# Patient Record
Sex: FEMALE | Race: WHITE | Employment: FULL TIME | URBAN - METROPOLITAN AREA
[De-identification: names, ages, dates, MRNs, and addresses within clinical notes are randomized per-mention and may not be internally consistent; named-entity substitution may affect disease eponyms.]

---

## 2017-02-07 ENCOUNTER — OFFICE VISIT (OUTPATIENT)
Dept: INTERNAL MEDICINE CLINIC | Age: 62
End: 2017-02-07

## 2017-02-07 VITALS
RESPIRATION RATE: 18 BRPM | DIASTOLIC BLOOD PRESSURE: 83 MMHG | HEART RATE: 78 BPM | HEIGHT: 67 IN | TEMPERATURE: 97.8 F | SYSTOLIC BLOOD PRESSURE: 146 MMHG | WEIGHT: 265.4 LBS | OXYGEN SATURATION: 97 % | BODY MASS INDEX: 41.65 KG/M2

## 2017-02-07 DIAGNOSIS — E66.01 OBESITY, MORBID, BMI 40.0-49.9 (HCC): ICD-10-CM

## 2017-02-07 DIAGNOSIS — E78.2 MIXED HYPERLIPIDEMIA: ICD-10-CM

## 2017-02-07 DIAGNOSIS — Z23 ENCOUNTER FOR IMMUNIZATION: ICD-10-CM

## 2017-02-07 DIAGNOSIS — I10 ESSENTIAL HYPERTENSION: Primary | ICD-10-CM

## 2017-02-07 PROBLEM — E78.5 HYPERLIPIDEMIA: Status: ACTIVE | Noted: 2017-02-07

## 2017-02-07 PROBLEM — M17.9 OSTEOARTHRITIS OF KNEE: Status: ACTIVE | Noted: 2017-02-07

## 2017-02-07 RX ORDER — LISINOPRIL 10 MG/1
10 TABLET ORAL DAILY
Qty: 90 TAB | Refills: 3 | Status: SHIPPED | OUTPATIENT
Start: 2017-02-07 | End: 2017-07-20 | Stop reason: ALTCHOICE

## 2017-02-07 RX ORDER — FUROSEMIDE 20 MG/1
20 TABLET ORAL DAILY
Qty: 90 TAB | Refills: 3 | Status: SHIPPED | OUTPATIENT
Start: 2017-02-07 | End: 2017-07-20 | Stop reason: SDUPTHER

## 2017-02-07 NOTE — MR AVS SNAPSHOT
Visit Information Date & Time Provider Department Dept. Phone Encounter #  
 2/7/2017  8:00 AM Anibal Vega MD Internist of [address] [place] [address] Place [id number] Your Appointments 8/8/2017  8:00 AM  
Office Visit with Anibal Vega MD  
Internist of [facility]) Appt Note: 6 months [address], [address] [place] [address] [address] [place]  
  
   
 [address], [address] Upcoming Health Maintenance Date Due DTaP/Tdap/Td series (1 - Tdap) 5/5/1977 ZOSTER VACCINE AGE 60> 5/5/2016 BREAST CANCER SCRN MAMMOGRAM 6/11/2017 COLONOSCOPY 5/28/2018 PAP AKA CERVICAL CYTOLOGY 7/28/2019 Allergies as of 2/7/2017  Review Complete On: 2/7/2017 By: Nicole Speaker No Known Allergies Current Immunizations  Reviewed on 10/10/2016 Name Date Influenza Vaccine (Quad) PF 10/10/2016 Not reviewed this visit You Were Diagnosed With   
  
 Codes Comments Essential hypertension    -  Primary ICD-10-CM: I10 
ICD-9-CM: 401.9 Vitals BP Pulse Temp Resp Height(growth percentile) Weight(growth percentile) 146/83 (BP 1 Location: Left arm, BP Patient Position: Sitting) 78 97.8 °F (36.6 °C) (Oral) 18 5' 6.5\" (1.689 m) 265 lb 6.4 oz (120.4 kg) SpO2 BMI OB Status Smoking Status 97% 42.19 kg/m2 Hysterectomy Never Smoker BMI and BSA Data Body Mass Index Body Surface Area  
 42.19 kg/m 2 2.38 m 2 Preferred Pharmacy Pharmacy Name Phone [facility] [address] [facility] [phone] Your Updated Medication List  
  
   
This list is accurate as of: 2/7/17  9:10 AM.  Always use your most recent med list.  
  
  
  
  
 acetaminophen 650 mg CR tablet Commonly known as:  TYLENOL Take 1 Tab by mouth every eight (8) hours as needed for Pain. calcium-cholecalciferol (d3) 600-125 mg-unit Tab Take 600 mg by mouth daily. cholecalciferol (vitamin D3) 2,000 unit Tab Take 1 Tab by mouth daily. furosemide 20 mg tablet Commonly known as:  LASIX Take 1 Tab by mouth daily. lisinopril 10 mg tablet Commonly known as:  Robertha Roup Take 1 Tab by mouth daily. meclizine 25 mg tablet Commonly known as:  ANTIVERT Take 1 Tab by mouth three (3) times daily as needed. Indications: VERTIGO  
  
 meloxicam 15 mg tablet Commonly known as:  MOBIC Take 1 Tab by mouth daily. omega-3 fatty acids-vitamin e 1,000 mg Cap Take 1 Cap by mouth. simvastatin 20 mg tablet Commonly known as:  ZOCOR Take 1 Tab by mouth nightly. Prescriptions Sent to Pharmacy Refills  
 furosemide (LASIX) 20 mg tablet 3 Sig: Take 1 Tab by mouth daily. Class: Normal  
 Pharmacy: [address] Ph #: [phone] Route: Oral  
 lisinopril (PRINIVIL, ZESTRIL) 10 mg tablet 3 Sig: Take 1 Tab by mouth daily. Class: Normal  
 Pharmacy: [address] Ph #: [phone] Route: Oral  
  
Patient Instructions PLAN: 
Key points we discussed today: 1. Call our office if symptoms worsen or if you have any questions 2. \"The Whole 30\" cook-book vs Paleo diet vs Mediterranean diet discussed today 3. Medications refilled. 4. Your goal is to keep your \"top\" blood pressure number below 140 and your \"bottom\" blood pressure number below 90. If you can, check your blood pressure 3 times per week. Notify us if your blood pressure is greater than 140/90. Dr. Marychuy Mcclelland [facility] [address] Phone: [phone] Fax: [phone] Thank you for choosing [facility] for all of your health care needs. High Cholesterol: Care Instructions Your Care Instructions Cholesterol is a type of fat in your blood. It is needed for many body functions, such as making new cells. Cholesterol is made by your body. It also comes from food you eat. High cholesterol means that you have too much of the fat in your blood. This raises your risk of a heart attack and stroke. LDL and HDL are part of your total cholesterol. LDL is the \"bad\" cholesterol. High LDL can raise your risk for heart disease, heart attack, and stroke. HDL is the \"good\" cholesterol. It helps clear bad cholesterol from the body. High HDL is linked with a lower risk of heart disease, heart attack, and stroke. Your cholesterol levels help your doctor find out your risk for having a heart attack or stroke. You and your doctor can talk about whether you need to lower your risk and what treatment is best for you. A heart-healthy lifestyle along with medicines can help lower your cholesterol and your risk. The way you choose to lower your risk will depend on how high your risk is for heart attack and stroke. It will also depend on how you feel about taking medicines. Follow-up care is a key part of your treatment and safety. Be sure to make and go to all appointments, and call your doctor if you are having problems. It's also a good idea to know your test results and keep a list of the medicines you take. How can you care for yourself at home? · Eat a variety of foods every day. Good choices include fruits, vegetables, whole grains (like oatmeal), dried beans and peas, nuts and seeds, soy products (like tofu), and fat-free or low-fat dairy products. · Replace butter, margarine, and hydrogenated or partially hydrogenated oils with olive and canola oils. (Canola oil margarine without trans fat is fine.) · Replace red meat with fish, poultry, and soy protein (like tofu). · Limit processed and packaged foods like chips, crackers, and cookies. · Bake, broil, or steam foods. Don't coleman them. · Be physically active. Get at least 30 minutes of exercise on most days of the week. Walking is a good choice. You also may want to do other activities, such as running, swimming, cycling, or playing tennis or team sports. · Stay at a healthy weight or lose weight by making the changes in eating and physical activity listed above. Losing just a small amount of weight, even 5 to 10 pounds, can reduce your risk for having a heart attack or stroke. · Do not smoke. When should you call for help? Watch closely for changes in your health, and be sure to contact your doctor if: 
· You need help making lifestyle changes. · You have questions about your medicine. Where can you learn more? Go to http://stantonTurbinegonsalo.info/. Enter Y741 in the search box to learn more about \"High Cholesterol: Care Instructions. \" Current as of: January 27, 2016 Content Version: 11.1 © 7647-2763 [employer]. Care instructions adapted under license by [employer] (which disclaims liability or warranty for this information). If you have questions about a medical condition or this instruction, always ask your healthcare professional. [address] any warranty or liability for your use of this information. Health Maintenance Due Topic Date Due  
 DTaP/Tdap/Td series (1 - Tdap) 05/05/1977  ZOSTER VACCINE AGE 60>  05/05/2016 [facility]! Dear Ananya Tirado: 
Thank you for requesting a [employer] account. Our records indicate that you already have an active [employer] account. You can access your account anytime at [email or web address] Did you know that you can access your hospital and ER discharge instructions at any time in [employer]? You can also review all of your test results from your hospital stay or ER visit. Additional Information If you have questions, please visit the Frequently Asked Questions section of the [email or web address] website at [email or web address] Remember, [employer] is NOT to be used for urgent needs. For medical emergencies, dial 911. Now available from your iPhone and Android! Please provide this summary of care documentation to your next provider. Your primary care clinician is listed as Lilli Brandt. If you have any questions after today's visit, please call [phone].

## 2017-02-07 NOTE — PROGRESS NOTES
INTERNISTS OF [facility]:  2/12/2017, MRN: [id number]       Zelda Floyd is a 62 y.o. female and presents to clinic for Hypertension; Cholesterol Problem; Labs (done 10-10-16 to discuss); and Immunization/Injection (t dap)    Subjective:   Pt is a 66yo female with h/o HLD, HTN, and DJD. 1. HTN:   - Chronic, present >6 months   - On lasix and lisinopril   - No side effects. She needs refills    2. HLD:   - Chronic, present >6 months   - On simvastatin, no adverse side effects.   - Has a 7lb weight gain   - No regular exercise   - Has problem with late evening snacks and a heavy dinner   - Has tried Weight Watchers but has to stop at times due to cost   - Not interested in bariatric surgery      Patient Active Problem List    Diagnosis Date Noted    Hyperlipidemia 02/07/2017    Essential hypertension 02/07/2017    Osteoarthritis of knee 02/07/2017       Current Outpatient Prescriptions   Medication Sig Dispense Refill    furosemide (LASIX) 20 mg tablet Take 1 Tab by mouth daily. 90 Tab 3    lisinopril (PRINIVIL, ZESTRIL) 10 mg tablet Take 1 Tab by mouth daily. 90 Tab 3    meloxicam (MOBIC) 15 mg tablet Take 1 Tab by mouth daily. 90 Tab 1    omega-3 fatty acids-vitamin e 1,000 mg cap Take 1 Cap by mouth.  simvastatin (ZOCOR) 20 mg tablet Take 1 Tab by mouth nightly. 90 Tab 3    cholecalciferol, vitamin D3, 2,000 unit tab Take 1 Tab by mouth daily. 90 Tab 3    calcium-cholecalciferol, d3, 600-125 mg-unit tab Take 600 mg by mouth daily.  acetaminophen (TYLENOL) 650 mg CR tablet Take 1 Tab by mouth every eight (8) hours as needed for Pain. 120 Tab 1    meclizine (ANTIVERT) 25 mg tablet Take 1 Tab by mouth three (3) times daily as needed.  Indications: VERTIGO 30 Tab 1       No Known Allergies    Past Medical History   Diagnosis Date    Arthritis      Bilateral Knee, and Bilateral Hand/Thumb    Carpal tunnel syndrome     Headache     History of ankle fusion 1997     left    Hx of migraine headaches  Hypercholesterolemia     Hypertension     Morbid obesity ([facility] [address]     Vertigo        Past Surgical History   Procedure Laterality Date    Hx hysterectomy       age 30years old   [facility] Hx tonsillectomy       at age 29years old   [facility] Hx appendectomy       age 25 years   [facility] Hx colonoscopy       Due in 2018: May 2015       Family History   Problem Relation Age of Onset    Heart Disease Mother     Hypertension Mother     Heart Surgery Mother     Cancer Father      colon    Hypertension Brother     No Known Problems Maternal Grandmother     Heart Disease Maternal Grandfather     MS Paternal Grandmother     Stroke Paternal Grandfather     Heart Disease Paternal Grandfather     No Known Problems Son        Social History   Substance Use Topics    Smoking status: Never Smoker    Smokeless tobacco: Never Used    Alcohol use 0.0 oz/week     0 Standard drinks or equivalent per week      Comment: rare once a year       ROS   Review of Systems   Constitutional: Negative for chills, fever and weight loss. HENT: Negative for ear pain and sore throat. Eyes: Negative for blurred vision and pain. Respiratory: Negative for cough and shortness of breath. Cardiovascular: Negative for chest pain. Gastrointestinal: Negative for abdominal pain. Genitourinary: Negative for dysuria. Musculoskeletal: Negative for joint pain and myalgias. Skin: Negative for rash. Neurological: Negative for tingling, focal weakness and headaches. Endo/Heme/Allergies: Negative for environmental allergies. Does not bruise/bleed easily. Psychiatric/Behavioral: Negative for substance abuse.        Objective     Vitals:    02/07/17 0817   BP: 146/83   Pulse: 78   Resp: 18   Temp: 97.8 °F (36.6 °C)   TempSrc: Oral   SpO2: 97%   Weight: 265 lb 6.4 oz (120.4 kg)   Height: 5' 6.5\" (1.689 m)   PainSc:   0 - No pain       Physical Exam   Constitutional: She is oriented to person, place, and time and well-developed, well-nourished, and in no distress. HENT:   Head: Normocephalic and atraumatic. Right Ear: External ear normal.   Left Ear: External ear normal.   Nose: Nose normal.   Mouth/Throat: Oropharynx is clear and moist. No oropharyngeal exudate. Clear TMs   Eyes: Conjunctivae and EOM are normal. Pupils are equal, round, and reactive to light. Right eye exhibits no discharge. Left eye exhibits no discharge. No scleral icterus. Neck: Neck supple. Cardiovascular: Normal rate, regular rhythm, normal heart sounds and intact distal pulses. Exam reveals no gallop and no friction rub. No murmur heard. Pulmonary/Chest: Effort normal and breath sounds normal. No respiratory distress. She has no wheezes. She has no rales. Abdominal: Soft. Bowel sounds are normal. She exhibits no distension. There is no tenderness. There is no rebound and no guarding. No hepatomegaly   Musculoskeletal: She exhibits no edema or tenderness (BUE are NTTP). Lymphadenopathy:     She has no cervical adenopathy. Neurological: She is alert and oriented to person, place, and time. She exhibits normal muscle tone. Gait normal.   Skin: Skin is warm and dry. No erythema. Psychiatric: Affect normal.   Nursing note and vitals reviewed.       LABS   Data Review:   Lab Results   Component Value Date/Time    WBC 6.2 10/10/2016 07:49 AM    HGB 14.6 10/10/2016 07:49 AM    HCT 45.1 10/10/2016 07:49 AM    PLATELET 409 78/65/1122 07:49 AM    MCV 89.7 10/10/2016 07:49 AM       Lab Results   Component Value Date/Time    Sodium 142 10/10/2016 07:49 AM    Potassium 4.2 10/10/2016 07:49 AM    Chloride 106 10/10/2016 07:49 AM    CO2 30 10/10/2016 07:49 AM    Anion gap 6 10/10/2016 07:49 AM    Glucose 97 10/10/2016 07:49 AM    BUN 15 10/10/2016 07:49 AM    Creatinine 0.58 10/10/2016 07:49 AM    BUN/Creatinine ratio 26 10/10/2016 07:49 AM    GFR est AA >60 10/10/2016 07:49 AM    GFR est non-AA >60 10/10/2016 07:49 AM    Calcium 9.3 10/10/2016 07:49 AM       Lab Results Component Value Date/Time    Cholesterol, total 164 10/10/2016 07:49 AM    HDL Cholesterol 56 10/10/2016 07:49 AM    LDL, calculated 69 10/10/2016 07:49 AM    VLDL, calculated 39 10/10/2016 07:49 AM    Triglyceride 195 10/10/2016 07:49 AM    CHOL/HDL Ratio 2.9 10/10/2016 07:49 AM       Lab Results   Component Value Date/Time    Hemoglobin A1c 5.4 05/16/2016 02:21 PM       Assessment/Plan:   1. Essential hypertension:  - Refilled her lasix and lisinopril    ORDERS:  - furosemide (LASIX) 20 mg tablet; Take 1 Tab by mouth daily. Dispense: 90 Tab; Refill: 3  - lisinopril (PRINIVIL, ZESTRIL) 10 mg tablet; Take 1 Tab by mouth daily. Dispense: 90 Tab; Refill: 3    2. Encounter for immunization  - Tdap given today    ORDERS:  - Tetanus, diphtheria toxoids and acellular pertussis (TDAP) vaccine, in individuals >=7 years, IM  - DC IMMUNIZ ADMIN,1 SINGLE/COMB VAC/TOXOID    3. HLD/Obesity:   - Pt counseled on the Paleo diet, the Whole 30, and the Mediterranean diet as ways to improve her diet, reduce her cholesterol, and lose weight. -C/w statin rx. Health Maintenance Due   Topic Date Due    ZOSTER VACCINE AGE 60>  05/05/2016    BREAST CANCER SCRN MAMMOGRAM  06/11/2017       Lab review: labs are reviewed in EHR    I have discussed the diagnosis with the patient and the intended plan as seen in the above orders. The patient has received an after-visit summary and questions were answered concerning future plans. I have discussed medication side effects and warnings with the patient as well. I have reviewed the plan of care with the patient, accepted their input and they are in agreement with the treatment goals. All questions were answered. The patient understands the plan of care. Handouts provided today with above information. Pt instructed if symptoms worsen to call the office or report to the ED for continued care. Greater than 50% of the visit time was spent in counseling and/or coordination of care. Follow-up Disposition:  Return in about 6 months (around 8/7/2017), or if symptoms worsen or fail to improve, for BP check.     Donell Cruz MD

## 2017-02-07 NOTE — PROGRESS NOTES
Chief Complaint   Patient presents with    Hypertension    Cholesterol Problem    Labs     done 10-10-16 to discuss     1. Have you been to the ER, urgent care clinic since your last visit? Hospitalized since your last visit? No    2. Have you seen or consulted any other health care providers outside of the [address] [place] [address] since your last visit? Include any pap smears or colon screening.  No

## 2017-02-07 NOTE — PROGRESS NOTES
Kathie Scherer is a 62 y.o. female who presents for routine immunizations. She denies any symptoms , reactions or allergies that would exclude them from being immunized today. Risks and adverse reactions were discussed and the VIS was given to them. All questions were addressed. She was observed for 10 min post injection. There were no reactions observed.     Chau Alcala LPN

## 2017-02-07 NOTE — PATIENT INSTRUCTIONS
PLAN:  Key points we discussed today:  1. Call our office if symptoms worsen or if you have any questions    2. \"The Whole 30\" cook-book vs Paleo diet vs Mediterranean diet discussed today    3. Medications refilled. 4. Your goal is to keep your \"top\" blood pressure number below 140 and your \"bottom\" blood pressure number below 90. If you can, check your blood pressure 3 times per week. Notify us if your blood pressure is greater than 140/90. Dr. Elijah Beavers  [facility] [place] [address]  Phone: [phone]  Fax: [phone]    Thank you for choosing [facility] for all of your health care needs. High Cholesterol: Care Instructions  Your Care Instructions  Cholesterol is a type of fat in your blood. It is needed for many body functions, such as making new cells. Cholesterol is made by your body. It also comes from food you eat. High cholesterol means that you have too much of the fat in your blood. This raises your risk of a heart attack and stroke. LDL and HDL are part of your total cholesterol. LDL is the \"bad\" cholesterol. High LDL can raise your risk for heart disease, heart attack, and stroke. HDL is the \"good\" cholesterol. It helps clear bad cholesterol from the body. High HDL is linked with a lower risk of heart disease, heart attack, and stroke. Your cholesterol levels help your doctor find out your risk for having a heart attack or stroke. You and your doctor can talk about whether you need to lower your risk and what treatment is best for you. A heart-healthy lifestyle along with medicines can help lower your cholesterol and your risk. The way you choose to lower your risk will depend on how high your risk is for heart attack and stroke. It will also depend on how you feel about taking medicines. Follow-up care is a key part of your treatment and safety.  Be sure to make and go to all appointments, and call your doctor if you are having problems. It's also a good idea to know your test results and keep a list of the medicines you take. How can you care for yourself at home? · Eat a variety of foods every day. Good choices include fruits, vegetables, whole grains (like oatmeal), dried beans and peas, nuts and seeds, soy products (like tofu), and fat-free or low-fat dairy products. · Replace butter, margarine, and hydrogenated or partially hydrogenated oils with olive and canola oils. (Canola oil margarine without trans fat is fine.)  · Replace red meat with fish, poultry, and soy protein (like tofu). · Limit processed and packaged foods like chips, crackers, and cookies. · Bake, broil, or steam foods. Don't coleman them. · Be physically active. Get at least 30 minutes of exercise on most days of the week. Walking is a good choice. You also may want to do other activities, such as running, swimming, cycling, or playing tennis or team sports. · Stay at a healthy weight or lose weight by making the changes in eating and physical activity listed above. Losing just a small amount of weight, even 5 to 10 pounds, can reduce your risk for having a heart attack or stroke. · Do not smoke. When should you call for help? Watch closely for changes in your health, and be sure to contact your doctor if:  · You need help making lifestyle changes. · You have questions about your medicine. Where can you learn more? Go to http://stanton-gonsalo.info/. Enter W755 in the search box to learn more about \"High Cholesterol: Care Instructions. \"  Current as of: January 27, 2016  Content Version: 11.1  © 1070-5865 [employer]. Care instructions adapted under license by [employer] (which disclaims liability or warranty for this information).  If you have questions about a medical condition or this instruction, always ask your healthcare professional. [employer] disclaims any warranty or liability for your use of this information.   Health Maintenance Due   Topic Date Due    DTaP/Tdap/Td series (1 - Tdap) 05/05/1977    ZOSTER VACCINE AGE 60>  05/05/2016

## 2017-02-12 PROBLEM — E66.01 OBESITY, MORBID, BMI 40.0-49.9 (HCC): Status: ACTIVE | Noted: 2017-02-12

## 2017-03-01 RX ORDER — MELOXICAM 15 MG/1
15 TABLET ORAL DAILY
Qty: 90 TAB | Refills: 1 | Status: SHIPPED | OUTPATIENT
Start: 2017-03-01 | End: 2017-08-30 | Stop reason: SDUPTHER

## 2017-03-15 ENCOUNTER — TELEPHONE (OUTPATIENT)
Dept: ORTHOPEDIC SURGERY | Facility: CLINIC | Age: 62
End: 2017-03-15

## 2017-06-26 DIAGNOSIS — L98.9 SKIN LESION: Primary | ICD-10-CM

## 2017-06-26 DIAGNOSIS — Z12.39 SCREENING FOR BREAST CANCER: ICD-10-CM

## 2017-07-14 ENCOUNTER — HOSPITAL ENCOUNTER (OUTPATIENT)
Dept: MAMMOGRAPHY | Age: 62
Discharge: HOME | End: 2017-07-14
Attending: INTERNAL MEDICINE
Payer: COMMERCIAL

## 2017-07-14 DIAGNOSIS — Z12.39 SCREENING FOR BREAST CANCER: ICD-10-CM

## 2017-07-14 PROCEDURE — 77067 SCR MAMMO BI INCL CAD: CPT

## 2017-07-18 RX ORDER — SIMVASTATIN 20 MG/1
20 TABLET, FILM COATED ORAL
Qty: 90 TAB | Refills: 3 | Status: SHIPPED | OUTPATIENT
Start: 2017-07-18 | End: 2018-07-08 | Stop reason: SDUPTHER

## 2017-07-20 ENCOUNTER — TELEPHONE (OUTPATIENT)
Dept: INTERNAL MEDICINE CLINIC | Age: 62
End: 2017-07-20

## 2017-07-20 ENCOUNTER — HOSPITAL ENCOUNTER (OUTPATIENT)
Dept: LAB | Age: 62
Discharge: HOME | End: 2017-07-20
Payer: COMMERCIAL

## 2017-07-20 ENCOUNTER — OFFICE VISIT (OUTPATIENT)
Dept: INTERNAL MEDICINE CLINIC | Age: 62
End: 2017-07-20

## 2017-07-20 ENCOUNTER — HOSPITAL ENCOUNTER (OUTPATIENT)
Dept: GENERAL RADIOLOGY | Age: 62
Discharge: HOME | End: 2017-07-20
Payer: COMMERCIAL

## 2017-07-20 VITALS
HEIGHT: 67 IN | SYSTOLIC BLOOD PRESSURE: 130 MMHG | BODY MASS INDEX: 44.95 KG/M2 | RESPIRATION RATE: 18 BRPM | HEART RATE: 57 BPM | TEMPERATURE: 96 F | OXYGEN SATURATION: 96 % | DIASTOLIC BLOOD PRESSURE: 90 MMHG | WEIGHT: 286.4 LBS

## 2017-07-20 DIAGNOSIS — R05.9 COUGH: ICD-10-CM

## 2017-07-20 DIAGNOSIS — R05.9 COUGH: Primary | ICD-10-CM

## 2017-07-20 DIAGNOSIS — R53.83 FATIGUE, UNSPECIFIED TYPE: ICD-10-CM

## 2017-07-20 DIAGNOSIS — I10 ESSENTIAL HYPERTENSION: ICD-10-CM

## 2017-07-20 LAB
BASOPHILS # BLD AUTO: 0 K/UL (ref 0–0.1)
BASOPHILS # BLD: 0 % (ref 0–2)
DIFFERENTIAL METHOD BLD: NORMAL
EOSINOPHIL # BLD: 0.1 K/UL (ref 0–0.4)
EOSINOPHIL NFR BLD: 2 % (ref 0–5)
ERYTHROCYTE [DISTWIDTH] IN BLOOD BY AUTOMATED COUNT: 12.2 % (ref 11.6–14.5)
HCT VFR BLD AUTO: 43.3 % (ref 35–45)
HGB BLD-MCNC: 14.3 G/DL (ref 12–16)
LYMPHOCYTES # BLD AUTO: 34 % (ref 21–52)
LYMPHOCYTES # BLD: 2.3 K/UL (ref 0.9–3.6)
MCH RBC QN AUTO: 29.2 PG (ref 24–34)
MCHC RBC AUTO-ENTMCNC: 33 G/DL (ref 31–37)
MCV RBC AUTO: 88.4 FL (ref 74–97)
MONOCYTES # BLD: 0.5 K/UL (ref 0.05–1.2)
MONOCYTES NFR BLD AUTO: 7 % (ref 3–10)
NEUTS SEG # BLD: 3.8 K/UL (ref 1.8–8)
NEUTS SEG NFR BLD AUTO: 57 % (ref 40–73)
PLATELET # BLD AUTO: 272 K/UL (ref 135–420)
PMV BLD AUTO: 11.3 FL (ref 9.2–11.8)
RBC # BLD AUTO: 4.9 M/UL (ref 4.2–5.3)
T4 FREE SERPL-MCNC: 1.1 NG/DL (ref 0.7–1.5)
TSH SERPL DL<=0.05 MIU/L-ACNC: 2.01 UIU/ML (ref 0.36–3.74)
WBC # BLD AUTO: 6.7 K/UL (ref 4.6–13.2)

## 2017-07-20 PROCEDURE — 71020 XR CHEST AP LAT: CPT

## 2017-07-20 PROCEDURE — 36415 COLL VENOUS BLD VENIPUNCTURE: CPT | Performed by: INTERNAL MEDICINE

## 2017-07-20 PROCEDURE — 85025 COMPLETE CBC W/AUTO DIFF WBC: CPT | Performed by: INTERNAL MEDICINE

## 2017-07-20 PROCEDURE — 84439 ASSAY OF FREE THYROXINE: CPT | Performed by: INTERNAL MEDICINE

## 2017-07-20 RX ORDER — AZITHROMYCIN 250 MG/1
TABLET, FILM COATED ORAL
Qty: 6 TAB | Refills: 0 | Status: CANCELLED | OUTPATIENT
Start: 2017-07-20

## 2017-07-20 RX ORDER — AMLODIPINE BESYLATE 5 MG/1
5 TABLET ORAL DAILY
Qty: 30 TAB | Refills: 6 | Status: CANCELLED | OUTPATIENT
Start: 2017-07-20

## 2017-07-20 RX ORDER — FUROSEMIDE 40 MG/1
40 TABLET ORAL DAILY
Qty: 90 TAB | Refills: 3 | Status: SHIPPED | OUTPATIENT
Start: 2017-07-20 | End: 2018-01-30

## 2017-07-20 RX ORDER — BENZONATATE 100 MG/1
100 CAPSULE ORAL
Qty: 14 CAP | Refills: 3 | Status: CANCELLED | OUTPATIENT
Start: 2017-07-20

## 2017-07-20 NOTE — PATIENT INSTRUCTIONS
DASH Diet: Care Instructions  Your Care Instructions  The DASH diet is an eating plan that can help lower your blood pressure. DASH stands for Dietary Approaches to Stop Hypertension. Hypertension is high blood pressure. The DASH diet focuses on eating foods that are high in calcium, potassium, and magnesium. These nutrients can lower blood pressure. The foods that are highest in these nutrients are fruits, vegetables, low-fat dairy products, nuts, seeds, and legumes. But taking calcium, potassium, and magnesium supplements instead of eating foods that are high in those nutrients does not have the same effect. The DASH diet also includes whole grains, fish, and poultry. The DASH diet is one of several lifestyle changes your doctor may recommend to lower your high blood pressure. Your doctor may also want you to decrease the amount of sodium in your diet. Lowering sodium while following the DASH diet can lower blood pressure even further than just the DASH diet alone. Follow-up care is a key part of your treatment and safety. Be sure to make and go to all appointments, and call your doctor if you are having problems. It's also a good idea to know your test results and keep a list of the medicines you take. How can you care for yourself at home? Following the DASH diet  · Eat 4 to 5 servings of fruit each day. A serving is 1 medium-sized piece of fruit, ½ cup chopped or canned fruit, 1/4 cup dried fruit, or 4 ounces (½ cup) of fruit juice. Choose fruit more often than fruit juice. · Eat 4 to 5 servings of vegetables each day. A serving is 1 cup of lettuce or raw leafy vegetables, ½ cup of chopped or cooked vegetables, or 4 ounces (½ cup) of vegetable juice. Choose vegetables more often than vegetable juice. · Get 2 to 3 servings of low-fat and fat-free dairy each day. A serving is 8 ounces of milk, 1 cup of yogurt, or 1 ½ ounces of cheese. · Eat 6 to 8 servings of grains each day.  A serving is 1 slice of bread, 1 ounce of dry cereal, or ½ cup of cooked rice, pasta, or cooked cereal. Try to choose whole-grain products as much as possible. · Limit lean meat, poultry, and fish to 2 servings each day. A serving is 3 ounces, about the size of a deck of cards. · Eat 4 to 5 servings of nuts, seeds, and legumes (cooked dried beans, lentils, and split peas) each week. A serving is 1/3 cup of nuts, 2 tablespoons of seeds, or ½ cup of cooked beans or peas. · Limit fats and oils to 2 to 3 servings each day. A serving is 1 teaspoon of vegetable oil or 2 tablespoons of salad dressing. · Limit sweets and added sugars to 5 servings or less a week. A serving is 1 tablespoon jelly or jam, ½ cup sorbet, or 1 cup of lemonade. · Eat less than 2,300 milligrams (mg) of sodium a day. If you limit your sodium to 1,500 mg a day, you can lower your blood pressure even more. Tips for success  · Start small. Do not try to make dramatic changes to your diet all at once. You might feel that you are missing out on your favorite foods and then be more likely to not follow the plan. Make small changes, and stick with them. Once those changes become habit, add a few more changes. · Try some of the following:  ¨ Make it a goal to eat a fruit or vegetable at every meal and at snacks. This will make it easy to get the recommended amount of fruits and vegetables each day. ¨ Try yogurt topped with fruit and nuts for a snack or healthy dessert. ¨ Add lettuce, tomato, cucumber, and onion to sandwiches. ¨ Combine a ready-made pizza crust with low-fat mozzarella cheese and lots of vegetable toppings. Try using tomatoes, squash, spinach, broccoli, carrots, cauliflower, and onions. ¨ Have a variety of cut-up vegetables with a low-fat dip as an appetizer instead of chips and dip. ¨ Sprinkle sunflower seeds or chopped almonds over salads. Or try adding chopped walnuts or almonds to cooked vegetables. ¨ Try some vegetarian meals using beans and peas. Add garbanzo or kidney beans to salads. Make burritos and tacos with mashed petit beans or black beans. Where can you learn more? Go to http://stanton-gonsalo.info/. Enter N856 in the search box to learn more about \"DASH Diet: Care Instructions. \"  Current as of: April 3, 2017  Content Version: 11.3  © 7265-1209 [employer]. Care instructions adapted under license by [employer] (which disclaims liability or warranty for this information). If you have questions about a medical condition or this instruction, always ask your healthcare professional. [place] [address] any warranty or liability for your use of this information. DASH Diet: Care Instructions  Your Care Instructions  The DASH diet is an eating plan that can help lower your blood pressure. DASH stands for Dietary Approaches to Stop Hypertension. Hypertension is high blood pressure. The DASH diet focuses on eating foods that are high in calcium, potassium, and magnesium. These nutrients can lower blood pressure. The foods that are highest in these nutrients are fruits, vegetables, low-fat dairy products, nuts, seeds, and legumes. But taking calcium, potassium, and magnesium supplements instead of eating foods that are high in those nutrients does not have the same effect. The DASH diet also includes whole grains, fish, and poultry. The DASH diet is one of several lifestyle changes your doctor may recommend to lower your high blood pressure. Your doctor may also want you to decrease the amount of sodium in your diet. Lowering sodium while following the DASH diet can lower blood pressure even further than just the DASH diet alone. Follow-up care is a key part of your treatment and safety. Be sure to make and go to all appointments, and call your doctor if you are having problems. It's also a good idea to know your test results and keep a list of the medicines you take.   How can you care for yourself at home? Following the DASH diet  · Eat 4 to 5 servings of fruit each day. A serving is 1 medium-sized piece of fruit, ½ cup chopped or canned fruit, 1/4 cup dried fruit, or 4 ounces (½ cup) of fruit juice. Choose fruit more often than fruit juice. · Eat 4 to 5 servings of vegetables each day. A serving is 1 cup of lettuce or raw leafy vegetables, ½ cup of chopped or cooked vegetables, or 4 ounces (½ cup) of vegetable juice. Choose vegetables more often than vegetable juice. · Get 2 to 3 servings of low-fat and fat-free dairy each day. A serving is 8 ounces of milk, 1 cup of yogurt, or 1 ½ ounces of cheese. · Eat 6 to 8 servings of grains each day. A serving is 1 slice of bread, 1 ounce of dry cereal, or ½ cup of cooked rice, pasta, or cooked cereal. Try to choose whole-grain products as much as possible. · Limit lean meat, poultry, and fish to 2 servings each day. A serving is 3 ounces, about the size of a deck of cards. · Eat 4 to 5 servings of nuts, seeds, and legumes (cooked dried beans, lentils, and split peas) each week. A serving is 1/3 cup of nuts, 2 tablespoons of seeds, or ½ cup of cooked beans or peas. · Limit fats and oils to 2 to 3 servings each day. A serving is 1 teaspoon of vegetable oil or 2 tablespoons of salad dressing. · Limit sweets and added sugars to 5 servings or less a week. A serving is 1 tablespoon jelly or jam, ½ cup sorbet, or 1 cup of lemonade. · Eat less than 2,300 milligrams (mg) of sodium a day. If you limit your sodium to 1,500 mg a day, you can lower your blood pressure even more. Tips for success  · Start small. Do not try to make dramatic changes to your diet all at once. You might feel that you are missing out on your favorite foods and then be more likely to not follow the plan. Make small changes, and stick with them. Once those changes become habit, add a few more changes.   · Try some of the following:  ¨ Make it a goal to eat a fruit or vegetable at every meal and at snacks. This will make it easy to get the recommended amount of fruits and vegetables each day. ¨ Try yogurt topped with fruit and nuts for a snack or healthy dessert. ¨ Add lettuce, tomato, cucumber, and onion to sandwiches. ¨ Combine a ready-made pizza crust with low-fat mozzarella cheese and lots of vegetable toppings. Try using tomatoes, squash, spinach, broccoli, carrots, cauliflower, and onions. ¨ Have a variety of cut-up vegetables with a low-fat dip as an appetizer instead of chips and dip. ¨ Sprinkle sunflower seeds or chopped almonds over salads. Or try adding chopped walnuts or almonds to cooked vegetables. ¨ Try some vegetarian meals using beans and peas. Add garbanzo or kidney beans to salads. Make burritos and tacos with mashed petit beans or black beans. Where can you learn more? Go to http://stantonFusionOnegonsalo.info/. Enter A232 in the search box to learn more about \"DASH Diet: Care Instructions. \"  Current as of: April 3, 2017  Content Version: 11.3  © 8885-8128 [employer]. Care instructions adapted under license by [employer] (which disclaims liability or warranty for this information). If you have questions about a medical condition or this instruction, always ask your healthcare professional. [place] [address] any warranty or liability for your use of this information. Cough: Care Instructions  Your Care Instructions  A cough is your body's response to something that bothers your throat or airways. Many things can cause a cough. You might cough because of a cold or the flu, bronchitis, or asthma. Smoking, postnasal drip, allergies, and stomach acid that backs up into your throat also can cause coughs. A cough is a symptom, not a disease. Most coughs stop when the cause, such as a cold, goes away. You can take a few steps at home to cough less and feel better.   Follow-up care is a key part of your treatment and safety. Be sure to make and go to all appointments, and call your doctor if you are having problems. It's also a good idea to know your test results and keep a list of the medicines you take. How can you care for yourself at home? · Drink lots of water and other fluids. This helps thin the mucus and soothes a dry or sore throat. Honey or lemon juice in hot water or tea may ease a dry cough. · Take cough medicine as directed by your doctor. · Prop up your head on pillows to help you breathe and ease a dry cough. · Try cough drops to soothe a dry or sore throat. Cough drops don't stop a cough. Medicine-flavored cough drops are no better than candy-flavored drops or hard candy. · Do not smoke. Avoid secondhand smoke. If you need help quitting, talk to your doctor about stop-smoking programs and medicines. These can increase your chances of quitting for good. When should you call for help? Call 911 anytime you think you may need emergency care. For example, call if:  · You have severe trouble breathing. Call your doctor now or seek immediate medical care if:  · You cough up blood. · You have new or worse trouble breathing. · You have a new or higher fever. · You have a new rash. Watch closely for changes in your health, and be sure to contact your doctor if:  · You cough more deeply or more often, especially if you notice more mucus or a change in the color of your mucus. · You have new symptoms, such as a sore throat, an earache, or sinus pain. · You do not get better as expected. Where can you learn more? Go to http://stanton-gonsalo.info/. Enter D279 in the search box to learn more about \"Cough: Care Instructions. \"  Current as of: March 25, 2017  Content Version: 11.3  © 4881-5442 [employer]. Care instructions adapted under license by [employer] (which disclaims liability or warranty for this information).  If you have questions about a medical condition or this instruction, always ask your healthcare professional. [place] [address] any warranty or liability for your use of this information.

## 2017-07-20 NOTE — TELEPHONE ENCOUNTER
I answered all of her questions. She is to stop lisinopril and take a higher dose of lasix as we discussed at her apt.     Dr. Michael Flores  [facility] [address].  Phone: [phone]  Fax: [phone]

## 2017-07-20 NOTE — PROGRESS NOTES
Chief Complaint   Patient presents with    Cough    Fatigue     Patient is here today for cough, non-productive and Fatigue with headache x 2 mths. Patient sts she feels weak during day and has been taking napd. Patient is due for Zooster vaccine. Patient sts she is having left side pain x 2 mth feels like is bruised. 1. Have you been to the ER, urgent care clinic since your last visit? Hospitalized since your last visit? No    2. Have you seen or consulted any other health care providers outside of the [address] [place] [address] since your last visit? Include any pap smears or colon screening.  No

## 2017-07-20 NOTE — MR AVS SNAPSHOT
Visit Information Date & Time Provider Department Dept. Phone Encounter #  
 7/20/2017  9:00 AM Matthew Phillips MD Internist of [facility] [address] [id number] Follow-up Instructions Return in about 3 weeks (around 8/10/2017). Your Appointments 8/3/2017 10:35 AM  
LAB with [facility] NURSE VISIT Internist of [facility] ([facility]) Appt Note: fasting labs [address], [address] [facility] [address] [place]  
  
   
 [address], [facility]  
  
    
 8/8/2017  8:00 AM  
Office Visit with Matthew Phillips MD  
Internist of [address] [place] [address] Appt Note: 6 months [address], [address] [facility] [address] [place]  
  
   
 [address], [facility]  
  
    
 8/10/2017  8:30 AM  
Office Visit with Matthew Phillips MD  
Internist of [address] [place] [address] Appt Note: 3 week follow up  
 [address], [address] [place] [address]  
[phone] Upcoming Health Maintenance Date Due ZOSTER VACCINE AGE 60> 5/5/2016 INFLUENZA AGE 9 TO ADULT 8/1/2017 COLONOSCOPY 5/28/2018 BREAST CANCER SCRN MAMMOGRAM 7/14/2018 PAP AKA CERVICAL CYTOLOGY 7/28/2019 DTaP/Tdap/Td series (2 - Td) 2/7/2027 Allergies as of 7/20/2017  Review Complete On: 7/20/2017 By: Tonio Guy LPN No Known Allergies Current Immunizations  Reviewed on 2/7/2017 Name Date Influenza Vaccine (Quad) PF 10/10/2016 Tdap 2/7/2017 Not reviewed this visit You Were Diagnosed With   
  
 Codes Comments Cough    -  Primary ICD-10-CM: L28 ICD-9-CM: 786.2 Fatigue, unspecified type     ICD-10-CM: R53.83 ICD-9-CM: 780.79 Essential hypertension     ICD-10-CM: I10 
ICD-9-CM: 401.9 Vitals BP Pulse Temp Resp Height(growth percentile) Weight(growth percentile) 130/90 (BP 1 Location: Right arm, BP Patient Position: Sitting) (!) 57 96 °F (35.6 °C) (Oral) 18 5' 6.5\" (1.689 m) 286 lb 6.4 oz (129.9 kg) SpO2 BMI OB Status Smoking Status 96% 45.53 kg/m2 Hysterectomy Never Smoker Vitals History BMI and BSA Data Body Mass Index Body Surface Area 45.53 kg/m 2 2.47 m 2 Preferred Pharmacy Pharmacy Name Phone [facility] [address] [place] [address] [phone] Your Updated Medication List  
  
   
This list is accurate as of: 7/20/17  9:49 AM.  Always use your most recent med list.  
  
  
  
  
 calcium-cholecalciferol (d3) 600-125 mg-unit Tab Take 600 mg by mouth daily. cholecalciferol (vitamin D3) 2,000 unit Tab Take 1 Tab by mouth daily. furosemide 40 mg tablet Commonly known as:  LASIX Take 1 Tab by mouth daily. meloxicam 15 mg tablet Commonly known as:  MOBIC Take 1 Tab by mouth daily. omega-3 fatty acids-vitamin e 1,000 mg Cap Take 1 Cap by mouth. simvastatin 20 mg tablet Commonly known as:  ZOCOR Take 1 Tab by mouth nightly. Prescriptions Sent to Pharmacy Refills  
 furosemide (LASIX) 40 mg tablet 3 Sig: Take 1 Tab by mouth daily. Class: Normal  
 Pharmacy: [facility] [address] [place] [address] Ph #: [phone] Route: Oral  
  
Follow-up Instructions Return in about 3 weeks (around 8/10/2017). To-Do List   
 07/20/2017 Lab:  CBC WITH AUTOMATED DIFF Around 07/20/2017 Lab:  TSH AND FREE T4 Around 07/20/2017 Imaging:  XR CHEST AP LAT Patient Instructions DASH Diet: Care Instructions Your Care Instructions The DASH diet is an eating plan that can help lower your blood pressure. DASH stands for Dietary Approaches to Stop Hypertension. Hypertension is high blood pressure. The DASH diet focuses on eating foods that are high in calcium, potassium, and magnesium. These nutrients can lower blood pressure. The foods that are highest in these nutrients are fruits, vegetables, low-fat dairy products, nuts, seeds, and legumes. But taking calcium, potassium, and magnesium supplements instead of eating foods that are high in those nutrients does not have the same effect. The DASH diet also includes whole grains, fish, and poultry. The DASH diet is one of several lifestyle changes your doctor may recommend to lower your high blood pressure. Your doctor may also want you to decrease the amount of sodium in your diet. Lowering sodium while following the DASH diet can lower blood pressure even further than just the DASH diet alone. Follow-up care is a key part of your treatment and safety. Be sure to make and go to all appointments, and call your doctor if you are having problems. It's also a good idea to know your test results and keep a list of the medicines you take. How can you care for yourself at home? Following the DASH diet · Eat 4 to 5 servings of fruit each day. A serving is 1 medium-sized piece of fruit, ½ cup chopped or canned fruit, 1/4 cup dried fruit, or 4 ounces (½ cup) of fruit juice. Choose fruit more often than fruit juice. · Eat 4 to 5 servings of vegetables each day. A serving is 1 cup of lettuce or raw leafy vegetables, ½ cup of chopped or cooked vegetables, or 4 ounces (½ cup) of vegetable juice. Choose vegetables more often than vegetable juice. · Get 2 to 3 servings of low-fat and fat-free dairy each day. A serving is 8 ounces of milk, 1 cup of yogurt, or 1 ½ ounces of cheese. · Eat 6 to 8 servings of grains each day. A serving is 1 slice of bread, 1 ounce of dry cereal, or ½ cup of cooked rice, pasta, or cooked cereal. Try to choose whole-grain products as much as possible. · Limit lean meat, poultry, and fish to 2 servings each day.  A serving is 3 ounces, about the size of a deck of cards. · Eat 4 to 5 servings of nuts, seeds, and legumes (cooked dried beans, lentils, and split peas) each week. A serving is 1/3 cup of nuts, 2 tablespoons of seeds, or ½ cup of cooked beans or peas. · Limit fats and oils to 2 to 3 servings each day. A serving is 1 teaspoon of vegetable oil or 2 tablespoons of salad dressing. · Limit sweets and added sugars to 5 servings or less a week. A serving is 1 tablespoon jelly or jam, ½ cup sorbet, or 1 cup of lemonade. · Eat less than 2,300 milligrams (mg) of sodium a day. If you limit your sodium to 1,500 mg a day, you can lower your blood pressure even more. Tips for success · Start small. Do not try to make dramatic changes to your diet all at once. You might feel that you are missing out on your favorite foods and then be more likely to not follow the plan. Make small changes, and stick with them. Once those changes become habit, add a few more changes. · Try some of the following: ¨ Make it a goal to eat a fruit or vegetable at every meal and at snacks. This will make it easy to get the recommended amount of fruits and vegetables each day. ¨ Try yogurt topped with fruit and nuts for a snack or healthy dessert. ¨ Add lettuce, tomato, cucumber, and onion to sandwiches. ¨ Combine a ready-made pizza crust with low-fat mozzarella cheese and lots of vegetable toppings. Try using tomatoes, squash, spinach, broccoli, carrots, cauliflower, and onions. ¨ Have a variety of cut-up vegetables with a low-fat dip as an appetizer instead of chips and dip. ¨ Sprinkle sunflower seeds or chopped almonds over salads. Or try adding chopped walnuts or almonds to cooked vegetables. ¨ Try some vegetarian meals using beans and peas. Add garbanzo or kidney beans to salads. Make burritos and tacos with mashed petit beans or black beans. Where can you learn more? Go to http://eid-gonsalo.info/. Enter E900 in the search box to learn more about \"DASH Diet: Care Instructions. \" Current as of: April 3, 2017 Content Version: 11.3 © 2419-4533 Echo it. Care instructions adapted under license by [employer] (which disclaims liability or warranty for this information). If you have questions about a medical condition or this instruction, always ask your healthcare professional. [address] any warranty or liability for your use of this information. DASH Diet: Care Instructions Your Care Instructions The DASH diet is an eating plan that can help lower your blood pressure. DASH stands for Dietary Approaches to Stop Hypertension. Hypertension is high blood pressure. The DASH diet focuses on eating foods that are high in calcium, potassium, and magnesium. These nutrients can lower blood pressure. The foods that are highest in these nutrients are fruits, vegetables, low-fat dairy products, nuts, seeds, and legumes. But taking calcium, potassium, and magnesium supplements instead of eating foods that are high in those nutrients does not have the same effect. The DASH diet also includes whole grains, fish, and poultry. The DASH diet is one of several lifestyle changes your doctor may recommend to lower your high blood pressure. Your doctor may also want you to decrease the amount of sodium in your diet. Lowering sodium while following the DASH diet can lower blood pressure even further than just the DASH diet alone. Follow-up care is a key part of your treatment and safety. Be sure to make and go to all appointments, and call your doctor if you are having problems. It's also a good idea to know your test results and keep a list of the medicines you take. How can you care for yourself at home? Following the DASH diet · Eat 4 to 5 servings of fruit each day.  A serving is 1 medium-sized piece of fruit, ½ cup chopped or canned fruit, 1/4 cup dried fruit, or 4 ounces (½ cup) of fruit juice. Choose fruit more often than fruit juice. · Eat 4 to 5 servings of vegetables each day. A serving is 1 cup of lettuce or raw leafy vegetables, ½ cup of chopped or cooked vegetables, or 4 ounces (½ cup) of vegetable juice. Choose vegetables more often than vegetable juice. · Get 2 to 3 servings of low-fat and fat-free dairy each day. A serving is 8 ounces of milk, 1 cup of yogurt, or 1 ½ ounces of cheese. · Eat 6 to 8 servings of grains each day. A serving is 1 slice of bread, 1 ounce of dry cereal, or ½ cup of cooked rice, pasta, or cooked cereal. Try to choose whole-grain products as much as possible. · Limit lean meat, poultry, and fish to 2 servings each day. A serving is 3 ounces, about the size of a deck of cards. · Eat 4 to 5 servings of nuts, seeds, and legumes (cooked dried beans, lentils, and split peas) each week. A serving is 1/3 cup of nuts, 2 tablespoons of seeds, or ½ cup of cooked beans or peas. · Limit fats and oils to 2 to 3 servings each day. A serving is 1 teaspoon of vegetable oil or 2 tablespoons of salad dressing. · Limit sweets and added sugars to 5 servings or less a week. A serving is 1 tablespoon jelly or jam, ½ cup sorbet, or 1 cup of lemonade. · Eat less than 2,300 milligrams (mg) of sodium a day. If you limit your sodium to 1,500 mg a day, you can lower your blood pressure even more. Tips for success · Start small. Do not try to make dramatic changes to your diet all at once. You might feel that you are missing out on your favorite foods and then be more likely to not follow the plan. Make small changes, and stick with them. Once those changes become habit, add a few more changes. · Try some of the following: ¨ Make it a goal to eat a fruit or vegetable at every meal and at snacks. This will make it easy to get the recommended amount of fruits and vegetables each day. ¨ Try yogurt topped with fruit and nuts for a snack or healthy dessert. ¨ Add lettuce, tomato, cucumber, and onion to sandwiches. ¨ Combine a ready-made pizza crust with low-fat mozzarella cheese and lots of vegetable toppings. Try using tomatoes, squash, spinach, broccoli, carrots, cauliflower, and onions. ¨ Have a variety of cut-up vegetables with a low-fat dip as an appetizer instead of chips and dip. ¨ Sprinkle sunflower seeds or chopped almonds over salads. Or try adding chopped walnuts or almonds to cooked vegetables. ¨ Try some vegetarian meals using beans and peas. Add garbanzo or kidney beans to salads. Make burritos and tacos with mashed petit beans or black beans. Where can you learn more? Go to http://stanton[employer] Incgonsalo.info/. Enter Z027 in the search box to learn more about \"DASH Diet: Care Instructions. \" Current as of: April 3, 2017 Content Version: 11.3 © 6337-7479 [email or web address]. Care instructions adapted under license by [employer] (which disclaims liability or warranty for this information). If you have questions about a medical condition or this instruction, always ask your healthcare professional. [address] any warranty or liability for your use of this information. Cough: Care Instructions Your Care Instructions A cough is your body's response to something that bothers your throat or airways. Many things can cause a cough. You might cough because of a cold or the flu, bronchitis, or asthma. Smoking, postnasal drip, allergies, and stomach acid that backs up into your throat also can cause coughs. A cough is a symptom, not a disease. Most coughs stop when the cause, such as a cold, goes away. You can take a few steps at home to cough less and feel better. Follow-up care is a key part of your treatment and safety.  Be sure to make and go to all appointments, and call your doctor if you are having problems. It's also a good idea to know your test results and keep a list of the medicines you take. How can you care for yourself at home? · Drink lots of water and other fluids. This helps thin the mucus and soothes a dry or sore throat. Honey or lemon juice in hot water or tea may ease a dry cough. · Take cough medicine as directed by your doctor. · Prop up your head on pillows to help you breathe and ease a dry cough. · Try cough drops to soothe a dry or sore throat. Cough drops don't stop a cough. Medicine-flavored cough drops are no better than candy-flavored drops or hard candy. · Do not smoke. Avoid secondhand smoke. If you need help quitting, talk to your doctor about stop-smoking programs and medicines. These can increase your chances of quitting for good. When should you call for help? Call 911 anytime you think you may need emergency care. For example, call if: 
· You have severe trouble breathing. Call your doctor now or seek immediate medical care if: 
· You cough up blood. · You have new or worse trouble breathing. · You have a new or higher fever. · You have a new rash. Watch closely for changes in your health, and be sure to contact your doctor if: 
· You cough more deeply or more often, especially if you notice more mucus or a change in the color of your mucus. · You have new symptoms, such as a sore throat, an earache, or sinus pain. · You do not get better as expected. Where can you learn more? Go to http://stanton-gonsalo.info/. Enter D279 in the search box to learn more about \"Cough: Care Instructions. \" Current as of: March 25, 2017 Content Version: 11.3 © 3944-9564 XL Video. Care instructions adapted under license by [employer] (which disclaims liability or warranty for this information).  If you have questions about a medical condition or this instruction, always ask your healthcare professional. [employer] disclaims any warranty or liability for your use of this information. Introducing [facility]! Dear Anthony Tijerina: 
Thank you for requesting a Docphin account. Our records indicate that you already have an active Docphin account. You can access your account anytime at [email or web address] Did you know that you can access your hospital and ER discharge instructions at any time in Docphin? You can also review all of your test results from your hospital stay or ER visit. Additional Information If you have questions, please visit the Frequently Asked Questions section of the Docphin website at [email or web address] Remember, Docphin is NOT to be used for urgent needs. For medical emergencies, dial 911. Now available from your iPhone and Android! Please provide this summary of care documentation to your next provider. Your primary care clinician is listed as Beka Pineda. If you have any questions after today's visit, please call [phone].

## 2017-07-20 NOTE — PROGRESS NOTES
INTERNISTS OF [facility]:  7/20/2017, MRN: [id number]      Cristo Matias is a 65 y.o. female and presents to clinic for Cough and Fatigue    Subjective:   Pt is a 64yo female with h/o HLD, HTN, and DJD. 1. Cough and Fatigue: The patient has cough and fatigue for 2 months. Her cough is non-productive. She often feels tired wanting to take naps during the day. She tried OTC rx with little relief of sx. She has some associated left sided pain along her rib cage that she has with her position. No alleviating factors are known. No hematochezia. No vaginal bleeding. No hematuria. No fever or chills. No sick contacts. No shortness of breath. 2. Hypertension: This is a chronic condition, present over 3 months. The patient was taking Lasix 20 mg daily and lisinopril 10 mg daily. Her blood pressure is 130/90. She has never had any problems taking her medications or had any adverse side effects. Patient Active Problem List    Diagnosis Date Noted    Obesity, morbid, BMI 40.0-49.9 ([facility] [address] 02/12/2017    Hyperlipidemia 02/07/2017    Essential hypertension 02/07/2017    Osteoarthritis of knee 02/07/2017       Current Outpatient Prescriptions   Medication Sig Dispense Refill    furosemide (LASIX) 40 mg tablet Take 1 Tab by mouth daily. 90 Tab 3    simvastatin (ZOCOR) 20 mg tablet Take 1 Tab by mouth nightly. 90 Tab 3    meloxicam (MOBIC) 15 mg tablet Take 1 Tab by mouth daily. 90 Tab 1    omega-3 fatty acids-vitamin e 1,000 mg cap Take 1 Cap by mouth.  cholecalciferol, vitamin D3, 2,000 unit tab Take 1 Tab by mouth daily. 90 Tab 3    calcium-cholecalciferol, d3, 600-125 mg-unit tab Take 600 mg by mouth daily.          No Known Allergies    Past Medical History:   Diagnosis Date    Arthritis     Bilateral Knee, and Bilateral Hand/Thumb    Carpal tunnel syndrome     Headache     History of ankle fusion 1997    left    Hx of migraine headaches     Hypercholesterolemia     Hypertension     Morbid obesity ([address]     Vertigo        Past Surgical History:   Procedure Laterality Date    HX APPENDECTOMY      age 25 years    HX COLONOSCOPY      Due in 2018: May 2015    HX HYSTERECTOMY      age 30years old   [place] HX TONSILLECTOMY      at age 29years old       Family History   Problem Relation Age of Onset    Heart Disease Mother     Hypertension Mother     Heart Surgery Mother     Cancer Father      colon    Hypertension Brother     No Known Problems Maternal Grandmother     Heart Disease Maternal Grandfather     MS Paternal Grandmother     Stroke Paternal Grandfather     Heart Disease Paternal Grandfather     No Known Problems Son        Social History   Substance Use Topics    Smoking status: Never Smoker    Smokeless tobacco: Never Used    Alcohol use 0.0 oz/week     0 Standard drinks or equivalent per week      Comment: rare once a year       ROS   Review of Systems   Constitutional: Positive for malaise/fatigue. Negative for chills and fever. HENT: Negative for ear pain and sore throat. Eyes: Negative for blurred vision and pain. Respiratory: Positive for cough. Negative for sputum production and shortness of breath. Cardiovascular: Negative for chest pain. Gastrointestinal: Negative for abdominal pain, blood in stool and melena. Genitourinary: Negative for dysuria and hematuria. Musculoskeletal: Positive for myalgias. Negative for joint pain. Skin: Negative for rash. Neurological: Negative for tingling, focal weakness and headaches. Endo/Heme/Allergies: Does not bruise/bleed easily. Psychiatric/Behavioral: Negative for substance abuse.        Objective     Vitals:    07/20/17 0908   BP: 130/90   Pulse: (!) 57   Resp: 18   Temp: 96 °F (35.6 °C)   TempSrc: Oral   SpO2: 96%   Weight: 286 lb 6.4 oz (129.9 kg)   Height: 5' 6.5\" (1.689 m)   PainSc:   6   PainLoc: Abdomen       Physical Exam   Constitutional: She is oriented to person, place, and time and well-developed, well-nourished, and in no distress. HENT:   Head: Normocephalic and atraumatic. Right Ear: External ear normal.   Left Ear: External ear normal.   Nose: Nose normal.   Mouth/Throat: Oropharynx is clear and moist. No oropharyngeal exudate. Clear TMs   Eyes: Conjunctivae and EOM are normal. Pupils are equal, round, and reactive to light. Right eye exhibits no discharge. Left eye exhibits no discharge. No scleral icterus. Neck: Neck supple. Cardiovascular: Normal rate, regular rhythm, normal heart sounds and intact distal pulses. Exam reveals no gallop and no friction rub. No murmur heard. Pulmonary/Chest: Effort normal and breath sounds normal. No respiratory distress. She has no wheezes. She has no rales. Abdominal: Soft. Bowel sounds are normal. She exhibits no distension. There is no tenderness. There is no rebound and no guarding. No hepatosplenomegaly   Musculoskeletal: She exhibits no edema or tenderness (BUE are NTTP). Lymphadenopathy:     She has no cervical adenopathy. Neurological: She is alert and oriented to person, place, and time. She exhibits normal muscle tone. Gait normal.   Skin: Skin is warm and dry. No erythema. Psychiatric: Affect normal.   Nursing note and vitals reviewed.       LABS   Data Review:   Lab Results   Component Value Date/Time    WBC 6.2 10/10/2016 07:49 AM    HGB 14.6 10/10/2016 07:49 AM    HCT 45.1 10/10/2016 07:49 AM    PLATELET 807 32/77/9340 07:49 AM    MCV 89.7 10/10/2016 07:49 AM       Lab Results   Component Value Date/Time    Sodium 142 10/10/2016 07:49 AM    Potassium 4.2 10/10/2016 07:49 AM    Chloride 106 10/10/2016 07:49 AM    CO2 30 10/10/2016 07:49 AM    Anion gap 6 10/10/2016 07:49 AM    Glucose 97 10/10/2016 07:49 AM    BUN 15 10/10/2016 07:49 AM    Creatinine 0.58 10/10/2016 07:49 AM    BUN/Creatinine ratio 26 10/10/2016 07:49 AM    GFR est AA >60 10/10/2016 07:49 AM    GFR est non-AA >60 10/10/2016 07:49 AM    Calcium 9.3 10/10/2016 07:49 AM Lab Results   Component Value Date/Time    Cholesterol, total 164 10/10/2016 07:49 AM    HDL Cholesterol 56 10/10/2016 07:49 AM    LDL, calculated 69 10/10/2016 07:49 AM    VLDL, calculated 39 10/10/2016 07:49 AM    Triglyceride 195 10/10/2016 07:49 AM    CHOL/HDL Ratio 2.9 10/10/2016 07:49 AM       Lab Results   Component Value Date/Time    Hemoglobin A1c 5.4 05/16/2016 02:21 PM       Assessment/Plan:   Cough, Fatigue, and HTN: On lisinopril and lasix. +H/o edema in BLE (stable per PE findings today). PE is reassuring today.  -We will check a chest x-ray given duration of her symptoms.  -We will check a CBC and TFTs given fatigue symptoms as well. -The patient was instructed to stop her lisinopril. We will replace this with a higher dose of her Lasix. Her Lasix was increased from 20 mg daily to 40 mg daily.  -We will have her return to clinic in 2 weeks for a BMP. She will follow with me in 3 weeks to assess her response to the higher dose of Lasix and the trial off of lisinopril. If she is still having cough symptoms, I will consider adding a PPI and PFTs for presumed GERD and to rule out cough variant asthma respectively. ORDERS:  - XR CHEST AP LAT; Future  - CBC WITH AUTOMATED DIFF; Future  - TSH AND FREE T4; Future  - furosemide (LASIX) 40 mg tablet; Take 1 Tab by mouth daily. Dispense: 90 Tab; Refill: 3        Health Maintenance Due   Topic Date Due    ZOSTER VACCINE AGE 60>  05/05/2016     Lab review: labs are reviewed and ordered as mentioned above    I have discussed the diagnosis with the patient and the intended plan as seen in the above orders. The patient has received an after-visit summary and questions were answered concerning future plans. I have discussed medication side effects and warnings with the patient as well. I have reviewed the plan of care with the patient, accepted their input and they are in agreement with the treatment goals. All questions were answered.  The patient understands the plan of care. Handouts provided today with above information. Pt instructed if symptoms worsen to call the office or report to the ED for continued care. Greater than 50% of the visit time was spent in counseling and/or coordination of care. Follow-up Disposition:  Return in about 3 weeks (around 8/10/2017).     Tiffanie France MD

## 2017-08-03 DIAGNOSIS — I10 ESSENTIAL HYPERTENSION: Primary | ICD-10-CM

## 2017-08-04 ENCOUNTER — TELEPHONE (OUTPATIENT)
Dept: INTERNAL MEDICINE CLINIC | Age: 62
End: 2017-08-04

## 2017-08-04 NOTE — TELEPHONE ENCOUNTER
Princess with [employer] is calling. Patient contacted them to see if they would [place] [facility] [place]. Emma Stanford stated they will not cover visits with a NP. She wants to know it they will be filed under a supervising provider.     [phone] ext [phone]

## 2017-08-05 ENCOUNTER — HOSPITAL ENCOUNTER (OUTPATIENT)
Dept: LAB | Age: 62
Discharge: HOME | End: 2017-08-05
Payer: COMMERCIAL

## 2017-08-05 DIAGNOSIS — I10 ESSENTIAL HYPERTENSION: ICD-10-CM

## 2017-08-05 LAB
ANION GAP BLD CALC-SCNC: 10 MMOL/L (ref 3–18)
BUN SERPL-MCNC: 15 MG/DL (ref 7–18)
BUN/CREAT SERPL: 25 (ref 12–20)
CALCIUM SERPL-MCNC: 8.8 MG/DL (ref 8.5–10.1)
CHLORIDE SERPL-SCNC: 106 MMOL/L (ref 100–108)
CO2 SERPL-SCNC: 27 MMOL/L (ref 21–32)
CREAT SERPL-MCNC: 0.6 MG/DL (ref 0.6–1.3)
GLUCOSE SERPL-MCNC: 114 MG/DL (ref 74–99)
POTASSIUM SERPL-SCNC: 4.4 MMOL/L (ref 3.5–5.5)
SODIUM SERPL-SCNC: 143 MMOL/L (ref 136–145)

## 2017-08-05 PROCEDURE — 36415 COLL VENOUS BLD VENIPUNCTURE: CPT | Performed by: INTERNAL MEDICINE

## 2017-08-05 PROCEDURE — 80048 BASIC METABOLIC PNL TOTAL CA: CPT | Performed by: INTERNAL MEDICINE

## 2017-08-10 ENCOUNTER — HOSPITAL ENCOUNTER (OUTPATIENT)
Dept: LAB | Age: 62
Discharge: HOME | End: 2017-08-10
Payer: COMMERCIAL

## 2017-08-10 ENCOUNTER — OFFICE VISIT (OUTPATIENT)
Dept: INTERNAL MEDICINE CLINIC | Age: 62
End: 2017-08-10

## 2017-08-10 VITALS
OXYGEN SATURATION: 97 % | TEMPERATURE: 97.4 F | HEIGHT: 67 IN | SYSTOLIC BLOOD PRESSURE: 143 MMHG | WEIGHT: 273.4 LBS | DIASTOLIC BLOOD PRESSURE: 83 MMHG | BODY MASS INDEX: 42.91 KG/M2 | HEART RATE: 76 BPM | RESPIRATION RATE: 18 BRPM

## 2017-08-10 DIAGNOSIS — R60.0 BILATERAL EDEMA OF LOWER EXTREMITY: ICD-10-CM

## 2017-08-10 DIAGNOSIS — R60.1 GENERALIZED EDEMA: Primary | ICD-10-CM

## 2017-08-10 DIAGNOSIS — R05.9 COUGH: ICD-10-CM

## 2017-08-10 DIAGNOSIS — R60.1 GENERALIZED EDEMA: ICD-10-CM

## 2017-08-10 DIAGNOSIS — I10 ESSENTIAL HYPERTENSION: ICD-10-CM

## 2017-08-10 LAB
ALBUMIN SERPL BCP-MCNC: 3.9 G/DL (ref 3.4–5)
ALBUMIN/GLOB SERPL: 1.2 {RATIO} (ref 0.8–1.7)
ALP SERPL-CCNC: 77 U/L (ref 45–117)
ALT SERPL-CCNC: 82 U/L (ref 13–56)
AST SERPL W P-5'-P-CCNC: 31 U/L (ref 15–37)
BILIRUB DIRECT SERPL-MCNC: 0.1 MG/DL (ref 0–0.2)
BILIRUB SERPL-MCNC: 0.5 MG/DL (ref 0.2–1)
BNP SERPL-MCNC: 43 PG/ML (ref 0–900)
GLOBULIN SER CALC-MCNC: 3.2 G/DL (ref 2–4)
PROT SERPL-MCNC: 7.1 G/DL (ref 6.4–8.2)

## 2017-08-10 PROCEDURE — 83880 ASSAY OF NATRIURETIC PEPTIDE: CPT | Performed by: INTERNAL MEDICINE

## 2017-08-10 PROCEDURE — 80076 HEPATIC FUNCTION PANEL: CPT | Performed by: INTERNAL MEDICINE

## 2017-08-10 NOTE — PROGRESS NOTES
Chief Complaint   Patient presents with    Cough    Fatigue     Patient is here today for 3wk F/U. Patient sts still having swelling and she can feel her body swelling. Patient sts she isn't urinating very much during. 1. Have you been to the ER, urgent care clinic since your last visit? Hospitalized since your last visit? No    2. Have you seen or consulted any other health care providers outside of the [address] [place] [address] since your last visit? Include any pap smears or colon screening.  No

## 2017-08-10 NOTE — PATIENT INSTRUCTIONS

## 2017-08-10 NOTE — PROGRESS NOTES
INTERNISTS OF [facility]:  8/10/2017, MRN: [id number]      Gein Pugh is a 65 y.o. female and presents to clinic for Cough and Fatigue    Subjective:   Pt is a 64yo female with h/o HLD, HTN, and DJD. Cough, Fatigue, and HTN: The patient has a history of cough and fatigue for 2-3 months. Her cough seems to be improving since stopping her lisinopril. Her cough is nonproductive. To compensate for d/c her lisinopril, I increased her Lasix. She reports no adverse side effects from the higher Lasix dose. In fact, she states she has more edema in all extremities over the past week. She denies shortness of breath. Edema resolves with rest.  Over the past 2 days, she notes that her legs have been more edematous. No history of travel recently. Her lifestyle is not sedentary. No fever or chills. She has a long-standing history of hypertension, present for over 6 months. +Lasix. Patient Active Problem List    Diagnosis Date Noted    Obesity, morbid, BMI 40.0-49.9 ([address] 02/12/2017    Hyperlipidemia 02/07/2017    Essential hypertension 02/07/2017    Osteoarthritis of knee 02/07/2017       Current Outpatient Prescriptions   Medication Sig Dispense Refill    furosemide (LASIX) 40 mg tablet Take 1 Tab by mouth daily. 90 Tab 3    simvastatin (ZOCOR) 20 mg tablet Take 1 Tab by mouth nightly. 90 Tab 3    meloxicam (MOBIC) 15 mg tablet Take 1 Tab by mouth daily. 90 Tab 1    omega-3 fatty acids-vitamin e 1,000 mg cap Take 1 Cap by mouth.  cholecalciferol, vitamin D3, 2,000 unit tab Take 1 Tab by mouth daily. 90 Tab 3    calcium-cholecalciferol, d3, 600-125 mg-unit tab Take 600 mg by mouth daily.          No Known Allergies    Past Medical History:   Diagnosis Date    Arthritis     Bilateral Knee, and Bilateral Hand/Thumb    Carpal tunnel syndrome     Headache     History of ankle fusion 1997    left    Hx of migraine headaches     Hypercholesterolemia     Hypertension     Morbid obesity ([address]     Vertigo        Past Surgical History:   Procedure Laterality Date    HX APPENDECTOMY      age 25 years    HX COLONOSCOPY      Due in 2018: May 2015    HX HYSTERECTOMY      age 30years old   Cindi Alas HX TONSILLECTOMY      at age 29years old       Family History   Problem Relation Age of Onset    Heart Disease Mother     Hypertension Mother     Heart Surgery Mother     Cancer Father      colon    Hypertension Brother     No Known Problems Maternal Grandmother     Heart Disease Maternal Grandfather     MS Paternal Grandmother     Stroke Paternal Grandfather     Heart Disease Paternal Grandfather     No Known Problems Son        Social History   Substance Use Topics    Smoking status: Never Smoker    Smokeless tobacco: Never Used    Alcohol use 0.0 oz/week     0 Standard drinks or equivalent per week      Comment: rare once a year       ROS   Review of Systems   Constitutional: Positive for malaise/fatigue. Negative for chills and fever. HENT: Negative for ear pain and sore throat. Eyes: Negative for blurred vision and pain. Respiratory: Positive for cough. Negative for sputum production and shortness of breath. Cardiovascular: Negative for chest pain. Gastrointestinal: Negative for abdominal pain, blood in stool and melena. Genitourinary: Negative for dysuria and hematuria. Musculoskeletal: Negative for joint pain and myalgias. Skin: Negative for rash. Neurological: Negative for tingling, focal weakness and headaches. Endo/Heme/Allergies: Does not bruise/bleed easily. Psychiatric/Behavioral: Negative for substance abuse. Objective     Vitals:    08/10/17 0810   BP: 143/83   Pulse: 76   Resp: 18   Temp: 97.4 °F (36.3 °C)   TempSrc: Oral   SpO2: 97%   Weight: 273 lb 6.4 oz (124 kg)   Height: 5' 6.5\" (1.689 m)   PainSc:   5   PainLoc: Leg       Physical Exam   Constitutional: She is oriented to person, place, and time and well-developed, well-nourished, and in no distress.    HENT: Head: Normocephalic and atraumatic. Right Ear: External ear normal.   Left Ear: External ear normal.   Nose: Nose normal.   Mouth/Throat: Oropharynx is clear and moist. No oropharyngeal exudate. Clear TMs   Eyes: Conjunctivae and EOM are normal. Pupils are equal, round, and reactive to light. Right eye exhibits no discharge. Left eye exhibits no discharge. No scleral icterus. Neck: Neck supple. Cardiovascular: Normal rate, regular rhythm, normal heart sounds and intact distal pulses. Exam reveals no gallop and no friction rub. No murmur heard. Pulmonary/Chest: Effort normal and breath sounds normal. No respiratory distress. She has no wheezes. She has no rales. Abdominal: Soft. Bowel sounds are normal. She exhibits no distension. There is no tenderness. There is no rebound and no guarding. Musculoskeletal: She exhibits edema (barely +1 edema in BLE - symmetric). She exhibits no tenderness (BUE are NTTP). No jt effusions. BUE are NTTP. BLE are mildly TTP   Lymphadenopathy:     She has no cervical adenopathy. Neurological: She is alert and oriented to person, place, and time. She exhibits normal muscle tone. Gait normal.   Skin: Skin is warm and dry. No erythema. Psychiatric: Affect normal.   Nursing note and vitals reviewed.       LABS   Data Review:   Lab Results   Component Value Date/Time    WBC 6.7 07/20/2017 10:23 AM    HGB 14.3 07/20/2017 10:23 AM    HCT 43.3 07/20/2017 10:23 AM    PLATELET 263 56/22/1584 10:23 AM    MCV 88.4 07/20/2017 10:23 AM       Lab Results   Component Value Date/Time    Sodium 143 08/05/2017 07:10 AM    Potassium 4.4 08/05/2017 07:10 AM    Chloride 106 08/05/2017 07:10 AM    CO2 27 08/05/2017 07:10 AM    Anion gap 10 08/05/2017 07:10 AM    Glucose 114 08/05/2017 07:10 AM    BUN 15 08/05/2017 07:10 AM    Creatinine 0.60 08/05/2017 07:10 AM    BUN/Creatinine ratio 25 08/05/2017 07:10 AM    GFR est AA >60 08/05/2017 07:10 AM    GFR est non-AA >60 08/05/2017 07:10 AM Calcium 8.8 08/05/2017 07:10 AM       Lab Results   Component Value Date/Time    Cholesterol, total 164 10/10/2016 07:49 AM    HDL Cholesterol 56 10/10/2016 07:49 AM    LDL, calculated 69 10/10/2016 07:49 AM    VLDL, calculated 39 10/10/2016 07:49 AM    Triglyceride 195 10/10/2016 07:49 AM    CHOL/HDL Ratio 2.9 10/10/2016 07:49 AM       Lab Results   Component Value Date/Time    Hemoglobin A1c 5.4 05/16/2016 02:21 PM       Assessment/Plan:   1. Generalized edema: Etiology is unknown.   -Bilateral lower extremity PVL study was ordered to rule out DVT. -A hepatic panel and BMP were ordered as well given generalized edema. -If she continues to have symptoms or if they worsen, I will refer her to vascular surgery for evaluation. ORDERS:  - DUPLEX LOWER EXT VENOUS BILAT; Future  - HEPATIC FUNCTION PANEL; Future  - NT-PRO BNP; Future    2. Cough: Improving since stopping her lisinopril.  -Checking a BNP.  -Observation. She continues to have symptoms or her symptoms worsen, I will refer her to pulmonology for PFTs. I will also consider a trial on a PPI. ORDERS:  - NT-PRO BNP; Future    3. Hypertension: Stable. -Continue with Lasix as prescribed. Health Maintenance Due   Topic Date Due    ZOSTER VACCINE AGE 60>  03/05/2016    INFLUENZA AGE 9 TO ADULT  08/01/2017     Lab review: labs are reviewed in the EHR    I have discussed the diagnosis with the patient and the intended plan as seen in the above orders. The patient has received an after-visit summary and questions were answered concerning future plans. I have discussed medication side effects and warnings with the patient as well. I have reviewed the plan of care with the patient, accepted their input and they are in agreement with the treatment goals. All questions were answered. The patient understands the plan of care. Handouts provided today with above information.  Pt instructed if symptoms worsen to call the office or report to the ED for continued care. Greater than 50% of the visit time was spent in counseling and/or coordination of care. Follow-up Disposition:  Return if symptoms worsen or fail to improve.     Jackie Eldridge MD

## 2017-08-10 NOTE — MR AVS SNAPSHOT
Visit Information Date & Time Provider Department Dept. Phone Encounter #  
 8/10/2017  8:30 AM Marisa Newman MD Internist of [address] [id number] Your Appointments 9/21/2017  9:00 AM  
Nurse Visit with [address] Metabolic Program ([facility]) Appt Note: orientation HR Metabolic Program ([facility]) [phone]  
  
    
 2/7/2018  7:45 AM  
LAB with [facility] NURSE VISIT Internist of [facility] ([facility]) Appt Note: 6 mo lab  
 [address], Suite [facility] [id number] [address] [place]  
  
   
 [address], [address] [facility] [phone]. [phone]  
  
    
 2/14/2018  8:00 AM  
Office Visit with Marisa Newman MD  
Internist of [address] [place] [address] Appt Note: 6 mo fu  
 [address], [address] [place]  
  
   
 [address], [address] Upcoming Health Maintenance Date Due ZOSTER VACCINE AGE 60> 3/5/2016 INFLUENZA AGE 9 TO ADULT 8/1/2017 COLONOSCOPY 5/28/2018 BREAST CANCER SCRN MAMMOGRAM 7/14/2018 PAP AKA CERVICAL CYTOLOGY 7/28/2019 DTaP/Tdap/Td series (2 - Td) 2/7/2027 Allergies as of 8/10/2017  Review Complete On: 8/10/2017 By: Dora Christopher LPN No Known Allergies Current Immunizations  Reviewed on 2/7/2017 Name Date Influenza Vaccine (Quad) PF 10/10/2016 Tdap 2/7/2017 Not reviewed this visit You Were Diagnosed With   
  
 Codes Comments Generalized edema    -  Primary ICD-10-CM: R60.1 ICD-9-CM: 848. 3 Bilateral edema of lower extremity     ICD-10-CM: R60.0 ICD-9-CM: 356. 3 Vitals BP Pulse Temp Resp Height(growth percentile) Weight(growth percentile) 143/83 (BP 1 Location: Right arm, BP Patient Position: Sitting) 76 97.4 °F (36.3 °C) (Oral) 18 5' 6.5\" (1.689 m) 273 lb 6.4 oz (124 kg) SpO2 BMI OB Status Smoking Status 97% 43.47 kg/m2 Hysterectomy Never Smoker Vitals History BMI and BSA Data Body Mass Index Body Surface Area  
 43.47 kg/m 2 2.41 m 2 Preferred Pharmacy Pharmacy Name Phone [facility] Carla Hawkins [address] [place] [address] [phone] Your Updated Medication List  
  
   
This list is accurate as of: 8/10/17  9:04 AM.  Always use your most recent med list.  
  
  
  
  
 calcium-cholecalciferol (d3) 600-125 mg-unit Tab Take 600 mg by mouth daily. cholecalciferol (vitamin D3) 2,000 unit Tab Take 1 Tab by mouth daily. furosemide 40 mg tablet Commonly known as:  LASIX Take 1 Tab by mouth daily. meloxicam 15 mg tablet Commonly known as:  MOBIC Take 1 Tab by mouth daily. omega-3 fatty acids-vitamin e 1,000 mg Cap Take 1 Cap by mouth. simvastatin 20 mg tablet Commonly known as:  ZOCOR Take 1 Tab by mouth nightly. To-Do List   
 08/10/2017 Imaging:  DUPLEX LOWER EXT VENOUS BILAT   
  
 08/10/2017 Lab:  HEPATIC FUNCTION PANEL   
  
 08/10/2017 Lab:  NT-PRO BNP Patient Instructions DASH Diet: Care Instructions Your Care Instructions The DASH diet is an eating plan that can help lower your blood pressure. DASH stands for Dietary Approaches to Stop Hypertension. Hypertension is high blood pressure. The DASH diet focuses on eating foods that are high in calcium, potassium, and magnesium. These nutrients can lower blood pressure. The foods that are highest in these nutrients are fruits, vegetables, low-fat dairy products, nuts, seeds, and legumes. But taking calcium, potassium, and magnesium supplements instead of eating foods that are high in those nutrients does not have the same effect. The DASH diet also includes whole grains, fish, and poultry.  
The DASH diet is one of several lifestyle changes your doctor may recommend to lower your high blood pressure. Your doctor may also want you to decrease the amount of sodium in your diet. Lowering sodium while following the DASH diet can lower blood pressure even further than just the DASH diet alone. Follow-up care is a key part of your treatment and safety. Be sure to make and go to all appointments, and call your doctor if you are having problems. It's also a good idea to know your test results and keep a list of the medicines you take. How can you care for yourself at home? Following the DASH diet · Eat 4 to 5 servings of fruit each day. A serving is 1 medium-sized piece of fruit, ½ cup chopped or canned fruit, 1/4 cup dried fruit, or 4 ounces (½ cup) of fruit juice. Choose fruit more often than fruit juice. · Eat 4 to 5 servings of vegetables each day. A serving is 1 cup of lettuce or raw leafy vegetables, ½ cup of chopped or cooked vegetables, or 4 ounces (½ cup) of vegetable juice. Choose vegetables more often than vegetable juice. · Get 2 to 3 servings of low-fat and fat-free dairy each day. A serving is 8 ounces of milk, 1 cup of yogurt, or 1 ½ ounces of cheese. · Eat 6 to 8 servings of grains each day. A serving is 1 slice of bread, 1 ounce of dry cereal, or ½ cup of cooked rice, pasta, or cooked cereal. Try to choose whole-grain products as much as possible. · Limit lean meat, poultry, and fish to 2 servings each day. A serving is 3 ounces, about the size of a deck of cards. · Eat 4 to 5 servings of nuts, seeds, and legumes (cooked dried beans, lentils, and split peas) each week. A serving is 1/3 cup of nuts, 2 tablespoons of seeds, or ½ cup of cooked beans or peas. · Limit fats and oils to 2 to 3 servings each day. A serving is 1 teaspoon of vegetable oil or 2 tablespoons of salad dressing. · Limit sweets and added sugars to 5 servings or less a week. A serving is 1 tablespoon jelly or jam, ½ cup sorbet, or 1 cup of lemonade. · Eat less than 2,300 milligrams (mg) of sodium a day. If you limit your sodium to 1,500 mg a day, you can lower your blood pressure even more. Tips for success · Start small. Do not try to make dramatic changes to your diet all at once. You might feel that you are missing out on your favorite foods and then be more likely to not follow the plan. Make small changes, and stick with them. Once those changes become habit, add a few more changes. · Try some of the following: ¨ Make it a goal to eat a fruit or vegetable at every meal and at snacks. This will make it easy to get the recommended amount of fruits and vegetables each day. ¨ Try yogurt topped with fruit and nuts for a snack or healthy dessert. ¨ Add lettuce, tomato, cucumber, and onion to sandwiches. ¨ Combine a ready-made pizza crust with low-fat mozzarella cheese and lots of vegetable toppings. Try using tomatoes, squash, spinach, broccoli, carrots, cauliflower, and onions. ¨ Have a variety of cut-up vegetables with a low-fat dip as an appetizer instead of chips and dip. ¨ Sprinkle sunflower seeds or chopped almonds over salads. Or try adding chopped walnuts or almonds to cooked vegetables. ¨ Try some vegetarian meals using beans and peas. Add garbanzo or kidney beans to salads. Make burritos and tacos with mashed petit beans or black beans. Where can you learn more? Go to http://stanton-gonsalo.info/. Enter V962 in the search box to learn more about \"DASH Diet: Care Instructions. \" Current as of: April 3, 2017 Content Version: 11.3 © 6418-6692 [employer]. Care instructions adapted under license by [employer] (which disclaims liability or warranty for this information). If you have questions about a medical condition or this instruction, always ask your healthcare professional. [address] any warranty or liability for your use of this information. Introducing [facility]! Dear Jeremias Colvin: 
Thank you for requesting a [employer] account. Our records indicate that you already have an active [employer] account. You can access your account anytime at [email or web address] Did you know that you can access your hospital and ER discharge instructions at any time in [employer]? You can also review all of your test results from your hospital stay or ER visit. Additional Information If you have questions, please visit the Frequently Asked Questions section of the [employer] website at [email or web address] Remember, [employer] is NOT to be used for urgent needs. For medical emergencies, dial 911. Now available from your iPhone and Android! Please provide this summary of care documentation to your next provider. Your primary care clinician is listed as Catina Collins. If you have any questions after today's visit, please call [phone].

## 2017-08-11 PROBLEM — K75.81 NASH (NONALCOHOLIC STEATOHEPATITIS): Status: ACTIVE | Noted: 2017-08-11

## 2017-08-17 ENCOUNTER — HOSPITAL ENCOUNTER (OUTPATIENT)
Dept: VASCULAR SURGERY | Age: 62
Discharge: HOME | End: 2017-08-17
Attending: INTERNAL MEDICINE
Payer: COMMERCIAL

## 2017-08-17 DIAGNOSIS — R60.1 GENERALIZED EDEMA: ICD-10-CM

## 2017-08-17 PROCEDURE — 93970 EXTREMITY STUDY: CPT

## 2017-08-17 NOTE — PROCEDURES
[facility]  *** [facility] ***    Name: Lillian Maradiaga  MRN: [id number]    Outpatient  : 05 May 1955  HIS Order #: [id number]  [address] [facility] Visit #: [id number]  Date: 17 Aug 2017    TYPE OF TEST: Peripheral Venous Testing    REASON FOR TEST  Pain in limb, Limb swelling    Right Leg:-  Deep venous thrombosis:           No  Superficial venous thrombosis:    No  Deep venous insufficiency:        Not examined  Superficial venous insufficiency: No    Left Leg:-  Deep venous thrombosis:           No  Superficial venous thrombosis:    No  Deep venous insufficiency:        Not examined  Superficial venous insufficiency: No      INTERPRETATION/FINDINGS  Duplex images were obtained using 2-D gray scale, color flow, and  spectral Doppler analysis. Right leg :  1. Deep veins visualized include the common femoral, femoral,  popliteal, posterior tibial and peroneal veins. 2. No evidence of deep venous thrombosis detected in the veins  visualized. 3. Superficial veins visualized include the great saphenous vein. 4. No evidence of superficial thrombosis detected. 5. No evidence of reflux detected in the superficial veins visualized. 6. Normal multiphasic flow in the posterior tibial artery. Left leg :  1. Deep veins visualized include the common femoral, femoral,  popliteal, posterior tibial and peroneal veins. 2. No evidence of deep venous thrombosis detected in the veins  visualized. 3. Superficial veins visualized include the great saphenous vein. 4. No evidence of superficial thrombosis detected. 5. No evidence of reflux detected in the superficial veins visualized. 6. Normal multiphasic flow in the posterior tibial artery. ADDITIONAL COMMENTS    I have personally reviewed the data relevant to the interpretation of  this  study.     TECHNOLOGIST: Mikle Goltz, [address] [place] [address]  Signed: 2017 06:29 PM    PHYSICIAN: Concha Hidalgo MD  Signed: 2017 08:19 AM

## 2017-09-04 NOTE — PROGRESS NOTES
Patient notified of results via my chart
Please let the pt know that the pt's CXR does not show evidence of pneumonia. She has what appears to be scarring vs mucous plugging along her right lower lung field. Her symptoms should improve. If they do not improve by her follow up apt with me, I will refer her to Pulmonology for further evaluation.     Dr. Zehra Branch  Internists of [address]  Phone: [phone]  Fax: [phone]
No

## 2017-09-21 ENCOUNTER — CLINICAL SUPPORT (OUTPATIENT)
Dept: FAMILY MEDICINE CLINIC | Age: 62
End: 2017-09-21

## 2017-09-21 DIAGNOSIS — E66.9 OBESITY, UNSPECIFIED OBESITY SEVERITY, UNSPECIFIED OBESITY TYPE: Primary | ICD-10-CM

## 2017-09-21 NOTE — PROGRESS NOTES
Patient attended a Medically Supervised Weight Loss New Patient Orientation today where we discussed:  - New Direction Very Low Calorie Diet details  - Medical Supervision  - Nutrition education  - Cost  - Policies and compliance required for program enrollment. - Lab slip was given to patient with instructions for having them drawn. Patients initial consultation with physician is tentatively scheduled for:  Future Appointments  Date Time Provider Katharine Cerna   10/4/2017 10:00 AM Tonya Lucero NP [address]   2/7/2018 7:45 AM IO NURSE VISIT [facility]   2/14/2018 8:00 AM Cory Olivas MD [address] starting weight was documented as: There were no vitals taken for this visit. The following patient answers were pulled from Weight Loss Questionnaire regarding weight related illness: (refer to media section for full weight loss history)  Hypertension (high blood pressure)  yes   High cholesterol yes   Hypothyroidism no   Obstructive sleep apnea no   Gout no   Arthritis yes ; Heart Attack in the last 3 months: no     Type I Diabetes no   Type II Diabetes   no           [facility]  Metabolic   [address] [facility]  Medically Supervised [facility] Loss Program  [address] [facility] [address]. [address]  [phone]  office  [phone]  Fax  [email or web address]  [email or web address]. [employer]

## 2017-09-29 RX ORDER — SULFAMETHOXAZOLE AND TRIMETHOPRIM 800; 160 MG/1; MG/1
1 TABLET ORAL 2 TIMES DAILY
Qty: 6 TAB | Refills: 0 | Status: SHIPPED | OUTPATIENT
Start: 2017-09-29 | End: 2017-10-02

## 2017-09-29 NOTE — PROGRESS NOTES
Left a voice message for the patient to check her pharmacy today for a new prescription for  and to call with any questions and or concerns.

## 2017-10-04 ENCOUNTER — OFFICE VISIT (OUTPATIENT)
Dept: INTERNAL MEDICINE CLINIC | Age: 62
End: 2017-10-04

## 2017-10-04 VITALS
HEIGHT: 66 IN | SYSTOLIC BLOOD PRESSURE: 135 MMHG | RESPIRATION RATE: 20 BRPM | OXYGEN SATURATION: 96 % | HEART RATE: 69 BPM | TEMPERATURE: 98 F | WEIGHT: 270.1 LBS | BODY MASS INDEX: 43.41 KG/M2 | DIASTOLIC BLOOD PRESSURE: 87 MMHG

## 2017-10-04 DIAGNOSIS — I10 ESSENTIAL HYPERTENSION: ICD-10-CM

## 2017-10-04 DIAGNOSIS — E78.2 MIXED HYPERLIPIDEMIA: ICD-10-CM

## 2017-10-04 DIAGNOSIS — K75.81 NASH (NONALCOHOLIC STEATOHEPATITIS): ICD-10-CM

## 2017-10-04 DIAGNOSIS — E66.01 MORBID OBESITY WITH BMI OF 40.0-44.9, ADULT (HCC): Primary | ICD-10-CM

## 2017-10-04 RX ORDER — CHOLECALCIFEROL (VITAMIN D3) 125 MCG
2000 CAPSULE ORAL DAILY
COMMUNITY
End: 2018-05-23

## 2017-10-04 RX ORDER — DEXTROMETHORPHAN HYDROBROMIDE, GUAIFENESIN 5; 100 MG/5ML; MG/5ML
650 LIQUID ORAL
COMMUNITY
End: 2019-05-15

## 2017-10-04 NOTE — PROGRESS NOTES
Chief Complaint   Patient presents with    Weight Management     Consult     1. Have you been to the ER, urgent care clinic since your last visit? Hospitalized since your last visit? No    2. Have you seen or consulted any other health care providers outside of the [address] [place] [address] since your last visit? Include any pap smears or colon screening.  No

## 2017-10-04 NOTE — PROGRESS NOTES
NSR, non specific T abnormality, unchanged from previous- except previous had multiple PVCs which are not present currently, QTc 413

## 2017-10-04 NOTE — MR AVS SNAPSHOT
Visit Information Date & Time Provider Department Dept. Phone Encounter #  
 10/4/2017 10:00 AM Cathy Hyde NP Internist of [address] [id number] Follow-up Instructions Return in about 4 weeks (around 11/1/2017). Your Appointments 2/7/2018  7:45 AM  
LAB with [facility] NURSE VISIT Internist of [facility] ([address]) Appt Note: 6 mo lab  
 [address], [address] [id number] [address] [place]  
  
   
 [address], [place] [phone] [phone]  
  
    
 2/14/2018  8:00 AM  
Office Visit with Malorie Melchor MD  
Internist of [address] [place] [address] [address] [facility]) Appt Note: 6 mo fu  
 [address], [address] [address] [address] [place]  
  
   
 [address], [address] Upcoming Health Maintenance Date Due ZOSTER VACCINE AGE 60> 3/5/2016 INFLUENZA AGE 9 TO ADULT 8/1/2017 COLONOSCOPY 5/28/2018 BREAST CANCER SCRN MAMMOGRAM 7/14/2018 PAP AKA CERVICAL CYTOLOGY 7/28/2019 DTaP/Tdap/Td series (2 - Td) 2/7/2027 Allergies as of 10/4/2017  Review Complete On: 10/4/2017 By: Cathy Hyde NP No Known Allergies Current Immunizations  Reviewed on 2/7/2017 Name Date Influenza Vaccine (Quad) PF 10/10/2016 Tdap 2/7/2017 Not reviewed this visit You Were Diagnosed With   
  
 Codes Comments Morbid obesity with BMI of 40.0-44.9, adult ([facility] [email or web address]]ca 75.)    -  Primary ICD-10-CM: E66.01, Z68.41 
ICD-9-CM: 278.01, V85.41 Mixed hyperlipidemia     ICD-10-CM: E78.2 ICD-9-CM: 272.2 HERZOG (nonalcoholic steatohepatitis)     ICD-10-CM: L97.33 ICD-9-CM: 571.8 Essential hypertension     ICD-10-CM: I10 
ICD-9-CM: 401.9 Vitals BP Pulse Temp Resp Height(growth percentile) Weight(growth percentile) 135/87 (BP 1 Location: Right arm, BP Patient Position: Sitting) 69 98 °F (36.7 °C) (Oral) 20 5' 6\" (1.676 m) 270 lb 1.6 oz (122.5 kg) SpO2 BMI OB Status Smoking Status 96% 43.6 kg/m2 Hysterectomy Never Smoker BMI and BSA Data Body Mass Index Body Surface Area  
 43.6 kg/m 2 2.39 m 2 Preferred Pharmacy Pharmacy Name Phone [facility] Carla Hawkins [address] [place] [address] [phone] Your Updated Medication List  
  
   
This list is accurate as of: 10/4/17 10:55 AM.  Always use your most recent med list.  
  
  
  
  
 calcium-cholecalciferol (d3) 600-125 mg-unit Tab Take 1,200 mg by mouth daily. * VITAMIN D3 2,000 unit Tab Generic drug:  cholecalciferol (vitamin D3) Take 2,000 Units by mouth daily. * cholecalciferol (vitamin D3) 2,000 unit Tab Take 1 Tab by mouth daily. furosemide 40 mg tablet Commonly known as:  LASIX Take 1 Tab by mouth daily. meloxicam 15 mg tablet Commonly known as:  MOBIC  
TAKE 1 TABLET BY MOUTH DAILY  
  
 omega-3 fatty acids-vitamin e 1,000 mg Cap Take 1 Cap by mouth two (2) times a day. potassium 99 mg tablet Take 99 mg by mouth daily as needed. simvastatin 20 mg tablet Commonly known as:  ZOCOR Take 1 Tab by mouth nightly. TYLENOL ARTHRITIS PAIN 650 mg CR tablet Generic drug:  acetaminophen Take 650 mg by mouth every six (6) hours as needed for Pain. * Notice: This list has 2 medication(s) that are the same as other medications prescribed for you. Read the directions carefully, and ask your doctor or other care provider to review them with you. We Performed the Following AMB POC EKG ROUTINE W/ 12 LEADS, INTER & REP [04264 CPT(R)] Follow-up Instructions Return in about 4 weeks (around 11/1/2017). To-Do List   
 10/04/2017 Lab:  METABOLIC PANEL, COMPREHENSIVE   
  
 10/04/2017 Lab:  URIC ACID Patient Instructions Monthly goal: 
 
10-15 lbs weight loss 4 meal replacements a day. No other food. First one within about 1 hour of waking up to get metabolism started. Don't go more than 6 hours between meals. No more than 1 soup a day- too much salt No more than 1 bar a day- not enough protein.  
  
10 carbs extra a day. Compliance 
  
We do not expect perfection. However, we do ask for your persistence and your willingness to do the work of growing yourself.  
  
You will hit plateaus in your weight loss. You will run into situations that test your will power. Janki Gates will encounter times when you feel frustrated. It's OK if you slip up from time to time. However, how your respond to your slip ups and, the adjustments you make to prevent future slip ups will determine you long-term success. 
  
If you find yourself temporarily slipping in the program, it's OK. We will gently nudge you forward and encourage you to do the work of identifying and moving beyond your stuck areas. However, if you find yourself wavering in the program for a prolonged time (more than 3 or 4 months) we will ask that you take a break from the program. At that time you will 3 options:  
  
1. Work with our weight loss specialist MICHAEL Francis to explore additional weight loss options.  
  
2. Work with a counselor to do some focused work in order to identify and break the patterns that are holding you back.  
  
3. The combination of both these. 
  
Once you, your counselor and/or Lorna feel that you have moved past those patterns and want to give the program another chance, we will gladly work with you to determine if you're ready to start back in the program. 
  
When returning after a break, if it's been less than 3 months, you do not need to repeat an orientation, labs or an EKG. If it's over been 3 months you will required to repeat an orientation and, if greater than 6 months, you will be required to repeat an orientation, labs and an EKG.  
  
  
  
Additional Tips 
  
 - Consume your 4 daily meal replacements equally spaced over the day No more than 6 hours between each meal 
Breakfast is especially important 
  
- Get the support of family and friends 
  
- Snack-proof your house 
  
- Have strategies for social situations, meetings that run over or vacation 
  
  
- When you fall off the plan it's no big deal; just start right back. Turn those days into examples of what to change or avoid next time.  
  
  
 
Homework for [employer] 
 
Exercise Exercise daily  
- Start slow, gradually increase your time by around 10 to 20 % a week - To prevent injury, take a recovery week every 4 weeks (reduce your exercise time and intensity by 1/2 during this time) - Your goal is to work up to 150 min a week; hard enough that you can't whistle or sing. It may take 6 months to work up to this. - Call the Health  at [facility] labs for exercise ideas ([phone]) Diet Common Side Effects 
 
-Constipation 
-Fatigue 
-Hunger 
-Low sodium 
-Hair Loss 1. Constipation  
     -around 1 out of 5 chance it happens at all 
     -around 1 out of 10 chance you'll need to take something (see below) It can be prevented by Drinking at least 8 glasses of water a day If you're prone to constipation: - Take a Stool Softener every day:  (eg - Dulcolax Stool Softener 100 mg or Miralax) - Eat Plenty of Fiber Get the New Direction products with fiber added Keep your fiber intake to at least 20 grams a day Use SUGAR-FREE products: Fiber One products, Benefiber or Metamucil If you get constipated: 1. Start with a Stool Softener (produces a bowel movement in 72 hr): 
 Examples: 
  Miralax 1 capful twice a day (It's OK to go up to 3 caps for a few days) Dulcolax Stool Softener 100 mg 
 
2.   If you still have constipation after 2 or 3 days, add a Stimulant Laxative to the Stool Softener (this will produce a bowel movement in 6 - 12 hr): 
 Examples: 
  Exlax (1 small square) for up to 4 days Dulcolax stimulant laxative Magnesium citrate pill 500 mg start with once a day and go up to 3 times a day if needed 3. Or you can go straight to a combination Stool Softner / Stimulant at night. If you need, you can add a morning dose. Examples: 
  Pericolace 50 mg / 8.25 mg Sennakot-S  50 mg / 8.25 mg 
 
4. If You're Really locked up, get Liquid Magnesium Citrate (take according to the      directions) 2. Fatigue 
     -Everyone goes through this stage More common in the first 2 weeks It's your body adjusting to the Ketogenic diet and it's normal  
 
 
3. Hunger More common in the first few days After your body adjusts to the Ketogenic diet it will go away 4. Low blood levels of sodium 
     -Not very common, especially if you're not taking a fluid pil If you develop a headache, feel fatigued, light-headed or dizzy Add 1/2 cup of bouillon broth every day 5. Hair loss 
     -This is fairly uncommon; you may see more than a usual amount of hair in your brush or the shower drain This can happen with physical stress (surgery, trauma or calorie restriction) or psychological stress. Although is it very concerning, it is often temporary and will resolve within 3 months. Some people notice a benefit from taking a daily dose of Biotin 2,500 mcg and increasing their Fish Oil intake. Other Tips and Helpful Information - Get the following books (all found on [employer]) Change Anything by Isabelle Amezquita, Gregory Husbands, and Tyler Edwards Mindless Eating by Grzegorz Remy Perfectly Yourself by Shawn Hensley Me, Myself and I - 28 Days to Self-Love by Miguel Cabrera - Consume your 4 daily meal replacements equally spaced over the    day No more than 6 hours between each meal 
 Breakfast is especially important - Get the support of family and friends 
 
- Snack-proof your house - Have strategies for social situations, meetings that run over or vacation - When you fall off the plan it's no big deal; just start right back; turn those days into examples of what to avoid next time. Long-term Healthy Weight / Body Fat Different ways to determining your ideal weight: 
1. Keep your waist to less than 1/2 of your height 2. Keep your % body fat to under 30% for female and under 20% if male 3. Keep your BMI around 25 Supplements 1. Vitacost Synergy Basic Multi-Vitamin (Their In-House Brand) Take 1 capsule twice a day Found ONLY at [facility], NOT at [facility], [facility], the Vitamin Shoppe, etc 
    SKU #: [id number]  
    $16.49   / 1 month supply 
    www. [employer]  [phone]  
 
 
2. N-Acetyl Cysteine (NAC) -- 600 mg 
     1 pill once a day Found at many stores but [address] has a good price: 
     Item #: NSI [id number]  $9.99 / 120 pills (4 month supply) 3. Turmeric with Black Pepper Extract (or Bioperine) 500 mg 
    1 pill 1-2 times a day (more is joint pain or increased inflammation) Found at many stores but [address] has a good price: 
    SKU #: [id number]  $13.64 / 60 pills 4. Fish Oil Take 1 capsule a day [facility] Brand: \"Omega 3 Fish Oil (per pill:  )\" OR 
 
Take 1 Tbsp 3 days a week of any of the following: 
 
[employer] Brand: Lemon or Orange flavored Fish Oil Nutra Sea + D:  Apple flavored Nutra Sea:  [place] flavored (These are found at most Vitamin Stores or on [employer]) FYI:  
Typical fish oil per pill =  mg and  mg 
Krill oil per pill = EPA 50 - 70 mg and DHA 27 - 250 mg 
 
 
^^^^^^^^^^^^^^^^^^^^^^^^^^^^^^^^^^^^^^^^^^^^^^^^^^^^^^^^^^^^^^^^^^^^^^^^^^^^^^^^^^^^^^^^^^^^^^^ Carbohydrates If you do not metabolize carbohydrates well, you will lose weight and keep the weigh off by keeping your carbohydrate intake low. How do you know if you do not metabolize carbs well? If your Triglycerides, sdLCL-C and Insulin Resistance are elevated, then you will do well to follow a low carb diet. Fun Facts About Carbs - The Typical American Diet = 450 grams a day - The Reducing Phase of the VLCD = 40 grams a day - Target for weight loss maintenance: 
 - Eat no more than 30 grams per meal 
 - Eat no more than 10 grams per snack (mid-morning and mid-afternoon snack) Resources for finding out where carbs hide in our foods: 
1. Our Registered Dietitians 2. Www. Atkins. com/tools 3. Read food labels 4. Books - The Calorie Titus Magdy - The New Atkins Diet for a New You 
     - Li Sorto's NEW Carb and Calorie 4th Edition (my favorite) 5. Smart phone and Internet-based apps - [employer]  
     - Carb Manager If you want to get a better picture of your cardiac risk, consider getting a coronary calcium score:  Call [phone] to schedule one. Introducing [facility]! Dear Ananya Tirado: 
Thank you for requesting a [employer] account. Our records indicate that you already have an active [employer] account. You can access your account anytime at [email or web address] Did you know that you can access your hospital and ER discharge instructions at any time in [employer]? You can also review all of your test results from your hospital stay or ER visit. Additional Information If you have questions, please visit the Frequently Asked Questions section of the [employer] website at [email or web address] Remember, [employer] is NOT to be used for urgent needs. For medical emergencies, dial 911. Now available from your iPhone and Android! Please provide this summary of care documentation to your next provider. Your primary care clinician is listed as Arpita Sanford. If you have any questions after today's visit, please call [phone].

## 2017-10-04 NOTE — PROGRESS NOTES
VLCD Progress Note: Initial Physician Visit    Brittany Watkins is a 65 y.o. female who is here for medical screening for the VLCD Program.      Weight History  Current weight 270.1 lb Body mass index is 43.6 kg/(m^2). Goal weight 165 lb  Highest weight 277 lb, at 47years old    Weight loss History  How many weight loss attempts have you had? 4  Which program were you most successful at? Weight Watchers    Significant Medical History  MI w/ in the last 3 months: no  Type I Diabetes: no  Type II Diabetes: no  Liver or Kidney disease requiring protein restriction: no  Pregnant or planning on becoming pregnant w/in 6 months: no  Recent treatment for Cancer: no  History of Uric-acid Kidney Stone or elevated Uric Acid: no  Peptic ulcer disease not well controlled: no  Recent onset of Inflammatory Bowel Disease: no    If female:  No LMP recorded. Patient has had a hysterectomy.     Significant Psychosocial History  Major lifestyle changes: no   Other commitments: no   Any potential unsupportive: no     Current psychotherapy: no  Ever hospitalized for psychiatric reasons: no  History of depression: no  History of suicidal ideation: no  Dramatic mood changes during dieting: no  History of binge eating: no  If yes, is there a history of purging: no    Social History  Social History   Substance Use Topics    Smoking status: Never Smoker    Smokeless tobacco: Never Used    Alcohol use 0.0 oz/week     0 Standard drinks or equivalent per week      Comment: rare once a year       Has Jazlyn Staff ever been told by a physician not to exercise: no    Does Jazlyn Staff know of any reason they shouldn't exercise: no  If yes, why?       Does Jazlyn Staff have any food allergies or sensitivities: no    Allergies include: No Known Allergies    Objective    Visit Vitals    /87 (BP 1 Location: Right arm, BP Patient Position: Sitting)    Pulse 69    Temp 98 °F (36.7 °C) (Oral)    Resp 20    Ht 5' 6\" (1.676 m)    Wt 270 lb 1.6 oz (122.5 kg)  SpO2 96%    BMI 43.6 kg/m2     Appearance: Obese, A&O x 3, NAD  HEENT:  NC/AT, PERRL  Neck:  No nodes, no JVD  Heart:  RRR without M/R/G  Lungs:  CTAB, no rhonchi, rales, or wheezes with good air exchange   Abdomen:  Non-tender, pos bowel sounds, no hepatosplenomegally  Ext:  No C/C/E      Labs    [facility] labs reviewed extensively with patient, will follow up with PCP concerning currently well controlled. Will continue with monthly CMP, uric acid checks    CBC:   Lab Results   Component Value Date/Time    WBC 6.7 07/20/2017 10:23 AM    RBC 4.90 07/20/2017 10:23 AM    HGB 14.3 07/20/2017 10:23 AM    HCT 43.3 07/20/2017 10:23 AM    PLATELET 391 79/75/7285 10:23 AM     BMP:   Lab Results   Component Value Date/Time    Glucose 114 08/05/2017 07:10 AM    Sodium 143 08/05/2017 07:10 AM    Potassium 4.4 08/05/2017 07:10 AM    Chloride 106 08/05/2017 07:10 AM    CO2 27 08/05/2017 07:10 AM    BUN 15 08/05/2017 07:10 AM    Creatinine 0.60 08/05/2017 07:10 AM    Calcium 8.8 08/05/2017 07:10 AM      Lab Results   Component Value Date/Time    Hemoglobin A1c 5.4 05/16/2016 02:21 PM    Glucose 114 08/05/2017 07:10 AM    Microalbumin/Creat ratio (mg/g creat)  05/16/2016 02:20 PM     Cannot calculate ratio due to micro albumin result outside reportable range. Microalbumin,urine random <0.50 05/16/2016 02:20 PM    LDL, calculated 69 10/10/2016 07:49 AM    Creatinine 0.60 08/05/2017 07:10 AM      Lab Results   Component Value Date/Time    Cholesterol, total 164 10/10/2016 07:49 AM    HDL Cholesterol 56 10/10/2016 07:49 AM    LDL, calculated 69 10/10/2016 07:49 AM    Triglyceride 195 10/10/2016 07:49 AM    CHOL/HDL Ratio 2.9 10/10/2016 07:49 AM             Assessment/Plan    ICD-10-CM ICD-9-CM    1. Morbid obesity with BMI of 40.0-44.9, adult (HCC) E66.01 278.01 AMB POC EKG ROUTINE W/ 12 LEADS, INTER & REP    Z68.41 V85.41    2. Mixed hyperlipidemia E78.2 272.2    3.  HERZOG (nonalcoholic steatohepatitis) suggested by ultrasound report, 2016 K75.81 571.8    4.  Essential hypertension I10 401.9        Diet regimen   # of meal replacements prescribed: 4   If modified LCD-nutritional guidelines:    Monthly Goal   10-15 lbs weight loss    Medical monitoring schedule:   Weekly BP/Weight checks   Monthly provider appointments

## 2017-10-04 NOTE — PATIENT INSTRUCTIONS
Monthly goal:    10-15 lbs weight loss    4 meal replacements a day. No other food. First one within about 1 hour of waking up to get metabolism started. Don't go more than 6 hours between meals. No more than 1 soup a day- too much salt  No more than 1 bar a day- not enough protein.      10 carbs extra a day. Compliance     We do not expect perfection. However, we do ask for your persistence and your willingness to do the work of growing yourself.      You will hit plateaus in your weight loss. You will run into situations that test your will power. Margaret Caldera will encounter times when you feel frustrated. It's OK if you slip up from time to time. However, how your respond to your slip ups and, the adjustments you make to prevent future slip ups will determine you long-term success.     If you find yourself temporarily slipping in the program, it's OK. We will gently nudge you forward and encourage you to do the work of identifying and moving beyond your stuck areas. However, if you find yourself wavering in the program for a prolonged time (more than 3 or 4 months) we will ask that you take a break from the program. At that time you will 3 options:      1. Work with our weight loss specialist MICHAEL Francis to explore additional weight loss options.      2. Work with a counselor to do some focused work in order to identify and break the patterns that are holding you back.      3. The combination of both these.     Once you, your counselor and/or Lorna feel that you have moved past those patterns and want to give the program another chance, we will gladly work with you to determine if you're ready to start back in the program.     When returning after a break, if it's been less than 3 months, you do not need to repeat an orientation, labs or an EKG.  If it's over been 3 months you will required to repeat an orientation and, if greater than 6 months, you will be required to repeat an orientation, labs and an EKG.           Additional Tips     - Consume your 4 daily meal replacements equally spaced over the day  No more than 6 hours between each meal  Breakfast is especially important     - Get the support of family and friends     - Snack-proof your house     - Have strategies for social situations, meetings that run over or vacation        - When you fall off the plan it's no big deal; just start right back. Turn those days into examples of what to change or avoid next time.           Homework for [employer]    Exercise    Exercise daily   - Start slow, gradually increase your time by around 10 to 20 % a week    - To prevent injury, take a recovery week every 4 weeks (reduce your exercise time and intensity by 1/2 during this time)    - Your goal is to work up to 150 min a week; hard enough that you can't whistle or sing. It may take 6 months to work up to this. - Call the Health  at [facility] labs for exercise ideas ([phone])          Diet          Common Side Effects    -Constipation  -Fatigue  -Hunger  -Low sodium  -Hair Loss      1. Constipation        -around 1 out of 5 chance it happens at all       -around 1 out of 10 chance you'll need to take something (see below)    It can be prevented by Drinking at least 8 glasses of water a day    If you're prone to constipation:  - Take a Stool Softener every day:  (eg - Dulcolax Stool Softener 100 mg or Miralax)  - Eat Plenty of Fiber   Get the New Direction products with fiber added   Keep your fiber intake to at least 20 grams a day   Use SUGAR-FREE products: Fiber One products, Benefiber or Metamucil      If you get constipated:  1. Start with a Stool Softener (produces a bowel movement in 72 hr):   Examples:    Miralax 1 capful twice a day (It's OK to go up to 3 caps for a few days)    Dulcolax Stool Softener 100 mg    2.   If you still have constipation after 2 or 3 days, add a Stimulant Laxative to the Stool Softener (this will produce a bowel movement in 6 - 12 hr):   Examples:    Exlax (1 small square) for up to 4 days    Dulcolax stimulant laxative    Magnesium citrate pill 500 mg start with once a day and go up to 3 times a day if needed    3. Or you can go straight to a combination Stool Softner / Stimulant at night. If you need, you can add a morning dose. Examples:    Pericolace 50 mg / 8.25 mg    Sennakot-S  50 mg / 8.25 mg    4. If You're Really locked up, get Liquid Magnesium Citrate (take according to the      directions)      2. Fatigue       -Everyone goes through this stage  More common in the first 2 weeks  It's your body adjusting to the Ketogenic diet and it's normal       3. Hunger  More common in the first few days  After your body adjusts to the Ketogenic diet it will go away       4. Low blood levels of sodium       -Not very common, especially if you're not taking a fluid pil  If you develop a headache, feel fatigued, light-headed or dizzy  Add 1/2 cup of bouillon broth every day      5. Hair loss       -This is fairly uncommon; you may see more than a usual amount of hair in your brush or the shower drain  This can happen with physical stress (surgery, trauma or calorie restriction) or psychological stress. Although is it very concerning, it is often temporary and will resolve within 3 months. Some people notice a benefit from taking a daily dose of Biotin 2,500 mcg and increasing their Fish Oil intake.       Other Tips and Helpful Information    - Get the following books (all found on [address])    Change Anything by Bart Fan, Shanel Carmichael, and Yudi Melendez    Mindless Eating by Lakshmi Martinez    Perfectly Yourself by Gaudencio Both    Me, Myself and I - 28 Days to Self-Love by Javier Sandhoff your 4 daily meal replacements equally spaced over the    day   No more than 6 hours between each meal   Breakfast is especially important    - Get the support of family and friends    - Snack-proof your house    - Have strategies for social situations, meetings that run over or vacation      - When you fall off the plan it's no big deal; just start right back; turn those days into examples of what to avoid next time. Long-term Healthy Weight / Body Fat  Different ways to determining your ideal weight:  1. Keep your waist to less than 1/2 of your height   2. Keep your % body fat to under 30% for female and under 20% if male  3. Keep your BMI around 25        Supplements      1. Vitacost Synergy Basic Multi-Vitamin (Their In-House Brand)      Take 1 capsule twice a day        Found ONLY at [facility], NOT at [facility], [place], [place], the [facility] Shoppe, etc      SKU #: [id number]       $16.49   / 1 month supply      www. [employer]  [phone]       2. N-Acetyl Cysteine (NAC) -- 600 mg       1 pill once a day       Found at many stores but [employer] has a good price:       Item #: [id number]  $9.99 / 120 pills (4 month supply)      3. Turmeric with Black Pepper Extract (or Bioperine) 500 mg      1 pill 1-2 times a day (more is joint pain or increased inflammation)      Found at many stores but [employer] has a good price:      SKU #: [id number]  $13.64 / 60 pills        4.  Fish Oil      Take 1 capsule a day      [employer] Brand: \"Omega 3 Fish Oil (per pill:  )\"                                                               OR    Take 1 Tbsp 3 days a week of any of the following:    [employer] Brand: Lemon or Orange flavored Fish Oil   Nutra Sea + D:  Apple flavored   Nutra Sea:  Luis flavored   (These are found at most Vitamin Stores or on [employer])    FYI:   Typical fish oil per pill =  mg and  mg  Krill oil per pill = EPA 50 - 70 mg and DHA 27 - 250 mg      ^^^^^^^^^^^^^^^^^^^^^^^^^^^^^^^^^^^^^^^^^^^^^^^^^^^^^^^^^^^^^^^^^^^^^^^^^^^^^^^^^^^^^^^^^^^^^^^      Carbohydrates     If you do not metabolize carbohydrates well, you will lose weight and keep the weigh off by keeping your carbohydrate intake low. How do you know if you do not metabolize carbs well? If your Triglycerides, sdLCL-C and Insulin Resistance are elevated, then you will do well to follow a low carb diet. Fun Facts About Carbs    - The Typical American Diet = 450 grams a day    - The Reducing Phase of the VLCD = 40 grams a day    - Target for weight loss maintenance:   - Eat no more than 30 grams per meal   - Eat no more than 10 grams per snack (mid-morning and mid-afternoon snack)        Resources for finding out where carbs hide in our foods:  1. Our Registered Dietitians    2. Www. Atkins. com/tools    3. Read food labels    4. Books       - The Calorie Rehana Cristian       - The [place] System Diet for a New You       - Li Sorto's NEW Carb and Calorie 4th Edition (my favorite)    5. Smart phone and Internet-based apps       - HealthPlan Data SolutionsPal        - Carb Manager      If you want to get a better picture of your cardiac risk, consider getting a coronary calcium score:  Call [phone] to schedule one.

## 2017-10-11 ENCOUNTER — CLINICAL SUPPORT (OUTPATIENT)
Dept: FAMILY MEDICINE CLINIC | Age: 62
End: 2017-10-11

## 2017-10-11 VITALS
HEART RATE: 76 BPM | BODY MASS INDEX: 42.56 KG/M2 | HEIGHT: 66 IN | DIASTOLIC BLOOD PRESSURE: 88 MMHG | WEIGHT: 264.8 LBS | SYSTOLIC BLOOD PRESSURE: 142 MMHG

## 2017-10-11 DIAGNOSIS — E66.01 OBESITY, MORBID, BMI 40.0-49.9 (HCC): Primary | ICD-10-CM

## 2017-10-11 NOTE — PROGRESS NOTES
Progress Note: Weekly Education Class in the [facility] Weight Loss Program   Is there anything that you or the patient needs to let the [facility] Physician know about? no  Over the past week, have you experienced any side-effects? no    Mouna Fernandez is a 65 y.o. female who is enrolled in [facility] Weight Loss Program    Visit Vitals    /88 (BP 1 Location: Right arm, BP Patient Position: Sitting)    Pulse 76    Ht 5' 6\" (1.676 m)    Wt 264 lb 12.8 oz (120.1 kg)    BMI 42.74 kg/m2     Weight Metrics 10/11/2017 10/4/2017 10/4/2017 8/10/2017 7/20/2017 2/7/2017 12/29/2016   Weight 264 lb 12.8 oz - 270 lb 1.6 oz 273 lb 6.4 oz 286 lb 6.4 oz 265 lb 6.4 oz 258 lb   Waist Measure Inches 48 48 - - - - -   Exercise Mins/week - 0 - - - - -   Body Fat % - 47.9 - - - - -   BMI 42.74 kg/m2 - 43.6 kg/m2 43.47 kg/m2 45.53 kg/m2 42.19 kg/m2 41.02 kg/m2         Have you received any other medical care this week? no  If yes, where and for what? Have you had any change in your medications since your last visit? no  If yes what? Did you have any problems adhering to the program last week? no  If yes, please explain:       Eating Habits Over Last Week:  Did you take in 64 oz of non-caloric fluids?  yes     Did you consume your 4 meal replacements each day? no       Physical Activity Over the Past Week:    Aerobic exercise: 0 min  Resistance exercise: 0 workouts / week

## 2017-10-18 ENCOUNTER — CLINICAL SUPPORT (OUTPATIENT)
Dept: FAMILY MEDICINE CLINIC | Age: 62
End: 2017-10-18

## 2017-10-18 VITALS
SYSTOLIC BLOOD PRESSURE: 133 MMHG | DIASTOLIC BLOOD PRESSURE: 85 MMHG | BODY MASS INDEX: 42.04 KG/M2 | HEART RATE: 74 BPM | WEIGHT: 261.6 LBS | HEIGHT: 66 IN

## 2017-10-18 DIAGNOSIS — E66.01 OBESITY, MORBID, BMI 40.0-49.9 (HCC): Primary | ICD-10-CM

## 2017-10-18 NOTE — PROGRESS NOTES
Progress Note: Weekly Education Class in the [facility] Weight Loss Program   Is there anything that you or the patient needs to let the [facility] Physician know about? no  Over the past week, have you experienced any side-effects? no    Marilu Verdugo is a 65 y.o. female who is enrolled in [facility] Weight Loss Program    Visit Vitals    /85 (BP 1 Location: Right arm, BP Patient Position: Sitting)    Pulse 74    Ht 5' 6\" (1.676 m)    Wt 261 lb 9.6 oz (118.7 kg)    BMI 42.22 kg/m2     Weight Metrics 10/18/2017 10/11/2017 10/4/2017 10/4/2017 8/10/2017 7/20/2017 2/7/2017   Weight 261 lb 9.6 oz 264 lb 12.8 oz - 270 lb 1.6 oz 273 lb 6.4 oz 286 lb 6.4 oz 265 lb 6.4 oz   Waist Measure Inches 47 48 48 - - - -   Exercise Mins/week - - 0 - - - -   Body Fat % - - 47.9 - - - -   BMI 42.22 kg/m2 42.74 kg/m2 - 43.6 kg/m2 43.47 kg/m2 45.53 kg/m2 42.19 kg/m2         Have you received any other medical care this week? no  If yes, where and for what? Have you had any change in your medications since your last visit? no  If yes what? Did you have any problems adhering to the program last week? no  If yes, please explain:       Eating Habits Over Last Week:  Did you take in 64 oz of non-caloric fluids? yes     Did you consume your 4 meal replacements each day?  yes       Physical Activity Over the Past Week:    Aerobic exercise: 0 min  Resistance exercise: 0 workouts / week

## 2017-10-25 ENCOUNTER — CLINICAL SUPPORT (OUTPATIENT)
Dept: FAMILY MEDICINE CLINIC | Age: 62
End: 2017-10-25

## 2017-10-25 DIAGNOSIS — E66.01 OBESITY, MORBID, BMI 40.0-49.9 (HCC): Primary | ICD-10-CM

## 2017-10-26 ENCOUNTER — HOSPITAL ENCOUNTER (OUTPATIENT)
Dept: LAB | Age: 62
Discharge: HOME | End: 2017-10-26
Payer: COMMERCIAL

## 2017-10-26 VITALS
DIASTOLIC BLOOD PRESSURE: 88 MMHG | HEIGHT: 66 IN | WEIGHT: 258.6 LBS | BODY MASS INDEX: 41.56 KG/M2 | HEART RATE: 79 BPM | SYSTOLIC BLOOD PRESSURE: 153 MMHG

## 2017-10-26 DIAGNOSIS — E66.01 MORBID OBESITY WITH BMI OF 40.0-44.9, ADULT (HCC): ICD-10-CM

## 2017-10-26 LAB
ALBUMIN SERPL-MCNC: 4.5 G/DL (ref 3.4–5)
ALBUMIN/GLOB SERPL: 1.4 {RATIO} (ref 0.8–1.7)
ALP SERPL-CCNC: 71 U/L (ref 45–117)
ALT SERPL-CCNC: 42 U/L (ref 13–56)
ANION GAP SERPL CALC-SCNC: 9 MMOL/L (ref 3–18)
AST SERPL-CCNC: 23 U/L (ref 15–37)
BILIRUB SERPL-MCNC: 0.4 MG/DL (ref 0.2–1)
BUN SERPL-MCNC: 18 MG/DL (ref 7–18)
BUN/CREAT SERPL: 31 (ref 12–20)
CALCIUM SERPL-MCNC: 9.9 MG/DL (ref 8.5–10.1)
CHLORIDE SERPL-SCNC: 103 MMOL/L (ref 100–108)
CO2 SERPL-SCNC: 28 MMOL/L (ref 21–32)
CREAT SERPL-MCNC: 0.59 MG/DL (ref 0.6–1.3)
GLOBULIN SER CALC-MCNC: 3.3 G/DL (ref 2–4)
GLUCOSE SERPL-MCNC: 92 MG/DL (ref 74–99)
POTASSIUM SERPL-SCNC: 4.2 MMOL/L (ref 3.5–5.5)
PROT SERPL-MCNC: 7.8 G/DL (ref 6.4–8.2)
SODIUM SERPL-SCNC: 140 MMOL/L (ref 136–145)
URATE SERPL-MCNC: 5.3 MG/DL (ref 2.6–7.2)

## 2017-10-26 PROCEDURE — 84550 ASSAY OF BLOOD/URIC ACID: CPT | Performed by: NURSE PRACTITIONER

## 2017-10-26 PROCEDURE — 80053 COMPREHEN METABOLIC PANEL: CPT | Performed by: NURSE PRACTITIONER

## 2017-10-26 PROCEDURE — 36415 COLL VENOUS BLD VENIPUNCTURE: CPT | Performed by: NURSE PRACTITIONER

## 2017-10-26 NOTE — PROGRESS NOTES
Progress Note: Weekly Education Class in the [facility] Weight Loss Program   Is there anything that you or the patient needs to let the [facility] Physician know about? no  Over the past week, have you experienced any side-effects? no    Edwige Landa is a 65 y.o. female who is enrolled in [facility] Weight Loss Program    Visit Vitals    /88 (BP 1 Location: Right arm, BP Patient Position: Sitting)    Pulse 79    Ht 5' 6\" (1.676 m)    Wt 258 lb 9.6 oz (117.3 kg)    BMI 41.74 kg/m2     Weight Metrics 10/26/2017 10/25/2017 10/18/2017 10/11/2017 10/4/2017 10/4/2017 8/10/2017   Weight - 258 lb 9.6 oz 261 lb 9.6 oz 264 lb 12.8 oz - 270 lb 1.6 oz 273 lb 6.4 oz   Waist Measure Inches 47 - 47 48 48 - -   Exercise Mins/week - - - - 0 - -   Body Fat % - - - - 47.9 - -   BMI - 41.74 kg/m2 42.22 kg/m2 42.74 kg/m2 - 43.6 kg/m2 43.47 kg/m2         Have you received any other medical care this week? no  If yes, where and for what? Have you had any change in your medications since your last visit? no  If yes what? Did you have any problems adhering to the program last week? no  If yes, please explain:       Eating Habits Over Last Week:  Did you take in 64 oz of non-caloric fluids? yes     Did you consume your 4 meal replacements each day?  yes       Physical Activity Over the Past Week:    Aerobic exercise: 0 min  Resistance exercise: 0 workouts / week

## 2017-11-01 ENCOUNTER — OFFICE VISIT (OUTPATIENT)
Dept: INTERNAL MEDICINE CLINIC | Age: 62
End: 2017-11-01

## 2017-11-01 VITALS
SYSTOLIC BLOOD PRESSURE: 139 MMHG | OXYGEN SATURATION: 95 % | WEIGHT: 253.7 LBS | RESPIRATION RATE: 18 BRPM | DIASTOLIC BLOOD PRESSURE: 74 MMHG | TEMPERATURE: 98.8 F | BODY MASS INDEX: 39.82 KG/M2 | HEART RATE: 64 BPM | HEIGHT: 67 IN

## 2017-11-01 DIAGNOSIS — K75.81 NASH (NONALCOHOLIC STEATOHEPATITIS): ICD-10-CM

## 2017-11-01 DIAGNOSIS — E78.2 MIXED HYPERLIPIDEMIA: ICD-10-CM

## 2017-11-01 DIAGNOSIS — E66.01 OBESITY, MORBID, BMI 40.0-49.9 (HCC): Primary | ICD-10-CM

## 2017-11-01 DIAGNOSIS — I10 ESSENTIAL HYPERTENSION: ICD-10-CM

## 2017-11-01 NOTE — PATIENT INSTRUCTIONS
Health Maintenance Due   Topic Date Due    ZOSTER VACCINE AGE 60>  03/05/2016     Monthly goal:    10-15 lbs weight loss    Biking 3 days per week 15 min/day with increases of only time or days each week. Body Mass Index: Care Instructions  Your Care Instructions    Body mass index (BMI) can help you see if your weight is raising your risk for health problems. It uses a formula to compare how much you weigh with how tall you are. · A BMI lower than 18.5 is considered underweight. · A BMI between 18.5 and 24.9 is considered healthy. · A BMI between 25 and 29.9 is considered overweight. A BMI of 30 or higher is considered obese. If your BMI is in the normal range, it means that you have a lower risk for weight-related health problems. If your BMI is in the overweight or obese range, you may be at increased risk for weight-related health problems, such as high blood pressure, heart disease, stroke, arthritis or joint pain, and diabetes. If your BMI is in the underweight range, you may be at increased risk for health problems such as fatigue, lower protection (immunity) against illness, muscle loss, bone loss, hair loss, and hormone problems. BMI is just one measure of your risk for weight-related health problems. You may be at higher risk for health problems if you are not active, you eat an unhealthy diet, or you drink too much alcohol or use tobacco products. Follow-up care is a key part of your treatment and safety. Be sure to make and go to all appointments, and call your doctor if you are having problems. It's also a good idea to know your test results and keep a list of the medicines you take. How can you care for yourself at home? · Practice healthy eating habits. This includes eating plenty of fruits, vegetables, whole grains, lean protein, and low-fat dairy. · If your doctor recommends it, get more exercise. Walking is a good choice. Bit by bit, increase the amount you walk every day.  Try for at least 30 minutes on most days of the week. · Do not smoke. Smoking can increase your risk for health problems. If you need help quitting, talk to your doctor about stop-smoking programs and medicines. These can increase your chances of quitting for good. · Limit alcohol to 2 drinks a day for men and 1 drink a day for women. Too much alcohol can cause health problems. If you have a BMI higher than 25  · Your doctor may do other tests to check your risk for weight-related health problems. This may include measuring the distance around your waist. A waist measurement of more than 40 inches in men or 35 inches in women can increase the risk of weight-related health problems. · Talk with your doctor about steps you can take to stay healthy or improve your health. You may need to make lifestyle changes to lose weight and stay healthy, such as changing your diet and getting regular exercise. If you have a BMI lower than 18.5  · Your doctor may do other tests to check your risk for health problems. · Talk with your doctor about steps you can take to stay healthy or improve your health. You may need to make lifestyle changes to gain or maintain weight and stay healthy, such as getting more healthy foods in your diet and doing exercises to build muscle. Where can you learn more? Go to http://stanton-gonsalo.info/. Enter S176 in the search box to learn more about \"Body Mass Index: Care Instructions. \"  Current as of: October 13, 2016  Content Version: 11.4  © 0729-4156 Healthwise, [employer]orporated. Care instructions adapted under license by [employer] (which disclaims liability or warranty for this information). If you have questions about a medical condition or this instruction, always ask your healthcare professional. [address] any warranty or liability for your use of this information.

## 2017-11-01 NOTE — PROGRESS NOTES
[facility] Progress Note:   F/up Physician Visit    CC: Obesity      Jignesh Toledo is a 65 y.o. female who is here for her  f/up physician visit for the VLCD Program. Jj Ku has completed 4 weeks of the program to date. 17 lbs weight loss, doing great, feeling well. Mild constipation, relieved with intermittent stool softener use. Weight History  Starting  weight 270.1 lb Body mass index is 43.6 kg/(m^2). Current weight 253  Goal weight 165 lb  Highest weight 277 lb, at 47years old    Weight Metrics 11/1/2017 11/1/2017 10/26/2017 10/25/2017 10/18/2017 10/11/2017 10/4/2017   Weight - 253 lb 11.2 oz - 258 lb 9.6 oz 261 lb 9.6 oz 264 lb 12.8 oz -   Waist Measure Inches 47 - 47 - 47 48 48   Exercise Mins/week 0 - - - - - 0   Body Fat % 42.7 - - - - - 47.9   BMI - 40.33 kg/m2 - 41.74 kg/m2 42.22 kg/m2 42.74 kg/m2 -         Current Outpatient Prescriptions   Medication Sig Dispense Refill    cholecalciferol, vitamin D3, (VITAMIN D3) 2,000 unit tab Take 2,000 Units by mouth daily.  potassium 99 mg tablet Take 99 mg by mouth daily as needed.  acetaminophen (TYLENOL ARTHRITIS PAIN) 650 mg CR tablet Take 650 mg by mouth every six (6) hours as needed for Pain.  meloxicam (MOBIC) 15 mg tablet TAKE 1 TABLET BY MOUTH DAILY 90 Tab 3    furosemide (LASIX) 40 mg tablet Take 1 Tab by mouth daily. 90 Tab 3    simvastatin (ZOCOR) 20 mg tablet Take 1 Tab by mouth nightly. 90 Tab 3    omega-3 fatty acids-vitamin e 1,000 mg cap Take 1 Cap by mouth two (2) times a day.  calcium-cholecalciferol, d3, 600-125 mg-unit tab Take 1,200 mg by mouth daily. Participation   Did you attend clinic and class last week? yes    Review of Systems  Since your last visit, have you experienced any complications? no  If yes, please list:     No CP, SOB, palpitations, lightheadedness, dizziness, constipation. Positives highlight in BOLD.       Are you taking an appetite suppressant? no  If so, is there any Chest Pain, Palpitations or Dizziness? BP Readings from Last 10 Encounters:   11/01/17 139/74   10/26/17 153/88   10/18/17 133/85   10/11/17 142/88   10/04/17 135/87   08/10/17 143/83   07/20/17 130/90   02/07/17 146/83   12/29/16 137/71   12/19/16 144/82         Have you received any other medical care this week? no  If yes, where and for what? Have you discontinued or changed any medicine or dose of your medicine since your last visit? no  If yes, where and for what? Diet  How many ounces of calorie-free liquids did you consume each day?  84 oz plus  How many meal replacements did you take each day? 4    Did you have any problems adhering to the program?  yes If yes, please explain: due to constipation and the patient has a Stool Softener she takes when needed      Exercise  Aerobic exercise: 0 min  Resistance exercise: 0 workouts / week  Any discomfort?  no     If yes, where? Review of Systems  Complete ROS negative except where noted above    Objective  Visit Vitals    /74 (BP 1 Location: Left arm, BP Patient Position: Sitting)    Pulse 64    Temp 98.8 °F (37.1 °C) (Oral)    Resp 18    Ht 5' 6.5\" (1.689 m)    Wt 253 lb 11.2 oz (115.1 kg)    SpO2 95%    BMI 40.33 kg/m2     No LMP recorded. Patient has had a hysterectomy. Waist Circumference: I personally reviewed patient's Weight Management Doc Flowsheet  Neck Circumference: I personally reviewed patient's Weight Management Doc Flowsheet  Percent Body Fat: I personally reviewed patient's Weight Management Doc Flowsheet    Physical Exam  Appearance: well appearing, obese, A&O, NAD  HEENT:  NC/AT, PERRL, No scleral icterus  Heart:  RRR without M/R/G  Lungs:  CTAB, no rhonchi, rales, or wheezes with good air exchange   Ext:  No LE Edema    Assessment / Plan    Encounter Diagnoses   Name Primary?     Obesity, morbid, BMI 40.0-49.9 ([facility] [address] Yes    Mixed hyperlipidemia     HERZOG (nonalcoholic steatohepatitis) suggested by ultrasound report, 2016     Essential hypertension        1. Weight management improved, reasonably well controlled   Progress was reviewed with patient    2. Labs    Latest results reviewed with patient   Lab slip given to pt for f/up HDL labs    3. Diet regimen   # of meal replacements prescribed: 4   If modified LCD-nutritional guidelines:    Monthly Goal   As below    Medical monitoring schedule:   Weekly BP/Weight checks   Monthly provider appointments          Patient Instructions     Health Maintenance Due   Topic Date Due    ZOSTER VACCINE AGE 60>  03/05/2016     Monthly goal:    10-15 lbs weight loss    Biking 3 days per week 15 min/day with increases of only time or days each week. Body Mass Index: Care Instructions  Your Care Instructions    Body mass index (BMI) can help you see if your weight is raising your risk for health problems. It uses a formula to compare how much you weigh with how tall you are. · A BMI lower than 18.5 is considered underweight. · A BMI between 18.5 and 24.9 is considered healthy. · A BMI between 25 and 29.9 is considered overweight. A BMI of 30 or higher is considered obese. If your BMI is in the normal range, it means that you have a lower risk for weight-related health problems. If your BMI is in the overweight or obese range, you may be at increased risk for weight-related health problems, such as high blood pressure, heart disease, stroke, arthritis or joint pain, and diabetes. If your BMI is in the underweight range, you may be at increased risk for health problems such as fatigue, lower protection (immunity) against illness, muscle loss, bone loss, hair loss, and hormone problems. BMI is just one measure of your risk for weight-related health problems. You may be at higher risk for health problems if you are not active, you eat an unhealthy diet, or you drink too much alcohol or use tobacco products.   Follow-up care is a key part of your treatment and safety. Be sure to make and go to all appointments, and call your doctor if you are having problems. It's also a good idea to know your test results and keep a list of the medicines you take. How can you care for yourself at home? · Practice healthy eating habits. This includes eating plenty of fruits, vegetables, whole grains, lean protein, and low-fat dairy. · If your doctor recommends it, get more exercise. Walking is a good choice. Bit by bit, increase the amount you walk every day. Try for at least 30 minutes on most days of the week. · Do not smoke. Smoking can increase your risk for health problems. If you need help quitting, talk to your doctor about stop-smoking programs and medicines. These can increase your chances of quitting for good. · Limit alcohol to 2 drinks a day for men and 1 drink a day for women. Too much alcohol can cause health problems. If you have a BMI higher than 25  · Your doctor may do other tests to check your risk for weight-related health problems. This may include measuring the distance around your waist. A waist measurement of more than 40 inches in men or 35 inches in women can increase the risk of weight-related health problems. · Talk with your doctor about steps you can take to stay healthy or improve your health. You may need to make lifestyle changes to lose weight and stay healthy, such as changing your diet and getting regular exercise. If you have a BMI lower than 18.5  · Your doctor may do other tests to check your risk for health problems. · Talk with your doctor about steps you can take to stay healthy or improve your health. You may need to make lifestyle changes to gain or maintain weight and stay healthy, such as getting more healthy foods in your diet and doing exercises to build muscle. Where can you learn more? Go to http://stanton-gonsalo.info/.   Enter S176 in the search box to learn more about \"Body Mass Index: Care Instructions. \"  Current as of: October 13, 2016  Content Version: 11.4  © 8544-1814 Healthwise, [employer]. Care instructions adapted under license by [employer] (which disclaims liability or warranty for this information). If you have questions about a medical condition or this instruction, always ask your healthcare professional. [address]  any warranty or liability for your use of this information. Follow-up Disposition:  Return in about 4 weeks (around 11/29/2017). 10 minutes of the 15 minutes face to face time with Sakina Viveros consisted of counseling & coordinating and/or discussing treatment plans in reference to her The primary encounter diagnosis was Obesity, morbid, BMI 40.0-49.9 ([facility] [address] Diagnoses of Mixed hyperlipidemia, HERZOG (nonalcoholic steatohepatitis) suggested by ultrasound report, 2016, and Essential hypertension were also pertinent to this visit. The patient is to follow up as scheduled and will report to the ED or the office if symptoms change or increase. The patient has voiced understanding and will comply.

## 2017-11-01 NOTE — PROGRESS NOTES
Chief Complaint   Patient presents with    Weight Management     1. Have you been to the ER, urgent care clinic since your last visit? Hospitalized since your last visit? No    2. Have you seen or consulted any other health care providers outside of the [address] [place] [address] since your last visit? Include any pap smears or colon screening.  No

## 2017-11-01 NOTE — MR AVS SNAPSHOT
Visit Information Date & Time Provider Department Dept. Phone Encounter #  
 11/1/2017  8:15 AM Emil Mejia NP [facility] [id number] Follow-up Instructions Return in about 4 weeks (around 11/29/2017). Follow-up and Disposition History Your Appointments 11/28/2017  9:55 AM  
LAB with [facility] NURSE VISIT [facility] ([address] [place] [address]) Appt Note: lab  
 [address], [address] [address] [address]  
  
   
 [address], [address]  
  
    
 12/6/2017  9:34 AM  
METABOLIC PROGRAM 15 with Emil Mejia NP [facility] ([address] [place] [address]) Appt Note: mwl  
 [address], [address] [address] [address]  
  
   
 [address], [address]  
  
    
 2/7/2018  7:45 AM  
LAB with [facility] NURSE VISIT [facility] ([address] [place] [address]) Appt Note: 6 mo lab  
 [address], [address] [address] [address] [address]  
[phone]  
  
    
 2/14/2018  8:00 AM  
Office Visit with Sujey Finnegan MD  
[facility] [address]) Appt Note: 6 mo fu  
 [address], [address] [address] [address]  
  
   
 [address], [address] Upcoming Health Maintenance Date Due ZOSTER VACCINE AGE 60> 3/5/2016 COLONOSCOPY 5/28/2018 BREAST CANCER SCRN MAMMOGRAM 7/14/2018 PAP AKA CERVICAL CYTOLOGY 7/28/2019 DTaP/Tdap/Td series (2 - Td) 2/7/2027 Allergies as of 11/1/2017  Review Complete On: 11/1/2017 By: Emil Mejia NP No Known Allergies Current Immunizations  Reviewed on 2/7/2017 Name Date Influenza Vaccine 10/12/2017 Influenza Vaccine (Quad) PF 10/10/2016 Tdap 2/7/2017 Not reviewed this visit You Were Diagnosed With   
  
 Codes Comments Obesity, morbid, BMI 40.0-49.9 ([facility] [address]    -  Primary ICD-10-CM: E66.01 
ICD-9-CM: 278.01 Mixed hyperlipidemia     ICD-10-CM: E78.2 ICD-9-CM: 272.2 HERZOG (nonalcoholic steatohepatitis)     ICD-10-CM: Y29.86 ICD-9-CM: 571.8 Essential hypertension     ICD-10-CM: I10 
ICD-9-CM: 401.9 Vitals BP Pulse Temp Resp Height(growth percentile) Weight(growth percentile) 139/74 (BP 1 Location: Left arm, BP Patient Position: Sitting) 64 98.8 °F (37.1 °C) (Oral) 18 5' 6.5\" (1.689 m) 253 lb 11.2 oz (115.1 kg) SpO2 BMI OB Status Smoking Status 95% 40.33 kg/m2 Hysterectomy Never Smoker BMI and BSA Data Body Mass Index Body Surface Area  
 40.33 kg/m 2 2.32 m 2 Preferred Pharmacy Pharmacy Name Phone [facility] [address] [place] [address] Your Updated Medication List  
  
   
This list is accurate as of: 11/1/17  8:46 AM.  Always use your most recent med list.  
  
  
  
  
 calcium-cholecalciferol (d3) 600-125 mg-unit Tab Take 1,200 mg by mouth daily. furosemide 40 mg tablet Commonly known as:  LASIX Take 1 Tab by mouth daily. meloxicam 15 mg tablet Commonly known as:  MOBIC  
TAKE 1 TABLET BY MOUTH DAILY  
  
 omega-3 fatty acids-vitamin e 1,000 mg Cap Take 1 Cap by mouth two (2) times a day. potassium 99 mg tablet Take 99 mg by mouth daily as needed. simvastatin 20 mg tablet Commonly known as:  ZOCOR Take 1 Tab by mouth nightly. TYLENOL ARTHRITIS PAIN 650 mg CR tablet Generic drug:  acetaminophen Take 650 mg by mouth every six (6) hours as needed for Pain. VITAMIN D3 2,000 unit Tab Generic drug:  cholecalciferol (vitamin D3) Take 2,000 Units by mouth daily. Follow-up Instructions Return in about 4 weeks (around 11/29/2017). To-Do List   
 11/01/2017 Lab:  METABOLIC PANEL, COMPREHENSIVE   
  
 11/01/2017 Lab:  URIC ACID Patient Instructions Health Maintenance Due Topic Date Due  
 ZOSTER VACCINE AGE 60>  03/05/2016 Monthly goal: 
 
10-15 lbs weight loss Biking 3 days per week 15 min/day with increases of only time or days each week. Body Mass Index: Care Instructions Your Care Instructions Body mass index (BMI) can help you see if your weight is raising your risk for health problems. It uses a formula to compare how much you weigh with how tall you are. · A BMI lower than 18.5 is considered underweight. · A BMI between 18.5 and 24.9 is considered healthy. · A BMI between 25 and 29.9 is considered overweight. A BMI of 30 or higher is considered obese. If your BMI is in the normal range, it means that you have a lower risk for weight-related health problems. If your BMI is in the overweight or obese range, you may be at increased risk for weight-related health problems, such as high blood pressure, heart disease, stroke, arthritis or joint pain, and diabetes. If your BMI is in the underweight range, you may be at increased risk for health problems such as fatigue, lower protection (immunity) against illness, muscle loss, bone loss, hair loss, and hormone problems. BMI is just one measure of your risk for weight-related health problems. You may be at higher risk for health problems if you are not active, you eat an unhealthy diet, or you drink too much alcohol or use tobacco products. Follow-up care is a key part of your treatment and safety. Be sure to make and go to all appointments, and call your doctor if you are having problems. It's also a good idea to know your test results and keep a list of the medicines you take. How can you care for yourself at home? · Practice healthy eating habits. This includes eating plenty of fruits, vegetables, whole grains, lean protein, and low-fat dairy. · If your doctor recommends it, get more exercise.  Walking is a good choice. Bit by bit, increase the amount you walk every day. Try for at least 30 minutes on most days of the week. · Do not smoke. Smoking can increase your risk for health problems. If you need help quitting, talk to your doctor about stop-smoking programs and medicines. These can increase your chances of quitting for good. · Limit alcohol to 2 drinks a day for men and 1 drink a day for women. Too much alcohol can cause health problems. If you have a BMI higher than 25 · Your doctor may do other tests to check your risk for weight-related health problems. This may include measuring the distance around your waist. A waist measurement of more than 40 inches in men or 35 inches in women can increase the risk of weight-related health problems. · Talk with your doctor about steps you can take to stay healthy or improve your health. You may need to make lifestyle changes to lose weight and stay healthy, such as changing your diet and getting regular exercise. If you have a BMI lower than 18.5 · Your doctor may do other tests to check your risk for health problems. · Talk with your doctor about steps you can take to stay healthy or improve your health. You may need to make lifestyle changes to gain or maintain weight and stay healthy, such as getting more healthy foods in your diet and doing exercises to build muscle. Where can you learn more? Go to http://stanton-gonsalo.info/. Enter S176 in the search box to learn more about \"Body Mass Index: Care Instructions. \" Current as of: October 13, 2016 Content Version: 11.4 © 4868-3876 Healthwise, [employer]. Care instructions adapted under license by [employer] (which disclaims liability or warranty for this information). If you have questions about a medical condition or this instruction, always ask your healthcare professional. [place] [address] any warranty or liability for your use of this information. Patient Instructions History Introducing [facility]! Dear Kristie Royal: 
Thank you for requesting a BindHQ account. Our records indicate that you already have an active BindHQ account. You can access your account anytime at [email or web address]. [employer] Did you know that you can access your hospital and ER discharge instructions at any time in BindHQ? You can also review all of your test results from your hospital stay or ER visit. Additional Information If you have questions, please visit the Frequently Asked Questions section of the BindHQ website at [email or web address] Remember, BindHQ is NOT to be used for urgent needs. For medical emergencies, dial 911. Now available from your iPhone and Android! Please provide this summary of care documentation to your next provider. Your primary care clinician is listed as Chiquis Deng. If you have any questions after today's visit, please call [phone].

## 2017-11-04 NOTE — PROGRESS NOTES
Nurse note from patient's weekly VLCD / LCD / Maintenance class was reviewed. Pertinent medical concerns were:  None noted.      Continue current regimen  Follow bp

## 2017-11-08 ENCOUNTER — CLINICAL SUPPORT (OUTPATIENT)
Dept: FAMILY MEDICINE CLINIC | Age: 62
End: 2017-11-08

## 2017-11-08 VITALS
BODY MASS INDEX: 39.68 KG/M2 | HEART RATE: 76 BPM | DIASTOLIC BLOOD PRESSURE: 85 MMHG | SYSTOLIC BLOOD PRESSURE: 140 MMHG | WEIGHT: 252.8 LBS | HEIGHT: 67 IN

## 2017-11-08 DIAGNOSIS — E66.01 OBESITY, MORBID, BMI 40.0-49.9 (HCC): Primary | ICD-10-CM

## 2017-11-15 ENCOUNTER — CLINICAL SUPPORT (OUTPATIENT)
Dept: FAMILY MEDICINE CLINIC | Age: 62
End: 2017-11-15

## 2017-11-15 VITALS
BODY MASS INDEX: 39.33 KG/M2 | SYSTOLIC BLOOD PRESSURE: 149 MMHG | DIASTOLIC BLOOD PRESSURE: 89 MMHG | HEIGHT: 67 IN | HEART RATE: 80 BPM | WEIGHT: 250.6 LBS

## 2017-11-15 DIAGNOSIS — E66.01 OBESITY, MORBID, BMI 40.0-49.9 (HCC): Primary | ICD-10-CM

## 2017-11-15 NOTE — PROGRESS NOTES
Progress Note: Weekly Education Class in the [facility] Program   Is there anything that you or the patient needs to let the [facility] Physician know about? no  Over the past week, have you experienced any side-effects? no    Faina Saldaña is a 65 y.o. female who is enrolled in [facility]    Visit Vitals    /89 (BP 1 Location: Right arm, BP Patient Position: Sitting)    Pulse 80    Ht 5' 6.5\" (1.689 m)    Wt 250 lb 9.6 oz (113.7 kg)    BMI 39.84 kg/m2     Weight Metrics 11/15/2017 11/8/2017 11/8/2017 11/1/2017 11/1/2017 10/26/2017 10/25/2017   Weight 250 lb 9.6 oz - 252 lb 12.8 oz - 253 lb 11.2 oz - 258 lb 9.6 oz   Waist Measure Inches 46 46 - 47 - 47 -   Exercise Mins/week - - - 0 - - -   Body Fat % - - - 42.7 - - -   BMI 39.84 kg/m2 - 40.19 kg/m2 - 40.33 kg/m2 - 41.74 kg/m2         Have you received any other medical care this week? no  If yes, where and for what? Have you had any change in your medications since your last visit? no  If yes what? Did you have any problems adhering to the program last week? no  If yes, please explain:       Eating Habits Over Last Week:  Did you take in 64 oz of non-caloric fluids? yes     Did you consume your 4 meal replacements each day?  yes       Physical Activity Over the Past Week:    Aerobic exercise: 40 min  Resistance exercise: 0 workouts / week

## 2017-11-22 ENCOUNTER — CLINICAL SUPPORT (OUTPATIENT)
Dept: FAMILY MEDICINE CLINIC | Age: 62
End: 2017-11-22

## 2017-11-22 VITALS
DIASTOLIC BLOOD PRESSURE: 88 MMHG | HEIGHT: 67 IN | SYSTOLIC BLOOD PRESSURE: 141 MMHG | HEART RATE: 81 BPM | BODY MASS INDEX: 38.67 KG/M2 | WEIGHT: 246.4 LBS

## 2017-11-22 DIAGNOSIS — E66.01 OBESITY, MORBID, BMI 40.0-49.9 (HCC): Primary | ICD-10-CM

## 2017-11-22 NOTE — PROGRESS NOTES
Progress Note: Weekly Education Class in the [facility] Weight Loss Program   Is there anything that you or the patient needs to let the [facility] Physician know about? no  Over the past week, have you experienced any side-effects? no    Brendon Bautista is a 65 y.o. female who is enrolled in [facility]    Visit Vitals    /88 (BP 1 Location: Left arm, BP Patient Position: Sitting)    Pulse 81    Ht 5' 6.5\" (1.689 m)    Wt 246 lb 6.4 oz (111.8 kg)    BMI 39.17 kg/m2     Weight Metrics 11/22/2017 11/15/2017 11/8/2017 11/8/2017 11/1/2017 11/1/2017 10/26/2017   Weight 246 lb 6.4 oz 250 lb 9.6 oz - 252 lb 12.8 oz - 253 lb 11.2 oz -   Waist Measure Inches 45 46 46 - 47 - 47   Exercise Mins/week - - - - 0 - -   Body Fat % - - - - 42.7 - -   BMI 39.17 kg/m2 39.84 kg/m2 - 40.19 kg/m2 - 40.33 kg/m2 -         Have you received any other medical care this week? no  If yes, where and for what? Have you had any change in your medications since your last visit? no  If yes what? Did you have any problems adhering to the program last week? no  If yes, please explain:       Eating Habits Over Last Week:  Did you take in 64 oz of non-caloric fluids? yes     Did you consume your 4 meal replacements each day?  yes       Physical Activity Over the Past Week:    Aerobic exercise: 60 min  Resistance exercise: 0 workouts / week

## 2017-11-28 ENCOUNTER — HOSPITAL ENCOUNTER (OUTPATIENT)
Dept: LAB | Age: 62
Discharge: HOME | End: 2017-11-28
Payer: COMMERCIAL

## 2017-11-28 DIAGNOSIS — E66.01 OBESITY, MORBID, BMI 40.0-49.9 (HCC): ICD-10-CM

## 2017-11-28 LAB
ALBUMIN SERPL-MCNC: 4.6 G/DL (ref 3.4–5)
ALBUMIN/GLOB SERPL: 1.5 {RATIO} (ref 0.8–1.7)
ALP SERPL-CCNC: 77 U/L (ref 45–117)
ALT SERPL-CCNC: 73 U/L (ref 13–56)
ANION GAP SERPL CALC-SCNC: 9 MMOL/L (ref 3–18)
AST SERPL-CCNC: 33 U/L (ref 15–37)
BILIRUB SERPL-MCNC: 0.5 MG/DL (ref 0.2–1)
BUN SERPL-MCNC: 16 MG/DL (ref 7–18)
BUN/CREAT SERPL: 28 (ref 12–20)
CALCIUM SERPL-MCNC: 10.2 MG/DL (ref 8.5–10.1)
CHLORIDE SERPL-SCNC: 102 MMOL/L (ref 100–108)
CO2 SERPL-SCNC: 29 MMOL/L (ref 21–32)
CREAT SERPL-MCNC: 0.57 MG/DL (ref 0.6–1.3)
GLOBULIN SER CALC-MCNC: 3 G/DL (ref 2–4)
GLUCOSE SERPL-MCNC: 94 MG/DL (ref 74–99)
POTASSIUM SERPL-SCNC: 4.2 MMOL/L (ref 3.5–5.5)
PROT SERPL-MCNC: 7.6 G/DL (ref 6.4–8.2)
SODIUM SERPL-SCNC: 140 MMOL/L (ref 136–145)
URATE SERPL-MCNC: 4.6 MG/DL (ref 2.6–7.2)

## 2017-11-28 PROCEDURE — 80053 COMPREHEN METABOLIC PANEL: CPT | Performed by: NURSE PRACTITIONER

## 2017-11-28 PROCEDURE — 36415 COLL VENOUS BLD VENIPUNCTURE: CPT | Performed by: NURSE PRACTITIONER

## 2017-11-28 PROCEDURE — 84550 ASSAY OF BLOOD/URIC ACID: CPT | Performed by: NURSE PRACTITIONER

## 2017-11-29 ENCOUNTER — CLINICAL SUPPORT (OUTPATIENT)
Dept: FAMILY MEDICINE CLINIC | Age: 62
End: 2017-11-29

## 2017-11-29 VITALS
BODY MASS INDEX: 38.3 KG/M2 | WEIGHT: 244 LBS | DIASTOLIC BLOOD PRESSURE: 92 MMHG | HEART RATE: 76 BPM | SYSTOLIC BLOOD PRESSURE: 148 MMHG | HEIGHT: 67 IN

## 2017-11-29 DIAGNOSIS — E66.01 OBESITY, MORBID, BMI 40.0-49.9 (HCC): Primary | ICD-10-CM

## 2017-11-29 NOTE — PROGRESS NOTES
Progress Note: Weekly Education Class in the [facility] Weight Loss Program   Is there anything that you or the patient needs to let the [facility] Physician know about? no  Over the past week, have you experienced any side-effects? no    Nato Young is a 65 y.o. female who is enrolled in [facility]    Visit Vitals    BP (!) 148/92 (BP 1 Location: Right arm, BP Patient Position: Sitting)    Pulse 76    Ht 5' 6.5\" (1.689 m)    Wt 244 lb (110.7 kg)    BMI 38.79 kg/m2     Weight Metrics 11/29/2017 11/22/2017 11/15/2017 11/8/2017 11/8/2017 11/1/2017 11/1/2017   Weight 244 lb 246 lb 6.4 oz 250 lb 9.6 oz - 252 lb 12.8 oz - 253 lb 11.2 oz   Waist Measure Inches 45.5 45 46 46 - 47 -   Exercise Mins/week - - - - - 0 -   Body Fat % - - - - - 42.7 -   BMI 38.79 kg/m2 39.17 kg/m2 39.84 kg/m2 - 40.19 kg/m2 - 40.33 kg/m2         Have you received any other medical care this week? no  If yes, where and for what? Have you had any change in your medications since your last visit? no  If yes what? Did you have any problems adhering to the program last week? no  If yes, please explain:       Eating Habits Over Last Week:  Did you take in 64 oz of non-caloric fluids? yes     Did you consume your 4 meal replacements each day?  yes       Physical Activity Over the Past Week:    Aerobic exercise: 80 min  Resistance exercise: 0 workouts / week

## 2017-12-06 ENCOUNTER — OFFICE VISIT (OUTPATIENT)
Dept: INTERNAL MEDICINE CLINIC | Age: 62
End: 2017-12-06

## 2017-12-06 VITALS
HEART RATE: 70 BPM | RESPIRATION RATE: 12 BRPM | TEMPERATURE: 98.9 F | OXYGEN SATURATION: 99 % | HEIGHT: 66 IN | DIASTOLIC BLOOD PRESSURE: 78 MMHG | BODY MASS INDEX: 38.59 KG/M2 | SYSTOLIC BLOOD PRESSURE: 120 MMHG | WEIGHT: 240.1 LBS

## 2017-12-06 DIAGNOSIS — E66.01 OBESITY, MORBID, BMI 40.0-49.9 (HCC): Primary | ICD-10-CM

## 2017-12-06 DIAGNOSIS — E78.2 MIXED HYPERLIPIDEMIA: ICD-10-CM

## 2017-12-06 DIAGNOSIS — I10 ESSENTIAL HYPERTENSION: ICD-10-CM

## 2017-12-06 DIAGNOSIS — K75.81 NASH (NONALCOHOLIC STEATOHEPATITIS): ICD-10-CM

## 2017-12-06 NOTE — PROGRESS NOTES
[facility] Progress Note:   F/up Physician Visit    CC: Obesity      Phuong Ruano is a 65 y.o. female who is here for her  f/up physician visit for the VLCD Program. Sky Thomas has completed 9 weeks of the program to date. 13 lb weight loss since last visit. Weight History  Starting  weight 270.1 lb Body mass index is 43.6 kg/(m^2). Current weight 240  Goal weight 165 lb  Highest weight 277 lb, at 47years old    Weight Metrics 12/6/2017 12/6/2017 11/29/2017 11/22/2017 11/15/2017 11/8/2017 11/8/2017   Weight - 240 lb 1.6 oz 244 lb 246 lb 6.4 oz 250 lb 9.6 oz - 252 lb 12.8 oz   Waist Measure Inches 45 - 45.5 45 46 46 -   Exercise Mins/week 90 - - - - - -   Body Fat % 45.5 - - - - - -   BMI - 39.05 kg/m2 38.79 kg/m2 39.17 kg/m2 39.84 kg/m2 - 40.19 kg/m2         Current Outpatient Prescriptions   Medication Sig Dispense Refill    cholecalciferol, vitamin D3, (VITAMIN D3) 2,000 unit tab Take 2,000 Units by mouth daily.  potassium 99 mg tablet Take 99 mg by mouth daily as needed.  acetaminophen (TYLENOL ARTHRITIS PAIN) 650 mg CR tablet Take 650 mg by mouth every six (6) hours as needed for Pain.  meloxicam (MOBIC) 15 mg tablet TAKE 1 TABLET BY MOUTH DAILY 90 Tab 3    furosemide (LASIX) 40 mg tablet Take 1 Tab by mouth daily. 90 Tab 3    simvastatin (ZOCOR) 20 mg tablet Take 1 Tab by mouth nightly. 90 Tab 3    omega-3 fatty acids-vitamin e 1,000 mg cap Take 1 Cap by mouth two (2) times a day.  calcium-cholecalciferol, d3, 600-125 mg-unit tab Take 1,200 mg by mouth daily. Participation   Did you attend clinic and class last week? yes    Review of Systems  Since your last visit, have you experienced any complications? no  If yes, please list:     No CP, SOB, palpitations, lightheadedness, dizziness, constipation. Positives highlight in BOLD. Are you taking an appetite suppressant? no  If so, is there any Chest Pain, Palpitations or Dizziness?   BP Readings from Last 10 Encounters:   12/06/17 120/78   11/29/17 (!) 148/92   11/22/17 141/88   11/15/17 149/89   11/08/17 140/85   11/01/17 139/74   10/26/17 153/88   10/18/17 133/85   10/11/17 142/88   10/04/17 135/87         Have you received any other medical care this week? no  If yes, where and for what? Have you discontinued or changed any medicine or dose of your medicine since your last visit? no  If yes, where and for what? Diet  How many ounces of calorie-free liquids did you consume each day? 64 oz    How many meal replacements did you take each day? 4    Did you have any problems adhering to the program?  no If yes, please explain:       Exercise  Aerobic exercise: 90 min  Resistance exercise: 0 workouts / week  Any discomfort?  no     If yes, where? Review of Systems  Complete ROS negative except where noted above    Objective  Visit Vitals    /78 (BP 1 Location: Left arm, BP Patient Position: Sitting)    Pulse 70    Temp 98.9 °F (37.2 °C) (Oral)    Resp 12    Ht 5' 5.75\" (1.67 m)    Wt 240 lb 1.6 oz (108.9 kg)    SpO2 99%    BMI 39.05 kg/m2     No LMP recorded. Patient has had a hysterectomy. Waist Circumference: I personally reviewed patient's Weight Management Doc Flowsheet  Neck Circumference: I personally reviewed patient's Weight Management Doc Flowsheet  Percent Body Fat: I personally reviewed patient's Weight Management Doc Flowsheet    Physical Exam  Appearance: well appearing, obese, A&O, NAD  HEENT:  NC/AT, PERRL, No scleral icterus  Heart:  RRR without M/R/G  Lungs:  CTAB, no rhonchi, rales, or wheezes with good air exchange   Ext:  No LE Edema    Assessment / Plan    Encounter Diagnoses   Name Primary?  Obesity, morbid, BMI 40.0-49.9 ([address] Yes    Essential hypertension     HERZOG (nonalcoholic steatohepatitis) suggested by ultrasound report, 2016     Mixed hyperlipidemia        1. Weight management well controlled   Progress was reviewed with patient    2.   Labs Latest results reviewed with patient   Lab slip given to pt for f/up HDL labs    3. Diet regimen   # of meal replacements prescribed: 4   If modified LCD-nutritional guidelines:    Monthly Goal   As below    Medical monitoring schedule:   Weekly BP/Weight checks   Monthly provider appointments          Patient Instructions   Monthly goal:    10-15 lbs weight loss    Continue biking/walking. Body Mass Index: Care Instructions  Your Care Instructions    Body mass index (BMI) can help you see if your weight is raising your risk for health problems. It uses a formula to compare how much you weigh with how tall you are. · A BMI lower than 18.5 is considered underweight. · A BMI between 18.5 and 24.9 is considered healthy. · A BMI between 25 and 29.9 is considered overweight. A BMI of 30 or higher is considered obese. If your BMI is in the normal range, it means that you have a lower risk for weight-related health problems. If your BMI is in the overweight or obese range, you may be at increased risk for weight-related health problems, such as high blood pressure, heart disease, stroke, arthritis or joint pain, and diabetes. If your BMI is in the underweight range, you may be at increased risk for health problems such as fatigue, lower protection (immunity) against illness, muscle loss, bone loss, hair loss, and hormone problems. BMI is just one measure of your risk for weight-related health problems. You may be at higher risk for health problems if you are not active, you eat an unhealthy diet, or you drink too much alcohol or use tobacco products. Follow-up care is a key part of your treatment and safety. Be sure to make and go to all appointments, and call your doctor if you are having problems. It's also a good idea to know your test results and keep a list of the medicines you take. How can you care for yourself at home? · Practice healthy eating habits.  This includes eating plenty of fruits, vegetables, whole grains, lean protein, and low-fat dairy. · If your doctor recommends it, get more exercise. Walking is a good choice. Bit by bit, increase the amount you walk every day. Try for at least 30 minutes on most days of the week. · Do not smoke. Smoking can increase your risk for health problems. If you need help quitting, talk to your doctor about stop-smoking programs and medicines. These can increase your chances of quitting for good. · Limit alcohol to 2 drinks a day for men and 1 drink a day for women. Too much alcohol can cause health problems. If you have a BMI higher than 25  · Your doctor may do other tests to check your risk for weight-related health problems. This may include measuring the distance around your waist. A waist measurement of more than 40 inches in men or 35 inches in women can increase the risk of weight-related health problems. · Talk with your doctor about steps you can take to stay healthy or improve your health. You may need to make lifestyle changes to lose weight and stay healthy, such as changing your diet and getting regular exercise. If you have a BMI lower than 18.5  · Your doctor may do other tests to check your risk for health problems. · Talk with your doctor about steps you can take to stay healthy or improve your health. You may need to make lifestyle changes to gain or maintain weight and stay healthy, such as getting more healthy foods in your diet and doing exercises to build muscle. Where can you learn more? Go to http://stanton-gonsalo.info/. Enter S176 in the search box to learn more about \"Body Mass Index: Care Instructions. \"  Current as of: October 13, 2016  Content Version: 11.4  © 9464-9684 [employer]. Care instructions adapted under license by [employer] (which disclaims liability or warranty for this information).  If you have questions about a medical condition or this instruction, always ask your healthcare professional. [place] [address] any warranty or liability for your use of this information. Follow-up Disposition:  Return in about 4 weeks (around 1/3/2018). 10 minutes of the 15 minutes face to face time with Dipak Aviles consisted of counseling & coordinating and/or discussing treatment plans in reference to her The primary encounter diagnosis was Obesity, morbid, BMI 40.0-49.9 ([address] Diagnoses of Essential hypertension, HERZOG (nonalcoholic steatohepatitis) suggested by ultrasound report, 2016, and Mixed hyperlipidemia were also pertinent to this visit. The patient is to follow up as scheduled and will report to the ED or the office if symptoms change or increase. The patient has voiced understanding and will comply.

## 2017-12-06 NOTE — PROGRESS NOTES
Chief Complaint   Patient presents with    Weight Management     1. Have you been to the ER, urgent care clinic since your last visit? Hospitalized since your last visit? No    2. Have you seen or consulted any other health care providers outside of the [address] [place] [address] since your last visit? Include any pap smears or colon screening.  No

## 2017-12-06 NOTE — PATIENT INSTRUCTIONS
Monthly goal:    10-15 lbs weight loss    Continue biking/walking. Body Mass Index: Care Instructions  Your Care Instructions    Body mass index (BMI) can help you see if your weight is raising your risk for health problems. It uses a formula to compare how much you weigh with how tall you are. · A BMI lower than 18.5 is considered underweight. · A BMI between 18.5 and 24.9 is considered healthy. · A BMI between 25 and 29.9 is considered overweight. A BMI of 30 or higher is considered obese. If your BMI is in the normal range, it means that you have a lower risk for weight-related health problems. If your BMI is in the overweight or obese range, you may be at increased risk for weight-related health problems, such as high blood pressure, heart disease, stroke, arthritis or joint pain, and diabetes. If your BMI is in the underweight range, you may be at increased risk for health problems such as fatigue, lower protection (immunity) against illness, muscle loss, bone loss, hair loss, and hormone problems. BMI is just one measure of your risk for weight-related health problems. You may be at higher risk for health problems if you are not active, you eat an unhealthy diet, or you drink too much alcohol or use tobacco products. Follow-up care is a key part of your treatment and safety. Be sure to make and go to all appointments, and call your doctor if you are having problems. It's also a good idea to know your test results and keep a list of the medicines you take. How can you care for yourself at home? · Practice healthy eating habits. This includes eating plenty of fruits, vegetables, whole grains, lean protein, and low-fat dairy. · If your doctor recommends it, get more exercise. Walking is a good choice. Bit by bit, increase the amount you walk every day. Try for at least 30 minutes on most days of the week. · Do not smoke. Smoking can increase your risk for health problems.  If you need help quitting, talk to your doctor about stop-smoking programs and medicines. These can increase your chances of quitting for good. · Limit alcohol to 2 drinks a day for men and 1 drink a day for women. Too much alcohol can cause health problems. If you have a BMI higher than 25  · Your doctor may do other tests to check your risk for weight-related health problems. This may include measuring the distance around your waist. A waist measurement of more than 40 inches in men or 35 inches in women can increase the risk of weight-related health problems. · Talk with your doctor about steps you can take to stay healthy or improve your health. You may need to make lifestyle changes to lose weight and stay healthy, such as changing your diet and getting regular exercise. If you have a BMI lower than 18.5  · Your doctor may do other tests to check your risk for health problems. · Talk with your doctor about steps you can take to stay healthy or improve your health. You may need to make lifestyle changes to gain or maintain weight and stay healthy, such as getting more healthy foods in your diet and doing exercises to build muscle. Where can you learn more? Go to http://stanton-gonsalo.info/. Enter S176 in the search box to learn more about \"Body Mass Index: Care Instructions. \"  Current as of: October 13, 2016  Content Version: 11.4  © 4074-0474 [employer]. Care instructions adapted under license by [employer] (which disclaims liability or warranty for this information). If you have questions about a medical condition or this instruction, always ask your healthcare professional. [place] [address] any warranty or liability for your use of this information.

## 2017-12-06 NOTE — MR AVS SNAPSHOT
Visit Information Date & Time Provider Department Dept. Phone Encounter #  
 12/6/2017  8:00 AM Radha Atkinson NP [facility] [phone] [id number] Follow-up Instructions Return in about 4 weeks (around 1/3/2018). Your Appointments 1/3/2018  4:88 AM  
METABOLIC PROGRAM 15 with Radha Atkinson NP [facility] ([facility]) Appt Note: mwl  
 [address], [address] [address] [address] [place]  
  
   
 [address], [address]  
  
    
 2/7/2018  7:45 AM  
LAB with [facility] NURSE VISIT [facility] ([facility]) Appt Note: 6 mo lab  
 [address], [address] [address] [address] [place]  
  
   
 [address], [address]  
  
    
 2/14/2018  8:00 AM  
Office Visit with Juan Otto MD  
[facility]) Appt Note: 6 mo fu  
 [address], [address] [address] [address] [place]  
  
   
 [address], [address] Upcoming Health Maintenance Date Due ZOSTER VACCINE AGE 60> 3/5/2016 COLONOSCOPY 5/28/2018 BREAST CANCER SCRN MAMMOGRAM 7/14/2018 PAP AKA CERVICAL CYTOLOGY 7/28/2019 DTaP/Tdap/Td series (2 - Td) 2/7/2027 Allergies as of 12/6/2017  Review Complete On: 12/6/2017 By: Radha Atkinson NP No Known Allergies Current Immunizations  Reviewed on 2/7/2017 Name Date Influenza Vaccine 10/12/2017 Influenza Vaccine (Quad) PF 10/10/2016 Tdap 2/7/2017 Not reviewed this visit You Were Diagnosed With   
  
 Codes Comments Obesity, morbid, BMI 40.0-49.9 ([facility] [address]    -  Primary ICD-10-CM: E66.01 
ICD-9-CM: 278.01 Essential hypertension     ICD-10-CM: I10 
ICD-9-CM: 401.9 HERZOG (nonalcoholic steatohepatitis)     ICD-10-CM: Q72.30 ICD-9-CM: 571.8 Mixed hyperlipidemia     ICD-10-CM: E78.2 ICD-9-CM: 272.2 Vitals BP Pulse Temp Resp Height(growth percentile) Weight(growth percentile) 120/78 (BP 1 Location: Left arm, BP Patient Position: Sitting) 70 98.9 °F (37.2 °C) (Oral) 12 5' 5.75\" (1.67 m) 240 lb 1.6 oz (108.9 kg) SpO2 BMI OB Status Smoking Status 99% 39.05 kg/m2 Hysterectomy Never Smoker Vitals History BMI and BSA Data Body Mass Index Body Surface Area 39.05 kg/m 2 2.25 m 2 Preferred Pharmacy Pharmacy Name Phone [facility] Carla Hawkins [address] [place] [address] [phone] Your Updated Medication List  
  
   
This list is accurate as of: 12/6/17  8:28 AM.  Always use your most recent med list.  
  
  
  
  
 calcium-cholecalciferol (d3) 600-125 mg-unit Tab Take 1,200 mg by mouth daily. furosemide 40 mg tablet Commonly known as:  LASIX Take 1 Tab by mouth daily. meloxicam 15 mg tablet Commonly known as:  MOBIC  
TAKE 1 TABLET BY MOUTH DAILY  
  
 omega-3 fatty acids-vitamin e 1,000 mg Cap Take 1 Cap by mouth two (2) times a day. potassium 99 mg tablet Take 99 mg by mouth daily as needed. simvastatin 20 mg tablet Commonly known as:  ZOCOR Take 1 Tab by mouth nightly. TYLENOL ARTHRITIS PAIN 650 mg [facility] Generic drug:  acetaminophen Take 650 mg by mouth every six (6) hours as needed for Pain. VITAMIN D3 2,000 unit Tab Generic drug:  cholecalciferol (vitamin D3) Take 2,000 Units by mouth daily. Follow-up Instructions Return in about 4 weeks (around 1/3/2018). To-Do List   
 12/06/2017 Lab:  METABOLIC PANEL, COMPREHENSIVE   
  
 12/06/2017 Lab:  URIC ACID Patient Instructions Monthly goal: 
 
10-15 lbs weight loss Continue biking/walking. Body Mass Index: Care Instructions Your Care Instructions Body mass index (BMI) can help you see if your weight is raising your risk for health problems. It uses a formula to compare how much you weigh with how tall you are. · A BMI lower than 18.5 is considered underweight. · A BMI between 18.5 and 24.9 is considered healthy. · A BMI between 25 and 29.9 is considered overweight. A BMI of 30 or higher is considered obese. If your BMI is in the normal range, it means that you have a lower risk for weight-related health problems. If your BMI is in the overweight or obese range, you may be at increased risk for weight-related health problems, such as high blood pressure, heart disease, stroke, arthritis or joint pain, and diabetes. If your BMI is in the underweight range, you may be at increased risk for health problems such as fatigue, lower protection (immunity) against illness, muscle loss, bone loss, hair loss, and hormone problems. BMI is just one measure of your risk for weight-related health problems. You may be at higher risk for health problems if you are not active, you eat an unhealthy diet, or you drink too much alcohol or use tobacco products. Follow-up care is a key part of your treatment and safety. Be sure to make and go to all appointments, and call your doctor if you are having problems. It's also a good idea to know your test results and keep a list of the medicines you take. How can you care for yourself at home? · Practice healthy eating habits. This includes eating plenty of fruits, vegetables, whole grains, lean protein, and low-fat dairy. · If your doctor recommends it, get more exercise. Walking is a good choice. Bit by bit, increase the amount you walk every day. Try for at least 30 minutes on most days of the week. · Do not smoke. Smoking can increase your risk for health problems. If you need help quitting, talk to your doctor about stop-smoking programs and medicines. These can increase your chances of quitting for good. · Limit alcohol to 2 drinks a day for men and 1 drink a day for women.  Too much alcohol can cause health problems. If you have a BMI higher than 25 · Your doctor may do other tests to check your risk for weight-related health problems. This may include measuring the distance around your waist. A waist measurement of more than 40 inches in men or 35 inches in women can increase the risk of weight-related health problems. · Talk with your doctor about steps you can take to stay healthy or improve your health. You may need to make lifestyle changes to lose weight and stay healthy, such as changing your diet and getting regular exercise. If you have a BMI lower than 18.5 · Your doctor may do other tests to check your risk for health problems. · Talk with your doctor about steps you can take to stay healthy or improve your health. You may need to make lifestyle changes to gain or maintain weight and stay healthy, such as getting more healthy foods in your diet and doing exercises to build muscle. Where can you learn more? Go to http://stanton-gonsalo.info/. Enter S176 in the search box to learn more about \"Body Mass Index: Care Instructions. \" Current as of: October 13, 2016 Content Version: 11.4 © 3743-8442 [employer] Care instructions adapted under license by [employer] (which disclaims liability or warranty for this information). If you have questions about a medical condition or this instruction, always ask your healthcare professional. [address] any warranty or liability for your use of this information. Introducing [facility]! Dear Cristo Barney: 
Thank you for requesting a [employer] account. Our records indicate that you already have an active [employer] account. You can access your account anytime at [email or web address] Did you know that you can access your hospital and ER discharge instructions at any time in [employer]an[employer]?   You can also review all of your test results from your hospital stay or ER visit. Additional Information If you have questions, please visit the Frequently Asked Questions section of the [employer] website at [email or web address] Remember, [employer] is NOT to be used for urgent needs. For medical emergencies, dial 911. Now available from your iPhone and Android! Please provide this summary of care documentation to your next provider. Your primary care clinician is listed as Jose C Bearden. If you have any questions after today's visit, please call [phone].

## 2017-12-13 ENCOUNTER — CLINICAL SUPPORT (OUTPATIENT)
Dept: FAMILY MEDICINE CLINIC | Age: 62
End: 2017-12-13

## 2017-12-13 VITALS
HEART RATE: 82 BPM | BODY MASS INDEX: 38.25 KG/M2 | DIASTOLIC BLOOD PRESSURE: 85 MMHG | SYSTOLIC BLOOD PRESSURE: 133 MMHG | WEIGHT: 238 LBS | HEIGHT: 66 IN

## 2017-12-13 DIAGNOSIS — E66.01 OBESITY, MORBID, BMI 40.0-49.9 (HCC): Primary | ICD-10-CM

## 2017-12-13 NOTE — PROGRESS NOTES
Progress Note: Weekly Education Class in the [facility]   Is there anything that you or the patient needs to let the [address] [facility] Physician know about? no  Over the past week, have you experienced any side-effects? no    Zelda Floyd is a 65 y.o. female who is enrolled in [facility]    Visit Vitals    /85 (BP 1 Location: Left arm, BP Patient Position: Sitting)    Pulse 82    Ht 5' 5.75\" (1.67 m)    Wt 238 lb (108 kg)    BMI 38.71 kg/m2     Weight Metrics 12/13/2017 12/6/2017 12/6/2017 11/29/2017 11/22/2017 11/15/2017 11/8/2017   Weight 238 lb - 240 lb 1.6 oz 244 lb 246 lb 6.4 oz 250 lb 9.6 oz -   Waist Measure Inches 44 45 - 45.5 45 46 46   Exercise Mins/week - 90 - - - - -   Body Fat % - 45.5 - - - - -   BMI 38.71 kg/m2 - 39.05 kg/m2 38.79 kg/m2 39.17 kg/m2 39.84 kg/m2 -         Have you received any other medical care this week? no  If yes, where and for what? Have you had any change in your medications since your last visit? no  If yes what? Did you have any problems adhering to the program last week? no  If yes, please explain:       Eating Habits Over Last Week:  Did you take in 64 oz of non-caloric fluids? yes     Did you consume your 4 meal replacements each day?  yes       Physical Activity Over the Past Week:    Aerobic exercise: 0 min  Resistance exercise: 0 workouts / week

## 2017-12-20 ENCOUNTER — CLINICAL SUPPORT (OUTPATIENT)
Dept: FAMILY MEDICINE CLINIC | Age: 62
End: 2017-12-20

## 2017-12-20 VITALS
SYSTOLIC BLOOD PRESSURE: 144 MMHG | WEIGHT: 235.2 LBS | HEIGHT: 66 IN | BODY MASS INDEX: 37.8 KG/M2 | HEART RATE: 68 BPM | DIASTOLIC BLOOD PRESSURE: 88 MMHG

## 2017-12-20 DIAGNOSIS — E66.01 OBESITY, MORBID, BMI 40.0-49.9 (HCC): Primary | ICD-10-CM

## 2017-12-20 NOTE — PROGRESS NOTES
Progress Note: Weekly Education Class in the [facility]   Is there anything that you or the patient needs to let the [facility] Physician know about? no  Over the past week, have you experienced any side-effects? no    Candance Power is a 65 y.o. female who is enrolled in [facility]    Visit Vitals    /88 (BP 1 Location: Right arm, BP Patient Position: Sitting)    Pulse 68    Ht 5' 5.75\" (1.67 m)    Wt 235 lb 3.2 oz (106.7 kg)    BMI 38.25 kg/m2     Weight Metrics 12/20/2017 12/13/2017 12/6/2017 12/6/2017 11/29/2017 11/22/2017 11/15/2017   Weight 235 lb 3.2 oz 238 lb - 240 lb 1.6 oz 244 lb 246 lb 6.4 oz 250 lb 9.6 oz   Waist Measure Inches 44 44 45 - 45.5 45 46   Exercise Mins/week - - 90 - - - -   Body Fat % - - 45.5 - - - -   BMI 38.25 kg/m2 38.71 kg/m2 - 39.05 kg/m2 38.79 kg/m2 39.17 kg/m2 39.84 kg/m2         Have you received any other medical care this week? no  If yes, where and for what? Have you had any change in your medications since your last visit? no  If yes what? Did you have any problems adhering to the program last week? no  If yes, please explain:       Eating Habits Over Last Week:  Did you take in 64 oz of non-caloric fluids? yes     Did you consume your 4 meal replacements each day?  yes       Physical Activity Over the Past Week:    Aerobic exercise: 0 min  Resistance exercise: 0 workouts / week

## 2017-12-27 ENCOUNTER — HOSPITAL ENCOUNTER (OUTPATIENT)
Dept: LAB | Age: 62
Discharge: HOME | End: 2017-12-27
Payer: COMMERCIAL

## 2017-12-27 ENCOUNTER — CLINICAL SUPPORT (OUTPATIENT)
Dept: FAMILY MEDICINE CLINIC | Age: 62
End: 2017-12-27

## 2017-12-27 VITALS
DIASTOLIC BLOOD PRESSURE: 84 MMHG | WEIGHT: 232.4 LBS | HEIGHT: 66 IN | HEART RATE: 75 BPM | BODY MASS INDEX: 37.35 KG/M2 | SYSTOLIC BLOOD PRESSURE: 133 MMHG

## 2017-12-27 DIAGNOSIS — E66.01 OBESITY, MORBID, BMI 40.0-49.9 (HCC): Primary | ICD-10-CM

## 2017-12-27 DIAGNOSIS — E66.01 OBESITY, MORBID, BMI 40.0-49.9 (HCC): ICD-10-CM

## 2017-12-27 LAB
ALBUMIN SERPL-MCNC: 4.2 G/DL (ref 3.4–5)
ALBUMIN/GLOB SERPL: 1.2 {RATIO} (ref 0.8–1.7)
ALP SERPL-CCNC: 71 U/L (ref 45–117)
ALT SERPL-CCNC: 46 U/L (ref 13–56)
ANION GAP SERPL CALC-SCNC: 9 MMOL/L (ref 3–18)
AST SERPL-CCNC: 18 U/L (ref 15–37)
BILIRUB SERPL-MCNC: 0.5 MG/DL (ref 0.2–1)
BUN SERPL-MCNC: 14 MG/DL (ref 7–18)
BUN/CREAT SERPL: 24 (ref 12–20)
CALCIUM SERPL-MCNC: 9.5 MG/DL (ref 8.5–10.1)
CHLORIDE SERPL-SCNC: 107 MMOL/L (ref 100–108)
CO2 SERPL-SCNC: 28 MMOL/L (ref 21–32)
CREAT SERPL-MCNC: 0.58 MG/DL (ref 0.6–1.3)
GLOBULIN SER CALC-MCNC: 3.4 G/DL (ref 2–4)
GLUCOSE SERPL-MCNC: 94 MG/DL (ref 74–99)
POTASSIUM SERPL-SCNC: 4.5 MMOL/L (ref 3.5–5.5)
PROT SERPL-MCNC: 7.6 G/DL (ref 6.4–8.2)
SODIUM SERPL-SCNC: 144 MMOL/L (ref 136–145)
URATE SERPL-MCNC: 4.3 MG/DL (ref 2.6–7.2)

## 2017-12-27 PROCEDURE — 80053 COMPREHEN METABOLIC PANEL: CPT | Performed by: NURSE PRACTITIONER

## 2017-12-27 PROCEDURE — 84550 ASSAY OF BLOOD/URIC ACID: CPT | Performed by: NURSE PRACTITIONER

## 2017-12-27 PROCEDURE — 36415 COLL VENOUS BLD VENIPUNCTURE: CPT | Performed by: NURSE PRACTITIONER

## 2017-12-27 NOTE — PROGRESS NOTES
Progress Note: Weekly Education Class in the [facility]   Is there anything that you or the patient needs to let the [facility] Physician know about? no  Over the past week, have you experienced any side-effects? no    Moriah Barrett is a 65 y.o. female who is enrolled in [facility]    Visit Vitals    /84 (BP 1 Location: Left arm, BP Patient Position: Sitting)    Pulse 75    Ht 5' 5.75\" (1.67 m)    Wt 232 lb 6.4 oz (105.4 kg)    BMI 37.8 kg/m2     Weight Metrics 12/27/2017 12/20/2017 12/13/2017 12/6/2017 12/6/2017 11/29/2017 11/22/2017   Weight 232 lb 6.4 oz 235 lb 3.2 oz 238 lb - 240 lb 1.6 oz 244 lb 246 lb 6.4 oz   Waist Measure Inches 45 44 44 45 - 45.5 45   Exercise Mins/week - - - 90 - - -   Body Fat % - - - 45.5 - - -   BMI 37.8 kg/m2 38.25 kg/m2 38.71 kg/m2 - 39.05 kg/m2 38.79 kg/m2 39.17 kg/m2         Have you received any other medical care this week? no  If yes, where and for what? Have you had any change in your medications since your last visit? no  If yes what? Did you have any problems adhering to the program last week? no  If yes, please explain:       Eating Habits Over Last Week:  Did you take in 64 oz of non-caloric fluids? yes     Did you consume your 4 meal replacements each day?  yes       Physical Activity Over the Past Week:    Aerobic exercise: 0 min  Resistance exercise: 0 workouts / week

## 2018-01-03 ENCOUNTER — OFFICE VISIT (OUTPATIENT)
Dept: INTERNAL MEDICINE CLINIC | Age: 63
End: 2018-01-03

## 2018-01-03 VITALS
SYSTOLIC BLOOD PRESSURE: 121 MMHG | WEIGHT: 224.4 LBS | BODY MASS INDEX: 37.39 KG/M2 | HEART RATE: 68 BPM | TEMPERATURE: 99.2 F | RESPIRATION RATE: 12 BRPM | HEIGHT: 65 IN | DIASTOLIC BLOOD PRESSURE: 73 MMHG | OXYGEN SATURATION: 96 %

## 2018-01-03 DIAGNOSIS — E66.9 OBESITY (BMI 30-39.9): Primary | ICD-10-CM

## 2018-01-03 NOTE — PATIENT INSTRUCTIONS
Health Maintenance Due   Topic Date Due    ZOSTER VACCINE AGE 60>  03/05/2016    COLONOSCOPY  05/28/2018     Monthly goal:    10-15 lbs    Restart exercise- make sure you can talk in full sentences. Great with incorporating strength training. Body Mass Index: Care Instructions  Your Care Instructions    Body mass index (BMI) can help you see if your weight is raising your risk for health problems. It uses a formula to compare how much you weigh with how tall you are. · A BMI lower than 18.5 is considered underweight. · A BMI between 18.5 and 24.9 is considered healthy. · A BMI between 25 and 29.9 is considered overweight. A BMI of 30 or higher is considered obese. If your BMI is in the normal range, it means that you have a lower risk for weight-related health problems. If your BMI is in the overweight or obese range, you may be at increased risk for weight-related health problems, such as high blood pressure, heart disease, stroke, arthritis or joint pain, and diabetes. If your BMI is in the underweight range, you may be at increased risk for health problems such as fatigue, lower protection (immunity) against illness, muscle loss, bone loss, hair loss, and hormone problems. BMI is just one measure of your risk for weight-related health problems. You may be at higher risk for health problems if you are not active, you eat an unhealthy diet, or you drink too much alcohol or use tobacco products. Follow-up care is a key part of your treatment and safety. Be sure to make and go to all appointments, and call your doctor if you are having problems. It's also a good idea to know your test results and keep a list of the medicines you take. How can you care for yourself at home? · Practice healthy eating habits. This includes eating plenty of fruits, vegetables, whole grains, lean protein, and low-fat dairy. · If your doctor recommends it, get more exercise. Walking is a good choice.  Bit by bit, increase the amount you walk every day. Try for at least 30 minutes on most days of the week. · Do not smoke. Smoking can increase your risk for health problems. If you need help quitting, talk to your doctor about stop-smoking programs and medicines. These can increase your chances of quitting for good. · Limit alcohol to 2 drinks a day for men and 1 drink a day for women. Too much alcohol can cause health problems. If you have a BMI higher than 25  · Your doctor may do other tests to check your risk for weight-related health problems. This may include measuring the distance around your waist. A waist measurement of more than 40 inches in men or 35 inches in women can increase the risk of weight-related health problems. · Talk with your doctor about steps you can take to stay healthy or improve your health. You may need to make lifestyle changes to lose weight and stay healthy, such as changing your diet and getting regular exercise. If you have a BMI lower than 18.5  · Your doctor may do other tests to check your risk for health problems. · Talk with your doctor about steps you can take to stay healthy or improve your health. You may need to make lifestyle changes to gain or maintain weight and stay healthy, such as getting more healthy foods in your diet and doing exercises to build muscle. Where can you learn more? Go to http://stanton-gonsalo.info/. Enter S176 in the search box to learn more about \"Body Mass Index: Care Instructions. \"  Current as of: October 13, 2016  Content Version: 11.4  © 1317-2564 [employer]. Care instructions adapted under license by [employer] (which disclaims liability or warranty for this information). If you have questions about a medical condition or this instruction, always ask your healthcare professional. [place] [address] any warranty or liability for your use of this information.

## 2018-01-03 NOTE — PROGRESS NOTES
[facility] Progress Note:   F/up Physician Visit    CC: Obesity      Garth Mcgovern is a 65 y.o. female who is here for her  f/up physician visit for the [facility]CD Program. Kenna Menchaca has completed 12 weeks of the program to date. Lost 16 lbs since last visit! Weight History  Starting  weight 270.1 lb Body mass index is 43.6 kg/(m^2). Current weight 224 lb  Goal weight 165 lb  Highest weight 277 lb, at 47years old    Weight Metrics 1/3/2018 1/3/2018 12/27/2017 12/27/2017 12/20/2017 12/13/2017 12/6/2017   Weight - 224 lb 6.4 oz - 232 lb 6.4 oz 235 lb 3.2 oz 238 lb -   Waist Measure Inches 43 - 45 - 44 44 45   Exercise Mins/week 0 - - - - - 90   Body Fat % 44 - - - - - 45.5   BMI - 37.34 kg/m2 - 37.8 kg/m2 38.25 kg/m2 38.71 kg/m2 -         Current Outpatient Prescriptions   Medication Sig Dispense Refill    cholecalciferol, vitamin D3, (VITAMIN D3) 2,000 unit tab Take 2,000 Units by mouth daily.  potassium 99 mg tablet Take 99 mg by mouth daily as needed.  acetaminophen (TYLENOL ARTHRITIS PAIN) 650 mg CR tablet Take 650 mg by mouth every six (6) hours as needed for Pain.  meloxicam (MOBIC) 15 mg tablet TAKE 1 TABLET BY MOUTH DAILY 90 Tab 3    furosemide (LASIX) 40 mg tablet Take 1 Tab by mouth daily. 90 Tab 3    simvastatin (ZOCOR) 20 mg tablet Take 1 Tab by mouth nightly. 90 Tab 3    omega-3 fatty acids-vitamin e 1,000 mg cap Take 1 Cap by mouth two (2) times a day.  calcium-cholecalciferol, d3, 600-125 mg-unit tab Take 1,200 mg by mouth daily. Participation   Did you attend clinic and class last week? yes    Review of Systems  Since your last visit, have you experienced any complications? no  If yes, please list:     No CP, SOB, palpitations, lightheadedness, dizziness, constipation. Positives highlight in BOLD. Are you taking an appetite suppressant? no  If so, is there any Chest Pain, Palpitations or Dizziness?   BP Readings from Last 10 Encounters: 01/03/18 121/73   12/27/17 133/84   12/20/17 144/88   12/13/17 133/85   12/06/17 120/78   11/29/17 (!) 148/92   11/22/17 141/88   11/15/17 149/89   11/08/17 140/85   11/01/17 139/74         Have you received any other medical care this week? no  If yes, where and for what? Have you discontinued or changed any medicine or dose of your medicine since your last visit? no  If yes, where and for what? Diet  How many ounces of calorie-free liquids did you consume each day?  80 oz    How many meal replacements did you take each day? 4    Did you have any problems adhering to the program?  no If yes, please explain:       Exercise  Aerobic exercise: 0 min  Resistance exercise: 0 workouts / week  Any discomfort?  no     If yes, where? Review of Systems  Complete ROS negative except where noted above    Objective  Visit Vitals    /73 (BP 1 Location: Left arm, BP Patient Position: Sitting)    Pulse 68    Temp 99.2 °F (37.3 °C) (Oral)    Resp 12    Ht 5' 5\" (1.651 m)    Wt 224 lb 6.4 oz (101.8 kg)    SpO2 96%    BMI 37.34 kg/m2     No LMP recorded. Patient has had a hysterectomy. Waist Circumference: I personally reviewed patient's Weight Management Doc Flowsheet  Neck Circumference: I personally reviewed patient's Weight Management Doc Flowsheet  Percent Body Fat: I personally reviewed patient's Weight Management Doc Flowsheet    Physical Exam  Appearance: well appearing, obese, A&O, NAD  HEENT:  NC/AT, PERRL, No scleral icterus  Heart:  RRR without M/R/G  Lungs:  CTAB, no rhonchi, rales, or wheezes with good air exchange   Ext:  No LE Edema    Assessment / Plan    Encounter Diagnosis   Name Primary?  Obesity (BMI 30-39. 9) Yes       1. Weight management well controlled   Progress was reviewed with patient    2. Labs    Latest results reviewed with patient   Lab slip given to pt for f/up HDL labs    3.  Diet regimen   # of meal replacements prescribed: 4   If modified LCD-nutritional guidelines:    Monthly Goal   As below    Medical monitoring schedule:   Weekly BP/Weight checks   Monthly provider appointments          Patient Instructions     Health Maintenance Due   Topic Date Due    ZOSTER VACCINE AGE 60>  03/05/2016    COLONOSCOPY  05/28/2018     Monthly goal:    10-15 lbs    Restart exercise- make sure you can talk in full sentences. Great with incorporating strength training. Body Mass Index: Care Instructions  Your Care Instructions    Body mass index (BMI) can help you see if your weight is raising your risk for health problems. It uses a formula to compare how much you weigh with how tall you are. · A BMI lower than 18.5 is considered underweight. · A BMI between 18.5 and 24.9 is considered healthy. · A BMI between 25 and 29.9 is considered overweight. A BMI of 30 or higher is considered obese. If your BMI is in the normal range, it means that you have a lower risk for weight-related health problems. If your BMI is in the overweight or obese range, you may be at increased risk for weight-related health problems, such as high blood pressure, heart disease, stroke, arthritis or joint pain, and diabetes. If your BMI is in the underweight range, you may be at increased risk for health problems such as fatigue, lower protection (immunity) against illness, muscle loss, bone loss, hair loss, and hormone problems. BMI is just one measure of your risk for weight-related health problems. You may be at higher risk for health problems if you are not active, you eat an unhealthy diet, or you drink too much alcohol or use tobacco products. Follow-up care is a key part of your treatment and safety. Be sure to make and go to all appointments, and call your doctor if you are having problems. It's also a good idea to know your test results and keep a list of the medicines you take. How can you care for yourself at home? · Practice healthy eating habits.  This includes eating plenty of fruits, vegetables, whole grains, lean protein, and low-fat dairy. · If your doctor recommends it, get more exercise. Walking is a good choice. Bit by bit, increase the amount you walk every day. Try for at least 30 minutes on most days of the week. · Do not smoke. Smoking can increase your risk for health problems. If you need help quitting, talk to your doctor about stop-smoking programs and medicines. These can increase your chances of quitting for good. · Limit alcohol to 2 drinks a day for men and 1 drink a day for women. Too much alcohol can cause health problems. If you have a BMI higher than 25  · Your doctor may do other tests to check your risk for weight-related health problems. This may include measuring the distance around your waist. A waist measurement of more than 40 inches in men or 35 inches in women can increase the risk of weight-related health problems. · Talk with your doctor about steps you can take to stay healthy or improve your health. You may need to make lifestyle changes to lose weight and stay healthy, such as changing your diet and getting regular exercise. If you have a BMI lower than 18.5  · Your doctor may do other tests to check your risk for health problems. · Talk with your doctor about steps you can take to stay healthy or improve your health. You may need to make lifestyle changes to gain or maintain weight and stay healthy, such as getting more healthy foods in your diet and doing exercises to build muscle. Where can you learn more? Go to http://stanton-gonsalo.info/. Enter S176 in the search box to learn more about \"Body Mass Index: Care Instructions. \"  Current as of: October 13, 2016  Content Version: 11.4  © 7699-1853 [employer]. Care instructions adapted under license by [employer] (which disclaims liability or warranty for this information).  If you have questions about a medical condition or this instruction, always ask your healthcare professional. [place] [address] any warranty or liability for your use of this information. Follow-up Disposition:  Return in about 4 weeks (around 1/31/2018). 10 minutes of the 15 minutes face to face time with Ivonne Michael consisted of counseling & coordinating and/or discussing treatment plans in reference to her The encounter diagnosis was Obesity (BMI 30-39.9). The patient is to follow up as scheduled and will report to the ED or the office if symptoms change or increase. The patient has voiced understanding and will comply. Discussed the patient's BMI with her. The BMI follow up plan is as follows:     dietary management education, guidance, and counseling  encourage exercise  monitor weight  prescribed dietary intake    An After Visit Summary was printed and given to the patient. The patient's abnormal BMI was reviewed and deemed target BMI for the patient. An After Visit Summary was provided to the patient and/or caregiver.

## 2018-01-03 NOTE — MR AVS SNAPSHOT
Visit Information Date & Time Provider Department Dept. Phone Encounter #  
 1/3/2018  8:15 AM Osvaldo Galloway NP [facility] [place] [phone] Follow-up Instructions Return in about 4 weeks (around 1/31/2018). Your Appointments 1/31/2018  0:29 AM  
METABOLIC PROGRAM 15 with Osvaldo Galloway NP [facility] ([address] [place] [address]) Appt Note: metabolic program salma [address], [address]  
  
   
 [address], [address]  
  
    
 2/7/2018  7:45 AM  
LAB with [facility] NURSE VISIT [facility] ([address] [place] [address]) Appt Note: 6 mo lab  
 [address], [address] [place] [address] [address]  
  
   
 [address], [address]  
  
    
 2/14/2018  8:00 AM  
Office Visit with Marquis Tom MD  
[facility] [address]) Appt Note: 6 mo fu  
 [address], [address] [place] [id number] [place] [address] [address]  
  
   
 [address], [address] Upcoming Health Maintenance Date Due ZOSTER VACCINE AGE 60> 3/5/2016 COLONOSCOPY 5/28/2018 BREAST CANCER SCRN MAMMOGRAM 7/14/2018 PAP AKA CERVICAL CYTOLOGY 7/28/2019 DTaP/Tdap/Td series (2 - Td) 2/7/2027 Allergies as of 1/3/2018  Review Complete On: 1/3/2018 By: Osvaldo Galloway NP No Known Allergies Current Immunizations  Reviewed on 2/7/2017 Name Date Influenza Vaccine 10/12/2017 Influenza Vaccine (Quad) PF 10/10/2016 Tdap 2/7/2017 Not reviewed this visit You Were Diagnosed With   
  
 Codes Comments Obesity (BMI 30-39.9)    -  Primary ICD-10-CM: V20.1 ICD-9-CM: 278.00 Vitals BP Pulse Temp Resp Height(growth percentile) Weight(growth percentile)  121/73 (BP 1 Location: Left arm, BP Patient Position: Sitting) 68 99.2 °F (37.3 °C) (Oral) 12 5' 5\" (1.651 m) 224 lb 6.4 oz (101.8 kg) SpO2 BMI OB Status Smoking Status 96% 37.34 kg/m2 Hysterectomy Never Smoker BMI and BSA Data Body Mass Index Body Surface Area  
 37.34 kg/m 2 2.16 m 2 Preferred Pharmacy Pharmacy Name Phone [facility] Carla Hawkins [address] [place] [address] [phone] Your Updated Medication List  
  
   
This list is accurate as of: 1/3/18  8:35 AM.  Always use your most recent med list.  
  
  
  
  
 calcium-cholecalciferol (d3) 600-125 mg-unit Tab Take 1,200 mg by mouth daily. furosemide 40 mg tablet Commonly known as:  LASIX Take 1 Tab by mouth daily. meloxicam 15 mg tablet Commonly known as:  MOBIC  
TAKE 1 TABLET BY MOUTH DAILY  
  
 omega-3 fatty acids-vitamin e 1,000 mg Cap Take 1 Cap by mouth two (2) times a day. potassium 99 mg tablet Take 99 mg by mouth daily as needed. simvastatin 20 mg tablet Commonly known as:  ZOCOR Take 1 Tab by mouth nightly. TYLENOL ARTHRITIS PAIN 650 mg [employer] Generic drug:  acetaminophen Take 650 mg by mouth every six (6) hours as needed for Pain. VITAMIN D3 2,000 unit Tab Generic drug:  cholecalciferol (vitamin D3) Take 2,000 Units by mouth daily. We Performed the Following HI CALC BMI ABV UP DAVE F/U [ [facility]] Follow-up Instructions Return in about 4 weeks (around 1/31/2018). To-Do List   
 01/03/2018 Lab:  METABOLIC PANEL, COMPREHENSIVE   
  
 01/03/2018 Lab:  URIC ACID Patient Instructions Health Maintenance Due Topic Date Due  
 ZOSTER VACCINE AGE 60>  03/05/2016  COLONOSCOPY  05/28/2018 Monthly goal: 
 
10-15 lbs Restart exercise- make sure you can talk in full sentences. Great with incorporating strength training. Body Mass Index: Care Instructions Your Care Instructions Body mass index (BMI) can help you see if your weight is raising your risk for health problems. It uses a formula to compare how much you weigh with how tall you are. · A BMI lower than 18.5 is considered underweight. · A BMI between 18.5 and 24.9 is considered healthy. · A BMI between 25 and 29.9 is considered overweight. A BMI of 30 or higher is considered obese. If your BMI is in the normal range, it means that you have a lower risk for weight-related health problems. If your BMI is in the overweight or obese range, you may be at increased risk for weight-related health problems, such as high blood pressure, heart disease, stroke, arthritis or joint pain, and diabetes. If your BMI is in the underweight range, you may be at increased risk for health problems such as fatigue, lower protection (immunity) against illness, muscle loss, bone loss, hair loss, and hormone problems. BMI is just one measure of your risk for weight-related health problems. You may be at higher risk for health problems if you are not active, you eat an unhealthy diet, or you drink too much alcohol or use tobacco products. Follow-up care is a key part of your treatment and safety. Be sure to make and go to all appointments, and call your doctor if you are having problems. It's also a good idea to know your test results and keep a list of the medicines you take. How can you care for yourself at home? · Practice healthy eating habits. This includes eating plenty of fruits, vegetables, whole grains, lean protein, and low-fat dairy. · If your doctor recommends it, get more exercise. Walking is a good choice. Bit by bit, increase the amount you walk every day. Try for at least 30 minutes on most days of the week. · Do not smoke. Smoking can increase your risk for health problems. If you need help quitting, talk to your doctor about stop-smoking programs and medicines. These can increase your chances of quitting for good. · Limit alcohol to 2 drinks a day for men and 1 drink a day for women. Too much alcohol can cause health problems. If you have a BMI higher than 25 · Your doctor may do other tests to check your risk for weight-related health problems. This may include measuring the distance around your waist. A waist measurement of more than 40 inches in men or 35 inches in women can increase the risk of weight-related health problems. · Talk with your doctor about steps you can take to stay healthy or improve your health. You may need to make lifestyle changes to lose weight and stay healthy, such as changing your diet and getting regular exercise. If you have a BMI lower than 18.5 · Your doctor may do other tests to check your risk for health problems. · Talk with your doctor about steps you can take to stay healthy or improve your health. You may need to make lifestyle changes to gain or maintain weight and stay healthy, such as getting more healthy foods in your diet and doing exercises to build muscle. Where can you learn more? Go to http://stanton-gonsalo.info/. Enter S176 in the search box to learn more about \"Body Mass Index: Care Instructions. \" Current as of: October 13, 2016 Content Version: 11.4 © 5961-0815 [employer]. Care instructions adapted under license by [employer] (which disclaims liability or warranty for this information). If you have questions about a medical condition or this instruction, always ask your healthcare professional. [address] any warranty or liability for your use of this information. Introducing [facility]! Dear Nithin Bo: 
Thank you for requesting a Asthmatx account. Our records indicate that you already have an active Asthmatx account. You can access your account anytime at [email or web address] Did you know that you can access your hospital and ER discharge instructions at any time in Clean Membranes? You can also review all of your test results from your hospital stay or ER visit. Additional Information If you have questions, please visit the Frequently Asked Questions section of the [employer] website at [email or web address] Remember, [employer] Membranes is NOT to be used for urgent needs. For medical emergencies, dial 911. Now available from your iPhone and Android! Please provide this summary of care documentation to your next provider. Your primary care clinician is listed as Seth Draper. If you have any questions after today's visit, please call [phone].

## 2018-01-03 NOTE — PROGRESS NOTES
Chief Complaint   Patient presents with    Weight Management     1. Have you been to the ER, urgent care clinic since your last visit? Hospitalized since your last visit? No    2. Have you seen or consulted any other health care providers outside of the [address] [place] [address] since your last visit? Include any pap smears or colon screening.  No

## 2018-01-10 ENCOUNTER — CLINICAL SUPPORT (OUTPATIENT)
Dept: FAMILY MEDICINE CLINIC | Age: 63
End: 2018-01-10

## 2018-01-10 DIAGNOSIS — E66.9 OBESITY (BMI 30-39.9): Primary | ICD-10-CM

## 2018-01-11 VITALS
BODY MASS INDEX: 37.99 KG/M2 | SYSTOLIC BLOOD PRESSURE: 140 MMHG | HEIGHT: 65 IN | DIASTOLIC BLOOD PRESSURE: 80 MMHG | WEIGHT: 228 LBS | HEART RATE: 79 BPM

## 2018-01-11 NOTE — PROGRESS NOTES
Progress Note: Weekly Education Class in the [facility] Weight Loss Program   Is there anything that you or the patient needs to let the [facility] [address] [facility] Physician know about? no  Over the past week, have you experienced any side-effects? no    Niharika Hoskins is a 65 y.o. female who is enrolled in [facility]    Visit Vitals    /80 (BP 1 Location: Left arm, BP Patient Position: Sitting)    Pulse 79    Ht 5' 5\" (1.651 m)    Wt 228 lb (103.4 kg)    BMI 37.94 kg/m2     Weight Metrics 1/11/2018 1/10/2018 1/3/2018 1/3/2018 12/27/2017 12/27/2017 12/20/2017   Weight - 228 lb - 224 lb 6.4 oz - 232 lb 6.4 oz 235 lb 3.2 oz   Waist Measure Inches 43 - 43 - 45 - 44   Exercise Mins/week - - 0 - - - -   Body Fat % - - 44 - - - -   BMI - 37.94 kg/m2 - 37.34 kg/m2 - 37.8 kg/m2 38.25 kg/m2         Have you received any other medical care this week? no  If yes, where and for what? Have you had any change in your medications since your last visit? no  If yes what? Did you have any problems adhering to the program last week? no  If yes, please explain:       Eating Habits Over Last Week:  Did you take in 64 oz of non-caloric fluids? yes     Did you consume your 4 meal replacements each day?  yes       Physical Activity Over the Past Week:    Aerobic exercise: 0 min  Resistance exercise: 0 workouts / week

## 2018-01-12 ENCOUNTER — OFFICE VISIT (OUTPATIENT)
Dept: ORTHOPEDIC SURGERY | Facility: CLINIC | Age: 63
End: 2018-01-12

## 2018-01-12 VITALS
BODY MASS INDEX: 37.92 KG/M2 | DIASTOLIC BLOOD PRESSURE: 83 MMHG | TEMPERATURE: 97.9 F | HEART RATE: 65 BPM | HEIGHT: 65 IN | RESPIRATION RATE: 18 BRPM | OXYGEN SATURATION: 98 % | WEIGHT: 227.6 LBS | SYSTOLIC BLOOD PRESSURE: 132 MMHG

## 2018-01-12 DIAGNOSIS — M25.061 HEMARTHROSIS INVOLVING KNEE JOINT, RIGHT: ICD-10-CM

## 2018-01-12 DIAGNOSIS — M17.0 PRIMARY OSTEOARTHRITIS OF BOTH KNEES: ICD-10-CM

## 2018-01-12 DIAGNOSIS — G89.29 CHRONIC PAIN OF BOTH KNEES: Primary | ICD-10-CM

## 2018-01-12 DIAGNOSIS — M25.461 EFFUSION OF RIGHT KNEE JOINT: ICD-10-CM

## 2018-01-12 DIAGNOSIS — M25.561 CHRONIC PAIN OF BOTH KNEES: Primary | ICD-10-CM

## 2018-01-12 DIAGNOSIS — M25.562 CHRONIC PAIN OF BOTH KNEES: Primary | ICD-10-CM

## 2018-01-12 RX ORDER — TRIAMCINOLONE ACETONIDE 40 MG/ML
80 INJECTION, SUSPENSION INTRA-ARTICULAR; INTRAMUSCULAR ONCE
Qty: 1 ML | Refills: 0
Start: 2018-01-12 | End: 2018-01-12

## 2018-01-12 RX ORDER — HYDROCODONE BITARTRATE AND ACETAMINOPHEN 7.5; 325 MG/1; MG/1
1-2 TABLET ORAL
Qty: 14 TAB | Refills: 0 | Status: SHIPPED | OUTPATIENT
Start: 2018-01-12 | End: 2018-02-01 | Stop reason: ALTCHOICE

## 2018-01-12 NOTE — MR AVS SNAPSHOT
Visit Information Date & Time Provider Department Dept. Phone Encounter #  
 1/12/2018  1:15 PM Quincy Blas, [address] [facility] [phone] Your Appointments 1/24/2018  8:45 AM  
LAB with [facility] NURSE VISIT [facility] of [address] [place] [address] ([address]) Appt Note: labs [address], [address] [place] [address] [place]  
  
   
 [address], Critical access hospital  
  
    
 1/31/2018  3:49 AM  
METABOLIC PROGRAM 15 with Jaz Lewis NP [facility] of [address] [place] [address] ([address]) Appt Note: metabolic program salma [address], [address] [place] [address] [place]  
  
   
 [address], Critical access hospital  
  
    
 2/7/2018  7:45 AM  
LAB with [facility] NURSE VISIT [facility] of [address] [place] [address] ([address] [facility]) Appt Note: 6 mo lab  
 [address] [place] [address]  
[phone]  
  
    
 2/14/2018  8:00 AM  
Office Visit with Ruth Kimball MD  
[facility]ists of [address] [place] [address] [address] [place] [address]) Appt Note: 6 mo fu  
 [address], [address] [place] [address] [place]  
  
   
 [address], Critical access hospital Upcoming Health Maintenance Date Due ZOSTER VACCINE AGE 60> 3/5/2016 COLONOSCOPY 5/28/2018 BREAST CANCER SCRN MAMMOGRAM 7/14/2018 PAP AKA CERVICAL CYTOLOGY 7/28/2019 DTaP/Tdap/Td series (2 - Td) 2/7/2027 Allergies as of 1/12/2018  Review Complete On: 1/12/2018 By: Quincy Blas PA-C No Known Allergies Current Immunizations  Reviewed on 2/7/2017 Name Date Influenza Vaccine 10/12/2017 Influenza Vaccine (Quad) PF 10/10/2016 Tdap 2/7/2017 Not reviewed this visit You Were Diagnosed With   
  
 Codes Comments Chronic pain of both knees    -  Primary ICD-10-CM: M25.561, M25.562, G89.29 ICD-9-CM: 719.46, 338.29   
 Effusion into joint     ICD-10-CM: M25.40 ICD-9-CM: 719.00 Primary osteoarthritis of both knees     ICD-10-CM: M17.0 ICD-9-CM: 715.16 Hemarthrosis involving knee joint, right     ICD-10-CM: M25.061 
ICD-9-CM: 719.16 Vitals BP Pulse Temp Resp Height(growth percentile) Weight(growth percentile) 132/83 65 97.9 °F (36.6 °C) (Oral) 18 5' 5\" (1.651 m) 227 lb 9.6 oz (103.2 kg) SpO2 BMI OB Status Smoking Status 98% 37.87 kg/m2 Hysterectomy Never Smoker BMI and BSA Data Body Mass Index Body Surface Area  
 37.87 kg/m 2 2.18 m 2 Preferred Pharmacy Pharmacy Name Phone [facility] [address] [phone] Your Updated Medication List  
  
   
This list is accurate as of: 1/12/18  2:16 PM.  Always use your most recent med list.  
  
  
  
  
 calcium-cholecalciferol (d3) 600-125 mg-unit Tab Take 1,200 mg by mouth daily. furosemide 40 mg tablet Commonly known as:  LASIX Take 1 Tab by mouth daily. meloxicam 15 mg tablet Commonly known as:  MOBIC  
TAKE 1 TABLET BY MOUTH DAILY  
  
 omega-3 fatty acids-vitamin e 1,000 mg Cap Take 1 Cap by mouth two (2) times a day. potassium 99 mg tablet Take 99 mg by mouth daily as needed. simvastatin 20 mg tablet Commonly known as:  ZOCOR Take 1 Tab by mouth nightly. TYLENOL ARTHRITIS PAIN 650 mg Devorah Cos Generic drug:  acetaminophen Take 650 mg by mouth every six (6) hours as needed for Pain. VITAMIN D3 2,000 unit Tab Generic drug:  cholecalciferol (vitamin D3) Take 2,000 Units by mouth daily. We Performed the Following AMB POC X-RAY KNEE 3 VIEW [26553 CPT(R)] AMB POC X-RAY KNEE 3 VIEW [68735 CPT(R)] Introducing [facility]! Dear Cali Hendricks: 
Thank you for requesting a YABUYt account.   Our records indicate that you already have an active Oculus360 account. You can access your account anytime at [email or web address] Did you know that you can access your hospital and ER discharge instructions at any time in [employer]? You can also review all of your test results from your hospital stay or ER visit. Additional Information If you have questions, please visit the Frequently Asked Questions section of the [employer] website at [email or web address] Remember, Oculus360 is NOT to be used for urgent needs. For medical emergencies, dial 911. Now available from your iPhone and Android! Please provide this summary of care documentation to your next provider. Your primary care clinician is listed as Lacey Subramanian. If you have any questions after today's visit, please call [phone].

## 2018-01-12 NOTE — PROGRESS NOTES
HISTORY OF PRESENT ILLNESS:  Basil Mireles presents to the office following an injury to her right knee. She has a history of osteoarthritis to the right knee and has pain in both knees. She was treated about one year ago with Euflexxa viscosupplement injections. She did well for several months following the injections. Regarding the injury, she was standing, with the past 24-36 hours, on a two-step stepladder when she stepped the full breadth of the second step to the floor causing a loading force to the right knee. She did not buckle of the knee. She did not fall to the ground. Since the occurrence, she has had severe pain to the right knee including the medial and the back of the knee. She had some difficulty sleeping last evening secondary to pain. REVIEW OF SYSTEMS:  No chest pain or shortness of breath. No fever, chills, or night sweats. No rash, no itching. No nausea and no vomiting. She is not a diabetic. She is not allergic to Betadine. PHYSICAL EXAM:  She is a healthy-appearing, well-developed, well-nourished, pleasant, obese, 79-year-old,  female, atraumatic, normocephalic, alert, and oriented times three, sitting on the table comfortably. She lies supine with no difficulties. She is in shorts. Nano Reardon, is present as my chaperone. Examination to the right knee reveals a 1+ effusion. On varus and valgus stressing, she has no instability. Her knee straightens with a varus deformity. Her patella tracks midline with passive ranging with a 90-10° range discovered. There is no calf tenderness or evidence of DVT. Distal sensation is intact fully to the right lower extremity. There is a positive Ganga's sign. IMPRESSION:      1. Right knee pain. 2. Right knee decreased range of motion secondary to above. 3. Right knee effusion/hematoma. 4. Positive Ganga's sign right knee.     PROCEDURE:  Using sterile technique, after verbal and written consent were obtained and appropriate time out performed, 3 cc of 1% Lidocaine was used to anesthetize the right knee using the superolateral, intraarticular approach. There were no complications. To follow, 21 cc of thin, bloody aspirate was removed from the same portal.  To follow, 2 cc of Kenalog at 6 mg per mL mixed with 10 mL of 0.25% Marcaine, was reinjected. There were no complications. The patient tolerated the procedure well. PLAN:   I am currently recommending an aspiration and injection of the right knee today. I am going to order an MRI to rule out any internal derangement. This blood finding coupled with the Ganga's sign leads me to believe there may be acute meniscal injury present. The patient was offered pain medication for bedtime use only. She will follow once the MRI is available.

## 2018-01-16 ENCOUNTER — HOSPITAL ENCOUNTER (OUTPATIENT)
Age: 63
Discharge: HOME | End: 2018-01-16
Attending: PHYSICIAN ASSISTANT
Payer: COMMERCIAL

## 2018-01-16 DIAGNOSIS — M25.461 EFFUSION OF RIGHT KNEE JOINT: ICD-10-CM

## 2018-01-16 DIAGNOSIS — M17.0 PRIMARY OSTEOARTHRITIS OF BOTH KNEES: ICD-10-CM

## 2018-01-16 DIAGNOSIS — M25.061 HEMARTHROSIS INVOLVING KNEE JOINT, RIGHT: ICD-10-CM

## 2018-01-16 PROCEDURE — 73721 MRI JNT OF LWR EXTRE W/O DYE: CPT

## 2018-01-17 ENCOUNTER — CLINICAL SUPPORT (OUTPATIENT)
Dept: FAMILY MEDICINE CLINIC | Age: 63
End: 2018-01-17

## 2018-01-17 VITALS
SYSTOLIC BLOOD PRESSURE: 133 MMHG | DIASTOLIC BLOOD PRESSURE: 82 MMHG | WEIGHT: 222.6 LBS | BODY MASS INDEX: 37.09 KG/M2 | HEIGHT: 65 IN | HEART RATE: 58 BPM

## 2018-01-17 DIAGNOSIS — E66.9 OBESITY (BMI 30-39.9): Primary | ICD-10-CM

## 2018-01-17 NOTE — PROGRESS NOTES
Progress Note: Weekly Education Class in the [facility] Weight Loss Program   Is there anything that you or the patient needs to let the [address] [facility] Physician know about? no  Over the past week, have you experienced any side-effects?no    Ludwig Zelaya is a 65 y.o. female who is enrolled in [facility] Loss Program    Visit Vitals    /82 (BP 1 Location: Left arm, BP Patient Position: Sitting)    Pulse (!) 58    Ht 5' 5\" (1.651 m)    Wt 222 lb 9.6 oz (101 kg)    BMI 37.04 kg/m2     Weight Metrics 1/17/2018 1/12/2018 1/11/2018 1/10/2018 1/3/2018 1/3/2018 12/27/2017   Weight 222 lb 9.6 oz 227 lb 9.6 oz - 228 lb - 224 lb 6.4 oz -   Waist Measure Inches 43 - 43 - 43 - 45   Exercise Mins/week - - - - 0 - -   Body Fat % - - - - 44 - -   BMI 37.04 kg/m2 37.87 kg/m2 - 37.94 kg/m2 - 37.34 kg/m2 -         Have you received any other medical care this week? yes  If yes, where and for what? Orthopedic Spine for follow up  Have you had any change in your medications since your last visit? yes  If yes what? Put on Norco 7.5/325 mg 1-2 tablets nightly as needed for pain    Did you have any problems adhering to the program last week? no  If yes, please explain:       Eating Habits Over Last Week:  Did you take in 64 oz of non-caloric fluids? yes     Did you consume your 4 meal replacements each day?  yes       Physical Activity Over the Past Week:    Aerobic exercise: 0 min  Resistance exercise: 0 workouts / week

## 2018-01-19 ENCOUNTER — TELEPHONE (OUTPATIENT)
Dept: INTERNAL MEDICINE CLINIC | Age: 63
End: 2018-01-19

## 2018-01-19 DIAGNOSIS — E78.2 MIXED HYPERLIPIDEMIA: Primary | ICD-10-CM

## 2018-01-19 DIAGNOSIS — I10 ESSENTIAL HYPERTENSION: ICD-10-CM

## 2018-01-19 DIAGNOSIS — Z13.0 SCREENING FOR DEFICIENCY ANEMIA: ICD-10-CM

## 2018-01-19 DIAGNOSIS — K75.81 NASH (NONALCOHOLIC STEATOHEPATITIS): ICD-10-CM

## 2018-01-19 NOTE — TELEPHONE ENCOUNTER
Patient was scheduled for labs in Feb but she is going to do them on the 31st of this month when she comes in for her weight loss labs. The only thing is I don't see any lab orders from you. Do you need her to have labs done?

## 2018-01-23 ENCOUNTER — OFFICE VISIT (OUTPATIENT)
Dept: INTERNAL MEDICINE CLINIC | Age: 63
End: 2018-01-23

## 2018-01-23 ENCOUNTER — HOSPITAL ENCOUNTER (OUTPATIENT)
Dept: LAB | Age: 63
Discharge: HOME | End: 2018-01-23
Payer: COMMERCIAL

## 2018-01-23 VITALS
DIASTOLIC BLOOD PRESSURE: 84 MMHG | SYSTOLIC BLOOD PRESSURE: 116 MMHG | WEIGHT: 214.8 LBS | HEART RATE: 77 BPM | BODY MASS INDEX: 35.79 KG/M2 | OXYGEN SATURATION: 95 % | TEMPERATURE: 98.1 F | RESPIRATION RATE: 14 BRPM | HEIGHT: 65 IN

## 2018-01-23 DIAGNOSIS — R53.83 FATIGUE, UNSPECIFIED TYPE: ICD-10-CM

## 2018-01-23 DIAGNOSIS — R10.9 ABDOMINAL PAIN, UNSPECIFIED ABDOMINAL LOCATION: ICD-10-CM

## 2018-01-23 DIAGNOSIS — R10.9 ABDOMINAL PAIN, UNSPECIFIED ABDOMINAL LOCATION: Primary | ICD-10-CM

## 2018-01-23 LAB
ALBUMIN SERPL-MCNC: 4.2 G/DL (ref 3.4–5)
ALBUMIN/GLOB SERPL: 1.3 {RATIO} (ref 0.8–1.7)
ALP SERPL-CCNC: 72 U/L (ref 45–117)
ALT SERPL-CCNC: 61 U/L (ref 13–56)
ANION GAP SERPL CALC-SCNC: 13 MMOL/L (ref 3–18)
AST SERPL-CCNC: 16 U/L (ref 15–37)
BASOPHILS # BLD: 0 K/UL (ref 0–0.06)
BASOPHILS NFR BLD: 0 % (ref 0–2)
BILIRUB SERPL-MCNC: 0.6 MG/DL (ref 0.2–1)
BUN SERPL-MCNC: 22 MG/DL (ref 7–18)
BUN/CREAT SERPL: 42 (ref 12–20)
CALCIUM SERPL-MCNC: 10.3 MG/DL (ref 8.5–10.1)
CHLORIDE SERPL-SCNC: 103 MMOL/L (ref 100–108)
CO2 SERPL-SCNC: 22 MMOL/L (ref 21–32)
CREAT SERPL-MCNC: 0.53 MG/DL (ref 0.6–1.3)
DIFFERENTIAL METHOD BLD: ABNORMAL
EOSINOPHIL # BLD: 0.2 K/UL (ref 0–0.4)
EOSINOPHIL NFR BLD: 1 % (ref 0–5)
ERYTHROCYTE [DISTWIDTH] IN BLOOD BY AUTOMATED COUNT: 13 % (ref 11.6–14.5)
GLOBULIN SER CALC-MCNC: 3.3 G/DL (ref 2–4)
GLUCOSE SERPL-MCNC: 115 MG/DL (ref 74–99)
HCT VFR BLD AUTO: 53.7 % (ref 35–45)
HGB BLD-MCNC: 17.5 G/DL (ref 12–16)
LIPASE SERPL-CCNC: 403 U/L (ref 73–393)
LYMPHOCYTES # BLD: 2.1 K/UL (ref 0.9–3.6)
LYMPHOCYTES NFR BLD: 17 % (ref 21–52)
MCH RBC QN AUTO: 29 PG (ref 24–34)
MCHC RBC AUTO-ENTMCNC: 32.6 G/DL (ref 31–37)
MCV RBC AUTO: 88.9 FL (ref 74–97)
MONOCYTES # BLD: 1.3 K/UL (ref 0.05–1.2)
MONOCYTES NFR BLD: 10 % (ref 3–10)
NEUTS SEG # BLD: 9 K/UL (ref 1.8–8)
NEUTS SEG NFR BLD: 72 % (ref 40–73)
PLATELET # BLD AUTO: 344 K/UL (ref 135–420)
PMV BLD AUTO: 12.1 FL (ref 9.2–11.8)
POTASSIUM SERPL-SCNC: 4.4 MMOL/L (ref 3.5–5.5)
PROT SERPL-MCNC: 7.5 G/DL (ref 6.4–8.2)
RBC # BLD AUTO: 6.04 M/UL (ref 4.2–5.3)
SODIUM SERPL-SCNC: 138 MMOL/L (ref 136–145)
WBC # BLD AUTO: 12.5 K/UL (ref 4.6–13.2)

## 2018-01-23 PROCEDURE — 80053 COMPREHEN METABOLIC PANEL: CPT | Performed by: PHYSICIAN ASSISTANT

## 2018-01-23 PROCEDURE — 83690 ASSAY OF LIPASE: CPT | Performed by: PHYSICIAN ASSISTANT

## 2018-01-23 PROCEDURE — 85025 COMPLETE CBC W/AUTO DIFF WBC: CPT | Performed by: PHYSICIAN ASSISTANT

## 2018-01-23 NOTE — PROGRESS NOTES
HPI/History  Jim Jalloh is a 65 y.o.  female who presents for evaluation. Pt reports fatigue x 2 days then mid abdominal pain starting today. Pain is sharp and intermittent. When present, will last 4-5min then totally resolves for about 20min before returning. About 12x total today. Admits to belching but no significant flatulence. Denies NV, diarrhea, constipation, or dark/bloody stools. Last BM earlier today. No fevers or ETOH use. No cardiopulmonary sxs. She is in the supervised weight management program with NP Vinayak Santos. Hx of appendectomy; gallbladder intact. Hx of elevated liver enzymes. Possible hepatic steatosis on 2016 US, no overt gallstones or other issues to today's complaint. Has seen GI, Dr. Yoan Lucero. CMP unremarkable in late December. No other sxs or complaints. Patient Active Problem List   Diagnosis Code    Hyperlipidemia E78.5    Essential hypertension I10    Osteoarthritis of knee M17.10    Obesity (BMI 30-39. 9) E66.9    HERZOG (nonalcoholic steatohepatitis) suggested by ultrasound report, 2016 K75.81     Past Medical History:   Diagnosis Date    Arthritis     Bilateral Knee, and Bilateral Hand/Thumb    Carpal tunnel syndrome     Headache     History of ankle fusion 1997    left    Hx of migraine headaches     Hypercholesterolemia     Hypertension     Morbid obesity ([facility] [address]     HERZOG (nonalcoholic steatohepatitis) suggested by ultrasound report, 2016 8/11/2017    Vertigo      Past Surgical History:   Procedure Laterality Date    HX APPENDECTOMY      age 25 years    HX COLONOSCOPY      Due in 2018: May 2015    HX HYSTERECTOMY      age 30years old   Shellie Ayala HX TONSILLECTOMY      at age 29years old     Social History     Social History    Marital status:      Spouse name: N/A    Number of children: N/A    Years of education: N/A     Occupational History    Not on file.      Social History Main Topics    Smoking status: Never Smoker    Smokeless tobacco: Never Used   Shellie Ayala Alcohol use 0.0 oz/week     0 Standard drinks or equivalent per week      Comment: rare once a year    Drug use: Yes     Special: Prescription, OTC    Sexual activity: Not on file     Other Topics Concern    Not on file     Social History Narrative    Works at [employer] in the [employer] department     Family History   Problem Relation Age of Onset    Heart Disease Mother     Hypertension Mother     Heart Surgery Mother     Cancer Father      colon    Hypertension Brother     No Known Problems Maternal Grandmother     Heart Disease Maternal Grandfather     MS Paternal Grandmother     Stroke Paternal Grandfather     Heart Disease Paternal Grandfather     No Known Problems Son      Current Outpatient Prescriptions   Medication Sig    HYDROcodone-acetaminophen (Karyna Ezequiel) 7.5-325 mg per tablet Take 1-2 Tabs by mouth nightly as needed for Pain. Max Daily Amount: 2 Tabs.  cholecalciferol, vitamin D3, (VITAMIN D3) 2,000 unit tab Take 2,000 Units by mouth daily.  potassium 99 mg tablet Take 99 mg by mouth daily as needed.  acetaminophen (TYLENOL ARTHRITIS PAIN) 650 mg CR tablet Take 650 mg by mouth every six (6) hours as needed for Pain.  meloxicam (MOBIC) 15 mg tablet TAKE 1 TABLET BY MOUTH DAILY    furosemide (LASIX) 40 mg tablet Take 1 Tab by mouth daily.  simvastatin (ZOCOR) 20 mg tablet Take 1 Tab by mouth nightly.  omega-3 fatty acids-vitamin e 1,000 mg cap Take 1 Cap by mouth two (2) times a day.  calcium-cholecalciferol, d3, 600-125 mg-unit tab Take 1,200 mg by mouth daily. No current facility-administered medications for this visit. No Known Allergies    Review of Systems  Aside from those included in HPI, remainder of complete ROS negative.     Physical Examination  Visit Vitals    /84 (BP 1 Location: Right arm, BP Patient Position: Sitting)    Pulse 77    Temp 98.1 °F (36.7 °C) (Oral)    Resp 14    Ht 5' 5\" (1.651 m)    Wt 214 lb 12.8 oz (97.4 kg)    SpO2 95%    BMI 35.74 kg/m2       General - Alert and in no acute distress. Pt appears well, comfortable, and in good spirits. Pleasant, engaging. Nontoxic. Not anxious, non-diaphoretic. Mental status - Appropriate mood, behavior, speech content, dress, and thought processes. Pulm - No tachypnea, retractions, or cyanosis. Good respiratory effort. Clear to auscultation bilat. Cardiovascular - Normal rate, regular rhythm. Abdomen - Wearing conforming suit currently. Nondistended. Active bowel sounds. Soft, nontender. No guarding, rigidity, or rebound. No appreciable masses. Assessment and Plan  1. Fatigue x 2 days with mid abdominal pain starting today - No definite cause, discussed differentials including potential for gas, viral process, and self-limited causes vs other, more concerning, potentials. Will check CBC, CMP, and lipase. She will monitor, including temps. Start omeprazole for 2 wks and can try gas-x. Return/call if needed or developments. Further planning as warranted. Pt happily agrees with plan. More than 25 mins spent during visit with more than 50% discussing above issues, potential causes/contributing factors, eval/tx, results, plan, and questions. PLEASE NOTE:   This document has been produced using voice recognition software. Unrecognized errors in transcription may be present.     Terisa Pore [employer] [facility] [employer] [facility]  [phone]  1/23/2018

## 2018-01-23 NOTE — PROGRESS NOTES
1. Have you been to the ER, urgent care clinic or hospitalized since your last visit? NO.     2. Have you seen or consulted any other health care providers outside of the [address] [place] [address] since your last visit (Include any pap smears or colon screening)? NO      Do you have an Advanced Directive?  YES

## 2018-01-24 ENCOUNTER — HOSPITAL ENCOUNTER (OUTPATIENT)
Dept: LAB | Age: 63
Discharge: HOME | End: 2018-01-24
Payer: COMMERCIAL

## 2018-01-24 ENCOUNTER — TELEPHONE (OUTPATIENT)
Dept: INTERNAL MEDICINE CLINIC | Age: 63
End: 2018-01-24

## 2018-01-24 DIAGNOSIS — E66.9 OBESITY (BMI 30-39.9): ICD-10-CM

## 2018-01-24 DIAGNOSIS — R10.33 PERIUMBILICAL ABDOMINAL PAIN: ICD-10-CM

## 2018-01-24 DIAGNOSIS — K85.90 ACUTE PANCREATITIS, UNSPECIFIED COMPLICATION STATUS, UNSPECIFIED PANCREATITIS TYPE: Primary | ICD-10-CM

## 2018-01-24 LAB
ALBUMIN SERPL-MCNC: 4 G/DL (ref 3.4–5)
ALBUMIN/GLOB SERPL: 1.3 {RATIO} (ref 0.8–1.7)
ALP SERPL-CCNC: 72 U/L (ref 45–117)
ALT SERPL-CCNC: 60 U/L (ref 13–56)
ANION GAP SERPL CALC-SCNC: 9 MMOL/L (ref 3–18)
AST SERPL-CCNC: 12 U/L (ref 15–37)
BILIRUB SERPL-MCNC: 0.7 MG/DL (ref 0.2–1)
BUN SERPL-MCNC: 17 MG/DL (ref 7–18)
BUN/CREAT SERPL: 25 (ref 12–20)
CALCIUM SERPL-MCNC: 9.7 MG/DL (ref 8.5–10.1)
CHLORIDE SERPL-SCNC: 106 MMOL/L (ref 100–108)
CO2 SERPL-SCNC: 25 MMOL/L (ref 21–32)
CREAT SERPL-MCNC: 0.67 MG/DL (ref 0.6–1.3)
GLOBULIN SER CALC-MCNC: 3.2 G/DL (ref 2–4)
GLUCOSE SERPL-MCNC: 115 MG/DL (ref 74–99)
POTASSIUM SERPL-SCNC: 4.1 MMOL/L (ref 3.5–5.5)
PROT SERPL-MCNC: 7.2 G/DL (ref 6.4–8.2)
SODIUM SERPL-SCNC: 140 MMOL/L (ref 136–145)
URATE SERPL-MCNC: 4 MG/DL (ref 2.6–7.2)

## 2018-01-24 PROCEDURE — 84550 ASSAY OF BLOOD/URIC ACID: CPT | Performed by: NURSE PRACTITIONER

## 2018-01-24 PROCEDURE — 36415 COLL VENOUS BLD VENIPUNCTURE: CPT | Performed by: NURSE PRACTITIONER

## 2018-01-24 PROCEDURE — 80053 COMPREHEN METABOLIC PANEL: CPT | Performed by: NURSE PRACTITIONER

## 2018-01-24 NOTE — TELEPHONE ENCOUNTER
Pt has pancreatitis. Etiology is unknown. I discussed her results with her via phone this morning. All questions were answered. PLAN:  - RUQ ultrasound ordered. - I discussed the need to recheck her labs (CBC, CMP, an lipase) in 1 wk. - Please schedule her to see me in a week to f/u. We will draw labs at the time of her f/u apt with me. - Advance her diet as tolerated = was discussed with her.        Dr. Antwan Paez  [facility], [address] [place] [address].  Phone: [phone]  Fax: [phone]

## 2018-01-25 ENCOUNTER — TELEPHONE (OUTPATIENT)
Dept: INTERNAL MEDICINE CLINIC | Age: 63
End: 2018-01-25

## 2018-01-25 ENCOUNTER — HOSPITAL ENCOUNTER (OUTPATIENT)
Dept: ULTRASOUND IMAGING | Age: 63
Discharge: HOME | End: 2018-01-25
Attending: INTERNAL MEDICINE
Payer: COMMERCIAL

## 2018-01-25 DIAGNOSIS — K85.90 ACUTE PANCREATITIS, UNSPECIFIED COMPLICATION STATUS, UNSPECIFIED PANCREATITIS TYPE: ICD-10-CM

## 2018-01-25 DIAGNOSIS — I10 ESSENTIAL HYPERTENSION: ICD-10-CM

## 2018-01-25 DIAGNOSIS — E78.2 MIXED HYPERLIPIDEMIA: Primary | ICD-10-CM

## 2018-01-25 DIAGNOSIS — D58.2 ELEVATED HEMOGLOBIN (HCC): ICD-10-CM

## 2018-01-25 DIAGNOSIS — R10.33 PERIUMBILICAL ABDOMINAL PAIN: ICD-10-CM

## 2018-01-25 PROCEDURE — 76705 ECHO EXAM OF ABDOMEN: CPT

## 2018-01-25 NOTE — TELEPHONE ENCOUNTER
Patient is calling to speak with you. Stating her Sister may not be around much longer and she wants to see how you feel about her going to [place] to see her.

## 2018-01-26 ENCOUNTER — APPOINTMENT (OUTPATIENT)
Dept: CT IMAGING | Age: 63
End: 2018-01-26
Attending: EMERGENCY MEDICINE
Payer: COMMERCIAL

## 2018-01-26 ENCOUNTER — OFFICE VISIT (OUTPATIENT)
Dept: ORTHOPEDIC SURGERY | Facility: CLINIC | Age: 63
End: 2018-01-26

## 2018-01-26 ENCOUNTER — HOSPITAL ENCOUNTER (EMERGENCY)
Age: 63
Discharge: HOME | End: 2018-01-26
Attending: EMERGENCY MEDICINE
Payer: COMMERCIAL

## 2018-01-26 ENCOUNTER — HOSPITAL ENCOUNTER (OUTPATIENT)
Dept: LAB | Age: 63
Discharge: HOME | End: 2018-01-26
Payer: COMMERCIAL

## 2018-01-26 VITALS
HEART RATE: 65 BPM | DIASTOLIC BLOOD PRESSURE: 78 MMHG | WEIGHT: 212 LBS | TEMPERATURE: 96.4 F | OXYGEN SATURATION: 97 % | BODY MASS INDEX: 35.32 KG/M2 | HEIGHT: 65 IN | SYSTOLIC BLOOD PRESSURE: 111 MMHG

## 2018-01-26 VITALS
DIASTOLIC BLOOD PRESSURE: 88 MMHG | RESPIRATION RATE: 18 BRPM | HEART RATE: 105 BPM | TEMPERATURE: 98.6 F | OXYGEN SATURATION: 98 % | SYSTOLIC BLOOD PRESSURE: 124 MMHG

## 2018-01-26 DIAGNOSIS — E78.2 MIXED HYPERLIPIDEMIA: ICD-10-CM

## 2018-01-26 DIAGNOSIS — R10.13 ABDOMINAL PAIN, EPIGASTRIC: Primary | ICD-10-CM

## 2018-01-26 DIAGNOSIS — K85.90 ACUTE PANCREATITIS, UNSPECIFIED COMPLICATION STATUS, UNSPECIFIED PANCREATITIS TYPE: ICD-10-CM

## 2018-01-26 DIAGNOSIS — M17.0 PRIMARY OSTEOARTHRITIS OF BOTH KNEES: ICD-10-CM

## 2018-01-26 DIAGNOSIS — D58.2 ELEVATED HEMOGLOBIN (HCC): ICD-10-CM

## 2018-01-26 DIAGNOSIS — M25.461 EFFUSION OF RIGHT KNEE JOINT: ICD-10-CM

## 2018-01-26 DIAGNOSIS — M25.562 CHRONIC PAIN OF BOTH KNEES: Primary | ICD-10-CM

## 2018-01-26 DIAGNOSIS — G89.29 CHRONIC PAIN OF BOTH KNEES: Primary | ICD-10-CM

## 2018-01-26 DIAGNOSIS — I48.91 ATRIAL FIBRILLATION, UNSPECIFIED TYPE (HCC): ICD-10-CM

## 2018-01-26 DIAGNOSIS — I10 ESSENTIAL HYPERTENSION: ICD-10-CM

## 2018-01-26 DIAGNOSIS — M25.561 CHRONIC PAIN OF BOTH KNEES: Primary | ICD-10-CM

## 2018-01-26 LAB
ALBUMIN SERPL-MCNC: 4.1 G/DL (ref 3.4–5)
ALBUMIN/GLOB SERPL: 1.3 {RATIO} (ref 0.8–1.7)
ALP SERPL-CCNC: 77 U/L (ref 45–117)
ALT SERPL-CCNC: 60 U/L (ref 13–56)
ANION GAP SERPL CALC-SCNC: 9 MMOL/L (ref 3–18)
AST SERPL-CCNC: 12 U/L (ref 15–37)
BASOPHILS # BLD: 0 K/UL (ref 0–0.06)
BASOPHILS # BLD: 0 K/UL (ref 0–0.1)
BASOPHILS NFR BLD: 0 % (ref 0–2)
BASOPHILS NFR BLD: 0 % (ref 0–2)
BILIRUB SERPL-MCNC: 0.8 MG/DL (ref 0.2–1)
BUN SERPL-MCNC: 17 MG/DL (ref 7–18)
BUN/CREAT SERPL: 25 (ref 12–20)
CALCIUM SERPL-MCNC: 9.8 MG/DL (ref 8.5–10.1)
CHLORIDE SERPL-SCNC: 106 MMOL/L (ref 100–108)
CHOLEST SERPL-MCNC: 155 MG/DL
CO2 SERPL-SCNC: 26 MMOL/L (ref 21–32)
CREAT SERPL-MCNC: 0.67 MG/DL (ref 0.6–1.3)
DIFFERENTIAL METHOD BLD: ABNORMAL
DIFFERENTIAL METHOD BLD: ABNORMAL
EOSINOPHIL # BLD: 0.1 K/UL (ref 0–0.4)
EOSINOPHIL # BLD: 0.2 K/UL (ref 0–0.4)
EOSINOPHIL NFR BLD: 1 % (ref 0–5)
EOSINOPHIL NFR BLD: 2 % (ref 0–5)
ERYTHROCYTE [DISTWIDTH] IN BLOOD BY AUTOMATED COUNT: 12.9 % (ref 11.6–14.5)
ERYTHROCYTE [DISTWIDTH] IN BLOOD BY AUTOMATED COUNT: 13.5 % (ref 11.6–14.5)
GLOBULIN SER CALC-MCNC: 3.2 G/DL (ref 2–4)
GLUCOSE SERPL-MCNC: 120 MG/DL (ref 74–99)
HBA1C MFR BLD: 5.6 % (ref 4.2–5.6)
HCT VFR BLD AUTO: 50.8 % (ref 35–45)
HCT VFR BLD AUTO: 52.1 % (ref 35–45)
HDLC SERPL-MCNC: 50 MG/DL (ref 40–60)
HDLC SERPL: 3.1 {RATIO} (ref 0–5)
HGB BLD-MCNC: 16.7 G/DL (ref 12–16)
HGB BLD-MCNC: 16.8 G/DL (ref 12–16)
LDLC SERPL CALC-MCNC: 76.2 MG/DL (ref 0–100)
LIPASE SERPL-CCNC: 359 U/L (ref 73–393)
LIPASE SERPL-CCNC: 393 U/L (ref 73–393)
LIPID PROFILE,FLP: NORMAL
LYMPHOCYTES # BLD: 1.6 K/UL (ref 0.9–3.6)
LYMPHOCYTES # BLD: 1.7 K/UL (ref 0.9–3.6)
LYMPHOCYTES NFR BLD: 15 % (ref 21–52)
LYMPHOCYTES NFR BLD: 15 % (ref 21–52)
MCH RBC QN AUTO: 28.6 PG (ref 24–34)
MCH RBC QN AUTO: 29 PG (ref 24–34)
MCHC RBC AUTO-ENTMCNC: 32.1 G/DL (ref 31–37)
MCHC RBC AUTO-ENTMCNC: 33.1 G/DL (ref 31–37)
MCV RBC AUTO: 86.5 FL (ref 74–97)
MCV RBC AUTO: 90.6 FL (ref 74–97)
MONOCYTES # BLD: 1 K/UL (ref 0.05–1.2)
MONOCYTES # BLD: 1 K/UL (ref 0.05–1.2)
MONOCYTES NFR BLD: 9 % (ref 3–10)
MONOCYTES NFR BLD: 9 % (ref 3–10)
NEUTS SEG # BLD: 8 K/UL (ref 1.8–8)
NEUTS SEG # BLD: 8.5 K/UL (ref 1.8–8)
NEUTS SEG NFR BLD: 74 % (ref 40–73)
NEUTS SEG NFR BLD: 75 % (ref 40–73)
PLATELET # BLD AUTO: 267 K/UL (ref 135–420)
PLATELET # BLD AUTO: 272 K/UL (ref 135–420)
PMV BLD AUTO: 11.3 FL (ref 9.2–11.8)
PMV BLD AUTO: 11.7 FL (ref 9.2–11.8)
POTASSIUM SERPL-SCNC: 3.9 MMOL/L (ref 3.5–5.5)
PROT SERPL-MCNC: 7.3 G/DL (ref 6.4–8.2)
RBC # BLD AUTO: 5.75 M/UL (ref 4.2–5.3)
RBC # BLD AUTO: 5.87 M/UL (ref 4.2–5.3)
SODIUM SERPL-SCNC: 141 MMOL/L (ref 136–145)
TRIGL SERPL-MCNC: 144 MG/DL (ref ?–150)
VLDLC SERPL CALC-MCNC: 28.8 MG/DL
WBC # BLD AUTO: 10.8 K/UL (ref 4.6–13.2)
WBC # BLD AUTO: 11.5 K/UL (ref 4.6–13.2)

## 2018-01-26 PROCEDURE — 83690 ASSAY OF LIPASE: CPT | Performed by: INTERNAL MEDICINE

## 2018-01-26 PROCEDURE — 96361 HYDRATE IV INFUSION ADD-ON: CPT

## 2018-01-26 PROCEDURE — 74011000250 HC RX REV CODE- 250: Performed by: EMERGENCY MEDICINE

## 2018-01-26 PROCEDURE — 85025 COMPLETE CBC W/AUTO DIFF WBC: CPT | Performed by: EMERGENCY MEDICINE

## 2018-01-26 PROCEDURE — 74011250636 HC RX REV CODE- 250/636: Performed by: EMERGENCY MEDICINE

## 2018-01-26 PROCEDURE — 99284 EMERGENCY DEPT VISIT MOD MDM: CPT

## 2018-01-26 PROCEDURE — 83036 HEMOGLOBIN GLYCOSYLATED A1C: CPT | Performed by: INTERNAL MEDICINE

## 2018-01-26 PROCEDURE — 74177 CT ABD & PELVIS W/CONTRAST: CPT

## 2018-01-26 PROCEDURE — 36415 COLL VENOUS BLD VENIPUNCTURE: CPT | Performed by: INTERNAL MEDICINE

## 2018-01-26 PROCEDURE — 80053 COMPREHEN METABOLIC PANEL: CPT | Performed by: EMERGENCY MEDICINE

## 2018-01-26 PROCEDURE — 83690 ASSAY OF LIPASE: CPT | Performed by: EMERGENCY MEDICINE

## 2018-01-26 PROCEDURE — 96374 THER/PROPH/DIAG INJ IV PUSH: CPT

## 2018-01-26 PROCEDURE — 93005 ELECTROCARDIOGRAM TRACING: CPT

## 2018-01-26 PROCEDURE — 80061 LIPID PANEL: CPT | Performed by: INTERNAL MEDICINE

## 2018-01-26 PROCEDURE — 85025 COMPLETE CBC W/AUTO DIFF WBC: CPT | Performed by: INTERNAL MEDICINE

## 2018-01-26 PROCEDURE — 74011636320 HC RX REV CODE- 636/320: Performed by: EMERGENCY MEDICINE

## 2018-01-26 RX ORDER — METOPROLOL TARTRATE 100 MG/1
12.5 TABLET ORAL 2 TIMES DAILY
Qty: 30 TAB | Refills: 0 | Status: SHIPPED | OUTPATIENT
Start: 2018-01-26 | End: 2018-02-27 | Stop reason: SDUPTHER

## 2018-01-26 RX ORDER — GUAIFENESIN 100 MG/5ML
81 LIQUID (ML) ORAL DAILY
Qty: 30 TAB | Refills: 0 | Status: SHIPPED | OUTPATIENT
Start: 2018-01-26 | End: 2018-05-23

## 2018-01-26 RX ORDER — DILTIAZEM HYDROCHLORIDE 5 MG/ML
10 INJECTION INTRAVENOUS
Status: COMPLETED | OUTPATIENT
Start: 2018-01-26 | End: 2018-01-26

## 2018-01-26 RX ADMIN — SODIUM CHLORIDE 1000 ML: 900 INJECTION, SOLUTION INTRAVENOUS at 13:34

## 2018-01-26 RX ADMIN — IOPAMIDOL 100 ML: 612 INJECTION, SOLUTION INTRAVENOUS at 12:50

## 2018-01-26 RX ADMIN — DILTIAZEM HYDROCHLORIDE 10 MG: 5 INJECTION INTRAVENOUS at 13:34

## 2018-01-26 NOTE — TELEPHONE ENCOUNTER
I spoke to her yesterday via phone. Labs have been ordered for today to f/u.     Dr. Lacey Subramanian  [facility], [address] [facility] [address]  Phone: [phone]  Fax: [phone]

## 2018-01-26 NOTE — DISCHARGE INSTRUCTIONS
Abdominal Pain: Care Instructions  Your Care Instructions    Abdominal pain has many possible causes. Some aren't serious and get better on their own in a few days. Others need more testing and treatment. If your pain continues or gets worse, you need to be rechecked and may need more tests to find out what is wrong. You may need surgery to correct the problem. Don't ignore new symptoms, such as fever, nausea and vomiting, urination problems, pain that gets worse, and dizziness. These may be signs of a more serious problem. Your doctor may have recommended a follow-up visit in the next 8 to 12 hours. If you are not getting better, you may need more tests or treatment. The doctor has checked you carefully, but problems can develop later. If you notice any problems or new symptoms, get medical treatment right away. Follow-up care is a key part of your treatment and safety. Be sure to make and go to all appointments, and call your doctor if you are having problems. It's also a good idea to know your test results and keep a list of the medicines you take. How can you care for yourself at home? · Rest until you feel better. · To prevent dehydration, drink plenty of fluids, enough so that your urine is light yellow or clear like water. Choose water and other caffeine-free clear liquids until you feel better. If you have kidney, heart, or liver disease and have to limit fluids, talk with your doctor before you increase the amount of fluids you drink. · If your stomach is upset, eat mild foods, such as rice, dry toast or crackers, bananas, and applesauce. Try eating several small meals instead of two or three large ones. · Wait until 48 hours after all symptoms have gone away before you have spicy foods, alcohol, and drinks that contain caffeine. · Do not eat foods that are high in fat. · Avoid anti-inflammatory medicines such as aspirin, ibuprofen (Advil, Motrin), and naproxen (Aleve).  These can cause stomach upset. Talk to your doctor if you take daily aspirin for another health problem. When should you call for help? Call 911 anytime you think you may need emergency care. For example, call if:  ? · You passed out (lost consciousness). ? · You pass maroon or very bloody stools. ? · You vomit blood or what looks like coffee grounds. ? · You have new, severe belly pain. ?Call your doctor now or seek immediate medical care if:  ? · Your pain gets worse, especially if it becomes focused in one area of your belly. ? · You have a new or higher fever. ? · Your stools are black and look like tar, or they have streaks of blood. ? · You have unexpected vaginal bleeding. ? · You have symptoms of a urinary tract infection. These may include:  ¨ Pain when you urinate. ¨ Urinating more often than usual.  ¨ Blood in your urine. ? · You are dizzy or lightheaded, or you feel like you may faint. ? Watch closely for changes in your health, and be sure to contact your doctor if:  ? · You are not getting better after 1 day (24 hours). Where can you learn more? Go to http://stanton-gonsalo.info/. Enter T051 in the search box to learn more about \"Abdominal Pain: Care Instructions. \"  Current as of: March 20, 2017  Content Version: 11.4  © 8306-0827 [employer]. Care instructions adapted under license by [employer] (which disclaims liability or warranty for this information). If you have questions about a medical condition or this instruction, always ask your healthcare professional. [place] [address] any warranty or liability for your use of this information. Atrial Fibrillation: Care Instructions  Your Care Instructions    Atrial fibrillation is an irregular and often fast heartbeat. Treating this condition is important for several reasons. It can cause blood clots, which can travel from your heart to your brain and cause a stroke.  If you have a fast heartbeat, you may feel lightheaded, dizzy, and weak. An irregular heartbeat can also increase your risk for heart failure. Atrial fibrillation is often the result of another heart condition, such as high blood pressure or coronary artery disease. Making changes to improve your heart condition will help you stay healthy and active. Follow-up care is a key part of your treatment and safety. Be sure to make and go to all appointments, and call your doctor if you are having problems. It's also a good idea to know your test results and keep a list of the medicines you take. How can you care for yourself at home? Medicines  ? · Take your medicines exactly as prescribed. Call your doctor if you think you are having a problem with your medicine. You will get more details on the specific medicines your doctor prescribes. ? · If your doctor has given you a blood thinner to prevent a stroke, be sure you get instructions about how to take your medicine safely. Blood thinners can cause serious bleeding problems. ? · Do not take any vitamins, over-the-counter drugs, or herbal products without talking to your doctor first.   ? Lifestyle changes  ? · Do not smoke. Smoking can increase your chance of a stroke and heart attack. If you need help quitting, talk to your doctor about stop-smoking programs and medicines. These can increase your chances of quitting for good. ? · Eat a heart-healthy diet. ? · Stay at a healthy weight. Lose weight if you need to.   ? · Limit alcohol to 2 drinks a day for men and 1 drink a day for women. Too much alcohol can cause health problems. ? · Avoid colds and flu. Get a pneumococcal vaccine shot. If you have had one before, ask your doctor whether you need another dose. Get a flu shot every year. If you must be around people with colds or flu, wash your hands often. Activity  ? · If your doctor recommends it, get more exercise. Walking is a good choice.  Bit by bit, increase the amount you walk every day. Try for at least 30 minutes on most days of the week. You also may want to swim, bike, or do other activities. Your doctor may suggest that you join a cardiac rehabilitation program so that you can have help increasing your physical activity safely. ? · Start light exercise if your doctor says it is okay. Even a small amount will help you get stronger, have more energy, and manage stress. Walking is an easy way to get exercise. Start out by walking a little more than you did in the hospital. Gradually increase the amount you walk. ? · When you exercise, watch for signs that your heart is working too hard. You are pushing too hard if you cannot talk while you are exercising. If you become short of breath or dizzy or have chest pain, sit down and rest immediately. ? · Check your pulse regularly. Place two fingers on the artery at the palm side of your wrist, in line with your thumb. If your heartbeat seems uneven or fast, talk to your doctor. When should you call for help? Call 911 anytime you think you may need emergency care. For example, call if:  ? · You have symptoms of a heart attack. These may include:  ¨ Chest pain or pressure, or a strange feeling in the chest.  ¨ Sweating. ¨ Shortness of breath. ¨ Nausea or vomiting. ¨ Pain, pressure, or a strange feeling in the back, neck, jaw, or upper belly or in one or both shoulders or arms. ¨ Lightheadedness or sudden weakness. ¨ A fast or irregular heartbeat. After you call 911, the  may tell you to chew 1 adult-strength or 2 to 4 low-dose aspirin. Wait for an ambulance. Do not try to drive yourself. ? · You have symptoms of a stroke. These may include:  ¨ Sudden numbness, tingling, weakness, or loss of movement in your face, arm, or leg, especially on only one side of your body. ¨ Sudden vision changes. ¨ Sudden trouble speaking. ¨ Sudden confusion or trouble understanding simple statements.   ¨ Sudden problems with walking or balance. ¨ A sudden, severe headache that is different from past headaches. ? · You passed out (lost consciousness). ?Call your doctor now or seek immediate medical care if:  ? · You have new or increased shortness of breath. ? · You feel dizzy or lightheaded, or you feel like you may faint. ? · Your heart rate becomes irregular. ? · You can feel your heart flutter in your chest or skip heartbeats. Tell your doctor if these symptoms are new or worse. ? Watch closely for changes in your health, and be sure to contact your doctor if you have any problems. Where can you learn more? Go to http://stanton-gonsalo.info/. Enter U020 in the search box to learn more about \"Atrial Fibrillation: Care Instructions. \"  Current as of: September 21, 2016  Content Version: 11.4  © 5029-7999 [employer]. Care instructions adapted under license by [employer] (which disclaims liability or warranty for this information). If you have questions about a medical condition or this instruction, always ask your healthcare professional. [address] any warranty or liability for your use of this information.

## 2018-01-26 NOTE — PROGRESS NOTES
HISTORY:  The patient returns for review, interpretation, and questions and answers concerning her recent MRI of the right knee on January 16, 2018. The patient has reduced her weight recently from over 250 now down to 212. She is continuing on a planned supervised diet. Unfortunately this week she has had some issues, severe in nature with acute pancreatitis. She has been seen by her PCP Dr. Jose Rafael Ramirez. She is pending an appointment with gastroenterology. RADIOGRAPHS:  Findings of MRI are consistent with severe tricompartmental degenerative joint disease with no fracture or significant joint effusion noted. Cruciate ligaments are intact with an ACL sprain versus a partial thickness tear. The anterior horn of the lateral meniscus is macerated with severe thinning of the lateral condylar cartilage along the medial half. There are prominent lateral osteophytes. The LCL complex is intact. MCL is intact with fluid  the MCL from the proximal tibia. There is near full thickness loss of medial condylar cartilage with a large medial osteophyte. PLAN:  In light of her pancreatitis and feeling poorly today, as well as in her words feeling like she is about to fall over the gastroenterology office was called and an appointment was made for her at 10:30 am today on [facility]. We will see her back once she is cleared from the gastroenterologist and has resolved her pancreatitis for treatment of her right knee. Consideration for surgical intervention is warranted. She did do well with prior viscosupplementation and we will go ahead and put an authorization in for repeating that. Today all the patients questions are answered to her satisfaction. A copy of the MRI is reviewed and provided. The patient is to follow-up once the authorization for Heather Schmidt has been obtained.   She is encouraged to continue her weight loss program and will make any modifications as recommended by gastroenterology.

## 2018-01-26 NOTE — ED PROVIDER NOTES
EMERGENCY DEPARTMENT HISTORY AND PHYSICAL EXAM    11:50 AM      Date: 1/26/2018  Patient Name: Osvaldo Francois    History of Presenting Illness     Chief Complaint   Patient presents with    Abdominal Pain         History Provided By: Patient    Chief Complaint: Abd pain  Duration: Several Days  Timing:  Constant  Location: Upper mid abd  Quality: Not obtained  Severity: Mild  Modifying Factors: None  Associated Symptoms: weakness      Additional History (Context): 12:13 PM Osvaldo Francois is a 65 y.o. female with h/o HTN who presents to ED complaining of mild constant upper mid abd pain associated with weakness onset several days. Patient was sent over from Dr. Elsy Stokes office due to abnormal labs showing dehydration per patient. Patient states that shes been weak lately. Denies hx of DM. States she has pancreatitis. She is compliant with her HTN medication. States she had an US yesterday and multiple labs ran over the past several days. She saw a NP at the GI office who consulted Dr. Long Bowman for patient to be sent to the ED. PCP: Dorina Grey MD    Current Facility-Administered Medications   Medication Dose Route Frequency Provider Last Rate Last Dose    sodium chloride 0.9 % bolus infusion 1,000 mL  1,000 mL IntraVENous ONCE Gerldine Lights Monika, DO 1,000 mL/hr at 01/26/18 1334 1,000 mL at 01/26/18 1334     Current Outpatient Prescriptions   Medication Sig Dispense Refill    HYDROcodone-acetaminophen (NORCO) 7.5-325 mg per tablet Take 1-2 Tabs by mouth nightly as needed for Pain. Max Daily Amount: 2 Tabs. 14 Tab 0    cholecalciferol, vitamin D3, (VITAMIN D3) 2,000 unit tab Take 2,000 Units by mouth daily.  potassium 99 mg tablet Take 99 mg by mouth daily as needed.  acetaminophen (TYLENOL ARTHRITIS PAIN) 650 mg CR tablet Take 650 mg by mouth every six (6) hours as needed for Pain.       meloxicam (MOBIC) 15 mg tablet TAKE 1 TABLET BY MOUTH DAILY 90 Tab 3    furosemide (LASIX) 40 mg tablet Take 1 Tab by mouth daily. 90 Tab 3    simvastatin (ZOCOR) 20 mg tablet Take 1 Tab by mouth nightly. 90 Tab 3    omega-3 fatty acids-vitamin e 1,000 mg cap Take 1 Cap by mouth two (2) times a day.  calcium-cholecalciferol, d3, 600-125 mg-unit tab Take 1,200 mg by mouth daily. Past History     Past Medical History:  Past Medical History:   Diagnosis Date    Arthritis     Bilateral Knee, and Bilateral Hand/Thumb    Carpal tunnel syndrome     Headache     History of ankle fusion 1997    left    Hx of migraine headaches     Hypercholesterolemia     Hypertension     Morbid obesity ([address]     HERZOG (nonalcoholic steatohepatitis) suggested by ultrasound report, 2016 8/11/2017    Vertigo        Past Surgical History:  Past Surgical History:   Procedure Laterality Date    HX APPENDECTOMY      age 25 years    HX COLONOSCOPY      Due in 2018: May 2015    HX HYSTERECTOMY      age 30years old   Sol Sears HX TONSILLECTOMY      at age 29years old       Family History:  Family History   Problem Relation Age of Onset    Heart Disease Mother     Hypertension Mother     Heart Surgery Mother     Cancer Father      colon    Hypertension Brother     No Known Problems Maternal Grandmother     Heart Disease Maternal Grandfather     MS Paternal Grandmother     Stroke Paternal Grandfather     Heart Disease Paternal Grandfather     No Known Problems Son        Social History:  Social History   Substance Use Topics    Smoking status: Never Smoker    Smokeless tobacco: Never Used    Alcohol use 0.0 oz/week     0 Standard drinks or equivalent per week      Comment: rare once a year       Allergies:  No Known Allergies      Review of Systems       Review of Systems   Gastrointestinal: Positive for abdominal pain. Neurological: Positive for weakness (generalized). All other systems reviewed and are negative.         Physical Exam     Visit Vitals    /88    Pulse (!) 142    Temp 98.6 °F (37 °C)    Resp 23    SpO2 96%         Physical Exam   Constitutional: She appears well-developed and well-nourished. Non-toxic appearance. She does not have a sickly appearance. She does not appear ill. No distress. HENT:   Head: Normocephalic and atraumatic. Mouth/Throat: Oropharynx is clear and moist. No oropharyngeal exudate. Eyes: Conjunctivae and EOM are normal. Pupils are equal, round, and reactive to light. No scleral icterus. Neck: Normal range of motion. Neck supple. No hepatojugular reflux and no JVD present. No tracheal deviation present. No thyromegaly present. Cardiovascular: Normal rate, regular rhythm, S1 normal, S2 normal, normal heart sounds, intact distal pulses and normal pulses. Exam reveals no gallop, no S3 and no S4. No murmur heard. Pulses:       Radial pulses are 2+ on the right side, and 2+ on the left side. Dorsalis pedis pulses are 2+ on the right side, and 2+ on the left side. Pulmonary/Chest: Effort normal and breath sounds normal. No respiratory distress. She has no decreased breath sounds. She has no wheezes. She has no rhonchi. She has no rales. Abdominal: Soft. Normal appearance and bowel sounds are normal. She exhibits no distension and no mass. There is no hepatosplenomegaly. There is no tenderness. There is no rigidity, no rebound, no guarding, no CVA tenderness, no tenderness at McBurney's point and negative Carrera's sign. Unremarkable abd exam    Musculoskeletal: Normal range of motion. Strength 5/5 throughout    Lymphadenopathy:        Head (right side): No submental, no submandibular, no preauricular and no occipital adenopathy present. Head (left side): No submental, no submandibular, no preauricular and no occipital adenopathy present. She has no cervical adenopathy. Right: No supraclavicular adenopathy present. Left: No supraclavicular adenopathy present. Neurological: She is alert. She has normal strength and normal reflexes.  She is not disoriented. No cranial nerve deficit or sensory deficit. Coordination and gait normal. GCS eye subscore is 4. GCS verbal subscore is 5. GCS motor subscore is 6. Grossly intact    Skin: Skin is warm, dry and intact. No rash noted. She is not diaphoretic. Psychiatric: She has a normal mood and affect. Her speech is normal and behavior is normal. Judgment and thought content normal. Cognition and memory are normal.   Nursing note reviewed. Diagnostic Study Results     Labs -  Recent Results (from the past 12 hour(s))   CBC WITH AUTOMATED DIFF    Collection Time: 01/26/18 12:13 PM   Result Value Ref Range    WBC 10.8 4.6 - 13.2 K/uL    RBC 5.87 (H) 4.20 - 5.30 M/uL    HGB 16.8 (H) 12.0 - 16.0 g/dL    HCT 50.8 (H) 35.0 - 45.0 %    MCV 86.5 74.0 - 97.0 FL    MCH 28.6 24.0 - 34.0 PG    MCHC 33.1 31.0 - 37.0 g/dL    RDW 12.9 11.6 - 14.5 %    PLATELET 987 440 - 796 K/uL    MPV 11.3 9.2 - 11.8 FL    NEUTROPHILS 75 (H) 40 - 73 %    LYMPHOCYTES 15 (L) 21 - 52 %    MONOCYTES 9 3 - 10 %    EOSINOPHILS 1 0 - 5 %    BASOPHILS 0 0 - 2 %    ABS. NEUTROPHILS 8.0 1.8 - 8.0 K/UL    ABS. LYMPHOCYTES 1.6 0.9 - 3.6 K/UL    ABS. MONOCYTES 1.0 0.05 - 1.2 K/UL    ABS. EOSINOPHILS 0.1 0.0 - 0.4 K/UL    ABS. BASOPHILS 0.0 0.0 - 0.1 K/UL    DF AUTOMATED     METABOLIC PANEL, COMPREHENSIVE    Collection Time: 01/26/18 12:13 PM   Result Value Ref Range    Sodium 141 136 - 145 mmol/L    Potassium 3.9 3.5 - 5.5 mmol/L    Chloride 106 100 - 108 mmol/L    CO2 26 21 - 32 mmol/L    Anion gap 9 3.0 - 18 mmol/L    Glucose 120 (H) 74 - 99 mg/dL    BUN 17 7.0 - 18 MG/DL    Creatinine 0.67 0.6 - 1.3 MG/DL    BUN/Creatinine ratio 25 (H) 12 - 20      GFR est AA >60 >60 ml/min/1.73m2    GFR est non-AA >60 >60 ml/min/1.73m2    Calcium 9.8 8.5 - 10.1 MG/DL    Bilirubin, total 0.8 0.2 - 1.0 MG/DL    ALT (SGPT) 60 (H) 13 - 56 U/L    AST (SGOT) 12 (L) 15 - 37 U/L    Alk.  phosphatase 77 45 - 117 U/L    Protein, total 7.3 6.4 - 8.2 g/dL    Albumin 4.1 3.4 - 5.0 g/dL    Globulin 3.2 2.0 - 4.0 g/dL    A-G Ratio 1.3 0.8 - 1.7     LIPASE    Collection Time: 01/26/18 12:13 PM   Result Value Ref Range    Lipase 393 73 - 393 U/L   EKG, 12 LEAD, INITIAL    Collection Time: 01/26/18  1:19 PM   Result Value Ref Range    Ventricular Rate 143 BPM    Atrial Rate 122 BPM    QRS Duration 60 ms    Q-T Interval 316 ms    QTC Calculation (Bezet) 487 ms    Calculated R Axis 20 degrees    Calculated T Axis -101 degrees    Diagnosis       Atrial fibrillation with rapid ventricular response  Low voltage QRS  Nonspecific ST and T wave abnormality , probably digitalis effect  Abnormal ECG  No previous ECGs available         Radiologic Studies -   CT ABD PELV W CONT   Final Result      IMPRESSIONS:     There is no definable acute processes in abdomen or in pelvis.     No evidence of pancreatitis.     No evidence of obstructive uropathy.     Status post appendectomy and hysterectomy noted.     No demonstrable abnormality in liver and gallbladder. Medical Decision Making   I am the first provider for this patient. I reviewed the vital signs, available nursing notes, past medical history, past surgical history, family history and social history. Vital Signs-Reviewed the patient's vital signs. Records Reviewed: Nursing Notes and Old Medical Records (Time of Review: 11:50 AM)    ED Course: Progress Notes, Reevaluation, and Consults:  Consult:  Discussed care with Dr. Ju Castellano discussion; including history of patients chief complaint, available diagnostic results, and treatment course. States that he would like a CT scan and is labs are normal she can be discharged home. 12:00 PM  Consult:  Discussed care with [facility], Cardiology Standard discussion; including history of patients chief complaint, available diagnostic results, and treatment course. Agrees with discharge. Wants patient to call the office today at [phone].  Will put her on Lopressor 12.5 mg BID and a daily Aspirin. 1:40 PM    Labs essentially normal. Chest X-Ray showed No acute process. EKG showed Afib with RVR with rate of 143 bpm. With no ST elevations or depression and non specific T wave changes. Given Cardizem and HR currently 97. CT abd showed There is no definable acute processes in abdomen or in pelvis. No evidence of pancreatitis. No evidence of obstructive uropathy. Status post appendectomy and hysterectomy noted. No demonstrable abnormality in liver and gallbladder. 1:54 AM 1/26/2018    Provider Notes (Medical Decision Making):  MDM  Number of Diagnoses or Management Options  Abdominal pain, epigastric:   Atrial fibrillation, unspecified type [facility]):         Diagnosis       I have reassessed the patient. Patient is feeling better. Patient was discharged in stable condition. Patient is to return to emergency department if any new or worsening condition. Clinical Impression:   1. Abdominal pain, epigastric    2. Atrial fibrillation, unspecified type ([address]        Disposition: DC    Follow-up Information     Follow up With Details Comments Contact Info    Dionicio Champion MD Call today  [address] [place] [address] Drive  [address]  [facility]  Susanne Gtz [address] [place] [address] Street      Dustin Contreras MD Schedule an appointment as soon as possible for a visit in 2 days  [facility] [address]  [address] [address]  [phone]      [facility] EMERGENCY DEPT Go to As needed, If symptoms worsen [address]  [phone]           _______________________________    Attestations:  Michele Wagoner acting as a scribe for and in the presence of Mony Oms, DO      January 26, 2018 at 11:50 AM       Provider Attestation:      I personally performed the services described in the documentation, reviewed the documentation, as recorded by the scribe in my presence, and it accurately and completely records my words and actions.  January 26, 2018 at 11:50 HEIKE Frank, DO    _______________________________

## 2018-01-26 NOTE — ED TRIAGE NOTES
Pt came in after seeing GI specialist pt stated she thought she had a pancreatitis flare up, pt aao*4, no c/o CP and respirations even and unlabored pt ambulatory strong steady gait

## 2018-01-27 LAB
ATRIAL RATE: 122 BPM
CALCULATED R AXIS, ECG10: 20 DEGREES
CALCULATED T AXIS, ECG11: -101 DEGREES
DIAGNOSIS, 93000: NORMAL
Q-T INTERVAL, ECG07: 316 MS
QRS DURATION, ECG06: 60 MS
QTC CALCULATION (BEZET), ECG08: 487 MS
VENTRICULAR RATE, ECG03: 143 BPM

## 2018-01-29 ENCOUNTER — TELEPHONE (OUTPATIENT)
Dept: INTERNAL MEDICINE CLINIC | Age: 63
End: 2018-01-29

## 2018-01-29 ENCOUNTER — OFFICE VISIT (OUTPATIENT)
Dept: CARDIOLOGY CLINIC | Age: 63
End: 2018-01-29

## 2018-01-29 ENCOUNTER — HOSPITAL ENCOUNTER (OUTPATIENT)
Dept: LAB | Age: 63
Discharge: HOME | End: 2018-01-29
Payer: COMMERCIAL

## 2018-01-29 VITALS
SYSTOLIC BLOOD PRESSURE: 118 MMHG | DIASTOLIC BLOOD PRESSURE: 80 MMHG | BODY MASS INDEX: 35.16 KG/M2 | HEIGHT: 65 IN | WEIGHT: 211 LBS | HEART RATE: 131 BPM | OXYGEN SATURATION: 97 %

## 2018-01-29 DIAGNOSIS — I48.91 ATRIAL FIBRILLATION, UNSPECIFIED TYPE (HCC): Primary | ICD-10-CM

## 2018-01-29 DIAGNOSIS — I10 ESSENTIAL HYPERTENSION: ICD-10-CM

## 2018-01-29 DIAGNOSIS — Z01.812 PRE-PROCEDURE LAB EXAM: ICD-10-CM

## 2018-01-29 DIAGNOSIS — R63.4 RAPID WEIGHT LOSS: ICD-10-CM

## 2018-01-29 LAB
ANION GAP SERPL CALC-SCNC: 10 MMOL/L (ref 3–18)
BUN SERPL-MCNC: 16 MG/DL (ref 7–18)
BUN/CREAT SERPL: 30 (ref 12–20)
CALCIUM SERPL-MCNC: 9.3 MG/DL (ref 8.5–10.1)
CHLORIDE SERPL-SCNC: 108 MMOL/L (ref 100–108)
CO2 SERPL-SCNC: 23 MMOL/L (ref 21–32)
CREAT SERPL-MCNC: 0.53 MG/DL (ref 0.6–1.3)
GLUCOSE SERPL-MCNC: 109 MG/DL (ref 74–99)
POTASSIUM SERPL-SCNC: 4.4 MMOL/L (ref 3.5–5.5)
SODIUM SERPL-SCNC: 141 MMOL/L (ref 136–145)

## 2018-01-29 PROCEDURE — 36415 COLL VENOUS BLD VENIPUNCTURE: CPT | Performed by: INTERNAL MEDICINE

## 2018-01-29 PROCEDURE — 80048 BASIC METABOLIC PNL TOTAL CA: CPT | Performed by: INTERNAL MEDICINE

## 2018-01-29 NOTE — TELEPHONE ENCOUNTER
She does not need to see me, but I will have nutritionist call her to make sure she is aware of calorie counts and overall diet goals. We can get her restarted if appropriate after cardiology issues are more stable.

## 2018-01-29 NOTE — LETTER
NOTIFICATION OF RETURN TO WORK / SCHOOL 
 
1/29/2018 10:55 AM 
 
Ms. Ludwig Zelaya [address] [id number] [facility] Partha Mo To Whom It May Concern: 
 
Ludwig Zelaya is under the care of [facility]. She will not be able to return to work until further notice. If there are questions or concerns please have the patient contact our office [phone] Sincerely, MD China Colin MD

## 2018-01-29 NOTE — TELEPHONE ENCOUNTER
Pt. Calling says she saw her heart doctor today and he is recommending she stops the weight loss program for a while. Wanted to know if KAMARI Melvin has any suggestions if she can go on maintance until he lets her know what to do, or if there is something else. She has a appt wed with KAMARI Melvin wants to know if she should keep her appt. Or not.     Pls Advise

## 2018-01-29 NOTE — LETTER
2018 11:02 HEIKE De La O 
[id number] 
[id number] Insurance:  [id number] [facility] 281 # _____________________ Proc Date:                 Proc Time:  11:45am 
 
Performing MD : Dr. Mary Vickers Hospital:  [facility] PCP Dr. Tip Leslie Scheduled with:  Lucy/Email                                                        Date:2018 HP:       EK/29    Labs:______  CXR: _______  Orders:  Special Instructions:  _____________________________________________________ 
______________________________________________________________________ 
______________________________________________________________________ Date Faxed:   ______________   Pages Faxed: ___________________ The materials enclosed with this facsimile transmission are private and confidential and are the property of the sender. If you are not the intended recipient, be advised that any unauthorized use, disclosure, copying, distribution, or the taking of any action in reliance on the contents of this telecopied information is strictly prohibited. If you have received this in error, please immediately notify the sender via telephone to arrange for return of the forwarded documentation.

## 2018-01-29 NOTE — LETTER
1/29/2018 10:53 AM 
 
Ms. Micky Pearson [address] 
 
 
 
1. You are scheduled to have a IGLESIA/Cardioversion on  1/30/18 at 11:45am .  Please check in at 10:45am.  
 
2. Please go to [facility] and park in the outpatient parking lot that is located around to the back of the hospital and enter through the [facility] building. See map for directions. Once you enter through the [facility] check in with the  there. If for some reason, there is not a  available, please use the phone that is there. The  will either give you directions or assist you in getting to the cath holding area. 3.             You are not to eat or drink anything after midnight the morning of the         procedure. 4. Please continue to take your medications with a small sip of water. 5. If you are diabetic, do not take your insulin/sugar pill the morning of the procedure. 6. We encourage families to wait in the waiting room on the first floor while the procedure is being done. The Doctor will come out and talk with you as soon as the procedure is over. 7. You will need someone to drive you home, so please have someone available. 8. If you or your family have any questions, please call our office Monday- Friday, 
     9:00 a.m.  4:30 p.m., at [phone], and ask to speak to one of the nurses. Sincerely, Suzanna Grimaldo MD

## 2018-01-30 ENCOUNTER — ANESTHESIA (OUTPATIENT)
Dept: CARDIAC CATH/INVASIVE PROCEDURES | Age: 63
End: 2018-01-30
Payer: COMMERCIAL

## 2018-01-30 ENCOUNTER — ANESTHESIA EVENT (OUTPATIENT)
Dept: CARDIAC CATH/INVASIVE PROCEDURES | Age: 63
End: 2018-01-30
Payer: COMMERCIAL

## 2018-01-30 ENCOUNTER — HOSPITAL ENCOUNTER (OUTPATIENT)
Dept: NON INVASIVE DIAGNOSTICS | Age: 63
Setting detail: OUTPATIENT SURGERY
Discharge: HOME | End: 2018-01-30
Attending: INTERNAL MEDICINE | Admitting: INTERNAL MEDICINE
Payer: COMMERCIAL

## 2018-01-30 VITALS — WEIGHT: 211 LBS | HEIGHT: 65 IN | BODY MASS INDEX: 35.16 KG/M2

## 2018-01-30 LAB
ATRIAL RATE: 57 BPM
CALCULATED P AXIS, ECG09: 14 DEGREES
CALCULATED R AXIS, ECG10: 16 DEGREES
CALCULATED T AXIS, ECG11: 23 DEGREES
DIAGNOSIS, 93000: NORMAL
P-R INTERVAL, ECG05: 178 MS
Q-T INTERVAL, ECG07: 448 MS
QRS DURATION, ECG06: 72 MS
QTC CALCULATION (BEZET), ECG08: 436 MS
VENTRICULAR RATE, ECG03: 57 BPM

## 2018-01-30 PROCEDURE — 93005 ELECTROCARDIOGRAM TRACING: CPT

## 2018-01-30 PROCEDURE — 93306 TTE W/DOPPLER COMPLETE: CPT

## 2018-01-30 NOTE — IP AVS SNAPSHOT
[address] [place] [address] Patient: Leandro Carrillo MRN: [id number] BMV:[id number] About your hospitalization You were admitted on:  January 30, 2018 You last received care in the:  [facility] 1 CATH HOLDING You were discharged on:  January 30, 2018 Why you were hospitalized Your primary diagnosis was:  Not on File Follow-up Information Follow up With Details Comments Contact Info Jace Duong [address] [place] [address] 
[phone] Your Scheduled Appointments Thursday February 01, 2018 12:00 PM EST Office Visit with Jessi Baker MD  
[facility] [address]  
[phone] Wednesday February 07, 2018  7:45 AM EST  
LAB with [facility] NURSE VISIT [facility] [address]  
[phone] Wednesday February 14, 2018  8:00 AM EST Office Visit with Jessi Baker MD  
[facility] [address]  
[phone] Thursday March 01, 2018  9:40 AM EST Follow Up with Marisa Marquez MD  
[facility] ([address] [facility]) Marlen Martinez [place] [id number] [phone] Discharge Orders None A check tricia indicates which time of day the medication should be taken. My Medications CONTINUE taking these medications Instructions Each Dose to Equal  
 Morning Noon Evening Bedtime  
 aspirin 81 mg chewable tablet Your last dose was: Your next dose is: Take 1 Tab by mouth daily. 81 mg  
    
   
   
   
  
 calcium-cholecalciferol (d3) 600-125 mg-unit Tab Your last dose was: Your next dose is: Take 1,200 mg by mouth daily. 1200 mg HYDROcodone-acetaminophen 7.5-325 mg per tablet Commonly known as:  Clemetine [place] Your last dose was: Your next dose is: Take 1-2 Tabs by mouth nightly as needed for Pain. Max Daily Amount: 2 Tabs. 1-2 Tab  
    
   
   
   
  
 meloxicam 15 mg tablet Commonly known as:  MOBIC Your last dose was: Your next dose is: TAKE 1 TABLET BY MOUTH DAILY  
     
   
   
   
  
 metoprolol tartrate 100 mg IR tablet Commonly known as:  LOPRESSOR Your last dose was: Your next dose is: Take 0.5 Tabs by mouth two (2) times a day. 12.5 mg  
    
   
   
   
  
 omega-3 fatty acids-vitamin e 1,000 mg Cap Your last dose was: Your next dose is: Take 1 Cap by mouth two (2) times a day. 1 Cap  
    
   
   
   
  
 potassium 99 mg tablet Your last dose was: Your next dose is: Take 99 mg by mouth daily as needed. 99 mg  
    
   
   
   
  
 simvastatin 20 mg tablet Commonly known as:  ZOCOR Your last dose was: Your next dose is: Take 1 Tab by mouth nightly. 20 mg  
    
   
   
   
  
 TYLENOL ARTHRITIS PAIN 650 mg Tber Generic drug:  acetaminophen Your last dose was: Your next dose is: Take 650 mg by mouth every six (6) hours as needed for Pain. 650 mg  
    
   
   
   
  
 VITAMIN D3 2,000 unit Tab Generic drug:  cholecalciferol (vitamin D3) Your last dose was: Your next dose is: Take 2,000 Units by mouth daily. 2000 Units STOP taking these medications   
 furosemide 40 mg tablet Commonly known as:  LASIX  
   
  
 rivaroxaban 20 mg Tab tablet Commonly known as:  Chuy Seat Discharge Instructions Patient may return to work on Thursday, Feb. 1, 2018 without limitations. Deion Feliz MD 
 
  
  
  
Introducing [facility]! Dear Jeramy Faulkner: 
Thank you for requesting a Impinj account. Our records indicate that you already have an active Impinj account. You can access your account anytime at [email or web address] Did you know that you can access your hospital and ER discharge instructions at any time in Impinj? You can also review all of your test results from your hospital stay or ER visit. Additional Information If you have questions, please visit the Frequently Asked Questions section of the Impinj website at [email or web address] Remember, Impinj is NOT to be used for urgent needs. For medical emergencies, dial 911. Now available from your iPhone and Android! Providers Seen During Your Hospitalization Provider Specialty Primary office phone Kirsten Sosa MD Cardiology [phone] Your Primary Care Physician (PCP) Primary Care Physician Office Phone Office Fax Zak Orta [phone] You are allergic to the following No active allergies Recent Documentation Height Weight Breastfeeding? BMI OB Status Smoking Status 1.651 m 95.7 kg No 35.11 kg/m2 Hysterectomy Never Smoker Emergency Contacts Name Discharge Info Relation Home Work Mobile Vosler,Dennis DISCHARGE CAREGIVER [3] Spouse [3] [phone] Patient Belongings The following personal items are in your possession at time of discharge: 
     Visual Aid: At bedside Please provide this summary of care documentation to your next provider. Signatures-by signing, you are acknowledging that this After Visit Summary has been reviewed with you and you have received a copy.   
  
 
  
    
    
 Patient Signature: ____________________________________________________________ Date:  ____________________________________________________________  
  
Bety Deiters Provider Signature:  ____________________________________________________________ Date:  ____________________________________________________________

## 2018-01-30 NOTE — PROGRESS NOTES
History of essential hypertension. Patient states her blood pressure improved after the weight loss. She was previously on an unknown antihypertensive may agent, but continues to take scheduled furosemide 40 mg daily. I recommended that she stop her furosemide altogether since there was some evidence of volume depletion on her recent lab work.

## 2018-01-30 NOTE — ANESTHESIA PREPROCEDURE EVALUATION
Anesthetic History   No history of anesthetic complications            Review of Systems / Medical History  Patient summary reviewed and pertinent labs reviewed    Pulmonary  Within defined limits                 Neuro/Psych   Within defined limits           Cardiovascular    Hypertension: well controlled        Dysrhythmias : atrial fibrillation  Hyperlipidemia    Exercise tolerance: >4 METS     GI/Hepatic/Renal           Liver disease     Endo/Other        Obesity and arthritis     Other Findings   Comments: Documentation of current medication  Current medications obtained, documented and obtained? YES      Risk Factors for Postoperative nausea/vomiting:       History of postoperative nausea/vomiting? YES       Female? YES       Motion sickness? NO       Intended opioid administration for postoperative analgesia? NO      Smoking Abstinence:  Current Smoker? NO  Elective Surgery? YES  Seen preoperatively by anesthesiologist or proxy prior to day of surgery? YES  Pt abstained from smoking 24 hours prior to anesthesia?  YES    Preventive care/screening for High Blood Pressure:  Aged 18 years and older: YES  Screened for high blood pressure: YES  Patients with high blood pressure referred to primary care provider   for BP management: YES                 Physical Exam    Airway  Mallampati: II  TM Distance: > 6 cm  Neck ROM: normal range of motion   Mouth opening: Normal     Cardiovascular    Rhythm: irregular  Rate: normal         Dental  No notable dental hx       Pulmonary  Breath sounds clear to auscultation               Abdominal  GI exam deferred       Other Findings            Anesthetic Plan    ASA: 3  Anesthesia type: MAC          Induction: Intravenous  Anesthetic plan and risks discussed with: Patient

## 2018-01-30 NOTE — H&P
HISTORY OF PRESENT ILLNESS  Jess Davila is a 65 y.o. female.     HPI     Patient presents for a new office visit. She does not have a prior cardiac history. She does have a history of hypertension and obesity who recently started a nonsurgical weight loss program and lost approximately 60 pounds over the past 4 months. She has been treated for what sounds like dependent edema in the past with Lasix which she continues to take.     Patient was seen by her PCP early last week complaining primarily of abdominal pain, fatigue, decreased activity tolerance and not feeling well. She underwent blood work and was started on a proton pump inhibitor, however, her symptoms persisted, so she was evaluated in the emergency room at the end of last week. At that time she was diagnosed with new onset atrial fibrillation with a heart rate around 140 bpm.  She was given a dose of intravenous diltiazem and her heart rate did improve to the upper 90s, so she was discharged home on metoprolol 50 mg twice daily. She also underwent a CT scan which was negative. Her blood work showed an elevated hemoglobin level with some evidence of volume depletion.     She presents to our office as part of a post ER follow-up. She has not felt better since leaving the emergency room. Her biggest complaint is fatigue, malaise and no activity tolerance.   She denies any chest pain, palpitations or shortness of breath, though she admits she has not been doing any physical activity over the past week.          Past Medical History:   Diagnosis Date    Arthritis       Bilateral Knee, and Bilateral Hand/Thumb    Atrial fibrillation ([address] 1/29/2018    Carpal tunnel syndrome      Headache      History of ankle fusion 1997     left    Hx of migraine headaches      Hypercholesterolemia      Hypertension      Morbid obesity ([address]      HERZOG (nonalcoholic steatohepatitis) suggested by ultrasound report, 2016 8/11/2017    Vertigo       Current Outpatient Prescriptions   Medication Sig Dispense Refill    metoprolol tartrate (LOPRESSOR) 100 mg IR tablet Take 0.5 Tabs by mouth two (2) times a day. 30 Tab 0    aspirin 81 mg chewable tablet Take 1 Tab by mouth daily. 30 Tab 0    HYDROcodone-acetaminophen (NORCO) 7.5-325 mg per tablet Take 1-2 Tabs by mouth nightly as needed for Pain. Max Daily Amount: 2 Tabs. 14 Tab 0    cholecalciferol, vitamin D3, (VITAMIN D3) 2,000 unit tab Take 2,000 Units by mouth daily.        potassium 99 mg tablet Take 99 mg by mouth daily as needed.        acetaminophen (TYLENOL ARTHRITIS PAIN) 650 mg CR tablet Take 650 mg by mouth every six (6) hours as needed for Pain.        meloxicam (MOBIC) 15 mg tablet TAKE 1 TABLET BY MOUTH DAILY 90 Tab 3    furosemide (LASIX) 40 mg tablet Take 1 Tab by mouth daily. 90 Tab 3    simvastatin (ZOCOR) 20 mg tablet Take 1 Tab by mouth nightly. 90 Tab 3    omega-3 fatty acids-vitamin e 1,000 mg cap Take 1 Cap by mouth two (2) times a day.        calcium-cholecalciferol, d3, 600-125 mg-unit tab Take 1,200 mg by mouth daily.          No Known Allergies              Social History    Substance Use Topics     Smoking status: Never Smoker     Smokeless tobacco: Never Used     Alcohol use 0.0 oz/week       0 Standard drinks or equivalent per week         Comment: rare once a year       Family History   Problem Relation Age of Onset    Heart Disease Mother      Hypertension Mother      Heart Surgery Mother      Cancer Father         colon    Hypertension Brother      No Known Problems Maternal Grandmother      Heart Disease Maternal Grandfather      MS Paternal Grandmother      Stroke Paternal Grandfather      Heart Disease Paternal Grandfather      No Known Problems Son              Review of Systems   Constitutional: Positive for malaise/fatigue. Negative for chills, fever and weight loss. HENT: Negative for nosebleeds.     Eyes: Negative for blurred vision and double vision. Respiratory: Negative for cough, shortness of breath and wheezing. Cardiovascular: Negative for chest pain, palpitations, orthopnea, claudication, leg swelling and PND. Gastrointestinal: Negative for abdominal pain, heartburn, nausea and vomiting. Genitourinary: Negative for dysuria and hematuria. Musculoskeletal: Negative for falls and myalgias. Skin: Negative for rash. Neurological: Negative for dizziness, focal weakness and headaches. Endo/Heme/Allergies: Does not bruise/bleed easily. Psychiatric/Behavioral: Negative for substance abuse.           Visit Vitals    /80    Pulse  57    Ht 5' 5\" (1.651 m)    Wt 95.7 kg (211 lb)    SpO2 97%    BMI 35.11 kg/m2         Physical Exam   Constitutional: She is oriented to person, place, and time. She appears well-developed and well-nourished. HENT:   Head: Normocephalic and atraumatic. Eyes: Conjunctivae are normal.   Neck: Neck supple. No JVD present. Carotid bruit is not present. Cardiovascular: S1 normal, S2 normal and normal pulses. Regular rate and rhythm. Exam reveals no gallop. No murmur heard. Pulmonary/Chest: Effort normal and breath sounds normal. She has no wheezes. She has no rales. Abdominal: Soft. Bowel sounds are normal. There is no tenderness. Musculoskeletal: She exhibits no edema, tenderness or deformity. Neurological: She is alert and oriented to person, place, and time. Skin: Skin is warm and dry. Psychiatric: She has a normal mood and affect. Her behavior is normal. Thought content normal.      EKG: Sinus bradycardia, HR 57, otherwise normal EKG.       ASSESSMENT and PLAN       Encounter Diagnoses   Name Primary?  Atrial fibrillation, unspecified type ([address] Yes    Pre-procedure lab exam      Essential hypertension      Rapid weight loss        Paroxsymal Atrial fibrillation. New onset, I suspect this is the cause of the majority of her symptoms.   She has been feeling poorly for at least a week, so I suspect her atrial fibrillation may have started a week ago. She is currently taking metoprolol 50 mg twice daily and this is not controlling her heart rates, so I suspect this will be difficult to control. I recommended starting her on Xarelto 20 mg daily and scheduling her for a transesophageal echocardiogram with possible cardioversion. Patient never started taking the Xarelto, but when she presented to hospital for IGLESIA/Cardioversion, she was already back in normal sinus rhythm     Procedure obviously cancelled. Will order a transthoracic echocardiogram for today to evaluate her LV function. NO need to start anti-coagulation since she is back in nsr unless her TTE is significantly abnormal.  Would continue her current metoprolol dosage and daily ASA 81 mg for now. Stable for d/c home following her TTE.

## 2018-02-01 ENCOUNTER — OFFICE VISIT (OUTPATIENT)
Dept: INTERNAL MEDICINE CLINIC | Age: 63
End: 2018-02-01

## 2018-02-01 VITALS
SYSTOLIC BLOOD PRESSURE: 129 MMHG | OXYGEN SATURATION: 98 % | BODY MASS INDEX: 35.49 KG/M2 | TEMPERATURE: 98.4 F | RESPIRATION RATE: 18 BRPM | HEART RATE: 66 BPM | WEIGHT: 213 LBS | HEIGHT: 65 IN | DIASTOLIC BLOOD PRESSURE: 79 MMHG

## 2018-02-01 DIAGNOSIS — E66.9 OBESITY (BMI 30-39.9): ICD-10-CM

## 2018-02-01 DIAGNOSIS — I48.91 ATRIAL FIBRILLATION, UNSPECIFIED TYPE (HCC): Primary | ICD-10-CM

## 2018-02-01 DIAGNOSIS — I10 ESSENTIAL HYPERTENSION: ICD-10-CM

## 2018-02-01 NOTE — PATIENT INSTRUCTIONS
Atrial Fibrillation: Care Instructions  Your Care Instructions    Atrial fibrillation is an irregular and often fast heartbeat. Treating this condition is important for several reasons. It can cause blood clots, which can travel from your heart to your brain and cause a stroke. If you have a fast heartbeat, you may feel lightheaded, dizzy, and weak. An irregular heartbeat can also increase your risk for heart failure. Atrial fibrillation is often the result of another heart condition, such as high blood pressure or coronary artery disease. Making changes to improve your heart condition will help you stay healthy and active. Follow-up care is a key part of your treatment and safety. Be sure to make and go to all appointments, and call your doctor if you are having problems. It's also a good idea to know your test results and keep a list of the medicines you take. How can you care for yourself at home? Medicines  ? · Take your medicines exactly as prescribed. Call your doctor if you think you are having a problem with your medicine. You will get more details on the specific medicines your doctor prescribes. ? · If your doctor has given you a blood thinner to prevent a stroke, be sure you get instructions about how to take your medicine safely. Blood thinners can cause serious bleeding problems. ? · Do not take any vitamins, over-the-counter drugs, or herbal products without talking to your doctor first.   ? Lifestyle changes  ? · Do not smoke. Smoking can increase your chance of a stroke and heart attack. If you need help quitting, talk to your doctor about stop-smoking programs and medicines. These can increase your chances of quitting for good. ? · Eat a heart-healthy diet. ? · Stay at a healthy weight. Lose weight if you need to.   ? · Limit alcohol to 2 drinks a day for men and 1 drink a day for women. Too much alcohol can cause health problems. ? · Avoid colds and flu.  Get a pneumococcal vaccine shot. If you have had one before, ask your doctor whether you need another dose. Get a flu shot every year. If you must be around people with colds or flu, wash your hands often. Activity  ? · If your doctor recommends it, get more exercise. Walking is a good choice. Bit by bit, increase the amount you walk every day. Try for at least 30 minutes on most days of the week. You also may want to swim, bike, or do other activities. Your doctor may suggest that you join a cardiac rehabilitation program so that you can have help increasing your physical activity safely. ? · Start light exercise if your doctor says it is okay. Even a small amount will help you get stronger, have more energy, and manage stress. Walking is an easy way to get exercise. Start out by walking a little more than you did in the hospital. Gradually increase the amount you walk. ? · When you exercise, watch for signs that your heart is working too hard. You are pushing too hard if you cannot talk while you are exercising. If you become short of breath or dizzy or have chest pain, sit down and rest immediately. ? · Check your pulse regularly. Place two fingers on the artery at the palm side of your wrist, in line with your thumb. If your heartbeat seems uneven or fast, talk to your doctor. When should you call for help? Call 911 anytime you think you may need emergency care. For example, call if:  ? · You have symptoms of a heart attack. These may include:  ¨ Chest pain or pressure, or a strange feeling in the chest.  ¨ Sweating. ¨ Shortness of breath. ¨ Nausea or vomiting. ¨ Pain, pressure, or a strange feeling in the back, neck, jaw, or upper belly or in one or both shoulders or arms. ¨ Lightheadedness or sudden weakness. ¨ A fast or irregular heartbeat. After you call 911, the  may tell you to chew 1 adult-strength or 2 to 4 low-dose aspirin. Wait for an ambulance. Do not try to drive yourself.    ? · You have symptoms of a stroke. These may include:  ¨ Sudden numbness, tingling, weakness, or loss of movement in your face, arm, or leg, especially on only one side of your body. ¨ Sudden vision changes. ¨ Sudden trouble speaking. ¨ Sudden confusion or trouble understanding simple statements. ¨ Sudden problems with walking or balance. ¨ A sudden, severe headache that is different from past headaches. ? · You passed out (lost consciousness). ?Call your doctor now or seek immediate medical care if:  ? · You have new or increased shortness of breath. ? · You feel dizzy or lightheaded, or you feel like you may faint. ? · Your heart rate becomes irregular. ? · You can feel your heart flutter in your chest or skip heartbeats. Tell your doctor if these symptoms are new or worse. ? Watch closely for changes in your health, and be sure to contact your doctor if you have any problems. Where can you learn more? Go to http://stanton-gonsalo.info/. Enter U020 in the search box to learn more about \"Atrial Fibrillation: Care Instructions. \"  Current as of: September 21, 2016  Content Version: 11.4  © 5785-9704 [employer]. Care instructions adapted under license by [employer] (which disclaims liability or warranty for this information). If you have questions about a medical condition or this instruction, always ask your healthcare professional. [address] any warranty or liability for your use of this information.

## 2018-02-01 NOTE — MR AVS SNAPSHOT
[address] [facility], [place], operated by [facility] 
 
 
 [address], [address] [facility] 
[phone] Patient: Garth Mcgovern MRN: [id number] [facility]:6[birth year]4/[birth year]38 Visit Information Date & Time Provider Department Dept. Phone Encounter #  
 2/1/2018 12:00 PM Pedro Singh MD [facility] [id number] Follow-up Instructions Return in about 6 months (around 8/1/2018) for BP check, weight. Your Appointments 3/1/2018  9:40 AM  
Follow Up with Yasmine Eason MD  
[facility] ([address]) Appt Note: 4 week after IGLESIA  
 Marlen Mcneal [phone] [address]  
  
    
 8/1/2018  9:30 AM  
Office Visit with Pedro Singh MD  
[facility] [address]) Appt Note: 6 mos f/u  
 [address] [facility]  
  
   
 [address], [address] Upcoming Health Maintenance Date Due ZOSTER VACCINE AGE 60> 3/5/2016 COLONOSCOPY 5/28/2018 BREAST CANCER SCRN MAMMOGRAM 7/14/2018 PAP AKA CERVICAL CYTOLOGY 7/28/2019 DTaP/Tdap/Td series (2 - Td) 2/7/2027 Allergies as of 2/1/2018  Review Complete On: 2/1/2018 By: Kriss Petersen LPN No Known Allergies Current Immunizations  Reviewed on 2/7/2017 Name Date Influenza Vaccine 10/12/2017 Influenza Vaccine (Quad) PF 10/10/2016 Tdap 2/7/2017 Not reviewed this visit You Were Diagnosed With   
  
 Codes Comments Atrial fibrillation, unspecified type [facility]    -  Primary ICD-10-CM: I48.91 
ICD-9-CM: 427.31 Essential hypertension     ICD-10-CM: I10 
ICD-9-CM: 401.9 Obesity (BMI 30-39. 9)     ICD-10-CM: E66.9 ICD-9-CM: 278.00 Vitals BP Pulse Temp Resp Height(growth percentile) Weight(growth percentile) 129/79 66 98.4 °F (36.9 °C) 18 5' 5\" (1.651 m) 213 lb (96.6 kg) SpO2 BMI OB Status Smoking Status 98% 35.45 kg/m2 Hysterectomy Never Smoker Vitals History BMI and BSA Data Body Mass Index Body Surface Area  
 35.45 kg/m 2 2.1 m 2 Preferred Pharmacy [facility] Carla Hawkins [address] [place] [phone] Your Updated Medication List  
  
   
This list is accurate as of: 2/1/18 12:43 PM.  Always use your most recent med list.  
  
  
  
  
 aspirin 81 mg chewable tablet Take 1 Tab by mouth daily. calcium-cholecalciferol (d3) 600-125 mg-unit Tab Take 1,200 mg by mouth daily. HYDROcodone-acetaminophen 7.5-325 mg per tablet Commonly known as:  Helen Apa Take 1-2 Tabs by mouth nightly as needed for Pain. Max Daily Amount: 2 Tabs. meloxicam 15 mg tablet Commonly known as:  MOBIC  
TAKE 1 TABLET BY MOUTH DAILY  
  
 metoprolol tartrate 100 mg IR tablet Commonly known as:  LOPRESSOR Take 0.5 Tabs by mouth two (2) times a day. omega-3 fatty acids-vitamin e 1,000 mg Cap Take 1 Cap by mouth two (2) times a day. potassium 99 mg tablet Take 99 mg by mouth daily as needed. simvastatin 20 mg tablet Commonly known as:  ZOCOR Take 1 Tab by mouth nightly. TYLENOL ARTHRITIS PAIN 650 mg Karen Daubs Generic drug:  acetaminophen Take 650 mg by mouth every six (6) hours as needed for Pain. VITAMIN D3 2,000 unit Tab Generic drug:  cholecalciferol (vitamin D3) Take 2,000 Units by mouth daily. Follow-up Instructions Return in about 6 months (around 8/1/2018) for BP check, weight. Patient Instructions Atrial Fibrillation: Care Instructions Your Care Instructions Atrial fibrillation is an irregular and often fast heartbeat. Treating this condition is important for several reasons.  It can cause blood clots, which can travel from your heart to your brain and cause a stroke. If you have a fast heartbeat, you may feel lightheaded, dizzy, and weak. An irregular heartbeat can also increase your risk for heart failure. Atrial fibrillation is often the result of another heart condition, such as high blood pressure or coronary artery disease. Making changes to improve your heart condition will help you stay healthy and active. Follow-up care is a key part of your treatment and safety. Be sure to make and go to all appointments, and call your doctor if you are having problems. It's also a good idea to know your test results and keep a list of the medicines you take. How can you care for yourself at home? Medicines ? · Take your medicines exactly as prescribed. Call your doctor if you think you are having a problem with your medicine. You will get more details on the specific medicines your doctor prescribes. ? · If your doctor has given you a blood thinner to prevent a stroke, be sure you get instructions about how to take your medicine safely. Blood thinners can cause serious bleeding problems. ? · Do not take any vitamins, over-the-counter drugs, or herbal products without talking to your doctor first. ? Lifestyle changes ? · Do not smoke. Smoking can increase your chance of a stroke and heart attack. If you need help quitting, talk to your doctor about stop-smoking programs and medicines. These can increase your chances of quitting for good. ? · Eat a heart-healthy diet. ? · Stay at a healthy weight. Lose weight if you need to.  
? · Limit alcohol to 2 drinks a day for men and 1 drink a day for women. Too much alcohol can cause health problems. ? · Avoid colds and flu. Get a pneumococcal vaccine shot. If you have had one before, ask your doctor whether you need another dose. Get a flu shot every year. If you must be around people with colds or flu, wash your hands often. Activity ? · If your doctor recommends it, get more exercise. Walking is a good choice. Bit by bit, increase the amount you walk every day. Try for at least 30 minutes on most days of the week. You also may want to swim, bike, or do other activities. Your doctor may suggest that you join a cardiac rehabilitation program so that you can have help increasing your physical activity safely. ? · Start light exercise if your doctor says it is okay. Even a small amount will help you get stronger, have more energy, and manage stress. Walking is an easy way to get exercise. Start out by walking a little more than you did in the hospital. Gradually increase the amount you walk. ? · When you exercise, watch for signs that your heart is working too hard. You are pushing too hard if you cannot talk while you are exercising. If you become short of breath or dizzy or have chest pain, sit down and rest immediately. ? · Check your pulse regularly. Place two fingers on the artery at the palm side of your wrist, in line with your thumb. If your heartbeat seems uneven or fast, talk to your doctor. When should you call for help? Call 911 anytime you think you may need emergency care. For example, call if: 
? · You have symptoms of a heart attack. These may include: ¨ Chest pain or pressure, or a strange feeling in the chest. 
¨ Sweating. ¨ Shortness of breath. ¨ Nausea or vomiting. ¨ Pain, pressure, or a strange feeling in the back, neck, jaw, or upper belly or in one or both shoulders or arms. ¨ Lightheadedness or sudden weakness. ¨ A fast or irregular heartbeat. After you call 911, the  may tell you to chew 1 adult-strength or 2 to 4 low-dose aspirin. Wait for an ambulance. Do not try to drive yourself. ? · You have symptoms of a stroke. These may include: 
¨ Sudden numbness, tingling, weakness, or loss of movement in your face, arm, or leg, especially on only one side of your body. ¨ Sudden vision changes. ¨ Sudden trouble speaking. ¨ Sudden confusion or trouble understanding simple statements. ¨ Sudden problems with walking or balance. ¨ A sudden, severe headache that is different from past headaches. ? · You passed out (lost consciousness). ?Call your doctor now or seek immediate medical care if: 
? · You have new or increased shortness of breath. ? · You feel dizzy or lightheaded, or you feel like you may faint. ? · Your heart rate becomes irregular. ? · You can feel your heart flutter in your chest or skip heartbeats. Tell your doctor if these symptoms are new or worse. ? Watch closely for changes in your health, and be sure to contact your doctor if you have any problems. Where can you learn more? Go to http://stanton-gonsalo.info/. Enter U020 in the search box to learn more about \"Atrial Fibrillation: Care Instructions. \" Current as of: September 21, 2016 Content Version: 11.4 © 7846-4841 [employer]. Care instructions adapted under license by [employer] (which disclaims liability or warranty for this information). If you have questions about a medical condition or this instruction, always ask your healthcare professional. [place] [address] any warranty or liability for your use of this information. Introducing [facility]! Dear Meseret Guillermo: 
Thank you for requesting a [employer] account. Our records indicate that you already have an active [employer] account. You can access your account anytime at [email or web address] [email or web address] Did you know that you can access your hospital and ER discharge instructions at any time in [employer]? You can also review all of your test results from your hospital stay or ER visit. Additional Information If you have questions, please visit the Frequently Asked Questions section of the [employer] website at [email or web address] Remember, MyChart is NOT to be used for urgent needs. For medical emergencies, dial 911. Now available from your iPhone and Android! Please provide this summary of care documentation to your next provider. Your primary care clinician is listed as Leticia Coon. If you have any questions after today's visit, please call [phone].

## 2018-02-01 NOTE — TELEPHONE ENCOUNTER
She was also seen in the office today (for primary care), we gave her info for the phase in diet and she said she has gotten the call from you too.

## 2018-02-09 ENCOUNTER — DOCUMENTATION ONLY (OUTPATIENT)
Dept: BARIATRICS/WEIGHT MGMT | Age: 63
End: 2018-02-09

## 2018-02-09 ENCOUNTER — HOSPITAL ENCOUNTER (OUTPATIENT)
Dept: BARIATRICS/WEIGHT MGMT | Age: 63
Discharge: HOME | End: 2018-02-09

## 2018-02-22 ENCOUNTER — OFFICE VISIT (OUTPATIENT)
Dept: ORTHOPEDIC SURGERY | Facility: CLINIC | Age: 63
End: 2018-02-22

## 2018-02-22 VITALS
OXYGEN SATURATION: 98 % | HEART RATE: 61 BPM | TEMPERATURE: 98.5 F | SYSTOLIC BLOOD PRESSURE: 135 MMHG | DIASTOLIC BLOOD PRESSURE: 77 MMHG

## 2018-02-22 DIAGNOSIS — M17.0 PRIMARY OSTEOARTHRITIS OF BOTH KNEES: ICD-10-CM

## 2018-02-22 DIAGNOSIS — M25.562 CHRONIC PAIN OF BOTH KNEES: ICD-10-CM

## 2018-02-22 DIAGNOSIS — M25.561 CHRONIC PAIN OF BOTH KNEES: ICD-10-CM

## 2018-02-22 DIAGNOSIS — G89.29 CHRONIC PAIN OF BOTH KNEES: ICD-10-CM

## 2018-02-22 DIAGNOSIS — M17.11 PRIMARY OSTEOARTHRITIS OF RIGHT KNEE: Primary | ICD-10-CM

## 2018-02-22 NOTE — PROGRESS NOTES
Patient: Yenny Parnell                MRN: [id number]       SSN: [id number]  YOB: 1955        AGE: 65 y.o. SEX: female    PCP: Beni Esquivel MD  02/22/18    Chief Complaint   Patient presents with    Knee Injury     PAIN 0     HISTORY:  Yenny Parnell is a 65 y.o. female who is seen for bilateral knee (R>L) pain. She notes pain with standing and walking. She notes increased pain recently. She denies any previous injury or trauma. She has x rays taken at [facility] on 5/16/16. She was previously seen by Dr. Kristie Hayes who gave her cortisone injections with benefit but she did not wish to be seen again. She states that she is right-leg dominant. She states that she has needed knee replacement surgery for over 10 years. She has not previously undergone visco supplementation. She completed a bilateral Euflexxa series on 12/29/16. She was previously seen for bilateral thumb and wrist pain. She notes increased bilateral thumb pain, numbness, and tingling as well. She reports increased leg pain and swelling with increased activity recently. She notes thumb pain with pinch and . Pain Assessment  2/22/2018   Location of Pain Knee   Location Modifiers Left;Right   Severity of Pain 0   Quality of Pain -   Duration of Pain -   Frequency of Pain -   Aggravating Factors -   Aggravating Factors Comment -   Limiting Behavior -   Relieving Factors -   Result of Injury No   Work-Related Injury -   Type of Injury -     Occupation, etc:  Ms. 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 She states that her job is primarily sedentary. She previously played clarinet and piano. She previously worked at [employer] in [place], [place] in the [place]. She previously lived in [place], [place] until 2010 and in [place] until 2014. Her son is in the [employer].  She states that she moved to be close to be closer her grandchildren. She has two granddaughters (9 and 15) and one 9 yo grandson. She has high blood pressure and high cholesterol for which she takes medication. She reports that she has gained 3 pounds recently. She enjoys staying active with her grandchildren. She is 213 pounds. She reports she has lost 60 pounds over the past 4 months. She is 5'5\". She is not diabetic. She states she was recently diagnosed with AFIB. Weight Metrics 2/1/2018 1/30/2018 1/29/2018 1/26/2018 1/23/2018 1/17/2018 1/12/2018   Weight 213 lb 211 lb 211 lb 212 lb 214 lb 12.8 oz 222 lb 9.6 oz 227 lb 9.6 oz   Waist Measure Inches - - - - - 43 -   Exercise Mins/week - - - - - - -   Body Fat % - - - - - - -   BMI 35.45 kg/m2 35.11 kg/m2 35.11 kg/m2 35.28 kg/m2 35.74 kg/m2 37.04 kg/m2 37.87 kg/m2     Patient Active Problem List   Diagnosis Code    Hyperlipidemia E78.5    Essential hypertension I10    Osteoarthritis of knee M17.10    Obesity (BMI 30-39. 9) E66.9    HERZOG (nonalcoholic steatohepatitis) suggested by ultrasound report, 2016 K75.81    Atrial fibrillation (HCC) I48.91     REVIEW OF SYSTEMS: All Below are Negative except: See HPI   Constitutional: negative for fever, chills, and weight loss. Cardiovascular: negative for chest pain, claudication, leg swelling, SOB, BETHEA   Gastrointestinal: Negative for pain, N/V/C/D, Blood in stool or urine, dysuria,  hematuria, incontinence, pelvic pain. Musculoskeletal: See HPI   Neurological: Negative for dizziness and weakness. Negative for headaches, Visual changes, confusion, seizures   Phychiatric/Behavioral: Negative for depression, memory loss, substance  abuse. Extremities: Negative for hair changes, rash, or skin lesion changes. Hematologic: Negative for bleeding problems, bruising, pallor or swollen lymph  nodes   Peripheral Vascular: No calf pain, no circulation deficits.     Social History     Social History    Marital status:      Spouse name: N/A    Number of children: N/A    Years of education: N/A     Occupational History    Not on file. Social History Main Topics    Smoking status: Never Smoker    Smokeless tobacco: Never Used    Alcohol use No    Drug use: No    Sexual activity: Not on file     Other Topics Concern    Not on file     Social History Narrative    Works at [employer] in the [employer] department      No Known Allergies   Current Outpatient Prescriptions   Medication Sig    metoprolol tartrate (LOPRESSOR) 100 mg IR tablet Take 0.5 Tabs by mouth two (2) times a day.  aspirin 81 mg chewable tablet Take 1 Tab by mouth daily.  cholecalciferol, vitamin D3, (VITAMIN D3) 2,000 unit tab Take 2,000 Units by mouth daily.  potassium 99 mg tablet Take 99 mg by mouth daily as needed.  acetaminophen (TYLENOL ARTHRITIS PAIN) 650 mg CR tablet Take 650 mg by mouth every six (6) hours as needed for Pain.  meloxicam (MOBIC) 15 mg tablet TAKE 1 TABLET BY MOUTH DAILY    simvastatin (ZOCOR) 20 mg tablet Take 1 Tab by mouth nightly.  omega-3 fatty acids-vitamin e 1,000 mg cap Take 1 Cap by mouth two (2) times a day.  calcium-cholecalciferol, d3, 600-125 mg-unit tab Take 1,200 mg by mouth daily. No current facility-administered medications for this visit. PHYSICAL EXAMINATION:  Visit Vitals    /77    Pulse 61    Temp 98.5 °F (36.9 °C) (Oral)    SpO2 98%     PHYSICAL EXAM:  Visit Vitals    /77    Pulse 61    Temp 98.5 °F (36.9 °C) (Oral)    SpO2 98%       Appearance: Alert, well appearing and pleasant patient who is in no distress, oriented to person, place/time, and who follows commands. HEENT: Jostin Graf hears well, does not require hearing aids. Her sclera of the eyes are non-icteric. She is breathing normally and no respiratory accessory muscle use is noted. No JVD present and Neck ROM within normal limits. Psychiatric: Affect and mood are appropriate.  Oriented x3  Heart[de-identified]  S1, S2 without murmer, regular rhythm  Lungs:  Breath sounds clear to auscultation  Cardiovascular/Peripheral Vascular: Normal pulses to each foot. Integumentary: No rashes,  wounds, or abrasions. Warm and normal color. No drainage. Gait: + limp  Sensory Exam: Intact/Normal Sensation    Lymphatic: No evidence of Lymphedema  Vascular:       Pulses: palpable  Varicosities none  Wounds/Abrasion: None Present  Neuro: Negative, no tremors  ORTHO EXAMINATION:  Examination Right knee Left knee   Skin Intact Intact   Range of motion 115-0 115-0   Effusion 1+ 1+   Medial joint line tenderness + +   Lateral joint line tenderness - -   Popliteal tenderness - -   Osteophytes palpable + +   Fredericks - -   Patella crepitus + +   Anterior drawer - -   Lateral laxity - -   Medial laxity - -   Varus deformity - -   Valgus deformity - +   Pretibial edema + +   Calf tenderness - -     Examination Lumbar Thoracic   Skin Intact Intact   Tenderness + -   Tightness - -   Lordosis Normal N/A   Kyphosis N/A Increased   Scoliosis - -   Flexion Fingertips to mid shin N/A   Extension 10 N/A   Knee reflexes Normal N/A   Ankle reflexes Normal N/A   Straight leg raise - N/A   Calf tenderness - N/A     Examination Right Wrist Left Wrist   Skin Intact Intact   Tenderness +, basal joint +, basal joint   Flexion 60 60   Extension 60 60   Deformity - -   Effusion - -   Finger flexion Full Full   Finger extension Full Full   Tinnel's sign - -   Phalen's test - -   Finklestein maneuver - -   Pain with thumb abduction - -   Capillary refill - -     MRI RIGHT KNEE WO CONT 1/16/18  IMPRESSION:  1. Severe tricompartmental degenerative joint disease with no fracture or significant joint effusion. 2. Maceration of the menisci. 3. No significant joint effusion. Nonspecific edema in the gastrocnemius, incompletely imaged.     RADIOGRAPHS:  XR RIGHT KNEE 1/12/18   -I have independently reviewed these images during this office visit. -Dr. Chris Edwards:  Three views - No fractures, no effusion, severe joint space narrowing R>L, + osteophytes present. Severe CMP. Mild Valgus deformity L,     XR WILLEM KNEES 5/16/16  IMPRESSION:  Grade 3 osteoarthritis of both knees. IMPRESSION:      ICD-10-CM ICD-9-CM    1. Primary osteoarthritis of right knee M17.11 715.16    2. Primary osteoarthritis of both knees M17.0 715.16    3. Chronic pain of both knees M25.561 719.46     M25.562 338.29     G89.29     4. BMI 35.0-35.9,adult Z68.35 V85.35      PLAN:  She will be scheduled for a right TKR. Risks of surgery outlined and informed consent obtained.     Scribed by Antoine Duke ([address]) as dictated by John Aguilar MD

## 2018-02-22 NOTE — PATIENT INSTRUCTIONS
Total Knee Replacement: Before Your Surgery  What is a total knee replacement? A total knee replacement replaces the worn ends of the bones where they meet at the knee. Those bones are the thighbone (femur) and the lower leg bone (tibia). Your doctor will remove the damaged bone. Then he or she will replace it with plastic and metal parts. These new parts may be attached to your bones with cement. Your doctor will make a cut down the center of your knee. This cut is called an incision. It will be about 8 to 10 inches long. Sometimes the surgery can be done with a smaller incision that is 4 to 6 inches. Both kinds of incisions leave scars that usually fade with time. You probably will be able to go home about 3 to 5 days after surgery. If you have both knees done at the same time, you may need to be in the hospital for 1 week. If there is no one to help you at home, you may go to a rehab center. Most people go back to normal activities or work in 4 to 16 weeks. This depends on your health. It also depends on how well your knee does in your rehab program. This may take longer if you have both knees done at the same time. Follow-up care is a key part of your treatment and safety. Be sure to make and go to all appointments, and call your doctor if you are having problems. It's also a good idea to know your test results and keep a list of the medicines you take. What happens before surgery? ?Surgery can be stressful. This information will help you understand what you can expect. And it will help you safely prepare for surgery. ? Preparing for surgery  ? · Understand exactly what surgery is planned, along with the risks, benefits, and other options. · Tell your doctors ALL the medicines, vitamins, supplements, and herbal remedies you take. Some of these can increase the risk of bleeding or interact with anesthesia. ?  · If you take blood thinners, such as warfarin (Coumadin), clopidogrel (Plavix), or aspirin, be sure to talk to your doctor. He or she will tell you if you should stop taking these medicines before your surgery. Make sure that you understand exactly what your doctor wants you to do.   ? · Your doctor will tell you which medicines to take or stop before your surgery. You may need to stop taking certain medicines a week or more before surgery. So talk to your doctor as soon as you can.   ? · If you have an advance directive, let your doctor know. It may include a living will and a durable power of  for health care. Bring a copy to the hospital. If you don't have one, you may want to prepare one. It lets your doctor and loved ones know your health care wishes. Doctors advise that everyone prepare these papers before any type of surgery or procedure. ? · You may need to shower or bathe with a special soap the night before and the morning of your surgery. The soap contains chlorhexidine. It reduces the amount of bacteria on your skin that could cause an infection after surgery. What happens on the day of surgery? · Follow the instructions exactly about when to stop eating and drinking. If you don't, your surgery may be canceled. If your doctor told you to take your medicines on the day of surgery, take them with only a sip of water. ? · Take a bath or shower before you come in for your surgery. Do not apply lotions, perfumes, deodorants, or nail polish. ? · Do not shave the surgical site yourself. ? · Take off all jewelry and piercings. And take out contact lenses, if you wear them. ? At the hospital or surgery center   · Bring a picture ID. ? · The area for surgery is often marked to make sure there are no errors. ? · You will be kept comfortable and safe by your anesthesia provider. The anesthesia may make you sleep. Or it may just numb the area being worked on.   ? · You also will get antibiotics through the IV tube before surgery.  This lowers the risk of an infection of the incision. ? · The surgery will take about 2 to 3 hours. Going home   · Be sure you have someone to drive you home. Anesthesia and pain medicine make it unsafe for you to drive. ? · You will be given more specific instructions about recovering from your surgery. They will cover things like diet, wound care, follow-up care, driving, and getting back to your normal routine. When should you call your doctor? · You have questions or concerns. ? · You don't understand how to prepare for your surgery. ? · You become ill before the surgery (such as fever, flu, or a cold). ? · You need to reschedule or have changed your mind about having the surgery. Where can you learn more? Go to http://stanton-gonsalo.info/. Enter O416 in the search box to learn more about \"Total Knee Replacement: Before Your Surgery. \"  Current as of: March 21, 2017  Content Version: 11.4  © 5626-1437 [employer], [employer]. Care instructions adapted under license by [employer] (which disclaims liability or warranty for this information). If you have questions about a medical condition or this instruction, always ask your healthcare professional. [place] [address] any warranty or liability for your use of this information.

## 2018-02-26 ENCOUNTER — DOCUMENTATION ONLY (OUTPATIENT)
Dept: ORTHOPEDIC SURGERY | Facility: CLINIC | Age: 63
End: 2018-02-26

## 2018-02-26 NOTE — PROGRESS NOTES
Patient states that she faxed over disability form on 02/26/18 to [phone]  for Dr. Maria Eugenia Galvez to fill out. Patient states that she forgot to add on the cover that she only has 30 days to get this done or her claim will be closed.

## 2018-02-26 NOTE — PROGRESS NOTES
[facility] Attending Provider Statement received via fax and put in forms bin @ . Fax back to [phone]. Pt also wants a copy for herself faxed to [phone].

## 2018-02-27 RX ORDER — METOPROLOL TARTRATE 50 MG/1
50 TABLET ORAL 2 TIMES DAILY
Qty: 180 TAB | Refills: 3 | Status: SHIPPED | OUTPATIENT
Start: 2018-02-27 | End: 2019-02-22 | Stop reason: SDUPTHER

## 2018-02-28 ENCOUNTER — CLINICAL SUPPORT (OUTPATIENT)
Dept: FAMILY MEDICINE CLINIC | Age: 63
End: 2018-02-28

## 2018-02-28 DIAGNOSIS — Z01.812 BLOOD TESTS PRIOR TO TREATMENT OR PROCEDURE: ICD-10-CM

## 2018-02-28 DIAGNOSIS — Z01.810 PRE-OPERATIVE CARDIOVASCULAR EXAMINATION: ICD-10-CM

## 2018-02-28 DIAGNOSIS — M17.11 PRIMARY OSTEOARTHRITIS OF RIGHT KNEE: Primary | ICD-10-CM

## 2018-02-28 DIAGNOSIS — Z01.811 PRE-OPERATIVE RESPIRATORY EXAMINATION: ICD-10-CM

## 2018-02-28 DIAGNOSIS — E66.9 OBESITY (BMI 30-39.9): Primary | ICD-10-CM

## 2018-03-01 ENCOUNTER — OFFICE VISIT (OUTPATIENT)
Dept: CARDIOLOGY CLINIC | Age: 63
End: 2018-03-01

## 2018-03-01 VITALS
HEART RATE: 76 BPM | BODY MASS INDEX: 34.85 KG/M2 | SYSTOLIC BLOOD PRESSURE: 144 MMHG | HEIGHT: 65 IN | DIASTOLIC BLOOD PRESSURE: 85 MMHG | WEIGHT: 209.2 LBS

## 2018-03-01 VITALS
HEIGHT: 65 IN | DIASTOLIC BLOOD PRESSURE: 90 MMHG | HEART RATE: 53 BPM | SYSTOLIC BLOOD PRESSURE: 130 MMHG | BODY MASS INDEX: 34.82 KG/M2 | WEIGHT: 209 LBS | OXYGEN SATURATION: 98 %

## 2018-03-01 DIAGNOSIS — R00.1 BRADYCARDIA BY ELECTROCARDIOGRAM: ICD-10-CM

## 2018-03-01 DIAGNOSIS — I48.0 PAROXYSMAL ATRIAL FIBRILLATION (HCC): Primary | ICD-10-CM

## 2018-03-01 DIAGNOSIS — I10 ESSENTIAL HYPERTENSION: ICD-10-CM

## 2018-03-01 DIAGNOSIS — Z01.810 PRE-OPERATIVE CARDIOVASCULAR EXAMINATION: ICD-10-CM

## 2018-03-01 NOTE — PROGRESS NOTES
1. Have you been to the ER, urgent care clinic since your last visit? Hospitalized since your last visit? 2. Have you seen or consulted any other health care providers outside of the [address] [place] [address] since your last visit? Include any pap smears or colon screening.  no

## 2018-03-01 NOTE — MR AVS SNAPSHOT
[address] [place] [address] [address] [place] [address] 
[phone] Patient: Sophia Oneill MRN: [id number] Visit Information Date & Time Provider Department Dept. Phone Encounter #  
 3/1/2018  9:40 AM Rocio Fernandes MD [facility] [id number] Your Appointments 3/14/2018 10:05 AM  
LAB with IOC NURSE VISIT [facility] [address] [place], [place] [address]Mohini Rogers) Appt Note: lab  
 [address], [address] [place] [address] [place]  
  
   
 [address], [address]  
  
    
 3/21/2018  6:74 AM  
METABOLIC PROGRAM 15 with Alvarez Carroll NP [facility] [address] [place], [place] [address]Mohini [facility]) Appt Note: mwl  
 [address], [address] [place] [address] [place]  
  
   
 [address], [address] 5/15/2018  9:00 AM  
Office Visit with Vishnu Holly MD  
[facility] [address] [place] [address] Mohini [facility]) Appt Note: pre op - Dr. Leno Walker 5/22/18 right total knee [address], [address] [place] [address] [place]  
  
   
 [address], [address]  
  
    
 5/16/2018  8:30 AM  
HISTORY AND PHYSICAL with BHAVANI [facility]) Appt Note: MV INPT SX 5-22-18 RT TOTAL KNEE ARTHROPLASTY/DMM; dmm  
 [address], [address] [place] [address]  
[phone]  
  
   
 [address] [place], [address] [address]  
  
    
 6/1/2018  8:30 AM  
POST OP with BHAVANI [facility]) Appt Note: S/P MV INPT SX 5-22-18 RT TKA/DMM  
 [address] [place] [address], [address] [place] [address]  
[phone]  
  
   
 [address] [place], [address] [address]  
  
    
 8/1/2018  9:30 AM  
Office Visit with Vishnu Holly MD  
 [facility] ([address]) Appt Note: 6 mos f/u  
 [address] [facility] [address], [address] [facility]  
[phone] Upcoming Health Maintenance Date Due ZOSTER VACCINE AGE 60> 3/5/2016 COLONOSCOPY 5/28/2018 BREAST CANCER SCRN MAMMOGRAM 7/14/2018 PAP AKA CERVICAL CYTOLOGY 7/28/2019 DTaP/Tdap/Td series (2 - Td) 2/7/2027 Allergies as of 3/1/2018  Review Complete On: 3/1/2018 By: René Zavala No Known Allergies Current Immunizations  Reviewed on 2/7/2017 Name Date Influenza Vaccine 10/12/2017 Influenza Vaccine (Quad) PF 10/10/2016 Tdap 2/7/2017 Not reviewed this visit You Were Diagnosed With   
  
 Codes Comments Atrial fibrillation, unspecified type ([facility]    -  Primary ICD-10-CM: I48.91 
ICD-9-CM: 427.31 Vitals BP Pulse Height(growth percentile) Weight(growth percentile) SpO2 BMI  
 130/90 (!) 53 5' 5\" (1.651 m) 209 lb (94.8 kg) 98% 34.78 kg/m2 OB Status Smoking Status Hysterectomy Never Smoker Vitals History BMI and BSA Data Body Mass Index Body Surface Area 34.78 kg/m 2 2.09 m 2 Preferred Pharmacy Pharmacy Name Phone [facility] 16 [phone] [facility] [phone] Your Updated Medication List  
  
   
This list is accurate as of 3/1/18 10:36 AM.  Always use your most recent med list.  
  
  
  
  
 aspirin 81 mg chewable tablet Take 1 Tab by mouth daily. calcium-cholecalciferol (d3) 600-125 mg-unit Tab Take 1,200 mg by mouth daily. meloxicam 15 mg tablet Commonly known as:  MOBIC  
TAKE 1 TABLET BY MOUTH DAILY  
  
 metoprolol tartrate 50 mg tablet Commonly known as:  LOPRESSOR Take 1 Tab by mouth two (2) times a day. omega-3 fatty acids-vitamin e 1,000 mg Cap Take 1 Cap by mouth two (2) times a day. potassium 99 mg tablet Take 99 mg by mouth daily as needed. simvastatin 20 mg tablet Commonly known as:  ZOCOR Take 1 Tab by mouth nightly. TYLENOL ARTHRITIS PAIN 650 mg Jerrlyn Muss Generic drug:  acetaminophen Take 650 mg by mouth every six (6) hours as needed for Pain. VITAMIN D3 2,000 unit Tab Generic drug:  cholecalciferol (vitamin D3) Take 2,000 Units by mouth daily. We Performed the Following AMB POC EKG ROUTINE W/ 12 LEADS, INTER & REP [99976 CPT(R)] Introducing [facility] SERVICES! Dear Deandra Tenorio: 
Thank you for requesting a [employer] account. Our records indicate that you already have an active lucierna account. You can access your account anytime at [email or web address] Did you know that you can access your hospital and ER discharge instructions at any time in [employer]na? You can also review all of your test results from your hospital stay or ER visit. Additional Information If you have questions, please visit the Frequently Asked Questions section of the [employer]na website at [email or web address] Remember, [employer]na is NOT to be used for urgent needs. For medical emergencies, dial 911. Now available from your iPhone and Android! Please provide this summary of care documentation to your next provider. Your primary care clinician is listed as Wilton Cushing. If you have any questions after today's visit, please call [phone].

## 2018-03-01 NOTE — PROGRESS NOTES
Progress Note: Weekly Education Class in the [facility] Weight Loss Program   Is there anything that you or the patient needs to let the [facility] Physician know about? no  Over the past week, have you experienced any side-effects? no    Sophia Oneill is a 65 y.o. female who is enrolled in [facility] Weight Loss Program    Visit Vitals    /85 (BP 1 Location: Right arm, BP Patient Position: Sitting)    Pulse 76    Ht 5' 5\" (1.651 m)    Wt 209 lb 3.2 oz (94.9 kg)    BMI 34.81 kg/m2     Weight Metrics 3/1/2018 3/1/2018 2/28/2018 2/1/2018 1/30/2018 1/29/2018 1/26/2018   Weight - 209 lb 209 lb 3.2 oz 213 lb 211 lb 211 lb 212 lb   Waist Measure Inches 41 - - - - - -   Exercise Mins/week - - - - - - -   Body Fat % - - - - - - -   BMI - 34.78 kg/m2 34.81 kg/m2 35.45 kg/m2 35.11 kg/m2 35.11 kg/m2 35.28 kg/m2         Have you received any other medical care this week? no  If yes, where and for what? Have you had any change in your medications since your last visit? no  If yes what? Did you have any problems adhering to the program last week? no  If yes, please explain:       Eating Habits Over Last Week:  Did you take in 64 oz of non-caloric fluids? yes     Did you consume your 4 meal replacements each day?  yes       Physical Activity Over the Past Week:    Aerobic exercise: 0 min  Resistance exercise: 0 workouts / week

## 2018-03-01 NOTE — PROGRESS NOTES
HISTORY OF PRESENT ILLNESS  Jess Davila is a 65 y.o. female. HPI    Patient presents for a follow-up office visit. She does have a history of hypertension and obesity who recently started a nonsurgical weight loss program and lost approximately 60 pounds over the past 4 months. She has been treated for what sounds like dependent edema in the past with Lasix which she continues to take. Patient was diagnosed with atrial fibrillation with a rapid ventricular response by her PCP in January 2018. She was started on metoprolol for rate control. I started her on Xarelto for anticoagulation at last visit. She was then scheduled for a IGLESIA and cardioversion, however, when she showed up for her procedure, she had already converted back to sinus rhythm. Her anticoagulation was stopped at that time. She underwent an echocardiogram which showed low normal LV systolic function, EF 95% with no valvular heart disease and a normal left atrial size. She has remained on metoprolol for rate control. She was last seen by me about a month ago. Since that time she has been feeling well. No recurrent palpitations or malaise/fatigue which was her previous complaint while in atrial fibrillation. Past Medical History:   Diagnosis Date    Arthritis     Bilateral Knee, and Bilateral Hand/Thumb    Atrial fibrillation ([address] 1/29/2018    Carpal tunnel syndrome     H/O echocardiogram 01/30/2018    EF 50%, normal left atrial size, no valvular heart disease    Headache     History of ankle fusion 1997    left    Hx of migraine headaches     Hypercholesterolemia     Hypertension     Morbid obesity ([address]     HERZOG (nonalcoholic steatohepatitis) suggested by ultrasound report, 2016 8/11/2017    Vertigo      Current Outpatient Prescriptions   Medication Sig Dispense Refill    metoprolol tartrate (LOPRESSOR) 50 mg tablet Take 1 Tab by mouth two (2) times a day.  180 Tab 3    aspirin 81 mg chewable tablet Take 1 Tab by mouth daily. 30 Tab 0    cholecalciferol, vitamin D3, (VITAMIN D3) 2,000 unit tab Take 2,000 Units by mouth daily.  potassium 99 mg tablet Take 99 mg by mouth daily as needed.  acetaminophen (TYLENOL ARTHRITIS PAIN) 650 mg CR tablet Take 650 mg by mouth every six (6) hours as needed for Pain.  meloxicam (MOBIC) 15 mg tablet TAKE 1 TABLET BY MOUTH DAILY 90 Tab 3    simvastatin (ZOCOR) 20 mg tablet Take 1 Tab by mouth nightly. 90 Tab 3    omega-3 fatty acids-vitamin e 1,000 mg cap Take 1 Cap by mouth two (2) times a day.  calcium-cholecalciferol, d3, 600-125 mg-unit tab Take 1,200 mg by mouth daily. No Known Allergies     Social History   Substance Use Topics    Smoking status: Never Smoker    Smokeless tobacco: Never Used    Alcohol use No           Review of Systems   Constitutional: Negative for chills, fever and weight loss. HENT: Negative for nosebleeds. Eyes: Negative for blurred vision and double vision. Respiratory: Negative for cough, shortness of breath and wheezing. Cardiovascular: Negative for chest pain, palpitations, orthopnea, claudication, leg swelling and PND. Gastrointestinal: Negative for abdominal pain, heartburn, nausea and vomiting. Genitourinary: Negative for dysuria and hematuria. Musculoskeletal: Negative for falls and myalgias. Skin: Negative for rash. Neurological: Negative for dizziness, focal weakness and headaches. Endo/Heme/Allergies: Does not bruise/bleed easily. Psychiatric/Behavioral: Negative for substance abuse. Visit Vitals    /90    Pulse (!) 53    Ht 5' 5\" (1.651 m)    Wt 94.8 kg (209 lb)    SpO2 98%    BMI 34.78 kg/m2       Physical Exam   Constitutional: She is oriented to person, place, and time. She appears well-developed and well-nourished. HENT:   Head: Normocephalic and atraumatic. Eyes: Conjunctivae are normal.   Neck: Neck supple. No JVD present. Carotid bruit is not present.    Cardiovascular: Regular rhythm, S1 normal, S2 normal and normal pulses. Bradycardia present. Exam reveals no gallop and no distant heart sounds. No murmur heard. Pulmonary/Chest: Effort normal and breath sounds normal. She has no wheezes. She has no rales. Abdominal: Soft. Bowel sounds are normal. There is no tenderness. Musculoskeletal: She exhibits no edema, tenderness or deformity. Neurological: She is alert and oriented to person, place, and time. Skin: Skin is warm and dry. Psychiatric: She has a normal mood and affect. Her behavior is normal. Thought content normal.     EKG: Sinus bradycardia, normal axis, borderline low voltage, no ST or T-wave abnormalities. Otherwise normal EKG. Compared to the previous EKG from January 30, 2018, no significant change. ASSESSMENT and PLAN    Paroxysmal atrial fibrillation. Patient converted on her own prior to her scheduled cardioversion. She was briefly on Xarelto for anticoagulation, but that was since discontinued since she had a structurally normal heart on echocardiogram.  She remains on aspirin for CVA prophylaxis. She remains on metoprolol 50 mg twice daily which I would continue. Essential hypertension. Patient blood pressure is normal on her current dose of metoprolol, which I would continue. Rapid weight loss. Patient lost 60 pounds in weight over the past 5 months using a nonsurgical weight loss program which includes dietary shakes. I recommended that she stop this until her cardiac issues are sorted out. Low risk from a cardiac standpoint to proceed with orthopedic knee replacement when scheduled in May of this year. The patient can stop the aspirin 7 days prior to the procedure. I would recommend that she continue her metoprolol dosage perioperatively. Follow-up in 3 months following her knee replacement, sooner if needed.

## 2018-03-01 NOTE — LETTER
3/1/2018 10:35 AM 
 
Ms. Jim Jalloh [address] [place] [address]  
[address] Jim Jalloh was seen in our office on 3/1/2018 for cardiac evaluation. From a cardiac standpoint she is at low risk for knee surgery with Dr Nereyda Conti. Patient can stop aspirin 7 days prior to surgery, but should remain on her metoprolol perioperatively. Please feel free to contact our office if you have any questions regarding this patient. Sincerely, Larry Mckeon MD

## 2018-03-07 ENCOUNTER — CLINICAL SUPPORT (OUTPATIENT)
Dept: FAMILY MEDICINE CLINIC | Age: 63
End: 2018-03-07

## 2018-03-07 DIAGNOSIS — E66.9 OBESITY (BMI 30-39.9): Primary | ICD-10-CM

## 2018-03-08 VITALS
WEIGHT: 207.6 LBS | HEART RATE: 64 BPM | DIASTOLIC BLOOD PRESSURE: 85 MMHG | BODY MASS INDEX: 34.59 KG/M2 | SYSTOLIC BLOOD PRESSURE: 137 MMHG | HEIGHT: 65 IN

## 2018-03-14 ENCOUNTER — CLINICAL SUPPORT (OUTPATIENT)
Dept: FAMILY MEDICINE CLINIC | Age: 63
End: 2018-03-14

## 2018-03-14 ENCOUNTER — HOSPITAL ENCOUNTER (OUTPATIENT)
Dept: LAB | Age: 63
Discharge: HOME | End: 2018-03-14
Payer: COMMERCIAL

## 2018-03-14 ENCOUNTER — LAB ONLY (OUTPATIENT)
Dept: INTERNAL MEDICINE CLINIC | Age: 63
End: 2018-03-14

## 2018-03-14 VITALS
BODY MASS INDEX: 34.42 KG/M2 | SYSTOLIC BLOOD PRESSURE: 136 MMHG | DIASTOLIC BLOOD PRESSURE: 87 MMHG | HEIGHT: 65 IN | HEART RATE: 70 BPM | WEIGHT: 206.6 LBS

## 2018-03-14 DIAGNOSIS — E66.9 OBESITY (BMI 30-39.9): ICD-10-CM

## 2018-03-14 DIAGNOSIS — E66.9 OBESITY (BMI 30-39.9): Primary | ICD-10-CM

## 2018-03-14 LAB
ALBUMIN SERPL-MCNC: 3.8 G/DL (ref 3.4–5)
ALBUMIN/GLOB SERPL: 1.4 {RATIO} (ref 0.8–1.7)
ALP SERPL-CCNC: 56 U/L (ref 45–117)
ALT SERPL-CCNC: 38 U/L (ref 13–56)
ANION GAP SERPL CALC-SCNC: 4 MMOL/L (ref 3–18)
AST SERPL-CCNC: 19 U/L (ref 15–37)
BILIRUB SERPL-MCNC: 0.5 MG/DL (ref 0.2–1)
BUN SERPL-MCNC: 17 MG/DL (ref 7–18)
BUN/CREAT SERPL: 37 (ref 12–20)
CALCIUM SERPL-MCNC: 9.2 MG/DL (ref 8.5–10.1)
CHLORIDE SERPL-SCNC: 108 MMOL/L (ref 100–108)
CO2 SERPL-SCNC: 29 MMOL/L (ref 21–32)
CREAT SERPL-MCNC: 0.46 MG/DL (ref 0.6–1.3)
GLOBULIN SER CALC-MCNC: 2.7 G/DL (ref 2–4)
GLUCOSE SERPL-MCNC: 98 MG/DL (ref 74–99)
POTASSIUM SERPL-SCNC: 4.2 MMOL/L (ref 3.5–5.5)
PROT SERPL-MCNC: 6.5 G/DL (ref 6.4–8.2)
SODIUM SERPL-SCNC: 141 MMOL/L (ref 136–145)
URATE SERPL-MCNC: 4 MG/DL (ref 2.6–7.2)

## 2018-03-14 PROCEDURE — 80053 COMPREHEN METABOLIC PANEL: CPT | Performed by: NURSE PRACTITIONER

## 2018-03-14 PROCEDURE — 36415 COLL VENOUS BLD VENIPUNCTURE: CPT | Performed by: NURSE PRACTITIONER

## 2018-03-14 PROCEDURE — 84550 ASSAY OF BLOOD/URIC ACID: CPT | Performed by: NURSE PRACTITIONER

## 2018-03-14 NOTE — PROGRESS NOTES
Progress Note: Weekly Education Class in the [facility] Weight Loss Program   Is there anything that you or the patient needs to let the [facility] Physician know about? no  Over the past week, have you experienced any side-effects? no    Junaid Donovan is a 65 y.o. female who is enrolled in [facility] Loss Program    Visit Vitals    /87 (BP 1 Location: Right arm, BP Patient Position: Sitting)    Pulse 70    Ht 5' 5\" (1.651 m)    Wt 206 lb 9.6 oz (93.7 kg)    BMI 34.38 kg/m2     Weight Metrics 3/14/2018 3/14/2018 3/8/2018 3/7/2018 3/1/2018 3/1/2018 2/28/2018   Weight - 206 lb 9.6 oz - 207 lb 9.6 oz - 209 lb 209 lb 3.2 oz   Waist Measure Inches 41 - 41 - 41 - -   Exercise Mins/week - - - - - - -   Body Fat % - - - - - - -   BMI - 34.38 kg/m2 - 34.55 kg/m2 - 34.78 kg/m2 34.81 kg/m2         Have you received any other medical care this week? no  If yes, where and for what? Have you had any change in your medications since your last visit? no  If yes what? Did you have any problems adhering to the program last week? no  If yes, please explain:       Eating Habits Over Last Week:  Did you take in 64 oz of non-caloric fluids? yes     Did you consume your 4 meal replacements each day?  yes       Physical Activity Over the Past Week:    Aerobic exercise: 0 min  Resistance exercise: 0 workouts / week

## 2018-03-21 ENCOUNTER — OFFICE VISIT (OUTPATIENT)
Dept: INTERNAL MEDICINE CLINIC | Age: 63
End: 2018-03-21

## 2018-03-21 VITALS
DIASTOLIC BLOOD PRESSURE: 75 MMHG | BODY MASS INDEX: 34.26 KG/M2 | HEIGHT: 65 IN | HEART RATE: 55 BPM | WEIGHT: 205.6 LBS | OXYGEN SATURATION: 98 % | RESPIRATION RATE: 16 BRPM | SYSTOLIC BLOOD PRESSURE: 115 MMHG | TEMPERATURE: 98.6 F

## 2018-03-21 DIAGNOSIS — E66.9 OBESITY (BMI 30-39.9): Primary | ICD-10-CM

## 2018-03-21 DIAGNOSIS — E78.2 MIXED HYPERLIPIDEMIA: ICD-10-CM

## 2018-03-21 DIAGNOSIS — I10 ESSENTIAL HYPERTENSION: ICD-10-CM

## 2018-03-21 DIAGNOSIS — M17.9 OSTEOARTHRITIS OF KNEE, UNSPECIFIED LATERALITY, UNSPECIFIED OSTEOARTHRITIS TYPE: ICD-10-CM

## 2018-03-21 DIAGNOSIS — K75.81 NASH (NONALCOHOLIC STEATOHEPATITIS): ICD-10-CM

## 2018-03-21 RX ORDER — METOPROLOL TARTRATE 100 MG/1
TABLET ORAL
Refills: 0 | COMMUNITY
Start: 2018-01-26 | End: 2018-03-21 | Stop reason: DRUGHIGH

## 2018-03-21 RX ORDER — POLYETHYLENE GLYCOL 3350, SODIUM CHLORIDE, SODIUM BICARBONATE, POTASSIUM CHLORIDE 420; 11.2; 5.72; 1.48 G/4L; G/4L; G/4L; G/4L
POWDER, FOR SOLUTION ORAL
Refills: 0 | COMMUNITY
Start: 2018-03-15 | End: 2018-06-07 | Stop reason: ALTCHOICE

## 2018-03-21 RX ORDER — FUROSEMIDE 40 MG/1
TABLET ORAL
Refills: 2 | COMMUNITY
Start: 2018-01-12 | End: 2018-03-21 | Stop reason: ALTCHOICE

## 2018-03-21 RX ORDER — HYDROCODONE BITARTRATE AND ACETAMINOPHEN 7.5; 325 MG/1; MG/1
TABLET ORAL
Refills: 0 | COMMUNITY
Start: 2018-01-12 | End: 2018-03-21 | Stop reason: ALTCHOICE

## 2018-03-21 NOTE — PROGRESS NOTES
[facility] Progress Note:   F/up Physician Visit    CC: Obesity      Valeriy Rubio is a 65 y.o. female who is here for her  f/up physician visit for the VLCD Program. Jaiden Mooney has completed 19 weeks of the program to date. 19 lbs weight loss! Weight History  Starting  weight 270.1 lb Body mass index is 43.6 kg/(m^2). Current weight 205 lb  Goal weight 165 lb  Highest weight 277 lb, at 49 years old    Last EKG done with cardiology 3/1/2018 wt 209 lbs, SR 53, QTc 460    Weight Metrics 3/21/2018 3/21/2018 3/14/2018 3/14/2018 3/8/2018 3/7/2018 3/1/2018   Weight - 205 lb 9.6 oz - 206 lb 9.6 oz - 207 lb 9.6 oz -   Waist Measure Inches 40 - 41 - 41 - 41   Exercise Mins/week 5 - - - - - -   Body Fat % 41.5 - - - - - -   BMI - 34.21 kg/m2 - 34.38 kg/m2 - 34.55 kg/m2 -         Current Outpatient Prescriptions   Medication Sig Dispense Refill    metoprolol tartrate (LOPRESSOR) 50 mg tablet Take 1 Tab by mouth two (2) times a day. 180 Tab 3    aspirin 81 mg chewable tablet Take 1 Tab by mouth daily. 30 Tab 0    cholecalciferol, vitamin D3, (VITAMIN D3) 2,000 unit tab Take 2,000 Units by mouth daily.  potassium 99 mg tablet Take 99 mg by mouth daily as needed.  acetaminophen (TYLENOL ARTHRITIS PAIN) 650 mg CR tablet Take 650 mg by mouth every six (6) hours as needed for Pain.  meloxicam (MOBIC) 15 mg tablet TAKE 1 TABLET BY MOUTH DAILY 90 Tab 3    simvastatin (ZOCOR) 20 mg tablet Take 1 Tab by mouth nightly. 90 Tab 3    omega-3 fatty acids-vitamin e 1,000 mg cap Take 1 Cap by mouth two (2) times a day.  calcium-cholecalciferol, d3, 600-125 mg-unit tab Take 1,200 mg by mouth daily.  peg-electrolyte soln (NULYTELY) 420 gram solution MIX WITH ANY CLEAR LIQUID. TAKE 2 LITERS AT 6 PM ON THE DAY BEFORE THE COLONOSCOPY.  0         Participation   Did you attend clinic and class last week? yes    Review of Systems  Since your last visit, have you experienced any complications? no  If yes, please list:     No CP, SOB, palpitations, lightheadedness, dizziness, constipation. Positives highlight in BOLD. Are you taking an appetite suppressant? no  If so, is there any Chest Pain, Palpitations or Dizziness? BP Readings from Last 10 Encounters:   03/21/18 115/75   03/14/18 136/87   03/08/18 137/85   03/01/18 130/90   03/01/18 144/85   02/22/18 135/77   02/01/18 129/79   01/29/18 118/80   01/26/18 124/88   01/26/18 111/78         Have you received any other medical care this week? no  If yes, where and for what? Have you discontinued or changed any medicine or dose of your medicine since your last visit? no  If yes, where and for what? Diet  How many ounces of calorie-free liquids did you consume each day?  108 oz    How many meal replacements did you take each day? 4    Did you have any problems adhering to the program?  no If yes, please explain:       Exercise  Aerobic exercise: 5 min  Resistance exercise: 0 workouts / week  Any discomfort? yes     If yes, where? Due to Bilateral Knee Pain       Review of Systems  Complete ROS negative except where noted above    Objective  Visit Vitals    /75 (BP 1 Location: Left arm, BP Patient Position: Sitting)    Pulse (!) 55    Temp 98.6 °F (37 °C) (Oral)    Resp 16    Ht 5' 5\" (1.651 m)    Wt 205 lb 9.6 oz (93.3 kg)    SpO2 98%    BMI 34.21 kg/m2     No LMP recorded. Patient has had a hysterectomy.     PHQ over the last two weeks 1/3/2018   Little interest or pleasure in doing things Not at all   Feeling down, depressed or hopeless Not at all   Total Score PHQ 2 0         Waist Circumference: I personally reviewed patient's Weight Management Doc Flowsheet  Neck Circumference: I personally reviewed patient's Weight Management Doc Flowsheet  Percent Body Fat: I personally reviewed patient's Weight Management Doc Flowsheet    Physical Exam  Appearance: well appearing, obese, A&O, NAD  HEENT:  NC/AT, PERRL, No scleral icterus  Heart:  RRR without M/R/G  Lungs:  CTAB, no rhonchi, rales, or wheezes with good air exchange   Ext:  No LE Edema    Assessment / Plan    Encounter Diagnoses   Name Primary?  Obesity (BMI 30-39. 9) Yes    Osteoarthritis of knee, unspecified laterality, unspecified osteoarthritis type     Mixed hyperlipidemia     HERZOG (nonalcoholic steatohepatitis) suggested by ultrasound report, 2016     Essential hypertension        1. Weight management well controlled   Progress was reviewed with patient    2. Labs    Latest results reviewed with patient   Lab slip given to pt for f/up HDL labs    3. Diet regimen   # of meal replacements prescribed: 4   If modified LCD-nutritional guidelines:    Monthly Goal   As below    Medical monitoring schedule:   Weekly BP/Weight checks   Monthly provider appointments  I have reviewed/discussed the above normal BMI with the patient. I have recommended the following interventions: dietary management education, guidance, and counseling, monitor weight and prescribed dietary intake . Avis Munoz Ms. Jamie Watters has a reminder for a \"due or due soon\" health maintenance. I have asked that she contact her primary care provider for follow-up on this health maintenance. Patient Instructions     Health Maintenance Due   Topic Date Due    ZOSTER VACCINE AGE 60>  03/05/2016    COLONOSCOPY  05/28/2018    BREAST CANCER SCRN MAMMOGRAM  07/14/2018     Monthly goal:    5-10 lbs weight loss         Body Mass Index: Care Instructions  Your Care Instructions    Body mass index (BMI) can help you see if your weight is raising your risk for health problems. It uses a formula to compare how much you weigh with how tall you are. · A BMI lower than 18.5 is considered underweight. · A BMI between 18.5 and 24.9 is considered healthy. · A BMI between 25 and 29.9 is considered overweight. A BMI of 30 or higher is considered obese.   If your BMI is in the normal range, it means that you have a lower risk for weight-related health problems. If your BMI is in the overweight or obese range, you may be at increased risk for weight-related health problems, such as high blood pressure, heart disease, stroke, arthritis or joint pain, and diabetes. If your BMI is in the underweight range, you may be at increased risk for health problems such as fatigue, lower protection (immunity) against illness, muscle loss, bone loss, hair loss, and hormone problems. BMI is just one measure of your risk for weight-related health problems. You may be at higher risk for health problems if you are not active, you eat an unhealthy diet, or you drink too much alcohol or use tobacco products. Follow-up care is a key part of your treatment and safety. Be sure to make and go to all appointments, and call your doctor if you are having problems. It's also a good idea to know your test results and keep a list of the medicines you take. How can you care for yourself at home? · Practice healthy eating habits. This includes eating plenty of fruits, vegetables, whole grains, lean protein, and low-fat dairy. · If your doctor recommends it, get more exercise. Walking is a good choice. Bit by bit, increase the amount you walk every day. Try for at least 30 minutes on most days of the week. · Do not smoke. Smoking can increase your risk for health problems. If you need help quitting, talk to your doctor about stop-smoking programs and medicines. These can increase your chances of quitting for good. · Limit alcohol to 2 drinks a day for men and 1 drink a day for women. Too much alcohol can cause health problems. If you have a BMI higher than 25  · Your doctor may do other tests to check your risk for weight-related health problems. This may include measuring the distance around your waist. A waist measurement of more than 40 inches in men or 35 inches in women can increase the risk of weight-related health problems.   · Talk with your doctor about steps you can take to stay healthy or improve your health. You may need to make lifestyle changes to lose weight and stay healthy, such as changing your diet and getting regular exercise. If you have a BMI lower than 18.5  · Your doctor may do other tests to check your risk for health problems. · Talk with your doctor about steps you can take to stay healthy or improve your health. You may need to make lifestyle changes to gain or maintain weight and stay healthy, such as getting more healthy foods in your diet and doing exercises to build muscle. Where can you learn more? Go to http://stanton-gonsalo.info/. Enter S176 in the search box to learn more about \"Body Mass Index: Care Instructions. \"  Current as of: October 13, 2016  Content Version: 11.4  © 2459-4444 [employer]. Care instructions adapted under license by [email or web address] (which disclaims liability or warranty for this information). If you have questions about a medical condition or this instruction, always ask your healthcare professional. [place] [address] any warranty or liability for your use of this information. Follow-up Disposition:  Return in about 4 weeks (around 4/18/2018). 10 minutes of the 15 minutes face to face time with Cali Hendricks consisted of counseling & coordinating and/or discussing treatment plans in reference to her The primary encounter diagnosis was Obesity (BMI 30-39.9). Diagnoses of Osteoarthritis of knee, unspecified laterality, unspecified osteoarthritis type, Mixed hyperlipidemia, HERZOG (nonalcoholic steatohepatitis) suggested by ultrasound report, 2016, and Essential hypertension were also pertinent to this visit. The patient is to follow up as scheduled and will report to the ED or the office if symptoms change or increase. The patient has voiced understanding and will comply.

## 2018-03-21 NOTE — MR AVS SNAPSHOT
[facility] 
 
 
 [address], [address] [place] [address] [facility] 
[phone] Patient: Ludwig Zelaya MRN: [id number] Visit Information Date & Time Provider Department Dept. Phone Encounter #  
 3/21/2018  9:00 AM Ronald Rutherford NP [facility] [phone] [id number] Follow-up Instructions Return in about 4 weeks (around 2018). Follow-up and Disposition History Your Appointments 2018 10:05 AM  
LAB with [facility] NURSE VISIT [facility] ([facility]) Appt Note: lab  
 [address], Suite [facility] [address] [address] [place]  
  
   
 [address], [address] 2018  4:18 AM  
METABOLIC PROGRAM 15 with Ronald Rutherford NP [facility] ([facility]) Appt Note: 4 week f/u  
 [address] [facility], [address] [address] [address] [place]  
  
   
 [address], [address] 5/15/2018  9:00 AM  
Office Visit with Mary Melvin MD  
[facility]) Appt Note: pre op - Dr. Jamal Bence 18 right total knee [address], [address] [place] [address] [address] [place]  
  
   
 [address], [address]  
  
    
 2018  8:30 AM  
HISTORY AND PHYSICAL with Cindi Jacobs PA-C  
[facility] [place] [address] [place] [address]) Appt Note: MV INPT SX 18 RT TOTAL KNEE ARTHROPLASTY/DMM; dmm  
 [address] [facility], [address] [place] [address]  
[phone]  
  
   
 [address] [facility], [address] [place] [address] [address] [id number]  
  
    
 2018  8:30 AM  
POST OP with Cindi Jacobs PA-C  
[facility] [place]) Appt Note: S/P MV INPT SX 18 RT TKA/DMM  
 [address] [facility], [address] [place] [address]  
[phone] [address] [facility], [facility] Lorie Allen [address]  
  
    
 6/7/2018  9:00 AM  
Follow Up with Teodora Paredes MD  
[facility] [place] [address] ([address]) Appt Note: 3 month f/up Marlen Wong [phone]  
[phone] [address]  
  
    
 8/1/2018  9:30 AM  
Office Visit with An Chen MD  
[facility] [address]) Appt Note: 6 mos f/u  
 [address] [facility], [address] [facility]  
[phone] Upcoming Health Maintenance Date Due ZOSTER VACCINE AGE 60> 3/5/2016 COLONOSCOPY 5/28/2018 BREAST CANCER SCRN MAMMOGRAM 7/14/2018 PAP AKA CERVICAL CYTOLOGY 7/28/2019 DTaP/Tdap/Td series (2 - Td) 2/7/2027 Allergies as of 3/21/2018  Review Complete On: 3/21/2018 By: Destin Wynne NP No Known Allergies Current Immunizations  Reviewed on 2/7/2017 Name Date Influenza Vaccine 10/12/2017 Influenza Vaccine (Quad) PF 10/10/2016 Tdap 2/7/2017 Not reviewed this visit You Were Diagnosed With   
  
 Codes Comments Obesity (BMI 30-39.9)    -  Primary ICD-10-CM: X45.5 ICD-9-CM: 278.00 Osteoarthritis of knee, unspecified laterality, unspecified osteoarthritis type     ICD-10-CM: M17.10 ICD-9-CM: 715.36 Mixed hyperlipidemia     ICD-10-CM: E78.2 ICD-9-CM: 272.2 HERZOG (nonalcoholic steatohepatitis)     ICD-10-CM: E62.57 ICD-9-CM: 571.8 Essential hypertension     ICD-10-CM: I10 
ICD-9-CM: 401.9 Vitals BP Pulse Temp Resp Height(growth percentile) Weight(growth percentile) 115/75 (BP 1 Location: Left arm, BP Patient Position: Sitting) (!) 55 98.6 °F (37 °C) (Oral) 16 5' 5\" (1.651 m) 205 lb 9.6 oz (93.3 kg) SpO2 BMI OB Status Smoking Status 98% 34.21 kg/m2 Hysterectomy Never Smoker BMI and BSA Data  Body Mass Index Body Surface Area  
 34.21 kg/m 2 2.07 m 2  
  
  
 Preferred Pharmacy Pharmacy Name Phone [facility] Carla Hawkins 16 [address] [facility] [phone] Your Updated Medication List  
  
   
This list is accurate as of 3/21/18  9:31 AM.  Always use your most recent med list.  
  
  
  
  
 aspirin 81 mg chewable tablet Take 1 Tab by mouth daily. calcium-cholecalciferol (d3) 600-125 mg-unit Tab Take 1,200 mg by mouth daily. meloxicam 15 mg tablet Commonly known as:  MOBIC  
TAKE 1 TABLET BY MOUTH DAILY  
  
 metoprolol tartrate 50 mg tablet Commonly known as:  LOPRESSOR Take 1 Tab by mouth two (2) times a day. omega-3 fatty acids-vitamin e 1,000 mg Cap Take 1 Cap by mouth two (2) times a day. peg-electrolyte soln 420 gram solution Commonly known as:  NULYTELY  
MIX WITH ANY CLEAR LIQUID. TAKE 2 LITERS AT 6 PM ON THE DAY BEFORE THE COLONOSCOPY. potassium 99 mg tablet Take 99 mg by mouth daily as needed. simvastatin 20 mg tablet Commonly known as:  ZOCOR Take 1 Tab by mouth nightly. TYLENOL ARTHRITIS PAIN 650 mg Catha Lek Generic drug:  acetaminophen Take 650 mg by mouth every six (6) hours as needed for Pain. VITAMIN D3 2,000 unit Tab Generic drug:  cholecalciferol (vitamin D3) Take 2,000 Units by mouth daily. Follow-up Instructions Return in about 4 weeks (around 4/18/2018). To-Do List   
 03/21/2018 Lab:  METABOLIC PANEL, COMPREHENSIVE   
  
 03/21/2018 Lab:  URIC ACID Patient Instructions Health Maintenance Due Topic Date Due  
 ZOSTER VACCINE AGE 60>  03/05/2016  COLONOSCOPY  05/28/2018  BREAST CANCER SCRN MAMMOGRAM  07/14/2018 Monthly goal: 
 
5-10 lbs weight loss Body Mass Index: Care Instructions Your Care Instructions Body mass index (BMI) can help you see if your weight is raising your risk for health problems.  It uses a formula to compare how much you weigh with how tall you are. · A BMI lower than 18.5 is considered underweight. · A BMI between 18.5 and 24.9 is considered healthy. · A BMI between 25 and 29.9 is considered overweight. A BMI of 30 or higher is considered obese. If your BMI is in the normal range, it means that you have a lower risk for weight-related health problems. If your BMI is in the overweight or obese range, you may be at increased risk for weight-related health problems, such as high blood pressure, heart disease, stroke, arthritis or joint pain, and diabetes. If your BMI is in the underweight range, you may be at increased risk for health problems such as fatigue, lower protection (immunity) against illness, muscle loss, bone loss, hair loss, and hormone problems. BMI is just one measure of your risk for weight-related health problems. You may be at higher risk for health problems if you are not active, you eat an unhealthy diet, or you drink too much alcohol or use tobacco products. Follow-up care is a key part of your treatment and safety. Be sure to make and go to all appointments, and call your doctor if you are having problems. It's also a good idea to know your test results and keep a list of the medicines you take. How can you care for yourself at home? · Practice healthy eating habits. This includes eating plenty of fruits, vegetables, whole grains, lean protein, and low-fat dairy. · If your doctor recommends it, get more exercise. Walking is a good choice. Bit by bit, increase the amount you walk every day. Try for at least 30 minutes on most days of the week. · Do not smoke. Smoking can increase your risk for health problems. If you need help quitting, talk to your doctor about stop-smoking programs and medicines. These can increase your chances of quitting for good. · Limit alcohol to 2 drinks a day for men and 1 drink a day for women. Too much alcohol can cause health problems. If you have a BMI higher than 25 · Your doctor may do other tests to check your risk for weight-related health problems. This may include measuring the distance around your waist. A waist measurement of more than 40 inches in men or 35 inches in women can increase the risk of weight-related health problems. · Talk with your doctor about steps you can take to stay healthy or improve your health. You may need to make lifestyle changes to lose weight and stay healthy, such as changing your diet and getting regular exercise. If you have a BMI lower than 18.5 · Your doctor may do other tests to check your risk for health problems. · Talk with your doctor about steps you can take to stay healthy or improve your health. You may need to make lifestyle changes to gain or maintain weight and stay healthy, such as getting more healthy foods in your diet and doing exercises to build muscle. Where can you learn more? Go to http://stanton-gonsalo.info/. Enter S176 in the search box to learn more about \"Body Mass Index: Care Instructions. \" Current as of: October 13, 2016 Content Version: 11.4 © 7742-5866 [employer]BIZinaBOX. Care instructions adapted under license by [employer] (which disclaims liability or warranty for this information). If you have questions about a medical condition or this instruction, always ask your healthcare professional. [address] any warranty or liability for your use of this information. Patient Instructions History Introducing [facility]! Dear Jeramy Faulkner: 
Thank you for requesting a [facility] account. Our records indicate that you already have an active [facility] account. You can access your account anytime at [email or web address] Did you know that you can access your hospital and ER discharge instructions at any time in [facility]? You can also review all of your test results from your hospital stay or ER visit. Additional Information If you have questions, please visit the Frequently Asked Questions section of the [email or web address] website at [email or web address] Remember, [employer] is NOT to be used for urgent needs. For medical emergencies, dial 911. Now available from your iPhone and Android! Please provide this summary of care documentation to your next provider. Your primary care clinician is listed as Vanessa Hu. If you have any questions after today's visit, please call [phone].

## 2018-03-21 NOTE — PROGRESS NOTES
Chief Complaint   Patient presents with    Weight Management     1. Have you been to the ER, urgent care clinic since your last visit? Hospitalized since your last visit? No    2. Have you seen or consulted any other health care providers outside of the [address] [place] [address] since your last visit? Include any pap smears or colon screening.  No

## 2018-03-21 NOTE — PATIENT INSTRUCTIONS
Health Maintenance Due   Topic Date Due    ZOSTER VACCINE AGE 60>  03/05/2016    COLONOSCOPY  05/28/2018    BREAST CANCER SCRN MAMMOGRAM  07/14/2018     Monthly goal:    5-10 lbs weight loss         Body Mass Index: Care Instructions  Your Care Instructions    Body mass index (BMI) can help you see if your weight is raising your risk for health problems. It uses a formula to compare how much you weigh with how tall you are. · A BMI lower than 18.5 is considered underweight. · A BMI between 18.5 and 24.9 is considered healthy. · A BMI between 25 and 29.9 is considered overweight. A BMI of 30 or higher is considered obese. If your BMI is in the normal range, it means that you have a lower risk for weight-related health problems. If your BMI is in the overweight or obese range, you may be at increased risk for weight-related health problems, such as high blood pressure, heart disease, stroke, arthritis or joint pain, and diabetes. If your BMI is in the underweight range, you may be at increased risk for health problems such as fatigue, lower protection (immunity) against illness, muscle loss, bone loss, hair loss, and hormone problems. BMI is just one measure of your risk for weight-related health problems. You may be at higher risk for health problems if you are not active, you eat an unhealthy diet, or you drink too much alcohol or use tobacco products. Follow-up care is a key part of your treatment and safety. Be sure to make and go to all appointments, and call your doctor if you are having problems. It's also a good idea to know your test results and keep a list of the medicines you take. How can you care for yourself at home? · Practice healthy eating habits. This includes eating plenty of fruits, vegetables, whole grains, lean protein, and low-fat dairy. · If your doctor recommends it, get more exercise. Walking is a good choice. Bit by bit, increase the amount you walk every day.  Try for at least 30 minutes on most days of the week. · Do not smoke. Smoking can increase your risk for health problems. If you need help quitting, talk to your doctor about stop-smoking programs and medicines. These can increase your chances of quitting for good. · Limit alcohol to 2 drinks a day for men and 1 drink a day for women. Too much alcohol can cause health problems. If you have a BMI higher than 25  · Your doctor may do other tests to check your risk for weight-related health problems. This may include measuring the distance around your waist. A waist measurement of more than 40 inches in men or 35 inches in women can increase the risk of weight-related health problems. · Talk with your doctor about steps you can take to stay healthy or improve your health. You may need to make lifestyle changes to lose weight and stay healthy, such as changing your diet and getting regular exercise. If you have a BMI lower than 18.5  · Your doctor may do other tests to check your risk for health problems. · Talk with your doctor about steps you can take to stay healthy or improve your health. You may need to make lifestyle changes to gain or maintain weight and stay healthy, such as getting more healthy foods in your diet and doing exercises to build muscle. Where can you learn more? Go to http://stanton-gonsalo.info/. Enter S176 in the search box to learn more about \"Body Mass Index: Care Instructions. \"  Current as of: October 13, 2016  Content Version: 11.4  © 5748-8483 [employer]. Care instructions adapted under license by "[employer]" (which disclaims liability or warranty for this information). If you have questions about a medical condition or this instruction, always ask your healthcare professional. [address] any warranty or liability for your use of this information.

## 2018-03-28 ENCOUNTER — CLINICAL SUPPORT (OUTPATIENT)
Dept: FAMILY MEDICINE CLINIC | Age: 63
End: 2018-03-28

## 2018-03-28 VITALS
DIASTOLIC BLOOD PRESSURE: 77 MMHG | WEIGHT: 204.2 LBS | HEIGHT: 65 IN | BODY MASS INDEX: 34.02 KG/M2 | SYSTOLIC BLOOD PRESSURE: 138 MMHG | HEART RATE: 66 BPM

## 2018-03-28 DIAGNOSIS — E66.9 OBESITY (BMI 30-39.9): Primary | ICD-10-CM

## 2018-03-28 NOTE — PROGRESS NOTES
Progress Note: Weekly Education Class in the [facility]   Is there anything that you or the patient needs to let the [facility] Physician know about? no  Over the past week, have you experienced any side-effects? no    Radha Fay is a 65 y.o. female who is enrolled in [facility]    Visit Vitals    /77 (BP 1 Location: Right arm, BP Patient Position: Sitting)    Pulse 66    Ht 5' 5\" (1.651 m)    Wt 204 lb 3.2 oz (92.6 kg)    BMI 33.98 kg/m2     Weight Metrics 3/28/2018 3/21/2018 3/21/2018 3/14/2018 3/14/2018 3/8/2018 3/7/2018   Weight 204 lb 3.2 oz - 205 lb 9.6 oz - 206 lb 9.6 oz - 207 lb 9.6 oz   Waist Measure Inches 41 40 - 41 - 41 -   Exercise Mins/week - 5 - - - - -   Body Fat % - 41.5 - - - - -   BMI 33.98 kg/m2 - 34.21 kg/m2 - 34.38 kg/m2 - 34.55 kg/m2         Have you received any other medical care this week? no  If yes, where and for what? Have you had any change in your medications since your last visit? no  If yes what? Did you have any problems adhering to the program last week? no  If yes, please explain:       Eating Habits Over Last Week:  Did you take in 64 oz of non-caloric fluids? yes     Did you consume your 4 meal replacements each day?  yes       Physical Activity Over the Past Week:    Aerobic exercise: 0 min  Resistance exercise: 0 workouts / week

## 2018-04-04 ENCOUNTER — CLINICAL SUPPORT (OUTPATIENT)
Dept: FAMILY MEDICINE CLINIC | Age: 63
End: 2018-04-04

## 2018-04-04 VITALS
HEIGHT: 65 IN | BODY MASS INDEX: 33.79 KG/M2 | DIASTOLIC BLOOD PRESSURE: 73 MMHG | HEART RATE: 60 BPM | WEIGHT: 202.8 LBS | SYSTOLIC BLOOD PRESSURE: 128 MMHG

## 2018-04-04 DIAGNOSIS — E66.9 OBESITY (BMI 30-39.9): Primary | ICD-10-CM

## 2018-04-04 NOTE — PROGRESS NOTES
Progress Note: Weekly Education Class in the [facility] Weight Loss Program   Is there anything that you or the patient needs to let the [facility] Physician know about? NO  Over the past week, have you experienced any side-effects? no    Truman Thomas is a 65 y.o. female who is enrolled in [facility]    Visit Vitals    /73 (BP 1 Location: Right arm, BP Patient Position: Sitting)    Pulse 60    Ht 5' 5\" (1.651 m)    Wt 202 lb 12.8 oz (92 kg)    BMI 33.75 kg/m2     Weight Metrics 4/4/2018 3/28/2018 3/21/2018 3/21/2018 3/14/2018 3/14/2018 3/8/2018   Weight 202 lb 12.8 oz 204 lb 3.2 oz - 205 lb 9.6 oz - 206 lb 9.6 oz -   Waist Measure Inches 40 41 40 - 41 - 41   Exercise Mins/week - - 5 - - - -   Body Fat % - - 41.5 - - - -   BMI 33.75 kg/m2 33.98 kg/m2 - 34.21 kg/m2 - 34.38 kg/m2 -         Have you received any other medical care this week? no  If yes, where and for what? Have you had any change in your medications since your last visit? no  If yes what? Did you have any problems adhering to the program last week? no  If yes, please explain:       Eating Habits Over Last Week:  Did you take in 64 oz of non-caloric fluids? yes     Did you consume your 4 meal replacements each day?  yes       Physical Activity Over the Past Week:    Aerobic exercise: 0 min  Resistance exercise: 0 workouts / week

## 2018-04-06 ENCOUNTER — APPOINTMENT (OUTPATIENT)
Dept: INTERNAL MEDICINE CLINIC | Age: 63
End: 2018-04-06

## 2018-04-06 ENCOUNTER — HOSPITAL ENCOUNTER (OUTPATIENT)
Dept: LAB | Age: 63
Discharge: HOME | End: 2018-04-06
Payer: COMMERCIAL

## 2018-04-06 DIAGNOSIS — E66.9 OBESITY (BMI 30-39.9): ICD-10-CM

## 2018-04-06 LAB
ALBUMIN SERPL-MCNC: 4.4 G/DL (ref 3.4–5)
ALBUMIN/GLOB SERPL: 1.5 {RATIO} (ref 0.8–1.7)
ALP SERPL-CCNC: 59 U/L (ref 45–117)
ALT SERPL-CCNC: 56 U/L (ref 13–56)
ANION GAP SERPL CALC-SCNC: 6 MMOL/L (ref 3–18)
AST SERPL-CCNC: 25 U/L (ref 15–37)
BILIRUB SERPL-MCNC: 0.7 MG/DL (ref 0.2–1)
BUN SERPL-MCNC: 16 MG/DL (ref 7–18)
BUN/CREAT SERPL: 28 (ref 12–20)
CALCIUM SERPL-MCNC: 9.9 MG/DL (ref 8.5–10.1)
CHLORIDE SERPL-SCNC: 106 MMOL/L (ref 100–108)
CO2 SERPL-SCNC: 30 MMOL/L (ref 21–32)
CREAT SERPL-MCNC: 0.57 MG/DL (ref 0.6–1.3)
GLOBULIN SER CALC-MCNC: 2.9 G/DL (ref 2–4)
GLUCOSE SERPL-MCNC: 89 MG/DL (ref 74–99)
POTASSIUM SERPL-SCNC: 4.3 MMOL/L (ref 3.5–5.5)
PROT SERPL-MCNC: 7.3 G/DL (ref 6.4–8.2)
SODIUM SERPL-SCNC: 142 MMOL/L (ref 136–145)
URATE SERPL-MCNC: 3.1 MG/DL (ref 2.6–7.2)

## 2018-04-06 PROCEDURE — 80053 COMPREHEN METABOLIC PANEL: CPT | Performed by: NURSE PRACTITIONER

## 2018-04-06 PROCEDURE — 84550 ASSAY OF BLOOD/URIC ACID: CPT | Performed by: NURSE PRACTITIONER

## 2018-04-06 PROCEDURE — 36415 COLL VENOUS BLD VENIPUNCTURE: CPT | Performed by: NURSE PRACTITIONER

## 2018-04-11 ENCOUNTER — CLINICAL SUPPORT (OUTPATIENT)
Dept: FAMILY MEDICINE CLINIC | Age: 63
End: 2018-04-11

## 2018-04-11 DIAGNOSIS — E66.9 OBESITY (BMI 30-39.9): Primary | ICD-10-CM

## 2018-04-12 VITALS
SYSTOLIC BLOOD PRESSURE: 137 MMHG | DIASTOLIC BLOOD PRESSURE: 83 MMHG | HEIGHT: 65 IN | WEIGHT: 200 LBS | HEART RATE: 62 BPM | BODY MASS INDEX: 33.32 KG/M2

## 2018-04-12 NOTE — PROGRESS NOTES
Progress Note: Weekly Education Class in the [facility]   Is there anything that you or the patient needs to let the [address] [facility] Physician know about? no  Over the past week, have you experienced any side-effects? no    Sarah Burgos is a 65 y.o. female who is enrolled in [facility]    Visit Vitals    /83 (BP 1 Location: Right arm, BP Patient Position: Sitting)    Pulse 62    Ht 5' 5\" (1.651 m)    Wt 200 lb (90.7 kg)    BMI 33.28 kg/m2     Weight Metrics 4/12/2018 4/11/2018 4/4/2018 3/28/2018 3/21/2018 3/21/2018 3/14/2018   Weight - 200 lb 202 lb 12.8 oz 204 lb 3.2 oz - 205 lb 9.6 oz -   Waist Measure Inches 39 - 40 41 40 - 41   Exercise Mins/week - - - - 5 - -   Body Fat % - - - - 41.5 - -   BMI - 33.28 kg/m2 33.75 kg/m2 33.98 kg/m2 - 34.21 kg/m2 -         Have you received any other medical care this week? no  If yes, where and for what? Have you had any change in your medications since your last visit? no  If yes what? Did you have any problems adhering to the program last week? no  If yes, please explain:       Eating Habits Over Last Week:  Did you take in 64 oz of non-caloric fluids? yes     Did you consume your 4 meal replacements each day?  yes       Physical Activity Over the Past Week:    Aerobic exercise: 0 min  Resistance exercise: 0 workouts / week

## 2018-04-18 ENCOUNTER — OFFICE VISIT (OUTPATIENT)
Dept: INTERNAL MEDICINE CLINIC | Age: 63
End: 2018-04-18

## 2018-04-18 VITALS
OXYGEN SATURATION: 98 % | RESPIRATION RATE: 14 BRPM | DIASTOLIC BLOOD PRESSURE: 70 MMHG | HEART RATE: 51 BPM | HEIGHT: 65 IN | SYSTOLIC BLOOD PRESSURE: 121 MMHG | BODY MASS INDEX: 32.87 KG/M2 | TEMPERATURE: 98.6 F | WEIGHT: 197.3 LBS

## 2018-04-18 DIAGNOSIS — I10 ESSENTIAL HYPERTENSION: ICD-10-CM

## 2018-04-18 DIAGNOSIS — E66.9 OBESITY (BMI 30-39.9): Primary | ICD-10-CM

## 2018-04-18 DIAGNOSIS — K75.81 NASH (NONALCOHOLIC STEATOHEPATITIS): ICD-10-CM

## 2018-04-18 DIAGNOSIS — E78.2 MIXED HYPERLIPIDEMIA: ICD-10-CM

## 2018-04-18 RX ORDER — FUROSEMIDE 40 MG/1
TABLET ORAL
Refills: 2 | COMMUNITY
Start: 2018-04-11 | End: 2018-05-09

## 2018-04-18 NOTE — PATIENT INSTRUCTIONS
Health Maintenance Due   Topic Date Due    ZOSTER VACCINE AGE 60>  03/05/2016    COLONOSCOPY  05/28/2018    BREAST CANCER SCRN MAMMOGRAM  07/14/2018     Congrats on getting to the 100's! Monthly goal:    10 lbs weight loss prior to surgery. Body Mass Index: Care Instructions  Your Care Instructions    Body mass index (BMI) can help you see if your weight is raising your risk for health problems. It uses a formula to compare how much you weigh with how tall you are. · A BMI lower than 18.5 is considered underweight. · A BMI between 18.5 and 24.9 is considered healthy. · A BMI between 25 and 29.9 is considered overweight. A BMI of 30 or higher is considered obese. If your BMI is in the normal range, it means that you have a lower risk for weight-related health problems. If your BMI is in the overweight or obese range, you may be at increased risk for weight-related health problems, such as high blood pressure, heart disease, stroke, arthritis or joint pain, and diabetes. If your BMI is in the underweight range, you may be at increased risk for health problems such as fatigue, lower protection (immunity) against illness, muscle loss, bone loss, hair loss, and hormone problems. BMI is just one measure of your risk for weight-related health problems. You may be at higher risk for health problems if you are not active, you eat an unhealthy diet, or you drink too much alcohol or use tobacco products. Follow-up care is a key part of your treatment and safety. Be sure to make and go to all appointments, and call your doctor if you are having problems. It's also a good idea to know your test results and keep a list of the medicines you take. How can you care for yourself at home? · Practice healthy eating habits. This includes eating plenty of fruits, vegetables, whole grains, lean protein, and low-fat dairy. · If your doctor recommends it, get more exercise. Walking is a good choice.  Bit by bit, increase the amount you walk every day. Try for at least 30 minutes on most days of the week. · Do not smoke. Smoking can increase your risk for health problems. If you need help quitting, talk to your doctor about stop-smoking programs and medicines. These can increase your chances of quitting for good. · Limit alcohol to 2 drinks a day for men and 1 drink a day for women. Too much alcohol can cause health problems. If you have a BMI higher than 25  · Your doctor may do other tests to check your risk for weight-related health problems. This may include measuring the distance around your waist. A waist measurement of more than 40 inches in men or 35 inches in women can increase the risk of weight-related health problems. · Talk with your doctor about steps you can take to stay healthy or improve your health. You may need to make lifestyle changes to lose weight and stay healthy, such as changing your diet and getting regular exercise. If you have a BMI lower than 18.5  · Your doctor may do other tests to check your risk for health problems. · Talk with your doctor about steps you can take to stay healthy or improve your health. You may need to make lifestyle changes to gain or maintain weight and stay healthy, such as getting more healthy foods in your diet and doing exercises to build muscle. Where can you learn more? Go to http://stanton-gonsalo.info/. Enter S176 in the search box to learn more about \"Body Mass Index: Care Instructions. \"  Current as of: October 13, 2016  Content Version: 11.4  © 4794-8956 [employer]. Care instructions adapted under license by [employer] (which disclaims liability or warranty for this information). If you have questions about a medical condition or this instruction, always ask your healthcare professional. [address] any warranty or liability for your use of this information.

## 2018-04-18 NOTE — MR AVS SNAPSHOT
[address] [facility] 
 
 
 [address], [facility] [address] [facility] 
[phone] Patient: Josef Montejo MRN: [id number] Visit Information Date & Time Provider Department Dept. Phone Encounter #  
 4/18/2018  8:15 AM Ángel Sullivan NP [facility] [address] [place] [address] [phone] [id number] Follow-up Instructions Return in about 4 weeks (around 5/16/2018). Your Appointments 5/9/2018  9:25 AM  
LAB with [facility] NURSE VISIT [facility] [address] [place] [address] ([address] [place] [address]) Appt Note: lab  
 [address], [address] [place] [address]  
  
   
 [address], [facility] 5/15/2018  9:00 AM  
Office Visit with Kade Woodard MD  
[facility] [address] [place] [address]) Appt Note: pre op - Dr. Rosi Cardona 5/22/18 right total knee [address] [place] [address]  
  
   
 [address], [facility]  
  
    
 5/16/2018  7:05 AM  
METABOLIC PROGRAM 15 with Ángel Sullivan NP [facility] [address] [place] [address] ([address] [place] [address]) Appt Note: 4 week f/u  
 [address] [facility], [address] [facility] [place] [address]  
  
   
 [address], [facility]  
  
    
 5/16/2018  8:30 AM  
HISTORY AND PHYSICAL with Dean Don PA-C  
[facility] [address]) Appt Note: MV INPT SX 5-22-18 RT TOTAL KNEE ARTHROPLASTY/DMM; dmm  
 [address] [facility], [address] [place] [address]  
[phone]  
  
   
 [address] [address]  
  
    
 6/1/2018  8:30 AM  
POST OP with Dean Don PA-C  
[facility] - [address]) Appt Note: S/P MV INPT SX 5-22-18 RT TKA/DMM  
 [address] [facility], [address] [facility] [address] [phone] [address] [facility], [address] [place] [address] 6/7/2018  9:00 AM  
Follow Up with Josephine Gomez MD  
[facility] Appt Note: 3 month f/up [place] [address] [address] [address] [phone] [address]  
  
    
 8/1/2018  9:30 AM  
Office Visit with Jose Alejandro Alves MD  
[facility] [address] [place] [address]) Appt Note: 6 mos f/u  
 [address] [facility], [address] [facility]  
[phone] Upcoming Health Maintenance Date Due ZOSTER VACCINE AGE 60> 3/5/2016 COLONOSCOPY 5/28/2018 BREAST CANCER SCRN MAMMOGRAM 7/14/2018 PAP AKA CERVICAL CYTOLOGY 7/28/2019 DTaP/Tdap/Td series (2 - Td) 2/7/2027 Allergies as of 4/18/2018  Review Complete On: 4/18/2018 By: Radha Branch NP No Known Allergies Current Immunizations  Reviewed on 2/7/2017 Name Date Influenza Vaccine 10/12/2017 Influenza Vaccine (Quad) PF 10/10/2016 Tdap 2/7/2017 Not reviewed this visit You Were Diagnosed With   
  
 Codes Comments Obesity (BMI 30-39.9)    -  Primary ICD-10-CM: N96.5 ICD-9-CM: 278.00 Mixed hyperlipidemia     ICD-10-CM: E78.2 ICD-9-CM: 272.2 HERZOG (nonalcoholic steatohepatitis)     ICD-10-CM: X24.32 ICD-9-CM: 571.8 Essential hypertension     ICD-10-CM: I10 
ICD-9-CM: 401.9 Vitals BP Pulse Temp Resp Height(growth percentile) Weight(growth percentile) 121/70 (BP 1 Location: Left arm, BP Patient Position: Sitting) (!) 51 98.6 °F (37 °C) (Oral) 14 5' 5\" (1.651 m) 197 lb 4.8 oz (89.5 kg) SpO2 BMI OB Status Smoking Status 98% 32.83 kg/m2 Hysterectomy Never Smoker BMI and BSA Data Body Mass Index Body Surface Area  
 32.83 kg/m 2 2.03 m 2 Preferred Pharmacy Pharmacy Name Phone [facility] [address] [place] [address] 510 [facility] [phone] Your Updated Medication List  
  
   
This list is accurate as of 4/18/18  8:27 AM.  Always use your most recent med list.  
  
  
  
  
 aspirin 81 mg chewable tablet Take 1 Tab by mouth daily. calcium-cholecalciferol (d3) 600-125 mg-unit Tab Take 1,200 mg by mouth daily. furosemide 40 mg tablet Commonly known as:  LASIX TK 1 T PO D  
  
 meloxicam 15 mg tablet Commonly known as:  MOBIC  
TAKE 1 TABLET BY MOUTH DAILY  
  
 metoprolol tartrate 50 mg tablet Commonly known as:  LOPRESSOR Take 1 Tab by mouth two (2) times a day. omega-3 fatty acids-vitamin e 1,000 mg Cap Take 1 Cap by mouth two (2) times a day. peg-electrolyte soln 420 gram solution Commonly known as:  NULYTELY  
MIX WITH ANY CLEAR LIQUID. TAKE 2 LITERS AT 6 PM ON THE DAY BEFORE THE COLONOSCOPY. potassium 99 mg tablet Take 99 mg by mouth daily as needed. simvastatin 20 mg tablet Commonly known as:  ZOCOR Take 1 Tab by mouth nightly. TYLENOL ARTHRITIS PAIN 650 mg [facility] Generic drug:  acetaminophen Take 650 mg by mouth every six (6) hours as needed for Pain. VITAMIN D3 2,000 unit Tab Generic drug:  cholecalciferol (vitamin D3) Take 2,000 Units by mouth daily. Follow-up Instructions Return in about 4 weeks (around 5/16/2018). To-Do List   
 04/18/2018 Lab:  METABOLIC PANEL, COMPREHENSIVE   
  
 04/18/2018 Lab:  URIC ACID Patient Instructions Health Maintenance Due Topic Date Due  
 ZOSTER VACCINE AGE 60>  03/05/2016  COLONOSCOPY  05/28/2018  BREAST CANCER SCRN MAMMOGRAM  07/14/2018 Congrats on getting to the 100's! Monthly goal: 
 
10 lbs weight loss prior to surgery. Body Mass Index: Care Instructions Your Care Instructions Body mass index (BMI) can help you see if your weight is raising your risk for health problems. It uses a formula to compare how much you weigh with how tall you are. · A BMI lower than 18.5 is considered underweight. · A BMI between 18.5 and 24.9 is considered healthy. · A BMI between 25 and 29.9 is considered overweight. A BMI of 30 or higher is considered obese. If your BMI is in the normal range, it means that you have a lower risk for weight-related health problems. If your BMI is in the overweight or obese range, you may be at increased risk for weight-related health problems, such as high blood pressure, heart disease, stroke, arthritis or joint pain, and diabetes. If your BMI is in the underweight range, you may be at increased risk for health problems such as fatigue, lower protection (immunity) against illness, muscle loss, bone loss, hair loss, and hormone problems. BMI is just one measure of your risk for weight-related health problems. You may be at higher risk for health problems if you are not active, you eat an unhealthy diet, or you drink too much alcohol or use tobacco products. Follow-up care is a key part of your treatment and safety. Be sure to make and go to all appointments, and call your doctor if you are having problems. It's also a good idea to know your test results and keep a list of the medicines you take. How can you care for yourself at home? · Practice healthy eating habits. This includes eating plenty of fruits, vegetables, whole grains, lean protein, and low-fat dairy. · If your doctor recommends it, get more exercise. Walking is a good choice. Bit by bit, increase the amount you walk every day. Try for at least 30 minutes on most days of the week. · Do not smoke. Smoking can increase your risk for health problems. If you need help quitting, talk to your doctor about stop-smoking programs and medicines. These can increase your chances of quitting for good. · Limit alcohol to 2 drinks a day for men and 1 drink a day for women. Too much alcohol can cause health problems. If you have a BMI higher than 25 · Your doctor may do other tests to check your risk for weight-related health problems. This may include measuring the distance around your waist. A waist measurement of more than 40 inches in men or 35 inches in women can increase the risk of weight-related health problems. · Talk with your doctor about steps you can take to stay healthy or improve your health. You may need to make lifestyle changes to lose weight and stay healthy, such as changing your diet and getting regular exercise. If you have a BMI lower than 18.5 · Your doctor may do other tests to check your risk for health problems. · Talk with your doctor about steps you can take to stay healthy or improve your health. You may need to make lifestyle changes to gain or maintain weight and stay healthy, such as getting more healthy foods in your diet and doing exercises to build muscle. Where can you learn more? Go to http://stanton-gonsalo.info/. Enter S176 in the search box to learn more about \"Body Mass Index: Care Instructions. \" Current as of: October 13, 2016 Content Version: 11.4 © 4422-5506 [employer]. Care instructions adapted under license by [employer] (which disclaims liability or warranty for this information). If you have questions about a medical condition or this instruction, always ask your healthcare professional. [address] any warranty or liability for your use of this information. Introducing [facility] & HEALTH SERVICES! Dear Jacobo Galvan: 
Thank you for requesting a [email or web address] account. Our records indicate that you already have an active [email or web address] account. You can access your account anytime at [email or web address] Did you know that you can access your hospital and ER discharge instructions at any time in [email or web address]? You can also review all of your test results from your hospital stay or ER visit. Additional Information If you have questions, please visit the Frequently Asked Questions section of the [employer] website at [email or web address] Remember, [facility] is NOT to be used for urgent needs. For medical emergencies, dial 911. Now available from your iPhone and Android! Please provide this summary of care documentation to your next provider. Your primary care clinician is listed as Yevgeniy Bell. If you have any questions after today's visit, please call [phone].

## 2018-04-18 NOTE — PROGRESS NOTES
[facility] Progress Note:   F/up Physician Visit    CC: Obesity      Brenden Conteh is a 65 y.o. female who is here for her  f/up physician visit for the VLCD Program. Honorio Bajwa has completed 23 weeks of the program to date. 8 lbs weight loss, 73 lbs since starting. Surgery scheduled 5/22/2018. Weight History  Starting  weight 270.1 lb Body mass index is 43.6 kg/(m^2). Current weight 197 lb  Goal weight 165 lb  Highest weight 277 lb, at 49 years old     Last EKG done with cardiology 3/1/2018 wt 209 lbs, SR 53, QTc 460    Weight Metrics 4/18/2018 4/18/2018 4/12/2018 4/11/2018 4/4/2018 3/28/2018 3/21/2018   Weight - 197 lb 4.8 oz - 200 lb 202 lb 12.8 oz 204 lb 3.2 oz -   Waist Measure Inches 39 - 39 - 40 41 40   Exercise Mins/week 0 - - - - - 5   Body Fat % 40.3 - - - - - 41.5   BMI - 32.83 kg/m2 - 33.28 kg/m2 33.75 kg/m2 33.98 kg/m2 -         Current Outpatient Prescriptions   Medication Sig Dispense Refill    metoprolol tartrate (LOPRESSOR) 50 mg tablet Take 1 Tab by mouth two (2) times a day. 180 Tab 3    aspirin 81 mg chewable tablet Take 1 Tab by mouth daily. 30 Tab 0    cholecalciferol, vitamin D3, (VITAMIN D3) 2,000 unit tab Take 2,000 Units by mouth daily.  potassium 99 mg tablet Take 99 mg by mouth daily as needed.  acetaminophen (TYLENOL ARTHRITIS PAIN) 650 mg CR tablet Take 650 mg by mouth every six (6) hours as needed for Pain.  meloxicam (MOBIC) 15 mg tablet TAKE 1 TABLET BY MOUTH DAILY 90 Tab 3    simvastatin (ZOCOR) 20 mg tablet Take 1 Tab by mouth nightly. 90 Tab 3    omega-3 fatty acids-vitamin e 1,000 mg cap Take 1 Cap by mouth two (2) times a day.  calcium-cholecalciferol, d3, 600-125 mg-unit tab Take 1,200 mg by mouth daily.  furosemide (LASIX) 40 mg tablet TK 1 T PO D  2    peg-electrolyte soln (NULYTELY) 420 gram solution MIX WITH ANY CLEAR LIQUID.  TAKE 2 LITERS AT 6 PM ON THE DAY BEFORE THE COLONOSCOPY.  0         Participation   Did you attend clinic and class last week? yes    Review of Systems  Since your last visit, have you experienced any complications? no  If yes, please list:     No CP, SOB, palpitations, lightheadedness, dizziness, constipation. Positives highlight in BOLD. Are you taking an appetite suppressant? no  If so, is there any Chest Pain, Palpitations or Dizziness? BP Readings from Last 10 Encounters:   04/18/18 121/70   04/12/18 137/83   04/04/18 128/73   03/28/18 138/77   03/21/18 115/75   03/14/18 136/87   03/08/18 137/85   03/01/18 130/90   03/01/18 144/85   02/22/18 135/77         Have you received any other medical care this week? no  If yes, where and for what? Have you discontinued or changed any medicine or dose of your medicine since your last visit? no  If yes, where and for what? Diet  How many ounces of calorie-free liquids did you consume each day?  120 oz    How many meal replacements did you take each day? 4    Did you have any problems adhering to the program?  no If yes, please explain:       Exercise  Aerobic exercise: 0 min patient will not be doing any exercise, due to upcoming Right Total Knee Replacement on 5-22-18  Resistance exercise: 0 workouts / week  Any discomfort? yes     If yes, where? Due to Right Knee pain      Review of Systems  Complete ROS negative except where noted above    Objective  Visit Vitals    /70 (BP 1 Location: Left arm, BP Patient Position: Sitting)    Pulse (!) 51    Temp 98.6 °F (37 °C) (Oral)    Resp 14    Ht 5' 5\" (1.651 m)    Wt 197 lb 4.8 oz (89.5 kg)    SpO2 98%    BMI 32.83 kg/m2     No LMP recorded. Patient has had a hysterectomy.     PHQ over the last two weeks 1/3/2018   Little interest or pleasure in doing things Not at all   Feeling down, depressed or hopeless Not at all   Total Score PHQ 2 0         Waist Circumference: I personally reviewed patient's Weight Management Doc Flowsheet  Neck Circumference: I personally reviewed patient's Weight Management Doc Flowsheet  Percent Body Fat: I personally reviewed patient's Weight Management Doc Flowsheet    Physical Exam  Appearance: well appearing, obese, A&O, NAD  HEENT:  NC/AT, PERRL, No scleral icterus  Heart:  RRR without M/R/G  Lungs:  CTAB, no rhonchi, rales, or wheezes with good air exchange   Ext:  No LE Edema    Assessment / Plan    Encounter Diagnoses   Name Primary?  Obesity (BMI 30-39. 9) Yes    Mixed hyperlipidemia     HERZOG (nonalcoholic steatohepatitis) suggested by ultrasound report, 2016     Essential hypertension        1. Weight management well controlled   Progress was reviewed with patient    2. Labs    Latest results reviewed with patient   Lab slip given to pt for f/up HDL labs    3. Diet regimen   # of meal replacements prescribed: 4   If modified LCD-nutritional guidelines:    Monthly Goal   As below    Medical monitoring schedule:   Weekly BP/Weight checks   Monthly provider appointments  I have reviewed/discussed the above normal BMI with the patient. I have recommended the following interventions: dietary management education, guidance, and counseling, monitor weight and prescribed dietary intake . Becky Harding Ms. Benjamin Daily has a reminder for a \"due or due soon\" health maintenance. I have asked that she contact her primary care provider for follow-up on this health maintenance. Patient Instructions     Health Maintenance Due   Topic Date Due    ZOSTER VACCINE AGE 60>  03/05/2016    COLONOSCOPY  05/28/2018    BREAST CANCER SCRN MAMMOGRAM  07/14/2018     Congrats on getting to the 100's! Monthly goal:    10 lbs weight loss prior to surgery. Body Mass Index: Care Instructions  Your Care Instructions    Body mass index (BMI) can help you see if your weight is raising your risk for health problems. It uses a formula to compare how much you weigh with how tall you are. · A BMI lower than 18.5 is considered underweight.   · A BMI between 18.5 and 24.9 is considered healthy. · A BMI between 25 and 29.9 is considered overweight. A BMI of 30 or higher is considered obese. If your BMI is in the normal range, it means that you have a lower risk for weight-related health problems. If your BMI is in the overweight or obese range, you may be at increased risk for weight-related health problems, such as high blood pressure, heart disease, stroke, arthritis or joint pain, and diabetes. If your BMI is in the underweight range, you may be at increased risk for health problems such as fatigue, lower protection (immunity) against illness, muscle loss, bone loss, hair loss, and hormone problems. BMI is just one measure of your risk for weight-related health problems. You may be at higher risk for health problems if you are not active, you eat an unhealthy diet, or you drink too much alcohol or use tobacco products. Follow-up care is a key part of your treatment and safety. Be sure to make and go to all appointments, and call your doctor if you are having problems. It's also a good idea to know your test results and keep a list of the medicines you take. How can you care for yourself at home? · Practice healthy eating habits. This includes eating plenty of fruits, vegetables, whole grains, lean protein, and low-fat dairy. · If your doctor recommends it, get more exercise. Walking is a good choice. Bit by bit, increase the amount you walk every day. Try for at least 30 minutes on most days of the week. · Do not smoke. Smoking can increase your risk for health problems. If you need help quitting, talk to your doctor about stop-smoking programs and medicines. These can increase your chances of quitting for good. · Limit alcohol to 2 drinks a day for men and 1 drink a day for women. Too much alcohol can cause health problems. If you have a BMI higher than 25  · Your doctor may do other tests to check your risk for weight-related health problems.  This may include measuring the distance around your waist. A waist measurement of more than 40 inches in men or 35 inches in women can increase the risk of weight-related health problems. · Talk with your doctor about steps you can take to stay healthy or improve your health. You may need to make lifestyle changes to lose weight and stay healthy, such as changing your diet and getting regular exercise. If you have a BMI lower than 18.5  · Your doctor may do other tests to check your risk for health problems. · Talk with your doctor about steps you can take to stay healthy or improve your health. You may need to make lifestyle changes to gain or maintain weight and stay healthy, such as getting more healthy foods in your diet and doing exercises to build muscle. Where can you learn more? Go to http://stanton-gonsalo.info/. Enter S176 in the search box to learn more about \"Body Mass Index: Care Instructions. \"  Current as of: October 13, 2016  Content Version: 11.4  © 4224-1743 [employer]. Care instructions adapted under license by [employer] (which disclaims liability or warranty for this information). If you have questions about a medical condition or this instruction, always ask your healthcare professional. [place] [address] any warranty or liability for your use of this information. Follow-up Disposition:  Return in about 4 weeks (around 5/16/2018). 10 minutes of the 15 minutes face to face time with Roshan Meza consisted of counseling & coordinating and/or discussing treatment plans in reference to her The primary encounter diagnosis was Obesity (BMI 30-39.9). Diagnoses of Mixed hyperlipidemia, HERZOG (nonalcoholic steatohepatitis) suggested by ultrasound report, 2016, and Essential hypertension were also pertinent to this visit. The patient is to follow up as scheduled and will report to the ED or the office if symptoms change or increase.  The patient has voiced understanding and will comply.

## 2018-04-18 NOTE — PROGRESS NOTES
Chief Complaint   Patient presents with    Weight Management     1. Have you been to the ER, urgent care clinic since your last visit? Hospitalized since your last visit? No    2. Have you seen or consulted any other health care providers outside of the [address] [place] [address] since your last visit? Include any pap smears or colon screening.  No

## 2018-04-25 ENCOUNTER — CLINICAL SUPPORT (OUTPATIENT)
Dept: FAMILY MEDICINE CLINIC | Age: 63
End: 2018-04-25

## 2018-04-25 VITALS
SYSTOLIC BLOOD PRESSURE: 138 MMHG | WEIGHT: 198 LBS | HEART RATE: 59 BPM | DIASTOLIC BLOOD PRESSURE: 83 MMHG | BODY MASS INDEX: 32.99 KG/M2 | HEIGHT: 65 IN

## 2018-04-25 DIAGNOSIS — E66.9 OBESITY (BMI 30-39.9): Primary | ICD-10-CM

## 2018-05-02 ENCOUNTER — CLINICAL SUPPORT (OUTPATIENT)
Dept: FAMILY MEDICINE CLINIC | Age: 63
End: 2018-05-02

## 2018-05-02 VITALS
DIASTOLIC BLOOD PRESSURE: 81 MMHG | HEIGHT: 65 IN | SYSTOLIC BLOOD PRESSURE: 137 MMHG | HEART RATE: 66 BPM | WEIGHT: 196.2 LBS | BODY MASS INDEX: 32.69 KG/M2

## 2018-05-02 DIAGNOSIS — E66.9 OBESITY (BMI 30-39.9): Primary | ICD-10-CM

## 2018-05-02 NOTE — PROGRESS NOTES
Progress Note: Weekly Education Class in the [facility]   Is there anything that you or the patient needs to let the [facility] Physician know about? no  Over the past week, have you experienced any side-effects? no    Veronica Yeager is a 65 y.o. female who is enrolled in [facility]    Visit Vitals    /81 (BP 1 Location: Right arm, BP Patient Position: Sitting)    Pulse 66    Ht 5' 5\" (1.651 m)    Wt 196 lb 3.2 oz (89 kg)    BMI 32.65 kg/m2     Weight Metrics 5/2/2018 4/25/2018 4/18/2018 4/18/2018 4/12/2018 4/11/2018 4/4/2018   Weight 196 lb 3.2 oz 198 lb - 197 lb 4.8 oz - 200 lb 202 lb 12.8 oz   Waist Measure Inches 40 38 39 - 39 - 40   Exercise Mins/week - - 0 - - - -   Body Fat % - - 40.3 - - - -   BMI 32.65 kg/m2 32.95 kg/m2 - 32.83 kg/m2 - 33.28 kg/m2 33.75 kg/m2         Have you received any other medical care this week? no  If yes, where and for what? Have you had any change in your medications since your last visit? no  If yes what? Did you have any problems adhering to the program last week? no  If yes, please explain:       Eating Habits Over Last Week:  Did you take in 64 oz of non-caloric fluids? yes     Did you consume your 4 meal replacements each day?  yes       Physical Activity Over the Past Week:    Aerobic exercise: 0 min  Resistance exercise: 0 workouts / week

## 2018-05-09 ENCOUNTER — TELEPHONE (OUTPATIENT)
Dept: ORTHOPEDIC SURGERY | Facility: CLINIC | Age: 63
End: 2018-05-09

## 2018-05-09 ENCOUNTER — CLINICAL SUPPORT (OUTPATIENT)
Dept: FAMILY MEDICINE CLINIC | Age: 63
End: 2018-05-09

## 2018-05-09 ENCOUNTER — HOSPITAL ENCOUNTER (OUTPATIENT)
Dept: LAB | Age: 63
Discharge: HOME | End: 2018-05-09
Payer: COMMERCIAL

## 2018-05-09 ENCOUNTER — HOSPITAL ENCOUNTER (OUTPATIENT)
Dept: GENERAL RADIOLOGY | Age: 63
Discharge: HOME | End: 2018-05-09
Payer: COMMERCIAL

## 2018-05-09 ENCOUNTER — HOSPITAL ENCOUNTER (OUTPATIENT)
Dept: PREADMISSION TESTING | Age: 63
Discharge: HOME | End: 2018-05-09
Payer: COMMERCIAL

## 2018-05-09 DIAGNOSIS — Z01.812 BLOOD TESTS PRIOR TO TREATMENT OR PROCEDURE: ICD-10-CM

## 2018-05-09 DIAGNOSIS — E66.9 OBESITY (BMI 30-39.9): ICD-10-CM

## 2018-05-09 DIAGNOSIS — M17.11 PRIMARY OSTEOARTHRITIS OF RIGHT KNEE: ICD-10-CM

## 2018-05-09 DIAGNOSIS — E66.3 OVERWEIGHT (BMI 25.0-29.9): Primary | ICD-10-CM

## 2018-05-09 DIAGNOSIS — Z01.811 PRE-OPERATIVE RESPIRATORY EXAMINATION: ICD-10-CM

## 2018-05-09 LAB
ABO + RH BLD: NORMAL
ALBUMIN SERPL-MCNC: 3.8 G/DL (ref 3.4–5)
ALBUMIN/GLOB SERPL: 1.3 {RATIO} (ref 0.8–1.7)
ALP SERPL-CCNC: 62 U/L (ref 45–117)
ALT SERPL-CCNC: 63 U/L (ref 13–56)
ANION GAP SERPL CALC-SCNC: 4 MMOL/L (ref 3–18)
ANION GAP SERPL CALC-SCNC: 4 MMOL/L (ref 3–18)
APPEARANCE UR: CLEAR
APTT PPP: 26.8 SEC (ref 23–36.4)
AST SERPL-CCNC: 23 U/L (ref 15–37)
BACTERIA URNS QL MICRO: ABNORMAL /HPF
BASOPHILS # BLD: 0 K/UL (ref 0–0.1)
BASOPHILS NFR BLD: 0 % (ref 0–2)
BILIRUB SERPL-MCNC: 0.5 MG/DL (ref 0.2–1)
BILIRUB UR QL: NEGATIVE
BLOOD GROUP ANTIBODIES SERPL: NORMAL
BUN SERPL-MCNC: 16 MG/DL (ref 7–18)
BUN SERPL-MCNC: 16 MG/DL (ref 7–18)
BUN/CREAT SERPL: 31 (ref 12–20)
BUN/CREAT SERPL: 31 (ref 12–20)
CALCIUM SERPL-MCNC: 9.8 MG/DL (ref 8.5–10.1)
CALCIUM SERPL-MCNC: 9.9 MG/DL (ref 8.5–10.1)
CHLORIDE SERPL-SCNC: 107 MMOL/L (ref 100–108)
CHLORIDE SERPL-SCNC: 108 MMOL/L (ref 100–108)
CO2 SERPL-SCNC: 31 MMOL/L (ref 21–32)
CO2 SERPL-SCNC: 32 MMOL/L (ref 21–32)
COLOR UR: YELLOW
CREAT SERPL-MCNC: 0.52 MG/DL (ref 0.6–1.3)
CREAT SERPL-MCNC: 0.52 MG/DL (ref 0.6–1.3)
DIFFERENTIAL METHOD BLD: NORMAL
EOSINOPHIL # BLD: 0.1 K/UL (ref 0–0.4)
EOSINOPHIL NFR BLD: 1 % (ref 0–5)
EPITH CASTS URNS QL MICRO: ABNORMAL /LPF (ref 0–5)
ERYTHROCYTE [DISTWIDTH] IN BLOOD BY AUTOMATED COUNT: 12.3 % (ref 11.6–14.5)
GLOBULIN SER CALC-MCNC: 2.9 G/DL (ref 2–4)
GLUCOSE SERPL-MCNC: 100 MG/DL (ref 74–99)
GLUCOSE SERPL-MCNC: 97 MG/DL (ref 74–99)
GLUCOSE UR STRIP.AUTO-MCNC: NEGATIVE MG/DL
HBA1C MFR BLD: 5.2 % (ref 4.2–5.6)
HCT VFR BLD AUTO: 43.8 % (ref 35–45)
HGB BLD-MCNC: 14.2 G/DL (ref 12–16)
HGB UR QL STRIP: NEGATIVE
HYALINE CASTS URNS QL MICRO: ABNORMAL /LPF (ref 0–2)
INR PPP: 1 (ref 0.8–1.2)
KETONES UR QL STRIP.AUTO: NEGATIVE MG/DL
LEUKOCYTE ESTERASE UR QL STRIP.AUTO: ABNORMAL
LYMPHOCYTES # BLD: 1.7 K/UL (ref 0.9–3.6)
LYMPHOCYTES NFR BLD: 35 % (ref 21–52)
MCH RBC QN AUTO: 28.6 PG (ref 24–34)
MCHC RBC AUTO-ENTMCNC: 32.4 G/DL (ref 31–37)
MCV RBC AUTO: 88.1 FL (ref 74–97)
MONOCYTES # BLD: 0.5 K/UL (ref 0.05–1.2)
MONOCYTES NFR BLD: 9 % (ref 3–10)
NEUTS SEG # BLD: 2.8 K/UL (ref 1.8–8)
NEUTS SEG NFR BLD: 55 % (ref 40–73)
NITRITE UR QL STRIP.AUTO: NEGATIVE
PH UR STRIP: 6.5 [PH] (ref 5–8)
PLATELET # BLD AUTO: 229 K/UL (ref 135–420)
PMV BLD AUTO: 11.3 FL (ref 9.2–11.8)
POTASSIUM SERPL-SCNC: 4.3 MMOL/L (ref 3.5–5.5)
POTASSIUM SERPL-SCNC: 4.3 MMOL/L (ref 3.5–5.5)
PROT SERPL-MCNC: 6.7 G/DL (ref 6.4–8.2)
PROT UR STRIP-MCNC: NEGATIVE MG/DL
PROTHROMBIN TIME: 13.1 SEC (ref 11.5–15.2)
RBC # BLD AUTO: 4.97 M/UL (ref 4.2–5.3)
RBC #/AREA URNS HPF: ABNORMAL /HPF (ref 0–5)
SODIUM SERPL-SCNC: 143 MMOL/L (ref 136–145)
SODIUM SERPL-SCNC: 143 MMOL/L (ref 136–145)
SP GR UR REFRACTOMETRY: 1.02 (ref 1–1.03)
SPECIMEN EXP DATE BLD: NORMAL
URATE SERPL-MCNC: 3.1 MG/DL (ref 2.6–7.2)
UROBILINOGEN UR QL STRIP.AUTO: 0.2 EU/DL (ref 0.2–1)
WBC # BLD AUTO: 5 K/UL (ref 4.6–13.2)
WBC URNS QL MICRO: ABNORMAL /HPF (ref 0–4)

## 2018-05-09 PROCEDURE — 85610 PROTHROMBIN TIME: CPT | Performed by: PHYSICIAN ASSISTANT

## 2018-05-09 PROCEDURE — 83036 HEMOGLOBIN GLYCOSYLATED A1C: CPT | Performed by: PHYSICIAN ASSISTANT

## 2018-05-09 PROCEDURE — 80053 COMPREHEN METABOLIC PANEL: CPT | Performed by: NURSE PRACTITIONER

## 2018-05-09 PROCEDURE — 87186 SC STD MICRODIL/AGAR DIL: CPT | Performed by: PHYSICIAN ASSISTANT

## 2018-05-09 PROCEDURE — 84550 ASSAY OF BLOOD/URIC ACID: CPT | Performed by: NURSE PRACTITIONER

## 2018-05-09 PROCEDURE — 86900 BLOOD TYPING SEROLOGIC ABO: CPT | Performed by: SPECIALIST

## 2018-05-09 PROCEDURE — 85025 COMPLETE CBC W/AUTO DIFF WBC: CPT | Performed by: PHYSICIAN ASSISTANT

## 2018-05-09 PROCEDURE — 71046 X-RAY EXAM CHEST 2 VIEWS: CPT

## 2018-05-09 PROCEDURE — 36415 COLL VENOUS BLD VENIPUNCTURE: CPT | Performed by: PHYSICIAN ASSISTANT

## 2018-05-09 PROCEDURE — 87086 URINE CULTURE/COLONY COUNT: CPT | Performed by: PHYSICIAN ASSISTANT

## 2018-05-09 PROCEDURE — 81001 URINALYSIS AUTO W/SCOPE: CPT | Performed by: PHYSICIAN ASSISTANT

## 2018-05-09 PROCEDURE — 87077 CULTURE AEROBIC IDENTIFY: CPT | Performed by: PHYSICIAN ASSISTANT

## 2018-05-09 PROCEDURE — 85730 THROMBOPLASTIN TIME PARTIAL: CPT | Performed by: PHYSICIAN ASSISTANT

## 2018-05-09 PROCEDURE — 80048 BASIC METABOLIC PNL TOTAL CA: CPT | Performed by: PHYSICIAN ASSISTANT

## 2018-05-09 RX ORDER — LEVOFLOXACIN 750 MG/1
750 TABLET ORAL DAILY
Qty: 7 TAB | Refills: 0 | Status: SHIPPED | OUTPATIENT
Start: 2018-05-09 | End: 2018-05-23

## 2018-05-09 NOTE — TELEPHONE ENCOUNTER
Attempted to contact patient regarding her pre-op labs. Phone rang 4 times, was picked up, and then all I could hear was background noise. Will attempt to contact again later this morning.

## 2018-05-09 NOTE — TELEPHONE ENCOUNTER
Called and spoke to patient. Advised that an antibiotic was phoned in as her pre-op lab work showed a UTI. Advised that she needs to start taking this antiobiotic as soon as possible, patient thanked writer for the call.

## 2018-05-10 VITALS
DIASTOLIC BLOOD PRESSURE: 79 MMHG | SYSTOLIC BLOOD PRESSURE: 139 MMHG | WEIGHT: 193.2 LBS | HEART RATE: 56 BPM | HEIGHT: 67 IN | BODY MASS INDEX: 30.32 KG/M2

## 2018-05-10 NOTE — PROGRESS NOTES
Progress Note: Weekly Education Class in the [facility]   Is there anything that you or the patient needs to let the [facility] Physician know about? no  Over the past week, have you experienced any side-effects? no    Olga Shah is a 59 y.o. female who is enrolled in [facility]    Visit Vitals    /79 (BP 1 Location: Right arm, BP Patient Position: Sitting)    Pulse (!) 56    Ht 5' 6.5\" (1.689 m)    Wt 193 lb 3.2 oz (87.6 kg)    BMI 30.72 kg/m2     Weight Metrics 5/10/2018 5/9/2018 5/9/2018 5/2/2018 4/25/2018 4/18/2018 4/18/2018   Weight - 193 lb 3.2 oz 191 lb 196 lb 3.2 oz 198 lb - 197 lb 4.8 oz   Waist Measure Inches 36 - - 40 38 39 -   Exercise Mins/week - - - - - 0 -   Body Fat % - - - - - 40.3 -   BMI - 30.72 kg/m2 - 32.65 kg/m2 32.95 kg/m2 - 32.83 kg/m2         Have you received any other medical care this week? no  If yes, where and for what? Have you had any change in your medications since your last visit? no  If yes what? Did you have any problems adhering to the program last week? no  If yes, please explain:       Eating Habits Over Last Week:  Did you take in 64 oz of non-caloric fluids? yes     Did you consume your 4 meal replacements each day?  yes       Physical Activity Over the Past Week:    Aerobic exercise: 45 min  Resistance exercise: 0 workouts / week

## 2018-05-11 LAB
BACTERIA SPEC CULT: ABNORMAL
SERVICE CMNT-IMP: ABNORMAL

## 2018-05-11 NOTE — PROGRESS NOTES
Helena Stefanie attended the Joint Replacement Pre-Operative class on 5/9/18. Topics discussed included surgery preparation, what to expect the day of surgery, medications, physical and occupational therapy, and discharge planning. It was discussed that this is considered an elective surgery and that prior to the surgery they need to make decisions such as arranging for help at home once they are discharged. She was given a knee patient education notebook to take home. Opportunity was given to ask questions and phone number of the Orthopaedic Nurse Clinician was given for any questions or concerns that may arise later.

## 2018-05-14 DIAGNOSIS — Z01.812 BLOOD TESTS PRIOR TO TREATMENT OR PROCEDURE: ICD-10-CM

## 2018-05-14 DIAGNOSIS — M17.11 PRIMARY OSTEOARTHRITIS OF RIGHT KNEE: Primary | ICD-10-CM

## 2018-05-15 ENCOUNTER — OFFICE VISIT (OUTPATIENT)
Dept: INTERNAL MEDICINE CLINIC | Age: 63
End: 2018-05-15

## 2018-05-15 VITALS
RESPIRATION RATE: 16 BRPM | WEIGHT: 192 LBS | BODY MASS INDEX: 30.13 KG/M2 | HEIGHT: 67 IN | OXYGEN SATURATION: 99 % | DIASTOLIC BLOOD PRESSURE: 71 MMHG | TEMPERATURE: 97.6 F | HEART RATE: 51 BPM | SYSTOLIC BLOOD PRESSURE: 126 MMHG

## 2018-05-15 DIAGNOSIS — Z71.89 ADVANCE CARE PLANNING: ICD-10-CM

## 2018-05-15 DIAGNOSIS — M17.11 OSTEOARTHRITIS OF RIGHT KNEE, UNSPECIFIED OSTEOARTHRITIS TYPE: Primary | ICD-10-CM

## 2018-05-15 NOTE — PATIENT INSTRUCTIONS
Body Mass Index: Care Instructions  Your Care Instructions    Body mass index (BMI) can help you see if your weight is raising your risk for health problems. It uses a formula to compare how much you weigh with how tall you are. · A BMI lower than 18.5 is considered underweight. · A BMI between 18.5 and 24.9 is considered healthy. · A BMI between 25 and 29.9 is considered overweight. A BMI of 30 or higher is considered obese. If your BMI is in the normal range, it means that you have a lower risk for weight-related health problems. If your BMI is in the overweight or obese range, you may be at increased risk for weight-related health problems, such as high blood pressure, heart disease, stroke, arthritis or joint pain, and diabetes. If your BMI is in the underweight range, you may be at increased risk for health problems such as fatigue, lower protection (immunity) against illness, muscle loss, bone loss, hair loss, and hormone problems. BMI is just one measure of your risk for weight-related health problems. You may be at higher risk for health problems if you are not active, you eat an unhealthy diet, or you drink too much alcohol or use tobacco products. Follow-up care is a key part of your treatment and safety. Be sure to make and go to all appointments, and call your doctor if you are having problems. It's also a good idea to know your test results and keep a list of the medicines you take. How can you care for yourself at home? · Practice healthy eating habits. This includes eating plenty of fruits, vegetables, whole grains, lean protein, and low-fat dairy. · If your doctor recommends it, get more exercise. Walking is a good choice. Bit by bit, increase the amount you walk every day. Try for at least 30 minutes on most days of the week. · Do not smoke. Smoking can increase your risk for health problems. If you need help quitting, talk to your doctor about stop-smoking programs and medicines. These can increase your chances of quitting for good. · Limit alcohol to 2 drinks a day for men and 1 drink a day for women. Too much alcohol can cause health problems. If you have a BMI higher than 25  · Your doctor may do other tests to check your risk for weight-related health problems. This may include measuring the distance around your waist. A waist measurement of more than 40 inches in men or 35 inches in women can increase the risk of weight-related health problems. · Talk with your doctor about steps you can take to stay healthy or improve your health. You may need to make lifestyle changes to lose weight and stay healthy, such as changing your diet and getting regular exercise. If you have a BMI lower than 18.5  · Your doctor may do other tests to check your risk for health problems. · Talk with your doctor about steps you can take to stay healthy or improve your health. You may need to make lifestyle changes to gain or maintain weight and stay healthy, such as getting more healthy foods in your diet and doing exercises to build muscle. Where can you learn more? Go to http://stanton-gonsalo.info/. Enter S176 in the search box to learn more about \"Body Mass Index: Care Instructions. \"  Current as of: October 13, 2016  Content Version: 11.4  © 5195-1114 [employer]. Care instructions adapted under license by [employer] (which disclaims liability or warranty for this information). If you have questions about a medical condition or this instruction, always ask your healthcare professional. [address] any warranty or liability for your use of this information.

## 2018-05-15 NOTE — PROGRESS NOTES
1. Have you been to the ER, urgent care clinic since your last visit? Hospitalized since your last visit? No    2. Have you seen or consulted any other health care providers outside of the [address] [place] [address] since your last visit? Include any pap smears or colon screening.  Yes Reason for visit: orthopedics

## 2018-05-15 NOTE — MR AVS SNAPSHOT
[address] [facility] 
 
 
 [address], [address] [place] [address] [facility] 
[phone] Patient: Perlita Bar MRN: [id number] [facility]:0/4/6773 Visit Information Date & Time Provider Department Dept. Phone Encounter #  
 5/15/2018  9:00 AM Shanel Casarez MD [facility] [address] [place] [address]  Follow-up Instructions Return in about 1 year (around 5/15/2019). Your Appointments 5/16/2018  8:30 AM  
HISTORY AND PHYSICAL with Alecia Willis PA-C  
[facility] - [address]) Appt Note: MV INPT SX 5-22-18 RT TOTAL KNEE ARTHROPLASTY/DMM; dmm  
 [address] [facility], [address] [facility] [address] [phone]  
  
   
 [address] [facility], [address] [address] 5/16/2018 89:31 AM  
METABOLIC PROGRAM 15 with Abby Sandoval NP [facility] [address] [place] [address] ([address] [place] [address]) Appt Note: 4 week f/u; 4 week f/u  
 [address] [facility], [address] [facility]  
[phone]  
  
   
 [address], [address]  
  
    
 6/1/2018  8:30 AM  
POST OP with Alecia Willis PA-C  
[facility] - [facility] ([address] [place] [address]) Appt Note: S/P MV INPT SX 5-22-18 RT TKA/DMM  
 [address] [facility], [address] [place] [address]  
[phone]  
  
   
 [address] [facility], [address] [address]  
  
    
 6/7/2018  9:00 AM  
Follow Up with Vika Thomas MD  
[facility] ([address] [place] [address]) Appt Note: 3 month f/up [place] [address] [address] [address] [phone] [address] [facility] [address] Upcoming Health Maintenance Date Due ZOSTER VACCINE AGE 60> 3/5/2016 COLONOSCOPY 5/28/2018 BREAST CANCER SCRN MAMMOGRAM 7/14/2018 Influenza Age 5 to Adult 8/1/2018 PAP AKA CERVICAL CYTOLOGY 7/28/2019 DTaP/Tdap/Td series (2 - Td) 2/7/2027 Allergies as of 5/15/2018  Review Complete On: 5/15/2018 By: Nupur Hernandez MD  
 No Known Allergies Current Immunizations  Reviewed on 2/7/2017 Name Date Influenza Vaccine 10/12/2017 Influenza Vaccine (Quad) PF 10/10/2016 Tdap 2/7/2017 Not reviewed this visit Vitals BP Pulse Temp Resp Height(growth percentile) Weight(growth percentile) 126/71 (!) 51 97.6 °F (36.4 °C) 16 5' 6.5\" (1.689 m) 192 lb (87.1 kg) SpO2 BMI OB Status Smoking Status 99% 30.53 kg/m2 Hysterectomy Never Smoker Vitals History BMI and BSA Data Body Mass Index Body Surface Area 30.53 kg/m 2 2.02 m 2 Preferred Pharmacy Pharmacy Name Phone [facility] [address] [place] [address] [phone] Your Updated Medication List  
  
   
This list is accurate as of 5/15/18  9:22 AM.  Always use your most recent med list.  
  
  
  
  
 aspirin 81 mg chewable tablet Take 1 Tab by mouth daily. calcium-cholecalciferol (d3) 600-125 mg-unit Tab Take 1,200 mg by mouth daily. levoFLOXacin 750 mg tablet Commonly known as:  [place] Take 1 Tab by mouth daily. meloxicam 15 mg tablet Commonly known as:  MOBIC  
TAKE 1 TABLET BY MOUTH DAILY  
  
 metoprolol tartrate 50 mg tablet Commonly known as:  LOPRESSOR Take 1 Tab by mouth two (2) times a day. omega-3 fatty acids-vitamin e 1,000 mg Cap Take 1 Cap by mouth two (2) times a day. peg-electrolyte soln 420 gram solution Commonly known as:  NULYTELY  
MIX WITH ANY CLEAR LIQUID. TAKE 2 LITERS AT 6 PM ON THE DAY BEFORE THE COLONOSCOPY. potassium 99 mg tablet Take 99 mg by mouth daily as needed. simvastatin 20 mg tablet Commonly known as:  ZOCOR Take 1 Tab by mouth nightly. TYLENOL ARTHRITIS PAIN 650 mg Denicendalyn So Generic drug:  acetaminophen Take 650 mg by mouth every six (6) hours as needed for Pain. VITAMIN D3 2,000 unit Tab Generic drug:  cholecalciferol (vitamin D3) Take 2,000 Units by mouth daily. Follow-up Instructions Return in about 1 year (around 5/15/2019). Patient Instructions Body Mass Index: Care Instructions Your Care Instructions Body mass index (BMI) can help you see if your weight is raising your risk for health problems. It uses a formula to compare how much you weigh with how tall you are. · A BMI lower than 18.5 is considered underweight. · A BMI between 18.5 and 24.9 is considered healthy. · A BMI between 25 and 29.9 is considered overweight. A BMI of 30 or higher is considered obese. If your BMI is in the normal range, it means that you have a lower risk for weight-related health problems. If your BMI is in the overweight or obese range, you may be at increased risk for weight-related health problems, such as high blood pressure, heart disease, stroke, arthritis or joint pain, and diabetes. If your BMI is in the underweight range, you may be at increased risk for health problems such as fatigue, lower protection (immunity) against illness, muscle loss, bone loss, hair loss, and hormone problems. BMI is just one measure of your risk for weight-related health problems. You may be at higher risk for health problems if you are not active, you eat an unhealthy diet, or you drink too much alcohol or use tobacco products. Follow-up care is a key part of your treatment and safety. Be sure to make and go to all appointments, and call your doctor if you are having problems. It's also a good idea to know your test results and keep a list of the medicines you take. How can you care for yourself at home? · Practice healthy eating habits. This includes eating plenty of fruits, vegetables, whole grains, lean protein, and low-fat dairy. · If your doctor recommends it, get more exercise.  Walking is a good choice. Bit by bit, increase the amount you walk every day. Try for at least 30 minutes on most days of the week. · Do not smoke. Smoking can increase your risk for health problems. If you need help quitting, talk to your doctor about stop-smoking programs and medicines. These can increase your chances of quitting for good. · Limit alcohol to 2 drinks a day for men and 1 drink a day for women. Too much alcohol can cause health problems. If you have a BMI higher than 25 · Your doctor may do other tests to check your risk for weight-related health problems. This may include measuring the distance around your waist. A waist measurement of more than 40 inches in men or 35 inches in women can increase the risk of weight-related health problems. · Talk with your doctor about steps you can take to stay healthy or improve your health. You may need to make lifestyle changes to lose weight and stay healthy, such as changing your diet and getting regular exercise. If you have a BMI lower than 18.5 · Your doctor may do other tests to check your risk for health problems. · Talk with your doctor about steps you can take to stay healthy or improve your health. You may need to make lifestyle changes to gain or maintain weight and stay healthy, such as getting more healthy foods in your diet and doing exercises to build muscle. Where can you learn more? Go to http://stanton-gonsalo.info/. Enter S176 in the search box to learn more about \"Body Mass Index: Care Instructions. \" Current as of: October 13, 2016 Content Version: 11.4 © 3372-5835 Healthwise, OnMyBlock. Care instructions adapted under license by [employer] (which disclaims liability or warranty for this information). If you have questions about a medical condition or this instruction, always ask your healthcare professional. [place] [address] any warranty or liability for your use of this information. Introducing [facility]! Dear Kal Reis: 
Thank you for requesting a BitePal account. Our records indicate that you already have an active BitePal account. You can access your account anytime at [email or web address] Did you know that you can access your hospital and ER discharge instructions at any time in BitePal? You can also review all of your test results from your hospital stay or ER visit. Additional Information If you have questions, please visit the Frequently Asked Questions section of the BitePal website at [email or web address] Remember, BitePal is NOT to be used for urgent needs. For medical emergencies, dial 911. Now available from your iPhone and Android! Please provide this summary of care documentation to your next provider. Your primary care clinician is listed as Yevgeniy Bell. If you have any questions after today's visit, please call [phone].

## 2018-05-15 NOTE — PROGRESS NOTES
[facility]:   Preoperative Evaluation    Date of Exam: 05/15/18    MRN: [id number]    Ariana Kurtz  Is a 59 y.o.  female  who presents for preoperative evaluation and management of right knee DJD. Chief Complaint   Patient presents with   [facility] Exam     dr Jenna Cheng right knee replacement       Subjective:   Pt is a 62yo female with h/o HLD, HTN, AF, and DJD. Right Knee DJD: Present for several months. Pain is worsening. Pain does not radiate. General Information:  Procedure/Surgery: Right knee replacement  Primary Physician: Elyssa Cox MD  Surgery status: Elective  Surgery risk: Intermediate (head/neck, intraperitoneal, intrathoracic, orthopedic, and prostate    Cardiovascular Risk Factors:  1. Coronary revascularization within 5 years: no  2. Recurrent chest pain: no  3. Shortness of breath:  no  4. Recent coronary evaluation/stress test/angiogram:  no  5. Recent MI (less than 1 month ago):  no  6. Prior MI (by way of history or pathological waves):  no  7. Compensated CHF or h/o CHF:  no  8. Severe valvular disease:  no  9. Decompensated CHF:  no  10. High-grade atrioventricular block:  no  11. Arrhythmia:  yes; she has h/o AF. She is now in sinus rhythm. 1/30/18 Echocardiogram: Left ventricle: Systolic function was at the lower limits of normal by visual assessment. Ejection fraction was estimated to be 50 %. No obvious wall motion abnormalities identified in the  views obtained. Wall thickness was at the upper limits of normal.    Other Risk Factors:  1. Diabetes hx:  no  2. H/o CVA:  no  3. Uncontrolled hypertension:  no  4, Advanced age:  no  5. Low functional capacity:  no  6. Recent use of: No recent use of aspirin (ASA), NSAIDS or steroids - she is not taking any NSAIDs given her anticipated surgery coming up. 7. Tetanus up to date: tetanus status unknown to the patient  8. Anesthesia Complications: None  9. History of abnormal bleeding : None  10.  History of Blood Transfusions: no  11. Health Care Directive or Living Will: yes; she has no changes to make to her preexisting advance directive  12. Latex Allergy: no      The patient is being treated for a UTI although she never had symptoms. Her urinalysis showed nonspecific findings concerning for an underlying infection. As such, her orthopedic team ordered levofloxacin, which she is tolerating well and without any adverse side effects. Problem List:     Patient Active Problem List    Diagnosis Date Noted    Atrial fibrillation ([address] 01/29/2018    HERZOG (nonalcoholic steatohepatitis) suggested by ultrasound report, 2016 08/11/2017    Obesity (BMI 30-39.9) 02/12/2017    Hyperlipidemia 02/07/2017    Essential hypertension 02/07/2017    Osteoarthritis of knee 02/07/2017     Medical History:     Past Medical History:   Diagnosis Date    Arthritis     Bilateral Knee, and Bilateral Hand/Thumb    Atrial fibrillation ([address] 1/29/2018    Carpal tunnel syndrome     H/O echocardiogram 01/30/2018    EF 50%, normal left atrial size, no valvular heart disease    Headache     History of ankle fusion 1997    left    Hx of migraine headaches     Hypercholesterolemia     Hypertension     Morbid obesity ([address]     HERZOG (nonalcoholic steatohepatitis) suggested by ultrasound report, 2016 8/11/2017    Vertigo      Allergies:   No Known Allergies   Medications:     Current Outpatient Prescriptions   Medication Sig    levoFLOXacin (LEVAQUIN) 750 mg tablet Take 1 Tab by mouth daily.  metoprolol tartrate (LOPRESSOR) 50 mg tablet Take 1 Tab by mouth two (2) times a day.  aspirin 81 mg chewable tablet Take 1 Tab by mouth daily.  cholecalciferol, vitamin D3, (VITAMIN D3) 2,000 unit tab Take 2,000 Units by mouth daily.  potassium 99 mg tablet Take 99 mg by mouth daily as needed.  acetaminophen (TYLENOL ARTHRITIS PAIN) 650 mg CR tablet Take 650 mg by mouth every six (6) hours as needed for Pain.     meloxicam (MOBIC) 15 mg tablet TAKE 1 TABLET BY MOUTH DAILY    simvastatin (ZOCOR) 20 mg tablet Take 1 Tab by mouth nightly.  omega-3 fatty acids-vitamin e 1,000 mg cap Take 1 Cap by mouth two (2) times a day.  calcium-cholecalciferol, d3, 600-125 mg-unit tab Take 1,200 mg by mouth daily.  peg-electrolyte soln (NULYTELY) 420 gram solution MIX WITH ANY CLEAR LIQUID. TAKE 2 LITERS AT 6 PM ON THE DAY BEFORE THE COLONOSCOPY. No current facility-administered medications for this visit. Surgical History:     Past Surgical History:   Procedure Laterality Date    HX APPENDECTOMY      age 25 years    HX COLONOSCOPY      Due in 2018: May 2015    HX HYSTERECTOMY      age 30years old   [facility]X ORTHOPAEDIC Left     ankle fusion    HX OTHER SURGICAL  2014    removal of mole left breast    HX TONSILLECTOMY      at age 29years old     Social History:     Social History     Social History    Marital status:      Spouse name: N/A    Number of children: N/A    Years of education: N/A     Social History Main Topics    Smoking status: Never Smoker    Smokeless tobacco: Never Used    Alcohol use No    Drug use: No    Sexual activity: Not Asked     Other Topics Concern    None     Social History Narrative    Works at [employer] in the [employer]         REVIEW OF SYSTEMS:  Review of Systems   Constitutional: Negative for chills and fever. HENT: Negative for ear pain and sore throat. Eyes: Negative for blurred vision and pain. Respiratory: Negative for cough and shortness of breath. Cardiovascular: Negative for chest pain. Gastrointestinal: Negative for abdominal pain, blood in stool and melena. Genitourinary: Negative for dysuria and hematuria. Musculoskeletal: Positive for joint pain. Negative for myalgias. Skin: Negative for rash. Neurological: Negative for tingling, focal weakness and headaches. Endo/Heme/Allergies: Does not bruise/bleed easily.    Psychiatric/Behavioral: Negative for substance abuse. Objective:     Vitals:    05/15/18 0848   BP: 126/71   Pulse: (!) 51   Resp: 16   Temp: 97.6 °F (36.4 °C)   SpO2: 99%   Weight: 192 lb (87.1 kg)   Height: 5' 6.5\" (1.689 m)   PainSc:   5   PainLoc: Knee       Physical Exam   Constitutional: She is oriented to person, place, and time and well-developed, well-nourished, and in no distress. HENT:   Head: Normocephalic and atraumatic. Right Ear: External ear normal.   Left Ear: External ear normal.   Nose: Nose normal.   Mouth/Throat: Oropharynx is clear and moist. No oropharyngeal exudate. Eyes: Conjunctivae and EOM are normal. Pupils are equal, round, and reactive to light. Right eye exhibits no discharge. Left eye exhibits no discharge. No scleral icterus. Neck: Neck supple. Cardiovascular: Normal rate, regular rhythm, normal heart sounds and intact distal pulses. Exam reveals no gallop and no friction rub. No murmur heard. Pulmonary/Chest: Effort normal and breath sounds normal. No respiratory distress. She has no wheezes. She has no rales. Abdominal: Soft. Bowel sounds are normal. She exhibits no distension. There is no tenderness. There is no rebound and no guarding. Musculoskeletal: She exhibits no edema or tenderness (BUE). She has some discomfort with passive ROM exercises along her right knee. Lymphadenopathy:     She has no cervical adenopathy. Neurological: She is alert and oriented to person, place, and time. She exhibits normal muscle tone. Gait normal.   Skin: Skin is warm and dry. No erythema. Psychiatric: Affect normal.   Nursing note and vitals reviewed.       DIAGNOSTICS:   Lab Results   Component Value Date/Time    Sodium 143 05/09/2018 07:31 AM    Potassium 4.3 05/09/2018 07:31 AM    Chloride 108 05/09/2018 07:31 AM    CO2 31 05/09/2018 07:31 AM    Anion gap 4 05/09/2018 07:31 AM    Glucose 97 05/09/2018 07:31 AM    BUN 16 05/09/2018 07:31 AM    Creatinine 0.52 (L) 05/09/2018 07:31 AM BUN/Creatinine ratio 31 (H) 05/09/2018 07:31 AM    GFR est AA >60 05/09/2018 07:31 AM    GFR est non-AA >60 05/09/2018 07:31 AM    Calcium 9.9 05/09/2018 07:31 AM     Lab Results   Component Value Date/Time    WBC 5.0 05/09/2018 07:20 AM    HGB 14.2 05/09/2018 07:20 AM    HCT 43.8 05/09/2018 07:20 AM    PLATELET 911 63/62/1579 07:20 AM    MCV 88.1 05/09/2018 07:20 AM     Lab Results   Component Value Date/Time    Hemoglobin A1c 5.2 05/09/2018 07:20 AM       Assessment/Plan:   Right Knee Pain: From DJD. Preoperative Assessment: No contraindications to planned surgery   Orders/studies that need to be obtained prior to surgical clearance: medical clearance has been obtained    Pt is to undergo a low risk procedure with a low cardiac risk based on current history. Labs and imaging within acceptable range. No contraindications to planned surgery. Discontinue NSAIDS 1 week prior to surgical procedure. Follow up as scheduled post operatively. I have discussed the plan of care with the patient. The patient has received an after-visit summary and questions were answered concerning future plans. I have discussed medication side effects and warnings with the patient as well. All questions were answered. The patient understands the plan of care. Handouts provided today with the above information. Pt instructed if symptoms worsen to call the office or report to the ED for continued care. Greater than 50% of the visit time was spent in counseling and/or coordination of care.       Dr. Ally Foy  [facility] [address]  Phone: [phone]  Fax: [phone]

## 2018-05-16 ENCOUNTER — OFFICE VISIT (OUTPATIENT)
Dept: ORTHOPEDIC SURGERY | Facility: CLINIC | Age: 63
End: 2018-05-16

## 2018-05-16 ENCOUNTER — OFFICE VISIT (OUTPATIENT)
Dept: INTERNAL MEDICINE CLINIC | Age: 63
End: 2018-05-16

## 2018-05-16 VITALS
RESPIRATION RATE: 16 BRPM | DIASTOLIC BLOOD PRESSURE: 71 MMHG | WEIGHT: 187.2 LBS | HEIGHT: 67 IN | SYSTOLIC BLOOD PRESSURE: 109 MMHG | TEMPERATURE: 97 F | OXYGEN SATURATION: 98 % | HEART RATE: 64 BPM | BODY MASS INDEX: 29.38 KG/M2

## 2018-05-16 VITALS
TEMPERATURE: 96.8 F | OXYGEN SATURATION: 99 % | WEIGHT: 188.6 LBS | HEIGHT: 67 IN | BODY MASS INDEX: 29.6 KG/M2 | SYSTOLIC BLOOD PRESSURE: 105 MMHG | HEART RATE: 65 BPM | DIASTOLIC BLOOD PRESSURE: 73 MMHG

## 2018-05-16 DIAGNOSIS — K75.81 NASH (NONALCOHOLIC STEATOHEPATITIS): ICD-10-CM

## 2018-05-16 DIAGNOSIS — E78.2 MIXED HYPERLIPIDEMIA: ICD-10-CM

## 2018-05-16 DIAGNOSIS — E66.9 OBESITY (BMI 30-39.9): Primary | ICD-10-CM

## 2018-05-16 DIAGNOSIS — Z01.818 PREOP GENERAL PHYSICAL EXAM: Primary | ICD-10-CM

## 2018-05-16 DIAGNOSIS — M17.9 OSTEOARTHRITIS OF KNEE, UNSPECIFIED LATERALITY, UNSPECIFIED OSTEOARTHRITIS TYPE: ICD-10-CM

## 2018-05-16 DIAGNOSIS — I10 ESSENTIAL HYPERTENSION: ICD-10-CM

## 2018-05-16 DIAGNOSIS — M17.0 PRIMARY OSTEOARTHRITIS OF BOTH KNEES: ICD-10-CM

## 2018-05-16 RX ORDER — ACETAMINOPHEN 325 MG/1
1000 TABLET ORAL
Status: CANCELLED | OUTPATIENT
Start: 2018-05-22 | End: 2018-05-23

## 2018-05-16 NOTE — PROGRESS NOTES
Marichuy Matthew returns for History and Physical in preparation of upcoming right total knee replacement. Please see the attached forms in Epic for History, Physical, and Review of systems.

## 2018-05-16 NOTE — MR AVS SNAPSHOT
[address] [facility] [place], operated by [facility] 
 
 
 [address] [facility], [facility] [address] [facility] 
[phone] Patient: Marichuy Castro MRN: [id number] BCW:[id number] Visit Information Date & Time Provider Department Dept. Phone Encounter #  
 5/16/2018 10:00 AM Kwabena Cho NP [facility] [address] [place] [address] [phone] Follow-up Instructions Return in about 6 weeks (around 6/27/2018). Routing History Follow-up and Disposition History Your Appointments 6/1/2018  8:30 AM  
POST OP with Chana Lim PA-C  
[facility] - [address]) Appt Note: S/P MV INPT SX 5-22-18 RT TKA/DMM  
 [address] [facility], [address] [facility]  
[phone]  
  
   
 [address] [facility], [address] [place] [id number]  
  
    
 6/7/2018  9:00 AM  
Follow Up with Niharika Gifford MD  
[facility] ([address] [place] [address]) Appt Note: 3 month f/up [place] [address] [address] [address] [phone] [place] [phone]  
  
    
 6/27/2018  4:01 AM  
METABOLIC PROGRAM 15 with Kwabena Cho NP [facility] [address] [place] [address] ([address] [place] [address]) Appt Note: 6 week follow up  
 [address] [facility], [address] [address] [address] [place] [place]  
  
   
 [address], [address] Upcoming Health Maintenance Date Due ZOSTER VACCINE AGE 60> 3/5/2016 COLONOSCOPY 5/28/2018 BREAST CANCER SCRN MAMMOGRAM 7/14/2018 Influenza Age 5 to Adult 8/1/2018 PAP AKA CERVICAL CYTOLOGY 7/28/2019 DTaP/Tdap/Td series (2 - Td) 2/7/2027 Allergies as of 5/16/2018  Review Complete On: 5/16/2018 By: Kwabena Cho NP No Known Allergies Current Immunizations  Reviewed on 2/7/2017 Name Date Influenza Vaccine 10/12/2017 Influenza Vaccine (Quad) PF 10/10/2016 Tdap 2/7/2017 Not reviewed this visit You Were Diagnosed With   
  
 Codes Comments Obesity (BMI 30-39.9)    -  Primary ICD-10-CM: S28.0 ICD-9-CM: 278.00 Osteoarthritis of knee, unspecified laterality, unspecified osteoarthritis type     ICD-10-CM: M17.10 ICD-9-CM: 715.36 Mixed hyperlipidemia     ICD-10-CM: E78.2 ICD-9-CM: 272.2 HERZOG (nonalcoholic steatohepatitis)     ICD-10-CM: N17.19 ICD-9-CM: 571.8 Essential hypertension     ICD-10-CM: I10 
ICD-9-CM: 401.9 Vitals BP Pulse Temp Resp Height(growth percentile) Weight(growth percentile) 109/71 (BP 1 Location: Left arm, BP Patient Position: Sitting) 64 97 °F (36.1 °C) (Oral) 16 5' 6.5\" (1.689 m) 187 lb 3.2 oz (84.9 kg) SpO2 BMI OB Status Smoking Status 98% 29.76 kg/m2 Hysterectomy Never Smoker BMI and BSA Data Body Mass Index Body Surface Area  
 29.76 kg/m 2 2 m 2 Preferred Pharmacy Pharmacy Name Phone [facility] [address] [place] [phone] Your Updated Medication List  
  
   
This list is accurate as of 5/16/18 10:17 AM.  Always use your most recent med list.  
  
  
  
  
 aspirin 81 mg chewable tablet Take 1 Tab by mouth daily. calcium-cholecalciferol (d3) 600-125 mg-unit Tab Take 1,200 mg by mouth daily. levoFLOXacin 750 mg tablet Commonly known as:  Strauss [place] Take 1 Tab by mouth daily. meloxicam 15 mg tablet Commonly known as:  MOBIC  
TAKE 1 TABLET BY MOUTH DAILY  
  
 metoprolol tartrate 50 mg tablet Commonly known as:  LOPRESSOR Take 1 Tab by mouth two (2) times a day. omega-3 fatty acids-vitamin e 1,000 mg Cap Take 1 Cap by mouth two (2) times a day. peg-electrolyte soln 420 gram solution Commonly known as:  NULYTELY  
MIX WITH ANY CLEAR LIQUID. TAKE 2 LITERS AT 6 PM ON THE DAY BEFORE THE COLONOSCOPY. potassium 99 mg tablet Take 99 mg by mouth daily as needed. simvastatin 20 mg tablet Commonly known as:  ZOCOR Take 1 Tab by mouth nightly. TYLENOL ARTHRITIS PAIN 650 mg Abdoulaye Schofield Generic drug:  acetaminophen Take 650 mg by mouth every six (6) hours as needed for Pain. VITAMIN D3 2,000 unit Tab Generic drug:  cholecalciferol (vitamin D3) Take 2,000 Units by mouth daily. Follow-up Instructions Return in about 6 weeks (around 6/27/2018). Patient Instructions Health Maintenance Due Topic Date Due  
 ZOSTER VACCINE AGE 60>  03/05/2016  COLONOSCOPY  05/28/2018  BREAST CANCER SCRN MAMMOGRAM  07/14/2018 Monthly goal: 
 
Maintain weight while out on surgical leave, good luck with surgery, your weight loss efforts will help with better outcome, so good! Body Mass Index: Care Instructions Your Care Instructions Body mass index (BMI) can help you see if your weight is raising your risk for health problems. It uses a formula to compare how much you weigh with how tall you are. · A BMI lower than 18.5 is considered underweight. · A BMI between 18.5 and 24.9 is considered healthy. · A BMI between 25 and 29.9 is considered overweight. A BMI of 30 or higher is considered obese. If your BMI is in the normal range, it means that you have a lower risk for weight-related health problems. If your BMI is in the overweight or obese range, you may be at increased risk for weight-related health problems, such as high blood pressure, heart disease, stroke, arthritis or joint pain, and diabetes. If your BMI is in the underweight range, you may be at increased risk for health problems such as fatigue, lower protection (immunity) against illness, muscle loss, bone loss, hair loss, and hormone problems. BMI is just one measure of your risk for weight-related health problems. You may be at higher risk for health problems if you are not active, you eat an unhealthy diet, or you drink too much alcohol or use tobacco products. Follow-up care is a key part of your treatment and safety. Be sure to make and go to all appointments, and call your doctor if you are having problems. It's also a good idea to know your test results and keep a list of the medicines you take. How can you care for yourself at home? · Practice healthy eating habits. This includes eating plenty of fruits, vegetables, whole grains, lean protein, and low-fat dairy. · If your doctor recommends it, get more exercise. Walking is a good choice. Bit by bit, increase the amount you walk every day. Try for at least 30 minutes on most days of the week. · Do not smoke. Smoking can increase your risk for health problems. If you need help quitting, talk to your doctor about stop-smoking programs and medicines. These can increase your chances of quitting for good. · Limit alcohol to 2 drinks a day for men and 1 drink a day for women. Too much alcohol can cause health problems. If you have a BMI higher than 25 · Your doctor may do other tests to check your risk for weight-related health problems. This may include measuring the distance around your waist. A waist measurement of more than 40 inches in men or 35 inches in women can increase the risk of weight-related health problems. · Talk with your doctor about steps you can take to stay healthy or improve your health. You may need to make lifestyle changes to lose weight and stay healthy, such as changing your diet and getting regular exercise. If you have a BMI lower than 18.5 · Your doctor may do other tests to check your risk for health problems. · Talk with your doctor about steps you can take to stay healthy or improve your health. You may need to make lifestyle changes to gain or maintain weight and stay healthy, such as getting more healthy foods in your diet and doing exercises to build muscle. Where can you learn more? Go to http://stanton-gonsalo.info/. Enter S176 in the search box to learn more about \"Body Mass Index: Care Instructions. \" Current as of: October 13, 2016 Content Version: 11.4 © 7647-7184 SnapAppoint[employer]. Care instructions adapted under license by [employer] (which disclaims liability or warranty for this information). If you have questions about a medical condition or this instruction, always ask your healthcare professional. [address] any warranty or liability for your use of this information. Patient Instructions History Introducing [facility]! Dear Lord Arroyo: 
Thank you for requesting a [facility] account. Our records indicate that you already have an active [facility] account. You can access your account anytime at [email or web address] Did you know that you can access your hospital and ER discharge instructions at any time in [facility]? You can also review all of your test results from your hospital stay or ER visit. Additional Information If you have questions, please visit the Frequently Asked Questions section of the [facility] website at [email or web address] Remember, [facility] is NOT to be used for urgent needs. For medical emergencies, dial 911. Now available from your iPhone and Android! Please provide this summary of care documentation to your next provider. Your primary care clinician is listed as Saul Swift. If you have any questions after today's visit, please call [phone].

## 2018-05-16 NOTE — PROGRESS NOTES
Chief Complaint   Patient presents with    Weight Management     1. Have you been to the ER, urgent care clinic since your last visit? Hospitalized since your last visit? No    2. Have you seen or consulted any other health care providers outside of the [address] [place] [address] since your last visit? Include any pap smears or colon screening.  No

## 2018-05-16 NOTE — PATIENT INSTRUCTIONS
Health Maintenance Due   Topic Date Due    ZOSTER VACCINE AGE 60>  03/05/2016    COLONOSCOPY  05/28/2018    BREAST CANCER SCRN MAMMOGRAM  07/14/2018     Monthly goal:    Maintain weight while out on surgical leave, good luck with surgery, your weight loss efforts will help with better outcome, so good! Body Mass Index: Care Instructions  Your Care Instructions    Body mass index (BMI) can help you see if your weight is raising your risk for health problems. It uses a formula to compare how much you weigh with how tall you are. · A BMI lower than 18.5 is considered underweight. · A BMI between 18.5 and 24.9 is considered healthy. · A BMI between 25 and 29.9 is considered overweight. A BMI of 30 or higher is considered obese. If your BMI is in the normal range, it means that you have a lower risk for weight-related health problems. If your BMI is in the overweight or obese range, you may be at increased risk for weight-related health problems, such as high blood pressure, heart disease, stroke, arthritis or joint pain, and diabetes. If your BMI is in the underweight range, you may be at increased risk for health problems such as fatigue, lower protection (immunity) against illness, muscle loss, bone loss, hair loss, and hormone problems. BMI is just one measure of your risk for weight-related health problems. You may be at higher risk for health problems if you are not active, you eat an unhealthy diet, or you drink too much alcohol or use tobacco products. Follow-up care is a key part of your treatment and safety. Be sure to make and go to all appointments, and call your doctor if you are having problems. It's also a good idea to know your test results and keep a list of the medicines you take. How can you care for yourself at home? · Practice healthy eating habits. This includes eating plenty of fruits, vegetables, whole grains, lean protein, and low-fat dairy.   · If your doctor recommends it, get more exercise. Walking is a good choice. Bit by bit, increase the amount you walk every day. Try for at least 30 minutes on most days of the week. · Do not smoke. Smoking can increase your risk for health problems. If you need help quitting, talk to your doctor about stop-smoking programs and medicines. These can increase your chances of quitting for good. · Limit alcohol to 2 drinks a day for men and 1 drink a day for women. Too much alcohol can cause health problems. If you have a BMI higher than 25  · Your doctor may do other tests to check your risk for weight-related health problems. This may include measuring the distance around your waist. A waist measurement of more than 40 inches in men or 35 inches in women can increase the risk of weight-related health problems. · Talk with your doctor about steps you can take to stay healthy or improve your health. You may need to make lifestyle changes to lose weight and stay healthy, such as changing your diet and getting regular exercise. If you have a BMI lower than 18.5  · Your doctor may do other tests to check your risk for health problems. · Talk with your doctor about steps you can take to stay healthy or improve your health. You may need to make lifestyle changes to gain or maintain weight and stay healthy, such as getting more healthy foods in your diet and doing exercises to build muscle. Where can you learn more? Go to http://stanton-gonsalo.info/. Enter S176 in the search box to learn more about \"Body Mass Index: Care Instructions. \"  Current as of: October 13, 2016  Content Version: 11.4  © 0190-8082 Healthwise[employer]. Care instructions adapted under license by [employer] (which disclaims liability or warranty for this information).  If you have questions about a medical condition or this instruction, always ask your healthcare professional. [place] [address] any warranty or liability for your use of this information.

## 2018-05-16 NOTE — H&P
History and Physical         59 year CC female seen in preparation for Right total knee arthroplasty  Review of Systems   Constitutional: Positive for activity change (decreased use right knee secondary to osteo arthritis). Negative for chills and fever. HENT: Negative for ear pain. Eyes: Negative for pain. Respiratory: Positive for shortness of breath (heavy exertional). Cardiovascular: Negative. Endocrine: Negative. Genitourinary: Negative for flank pain. Treated Pre Op for UTI   Musculoskeletal: Positive for arthralgias, gait problem, joint swelling and myalgias. Right knee end stage osteo arthritis   Skin: Negative. Allergic/Immunologic: Negative. Psychiatric/Behavioral: Negative. Physical Exam   Nursing note and vitals reviewed. Constitutional: She is oriented to person, place, and time. She appears well-developed and well-nourished. HENT:   Head: Normocephalic and atraumatic. Eyes: Conjunctivae and EOM are normal. Pupils are equal, round, and reactive to light. Neck: Normal range of motion. Cardiovascular: Normal rate, regular rhythm, normal heart sounds and intact distal pulses. Pulmonary/Chest: Effort normal and breath sounds normal.   Musculoskeletal:        Right knee: She exhibits decreased range of motion, swelling, effusion and bony tenderness. Tenderness found. Medial joint line and lateral joint line tenderness noted. Legs:  Neurological: She is alert and oriented to person, place, and time. Skin: Skin is warm and dry. Psychiatric: She has a normal mood and affect. Her behavior is normal. Judgment and thought content normal.     Right knee osteoarthritis    Decreased range of motion right knee    Right total knee arthroplasty    Risk / Benefit: Surgeon will discuss in \"face to face\" encounter on day of surgery.     Family History and Surgical History reviewed, discussed in detail, and deemed Non-Contributory in preparation for this surgical encounter. Orthopaedically, this patient requires admission as predetermined by the attending physicians documented severity of the admitting diagnosis,  and expected need for post surgical and co morbity health management determines length of projected stay.

## 2018-05-16 NOTE — PROGRESS NOTES
[facility] Progress Note:   F/up Physician Visit    CC: Obesity      Lucille Pham is a 59 y.o. female who is here for her  f/up physician visit for the VLCD Program. Connor Craven has completed 27 weeks of the program to date. OVERCOME OBESITY!!!  10 lbs weight loss since last visit. Last visit prior to surgery- total knee. Will see back in about 6 weeks and after clearance. Weight History  Starting  weight 270.1 lb Body mass index is 43.6 kg/(m^2). Current weight 187 lb  Goal weight 165 lb  Highest weight 277 lb, at 47years old        Weight Metrics 5/16/2018 5/16/2018 5/16/2018 5/15/2018 5/10/2018 5/9/2018 5/9/2018   Weight - 187 lb 3.2 oz 188 lb 9.6 oz 192 lb - 193 lb 3.2 oz 191 lb   Waist Measure Inches 36 - - - 36 - -   Exercise Mins/week 280 - - - - - -   Body Fat % 36 - - - - - -   BMI - 29.76 kg/m2 29.98 kg/m2 30.53 kg/m2 - 30.72 kg/m2 -         Current Outpatient Prescriptions   Medication Sig Dispense Refill    levoFLOXacin (LEVAQUIN) 750 mg tablet Take 1 Tab by mouth daily. 7 Tab 0    metoprolol tartrate (LOPRESSOR) 50 mg tablet Take 1 Tab by mouth two (2) times a day. 180 Tab 3    cholecalciferol, vitamin D3, (VITAMIN D3) 2,000 unit tab Take 2,000 Units by mouth daily.  potassium 99 mg tablet Take 99 mg by mouth daily as needed.  acetaminophen (TYLENOL ARTHRITIS PAIN) 650 mg CR tablet Take 650 mg by mouth every six (6) hours as needed for Pain.  simvastatin (ZOCOR) 20 mg tablet Take 1 Tab by mouth nightly. 90 Tab 3    calcium-cholecalciferol, d3, 600-125 mg-unit tab Take 1,200 mg by mouth daily.  peg-electrolyte soln (NULYTELY) 420 gram solution MIX WITH ANY CLEAR LIQUID. TAKE 2 LITERS AT 6 PM ON THE DAY BEFORE THE COLONOSCOPY.  0    aspirin 81 mg chewable tablet Take 1 Tab by mouth daily.  30 Tab 0    meloxicam (MOBIC) 15 mg tablet TAKE 1 TABLET BY MOUTH DAILY 90 Tab 3    omega-3 fatty acids-vitamin e 1,000 mg cap Take 1 Cap by mouth two (2) times a day.           Participation   Did you attend clinic and class last week? yes    Review of Systems  Since your last visit, have you experienced any complications? no  If yes, please list:     No CP, SOB, palpitations, lightheadedness, dizziness, constipation. Positives highlight in BOLD. Are you taking an appetite suppressant? no  If so, is there any Chest Pain, Palpitations or Dizziness? BP Readings from Last 10 Encounters:   05/16/18 109/71   05/16/18 105/73   05/15/18 126/71   05/10/18 139/79   05/02/18 137/81   04/25/18 138/83   04/18/18 121/70   04/12/18 137/83   04/04/18 128/73   03/28/18 138/77         Have you received any other medical care this week? yes  If yes, where and for what? Pre Op with Orthopedic Dr Bairon Batista for upcoming Total Right Knee Replacement scheduled 5-22-18. Have you discontinued or changed any medicine or dose of your medicine since your last visit? yes  If yes, where and for what? Off all aspirin, fish oil, mobic for the next 10 days prior to surgery. Diet  How many ounces of calorie-free liquids did you consume each day?  108 oz    How many meal replacements did you take each day? 4    Did you have any problems adhering to the program?  no If yes, please explain:       Exercise  Aerobic exercise: 280 min  Resistance exercise: 0 workouts / week  Any discomfort? yes     If yes, where? Right Knee at times      Review of Systems  Complete ROS negative except where noted above    Objective  Visit Vitals    /71 (BP 1 Location: Left arm, BP Patient Position: Sitting)    Pulse 64    Temp 97 °F (36.1 °C) (Oral)    Resp 16    Ht 5' 6.5\" (1.689 m)    Wt 187 lb 3.2 oz (84.9 kg)    SpO2 98%    BMI 29.76 kg/m2     No LMP recorded. Patient has had a hysterectomy.     PHQ over the last two weeks 5/16/2018   PHQ Not Done Patient Decline   Little interest or pleasure in doing things -   Feeling down, depressed or hopeless -   Total Score PHQ 2 -         Waist Circumference: I personally reviewed patient's Weight Management Doc Flowsheet  Neck Circumference: I personally reviewed patient's Weight Management Doc Flowsheet  Percent Body Fat: I personally reviewed patient's Weight Management Doc Flowsheet    Physical Exam  Appearance: well appearing, A&O, NAD  HEENT:  NC/AT, PERRL, No scleral icterus  Heart:  RRR without M/R/G  Lungs:  CTAB, no rhonchi, rales, or wheezes with good air exchange   Ext:  No LE Edema    Assessment / Plan    Encounter Diagnoses   Name Primary?  Obesity (BMI 30-39. 9) Yes    Osteoarthritis of knee, unspecified laterality, unspecified osteoarthritis type     Mixed hyperlipidemia     HERZOG (nonalcoholic steatohepatitis) suggested by ultrasound report, 2016     Essential hypertension        1. Weight management well controlled   Progress was reviewed with patient    2. Labs    Latest results reviewed with patient   Lab slip given to pt for f/up HDL labs    3. Diet regimen   # of meal replacements prescribed: 1-2 while on medical leave   If modified LCD-nutritional guidelines:    Monthly Goal   As below    Medical monitoring schedule:   Weekly BP/Weight checks   Monthly provider appointments  I have reviewed/discussed the above normal BMI with the patient. I have recommended the following interventions: dietary management education, guidance, and counseling, monitor weight and prescribed dietary intake . Kieran Mujica Ms. Damion Melgoza has a reminder for a \"due or due soon\" health maintenance. I have asked that she contact her primary care provider for follow-up on this health maintenance. Patient Instructions     Health Maintenance Due   Topic Date Due    ZOSTER VACCINE AGE 60>  03/05/2016    COLONOSCOPY  05/28/2018    BREAST CANCER SCRN MAMMOGRAM  07/14/2018     Monthly goal:    Maintain weight while out on surgical leave, good luck with surgery, your weight loss efforts will help with better outcome, so good!          Body Mass Index: Care Instructions  Your Care Instructions    Body mass index (BMI) can help you see if your weight is raising your risk for health problems. It uses a formula to compare how much you weigh with how tall you are. · A BMI lower than 18.5 is considered underweight. · A BMI between 18.5 and 24.9 is considered healthy. · A BMI between 25 and 29.9 is considered overweight. A BMI of 30 or higher is considered obese. If your BMI is in the normal range, it means that you have a lower risk for weight-related health problems. If your BMI is in the overweight or obese range, you may be at increased risk for weight-related health problems, such as high blood pressure, heart disease, stroke, arthritis or joint pain, and diabetes. If your BMI is in the underweight range, you may be at increased risk for health problems such as fatigue, lower protection (immunity) against illness, muscle loss, bone loss, hair loss, and hormone problems. BMI is just one measure of your risk for weight-related health problems. You may be at higher risk for health problems if you are not active, you eat an unhealthy diet, or you drink too much alcohol or use tobacco products. Follow-up care is a key part of your treatment and safety. Be sure to make and go to all appointments, and call your doctor if you are having problems. It's also a good idea to know your test results and keep a list of the medicines you take. How can you care for yourself at home? · Practice healthy eating habits. This includes eating plenty of fruits, vegetables, whole grains, lean protein, and low-fat dairy. · If your doctor recommends it, get more exercise. Walking is a good choice. Bit by bit, increase the amount you walk every day. Try for at least 30 minutes on most days of the week. · Do not smoke. Smoking can increase your risk for health problems. If you need help quitting, talk to your doctor about stop-smoking programs and medicines.  These can increase your chances of quitting for good. · Limit alcohol to 2 drinks a day for men and 1 drink a day for women. Too much alcohol can cause health problems. If you have a BMI higher than 25  · Your doctor may do other tests to check your risk for weight-related health problems. This may include measuring the distance around your waist. A waist measurement of more than 40 inches in men or 35 inches in women can increase the risk of weight-related health problems. · Talk with your doctor about steps you can take to stay healthy or improve your health. You may need to make lifestyle changes to lose weight and stay healthy, such as changing your diet and getting regular exercise. If you have a BMI lower than 18.5  · Your doctor may do other tests to check your risk for health problems. · Talk with your doctor about steps you can take to stay healthy or improve your health. You may need to make lifestyle changes to gain or maintain weight and stay healthy, such as getting more healthy foods in your diet and doing exercises to build muscle. Where can you learn more? Go to http://stanton-gonsalo.info/. Enter S176 in the search box to learn more about \"Body Mass Index: Care Instructions. \"  Current as of: October 13, 2016  Content Version: 11.4  © 6104-8236 [employer], [employer]. Care instructions adapted under license by [employer] (which disclaims liability or warranty for this information). If you have questions about a medical condition or this instruction, always ask your healthcare professional. [place] [address] any warranty or liability for your use of this information. Follow-up Disposition:  Return in about 6 weeks (around 6/27/2018). 10 minutes of the 15 minutes face to face time with Anthony Guo consisted of counseling & coordinating and/or discussing treatment plans in reference to her The primary encounter diagnosis was Obesity (BMI 30-39.9).  Diagnoses of Osteoarthritis of knee, unspecified laterality, unspecified osteoarthritis type, Mixed hyperlipidemia, HERZOG (nonalcoholic steatohepatitis) suggested by ultrasound report, 2016, and Essential hypertension were also pertinent to this visit. The patient is to follow up as scheduled and will report to the ED or the office if symptoms change or increase. The patient has voiced understanding and will comply.

## 2018-05-18 ENCOUNTER — HOSPITAL ENCOUNTER (OUTPATIENT)
Dept: PREADMISSION TESTING | Age: 63
Discharge: HOME | End: 2018-05-18
Payer: COMMERCIAL

## 2018-05-18 DIAGNOSIS — Z01.812 BLOOD TESTS PRIOR TO TREATMENT OR PROCEDURE: ICD-10-CM

## 2018-05-18 DIAGNOSIS — M17.11 PRIMARY OSTEOARTHRITIS OF RIGHT KNEE: ICD-10-CM

## 2018-05-18 LAB
APPEARANCE UR: CLEAR
APPEARANCE UR: CLEAR
BILIRUB UR QL: NEGATIVE
BILIRUB UR QL: NEGATIVE
COLOR UR: YELLOW
COLOR UR: YELLOW
GLUCOSE UR STRIP.AUTO-MCNC: NEGATIVE MG/DL
GLUCOSE UR STRIP.AUTO-MCNC: NEGATIVE MG/DL
HGB UR QL STRIP: NEGATIVE
HGB UR QL STRIP: NEGATIVE
KETONES UR QL STRIP.AUTO: NEGATIVE MG/DL
KETONES UR QL STRIP.AUTO: NEGATIVE MG/DL
LEUKOCYTE ESTERASE UR QL STRIP.AUTO: NEGATIVE
LEUKOCYTE ESTERASE UR QL STRIP.AUTO: NEGATIVE
NITRITE UR QL STRIP.AUTO: NEGATIVE
NITRITE UR QL STRIP.AUTO: NEGATIVE
PH UR STRIP: 6.5 [PH] (ref 5–8)
PH UR STRIP: 6.5 [PH] (ref 5–8)
PROT UR STRIP-MCNC: NEGATIVE MG/DL
PROT UR STRIP-MCNC: NEGATIVE MG/DL
SP GR UR REFRACTOMETRY: 1 (ref 1–1.03)
SP GR UR REFRACTOMETRY: 1 (ref 1–1.03)
UROBILINOGEN UR QL STRIP.AUTO: 0.2 EU/DL (ref 0.2–1)
UROBILINOGEN UR QL STRIP.AUTO: 0.2 EU/DL (ref 0.2–1)

## 2018-05-18 PROCEDURE — 81003 URINALYSIS AUTO W/O SCOPE: CPT | Performed by: SPECIALIST

## 2018-05-18 PROCEDURE — 87086 URINE CULTURE/COLONY COUNT: CPT | Performed by: SPECIALIST

## 2018-05-18 PROCEDURE — 36415 COLL VENOUS BLD VENIPUNCTURE: CPT | Performed by: SPECIALIST

## 2018-05-19 LAB
BACTERIA SPEC CULT: NORMAL
SERVICE CMNT-IMP: NORMAL

## 2018-05-20 PROBLEM — E66.9 OBESITY (BMI 30-39.9): Status: RESOLVED | Noted: 2017-02-12 | Resolved: 2018-05-20

## 2018-05-21 ENCOUNTER — ANESTHESIA EVENT (OUTPATIENT)
Dept: SURGERY | Age: 63
DRG: 470 | End: 2018-05-21
Payer: COMMERCIAL

## 2018-05-21 NOTE — ACP (ADVANCE CARE PLANNING)
Advance Care Planning (ACP) Provider Conversation Snapshot    Date of ACP Conversation: 5/15/18  Persons included in Conversation:  patient  Length of ACP Conversation in minutes:  <16 minutes (Non-Billable)    Authorized Decision Maker (if patient is incapable of making informed decisions): This person is:   Healthcare Agent/Medical Power of  under Advance Directive    For Patients with Decision Making Capacity:   Values/Goals: Exploration of values, goals, and preferences if recovery is not expected, even with continued medical treatment in the event of:  Imminent death  Severe, permanent brain injury    Conversation Outcomes / Follow-Up Plan:   Reviewed existing Advance Directive . We reviewed the content of her advanced directive. She has no changes to make at this time to the pre-existing advanced directive in our EHR.     Dr. Austin Carrero  [facility] [address].  Phone: [phone]  Fax: [phone]

## 2018-05-22 ENCOUNTER — ANESTHESIA (OUTPATIENT)
Dept: SURGERY | Age: 63
DRG: 470 | End: 2018-05-22
Payer: COMMERCIAL

## 2018-05-22 ENCOUNTER — HOSPITAL ENCOUNTER (INPATIENT)
Age: 63
LOS: 1 days | Discharge: HOME HEALTH | DRG: 470 | End: 2018-05-23
Attending: SPECIALIST | Admitting: SPECIALIST
Payer: COMMERCIAL

## 2018-05-22 DIAGNOSIS — G89.18 POST-OPERATIVE PAIN: Primary | ICD-10-CM

## 2018-05-22 PROBLEM — M17.11 PRIMARY OSTEOARTHRITIS OF RIGHT KNEE: Status: ACTIVE | Noted: 2018-05-22

## 2018-05-22 LAB
HCT VFR BLD AUTO: 41.9 % (ref 35–45)
HGB BLD-MCNC: 13.7 G/DL (ref 12–16)

## 2018-05-22 PROCEDURE — 77030032490 HC SLV COMPR SCD KNE COVD -B: Performed by: SPECIALIST

## 2018-05-22 PROCEDURE — 74011250636 HC RX REV CODE- 250/636: Performed by: PHYSICIAN ASSISTANT

## 2018-05-22 PROCEDURE — 74011250636 HC RX REV CODE- 250/636

## 2018-05-22 PROCEDURE — C1776 JOINT DEVICE (IMPLANTABLE): HCPCS | Performed by: SPECIALIST

## 2018-05-22 PROCEDURE — 74011250637 HC RX REV CODE- 250/637: Performed by: SPECIALIST

## 2018-05-22 PROCEDURE — 97116 GAIT TRAINING THERAPY: CPT

## 2018-05-22 PROCEDURE — 74011250636 HC RX REV CODE- 250/636: Performed by: NURSE ANESTHETIST, CERTIFIED REGISTERED

## 2018-05-22 PROCEDURE — 74011250637 HC RX REV CODE- 250/637: Performed by: PHYSICIAN ASSISTANT

## 2018-05-22 PROCEDURE — 74011000258 HC RX REV CODE- 258

## 2018-05-22 PROCEDURE — C9290 INJ, BUPIVACAINE LIPOSOME: HCPCS | Performed by: PHYSICIAN ASSISTANT

## 2018-05-22 PROCEDURE — 77030013708 HC HNDPC SUC IRR PULS STRY –B: Performed by: SPECIALIST

## 2018-05-22 PROCEDURE — 77030003601 HC NDL NRV BLK BBMI -A: Performed by: SPECIALIST

## 2018-05-22 PROCEDURE — 76010000172 HC OR TIME 2.5 TO 3 HR INTENSV-TIER 1: Performed by: SPECIALIST

## 2018-05-22 PROCEDURE — 74011000272 HC RX REV CODE- 272: Performed by: SPECIALIST

## 2018-05-22 PROCEDURE — 77030008467 HC STPLR SKN COVD -B: Performed by: SPECIALIST

## 2018-05-22 PROCEDURE — 77030026438 HC STYL ET INTUB CARD -A: Performed by: NURSE ANESTHETIST, CERTIFIED REGISTERED

## 2018-05-22 PROCEDURE — 77030018836 HC SOL IRR NACL ICUM -A: Performed by: SPECIALIST

## 2018-05-22 PROCEDURE — 77030008683 HC TU ET CUF COVD -A: Performed by: NURSE ANESTHETIST, CERTIFIED REGISTERED

## 2018-05-22 PROCEDURE — 0SRC0J9 REPLACEMENT OF RIGHT KNEE JOINT WITH SYNTHETIC SUBSTITUTE, CEMENTED, OPEN APPROACH: ICD-10-PCS | Performed by: SPECIALIST

## 2018-05-22 PROCEDURE — 77030012012 HC AIRWY OP SFT TELE -A: Performed by: NURSE ANESTHETIST, CERTIFIED REGISTERED

## 2018-05-22 PROCEDURE — 76060000036 HC ANESTHESIA 2.5 TO 3 HR: Performed by: SPECIALIST

## 2018-05-22 PROCEDURE — 76942 ECHO GUIDE FOR BIOPSY: CPT | Performed by: ANESTHESIOLOGY

## 2018-05-22 PROCEDURE — 3E0T3BZ INTRODUCTION OF ANESTHETIC AGENT INTO PERIPHERAL NERVES AND PLEXI, PERCUTANEOUS APPROACH: ICD-10-PCS | Performed by: ANESTHESIOLOGY

## 2018-05-22 PROCEDURE — 77030020782 HC GWN BAIR PAWS FLX 3M -B: Performed by: SPECIALIST

## 2018-05-22 PROCEDURE — 74011000250 HC RX REV CODE- 250

## 2018-05-22 PROCEDURE — C1713 ANCHOR/SCREW BN/BN,TIS/BN: HCPCS | Performed by: SPECIALIST

## 2018-05-22 PROCEDURE — 85018 HEMOGLOBIN: CPT | Performed by: SPECIALIST

## 2018-05-22 PROCEDURE — 36415 COLL VENOUS BLD VENIPUNCTURE: CPT | Performed by: SPECIALIST

## 2018-05-22 PROCEDURE — 77010033678 HC OXYGEN DAILY: Performed by: SPECIALIST

## 2018-05-22 PROCEDURE — 64447 NJX AA&/STRD FEMORAL NRV IMG: CPT | Performed by: ANESTHESIOLOGY

## 2018-05-22 PROCEDURE — 77030034850: Performed by: SPECIALIST

## 2018-05-22 PROCEDURE — 74011000250 HC RX REV CODE- 250: Performed by: NURSE ANESTHETIST, CERTIFIED REGISTERED

## 2018-05-22 PROCEDURE — 97530 THERAPEUTIC ACTIVITIES: CPT

## 2018-05-22 PROCEDURE — 77030031139 HC SUT VCRL2 J&J -A: Performed by: SPECIALIST

## 2018-05-22 PROCEDURE — 76210000006 HC OR PH I REC 0.5 TO 1 HR: Performed by: SPECIALIST

## 2018-05-22 PROCEDURE — 74011000250 HC RX REV CODE- 250: Performed by: PHYSICIAN ASSISTANT

## 2018-05-22 PROCEDURE — 74011250637 HC RX REV CODE- 250/637: Performed by: ANESTHESIOLOGY

## 2018-05-22 PROCEDURE — 74011250636 HC RX REV CODE- 250/636: Performed by: SPECIALIST

## 2018-05-22 PROCEDURE — 77030006835 HC BLD SAW SAG STRY -B: Performed by: SPECIALIST

## 2018-05-22 PROCEDURE — 77030019605: Performed by: SPECIALIST

## 2018-05-22 PROCEDURE — 77030003029 HC SUT VCRL J&J -B: Performed by: SPECIALIST

## 2018-05-22 PROCEDURE — 77030011640 HC PAD GRND REM COVD -A: Performed by: SPECIALIST

## 2018-05-22 PROCEDURE — 97161 PT EVAL LOW COMPLEX 20 MIN: CPT

## 2018-05-22 PROCEDURE — 74011000258 HC RX REV CODE- 258: Performed by: PHYSICIAN ASSISTANT

## 2018-05-22 PROCEDURE — 65270000029 HC RM PRIVATE

## 2018-05-22 PROCEDURE — 77030008462 HC STPLR SKN PROX J&J -A: Performed by: SPECIALIST

## 2018-05-22 DEVICE — CEMENT BNE GENTAMC HV R+G 40GM -- PALACOS R+G 5036964: Type: IMPLANTABLE DEVICE | Site: KNEE | Status: FUNCTIONAL

## 2018-05-22 DEVICE — BEARING TIB AP71MM ML75MM THK12MM KNEE E1 POST STBL: Type: IMPLANTABLE DEVICE | Site: KNEE | Status: FUNCTIONAL

## 2018-05-22 DEVICE — COMPONENT FEM 67.5MM R KNEE TINBN NP POST STBL OPN BX: Type: IMPLANTABLE DEVICE | Site: KNEE | Status: FUNCTIONAL

## 2018-05-22 DEVICE — STEM TIB L80MM DIA12.5MM KNEE PRI FIN VANGUARD COMPLT SYS: Type: IMPLANTABLE DEVICE | Site: KNEE | Status: FUNCTIONAL

## 2018-05-22 DEVICE — COMPONENT PAT DIA37MM THK8.6MM THN KNEE POLY 3 PEG SER A: Type: IMPLANTABLE DEVICE | Site: KNEE | Status: FUNCTIONAL

## 2018-05-22 DEVICE — KNEE CEM FEM/TIB E1 PAT -- VANGUARD K6: Type: IMPLANTABLE DEVICE | Site: KNEE | Status: FUNCTIONAL

## 2018-05-22 DEVICE — TRAY TIB L75MM KNEE COPOLYESTER SIL INTLOK PRI CEM VANGUARD: Type: IMPLANTABLE DEVICE | Site: KNEE | Status: FUNCTIONAL

## 2018-05-22 RX ORDER — MAGNESIUM SULFATE 100 %
4 CRYSTALS MISCELLANEOUS AS NEEDED
Status: DISCONTINUED | OUTPATIENT
Start: 2018-05-22 | End: 2018-05-22 | Stop reason: HOSPADM

## 2018-05-22 RX ORDER — LIDOCAINE HYDROCHLORIDE 20 MG/ML
INJECTION, SOLUTION EPIDURAL; INFILTRATION; INTRACAUDAL; PERINEURAL AS NEEDED
Status: DISCONTINUED | OUTPATIENT
Start: 2018-05-22 | End: 2018-05-22 | Stop reason: HOSPADM

## 2018-05-22 RX ORDER — SODIUM CHLORIDE, SODIUM LACTATE, POTASSIUM CHLORIDE, CALCIUM CHLORIDE 600; 310; 30; 20 MG/100ML; MG/100ML; MG/100ML; MG/100ML
75 INJECTION, SOLUTION INTRAVENOUS CONTINUOUS
Status: DISCONTINUED | OUTPATIENT
Start: 2018-05-22 | End: 2018-05-22 | Stop reason: HOSPADM

## 2018-05-22 RX ORDER — CEFAZOLIN SODIUM 2 G/50ML
2 SOLUTION INTRAVENOUS
Status: COMPLETED | OUTPATIENT
Start: 2018-05-22 | End: 2018-05-22

## 2018-05-22 RX ORDER — EPHEDRINE SULFATE/0.9% NACL/PF 25 MG/5 ML
SYRINGE (ML) INTRAVENOUS AS NEEDED
Status: DISCONTINUED | OUTPATIENT
Start: 2018-05-22 | End: 2018-05-22 | Stop reason: HOSPADM

## 2018-05-22 RX ORDER — MELATONIN
2000 DAILY
Status: DISCONTINUED | OUTPATIENT
Start: 2018-05-22 | End: 2018-05-23 | Stop reason: HOSPADM

## 2018-05-22 RX ORDER — POTASSIUM CHLORIDE 750 MG/1
10 TABLET, EXTENDED RELEASE ORAL
Status: COMPLETED | OUTPATIENT
Start: 2018-05-22 | End: 2018-05-22

## 2018-05-22 RX ORDER — SIMVASTATIN 20 MG/1
20 TABLET, FILM COATED ORAL
Status: DISCONTINUED | OUTPATIENT
Start: 2018-05-22 | End: 2018-05-23 | Stop reason: HOSPADM

## 2018-05-22 RX ORDER — METOPROLOL TARTRATE 50 MG/1
50 TABLET ORAL 2 TIMES DAILY
Status: DISCONTINUED | OUTPATIENT
Start: 2018-05-22 | End: 2018-05-23 | Stop reason: HOSPADM

## 2018-05-22 RX ORDER — OXYCODONE HYDROCHLORIDE 5 MG/1
5-15 TABLET ORAL
Status: DISCONTINUED | OUTPATIENT
Start: 2018-05-22 | End: 2018-05-23 | Stop reason: HOSPADM

## 2018-05-22 RX ORDER — CALCIUM CARBONATE/VITAMIN D3 250-3.125
1 TABLET ORAL DAILY
Status: DISCONTINUED | OUTPATIENT
Start: 2018-05-22 | End: 2018-05-23 | Stop reason: HOSPADM

## 2018-05-22 RX ORDER — OXYCODONE HYDROCHLORIDE 5 MG/1
5 TABLET ORAL
Status: DISCONTINUED | OUTPATIENT
Start: 2018-05-22 | End: 2018-05-22

## 2018-05-22 RX ORDER — SODIUM CHLORIDE 0.9 % (FLUSH) 0.9 %
5-10 SYRINGE (ML) INJECTION AS NEEDED
Status: DISCONTINUED | OUTPATIENT
Start: 2018-05-22 | End: 2018-05-22 | Stop reason: HOSPADM

## 2018-05-22 RX ORDER — SUCCINYLCHOLINE CHLORIDE 20 MG/ML
INJECTION INTRAMUSCULAR; INTRAVENOUS AS NEEDED
Status: DISCONTINUED | OUTPATIENT
Start: 2018-05-22 | End: 2018-05-22 | Stop reason: HOSPADM

## 2018-05-22 RX ORDER — DOCUSATE SODIUM 100 MG/1
100 CAPSULE, LIQUID FILLED ORAL 2 TIMES DAILY
Status: DISCONTINUED | OUTPATIENT
Start: 2018-05-22 | End: 2018-05-23 | Stop reason: HOSPADM

## 2018-05-22 RX ORDER — FENTANYL CITRATE 50 UG/ML
INJECTION, SOLUTION INTRAMUSCULAR; INTRAVENOUS AS NEEDED
Status: DISCONTINUED | OUTPATIENT
Start: 2018-05-22 | End: 2018-05-22 | Stop reason: HOSPADM

## 2018-05-22 RX ORDER — PROPOFOL 10 MG/ML
INJECTION, EMULSION INTRAVENOUS AS NEEDED
Status: DISCONTINUED | OUTPATIENT
Start: 2018-05-22 | End: 2018-05-22 | Stop reason: HOSPADM

## 2018-05-22 RX ORDER — ACETAMINOPHEN 500 MG
1000 TABLET ORAL
Status: COMPLETED | OUTPATIENT
Start: 2018-05-22 | End: 2018-05-22

## 2018-05-22 RX ORDER — SODIUM CHLORIDE 0.9 % (FLUSH) 0.9 %
5-10 SYRINGE (ML) INJECTION AS NEEDED
Status: DISCONTINUED | OUTPATIENT
Start: 2018-05-22 | End: 2018-05-23 | Stop reason: HOSPADM

## 2018-05-22 RX ORDER — GLYCOPYRROLATE 0.2 MG/ML
INJECTION INTRAMUSCULAR; INTRAVENOUS AS NEEDED
Status: DISCONTINUED | OUTPATIENT
Start: 2018-05-22 | End: 2018-05-22 | Stop reason: HOSPADM

## 2018-05-22 RX ORDER — FENTANYL CITRATE 50 UG/ML
100 INJECTION, SOLUTION INTRAMUSCULAR; INTRAVENOUS ONCE
Status: COMPLETED | OUTPATIENT
Start: 2018-05-22 | End: 2018-05-22

## 2018-05-22 RX ORDER — SODIUM CHLORIDE 0.9 % (FLUSH) 0.9 %
5-10 SYRINGE (ML) INJECTION EVERY 8 HOURS
Status: DISCONTINUED | OUTPATIENT
Start: 2018-05-22 | End: 2018-05-22 | Stop reason: HOSPADM

## 2018-05-22 RX ORDER — CEFAZOLIN SODIUM 2 G/50ML
2 SOLUTION INTRAVENOUS EVERY 8 HOURS
Status: COMPLETED | OUTPATIENT
Start: 2018-05-22 | End: 2018-05-23

## 2018-05-22 RX ORDER — SODIUM CHLORIDE 0.9 % (FLUSH) 0.9 %
5-10 SYRINGE (ML) INJECTION EVERY 8 HOURS
Status: DISCONTINUED | OUTPATIENT
Start: 2018-05-22 | End: 2018-05-23 | Stop reason: HOSPADM

## 2018-05-22 RX ORDER — SCOLOPAMINE TRANSDERMAL SYSTEM 1 MG/1
1 PATCH, EXTENDED RELEASE TRANSDERMAL ONCE
Status: COMPLETED | OUTPATIENT
Start: 2018-05-22 | End: 2018-05-23

## 2018-05-22 RX ORDER — ONDANSETRON 2 MG/ML
4 INJECTION INTRAMUSCULAR; INTRAVENOUS ONCE
Status: COMPLETED | OUTPATIENT
Start: 2018-05-22 | End: 2018-05-22

## 2018-05-22 RX ORDER — DEXTROSE, SODIUM CHLORIDE, SODIUM LACTATE, POTASSIUM CHLORIDE, AND CALCIUM CHLORIDE 5; .6; .31; .03; .02 G/100ML; G/100ML; G/100ML; G/100ML; G/100ML
75 INJECTION, SOLUTION INTRAVENOUS CONTINUOUS
Status: DISPENSED | OUTPATIENT
Start: 2018-05-22 | End: 2018-05-23

## 2018-05-22 RX ORDER — ENOXAPARIN SODIUM 100 MG/ML
30 INJECTION SUBCUTANEOUS EVERY 24 HOURS
Status: DISCONTINUED | OUTPATIENT
Start: 2018-05-23 | End: 2018-05-23 | Stop reason: HOSPADM

## 2018-05-22 RX ORDER — ROPIVACAINE HYDROCHLORIDE 2 MG/ML
60 INJECTION, SOLUTION EPIDURAL; INFILTRATION; PERINEURAL
Status: COMPLETED | OUTPATIENT
Start: 2018-05-22 | End: 2018-05-22

## 2018-05-22 RX ORDER — PROMETHAZINE HYDROCHLORIDE 25 MG/ML
INJECTION, SOLUTION INTRAMUSCULAR; INTRAVENOUS AS NEEDED
Status: DISCONTINUED | OUTPATIENT
Start: 2018-05-22 | End: 2018-05-22 | Stop reason: HOSPADM

## 2018-05-22 RX ORDER — ROCURONIUM BROMIDE 10 MG/ML
INJECTION, SOLUTION INTRAVENOUS AS NEEDED
Status: DISCONTINUED | OUTPATIENT
Start: 2018-05-22 | End: 2018-05-22 | Stop reason: HOSPADM

## 2018-05-22 RX ORDER — ACETAMINOPHEN 325 MG/1
650 TABLET ORAL EVERY 6 HOURS
Status: DISCONTINUED | OUTPATIENT
Start: 2018-05-22 | End: 2018-05-23 | Stop reason: HOSPADM

## 2018-05-22 RX ORDER — ONDANSETRON 2 MG/ML
INJECTION INTRAMUSCULAR; INTRAVENOUS AS NEEDED
Status: DISCONTINUED | OUTPATIENT
Start: 2018-05-22 | End: 2018-05-22 | Stop reason: HOSPADM

## 2018-05-22 RX ORDER — FENTANYL CITRATE 50 UG/ML
50 INJECTION, SOLUTION INTRAMUSCULAR; INTRAVENOUS
Status: DISCONTINUED | OUTPATIENT
Start: 2018-05-22 | End: 2018-05-22 | Stop reason: HOSPADM

## 2018-05-22 RX ORDER — MIDAZOLAM HYDROCHLORIDE 1 MG/ML
2 INJECTION, SOLUTION INTRAMUSCULAR; INTRAVENOUS ONCE
Status: COMPLETED | OUTPATIENT
Start: 2018-05-22 | End: 2018-05-22

## 2018-05-22 RX ORDER — DEXTROSE 50 % IN WATER (D50W) INTRAVENOUS SYRINGE
25-50 AS NEEDED
Status: DISCONTINUED | OUTPATIENT
Start: 2018-05-22 | End: 2018-05-22 | Stop reason: HOSPADM

## 2018-05-22 RX ORDER — MIDAZOLAM HYDROCHLORIDE 1 MG/ML
INJECTION, SOLUTION INTRAMUSCULAR; INTRAVENOUS AS NEEDED
Status: DISCONTINUED | OUTPATIENT
Start: 2018-05-22 | End: 2018-05-22 | Stop reason: HOSPADM

## 2018-05-22 RX ORDER — LABETALOL HYDROCHLORIDE 5 MG/ML
INJECTION, SOLUTION INTRAVENOUS AS NEEDED
Status: DISCONTINUED | OUTPATIENT
Start: 2018-05-22 | End: 2018-05-22 | Stop reason: HOSPADM

## 2018-05-22 RX ADMIN — TRANEXAMIC ACID 1 G: 100 INJECTION, SOLUTION INTRAVENOUS at 09:57

## 2018-05-22 RX ADMIN — SODIUM CHLORIDE, SODIUM LACTATE, POTASSIUM CHLORIDE, AND CALCIUM CHLORIDE 75 ML/HR: 600; 310; 30; 20 INJECTION, SOLUTION INTRAVENOUS at 06:20

## 2018-05-22 RX ADMIN — VITAMIN D, TAB 1000IU (100/BT) 2000 UNITS: 25 TAB at 13:05

## 2018-05-22 RX ADMIN — FENTANYL CITRATE 50 MCG: 50 INJECTION, SOLUTION INTRAMUSCULAR; INTRAVENOUS at 07:40

## 2018-05-22 RX ADMIN — Medication 1 CAPSULE: at 13:05

## 2018-05-22 RX ADMIN — PROPOFOL 150 MG: 10 INJECTION, EMULSION INTRAVENOUS at 07:40

## 2018-05-22 RX ADMIN — Medication 10 MG: at 07:49

## 2018-05-22 RX ADMIN — POTASSIUM CHLORIDE 10 MEQ: 750 TABLET, EXTENDED RELEASE ORAL at 13:04

## 2018-05-22 RX ADMIN — SUCCINYLCHOLINE CHLORIDE 140 MG: 20 INJECTION INTRAMUSCULAR; INTRAVENOUS at 07:41

## 2018-05-22 RX ADMIN — OXYCODONE HYDROCHLORIDE 5 MG: 5 TABLET ORAL at 22:11

## 2018-05-22 RX ADMIN — SODIUM CHLORIDE 1000 MG: 900 INJECTION, SOLUTION INTRAVENOUS at 06:30

## 2018-05-22 RX ADMIN — Medication 10 ML: at 23:09

## 2018-05-22 RX ADMIN — LIDOCAINE HYDROCHLORIDE 60 MG: 20 INJECTION, SOLUTION EPIDURAL; INFILTRATION; INTRACAUDAL; PERINEURAL at 07:40

## 2018-05-22 RX ADMIN — ACETAMINOPHEN 650 MG: 325 TABLET ORAL at 23:05

## 2018-05-22 RX ADMIN — TRANEXAMIC ACID 1 G: 100 INJECTION, SOLUTION INTRAVENOUS at 08:02

## 2018-05-22 RX ADMIN — FENTANYL CITRATE 50 MCG: 50 INJECTION, SOLUTION INTRAMUSCULAR; INTRAVENOUS at 08:37

## 2018-05-22 RX ADMIN — ROCURONIUM BROMIDE 5 MG: 10 INJECTION, SOLUTION INTRAVENOUS at 07:40

## 2018-05-22 RX ADMIN — OXYCODONE HYDROCHLORIDE 5 MG: 5 TABLET ORAL at 13:03

## 2018-05-22 RX ADMIN — METOPROLOL TARTRATE 50 MG: 50 TABLET ORAL at 18:08

## 2018-05-22 RX ADMIN — SODIUM CHLORIDE 20 MG: 9 INJECTION INTRAMUSCULAR; INTRAVENOUS; SUBCUTANEOUS at 06:24

## 2018-05-22 RX ADMIN — ROPIVACAINE HYDROCHLORIDE 60 MG: 2 INJECTION, SOLUTION EPIDURAL; INFILTRATION at 07:20

## 2018-05-22 RX ADMIN — ONDANSETRON 4 MG: 2 INJECTION INTRAMUSCULAR; INTRAVENOUS at 10:03

## 2018-05-22 RX ADMIN — ACETAMINOPHEN 650 MG: 325 TABLET ORAL at 18:08

## 2018-05-22 RX ADMIN — Medication 10 MG: at 07:56

## 2018-05-22 RX ADMIN — LABETALOL HYDROCHLORIDE 5 MG: 5 INJECTION, SOLUTION INTRAVENOUS at 10:06

## 2018-05-22 RX ADMIN — CEFAZOLIN SODIUM 2 G: 2 SOLUTION INTRAVENOUS at 18:08

## 2018-05-22 RX ADMIN — GLYCOPYRROLATE 0.2 MG: 0.2 INJECTION INTRAMUSCULAR; INTRAVENOUS at 07:31

## 2018-05-22 RX ADMIN — FENTANYL CITRATE 50 MCG: 50 INJECTION INTRAMUSCULAR; INTRAVENOUS at 10:54

## 2018-05-22 RX ADMIN — MIDAZOLAM HYDROCHLORIDE 2 MG: 1 INJECTION, SOLUTION INTRAMUSCULAR; INTRAVENOUS at 07:28

## 2018-05-22 RX ADMIN — SIMVASTATIN 20 MG: 20 TABLET, FILM COATED ORAL at 22:11

## 2018-05-22 RX ADMIN — MIDAZOLAM HYDROCHLORIDE 1 MG: 1 INJECTION, SOLUTION INTRAMUSCULAR; INTRAVENOUS at 07:16

## 2018-05-22 RX ADMIN — ACETAMINOPHEN 1000 MG: 500 TABLET, FILM COATED ORAL at 06:27

## 2018-05-22 RX ADMIN — CALCIUM CARBONATE-CHOLECALCIFEROL TAB 250 MG-125 UNIT 1 TABLET: 250-125 TAB at 13:05

## 2018-05-22 RX ADMIN — DOCUSATE SODIUM 100 MG: 100 CAPSULE, LIQUID FILLED ORAL at 18:09

## 2018-05-22 RX ADMIN — FENTANYL CITRATE 50 MCG: 50 INJECTION INTRAMUSCULAR; INTRAVENOUS at 07:17

## 2018-05-22 RX ADMIN — SODIUM CHLORIDE, SODIUM LACTATE, POTASSIUM CHLORIDE, AND CALCIUM CHLORIDE: 600; 310; 30; 20 INJECTION, SOLUTION INTRAVENOUS at 10:10

## 2018-05-22 RX ADMIN — ONDANSETRON HYDROCHLORIDE 4 MG: 2 INJECTION, SOLUTION INTRAMUSCULAR; INTRAVENOUS at 10:55

## 2018-05-22 RX ADMIN — CEFAZOLIN SODIUM 2 G: 2 SOLUTION INTRAVENOUS at 08:07

## 2018-05-22 RX ADMIN — ACETAMINOPHEN 650 MG: 325 TABLET ORAL at 13:05

## 2018-05-22 RX ADMIN — PROMETHAZINE HYDROCHLORIDE 6.25 MG: 25 INJECTION, SOLUTION INTRAMUSCULAR; INTRAVENOUS at 07:51

## 2018-05-22 RX ADMIN — DOCUSATE SODIUM 100 MG: 100 CAPSULE, LIQUID FILLED ORAL at 13:05

## 2018-05-22 NOTE — INTERVAL H&P NOTE
H&P Update:  Elizabeth Trinidad was seen and examined. History and physical has been reviewed. The patient has been examined. There have been no significant clinical changes since the completion of the originally dated History and Physical.  Patient identified by surgeon; surgical site was confirmed by patient and surgeon.     Signed By: Lovey Kanner, MD     May 22, 2018 7:16 AM

## 2018-05-22 NOTE — ANESTHESIA POSTPROCEDURE EVALUATION
Post-Anesthesia Evaluation and Assessment    Patient: Omkar Augustin MRN: [id number]  SSN: [id number]    YOB: 1955  Age: 59 y.o. Sex: female       Cardiovascular Function/Vital Signs  Visit Vitals    /68 (BP 1 Location: Right arm)    Pulse 60    Temp 35.9 °C (96.7 °F)    Resp 16    Ht 5' 6.5\" (1.689 m)    Wt 85.8 kg (189 lb 4 oz)    SpO2 98%    BMI 30.09 kg/m2       Patient is status post regional, general anesthesia for Procedure(s):  RIGHT TOTAL KNEE ARTHROPLASTY/BIOMET/2 SA'S/ADDUCTOR BLOCK. Nausea/Vomiting: None    Postoperative hydration reviewed and adequate. Pain:  Pain Scale 1: Numeric (0 - 10) (05/22/18 1054)  Pain Intensity 1: 9 (05/22/18 1054)   Managed    Neurological Status:   Neuro (WDL): Within Defined Limits (05/22/18 1023)  Neuro  LUE Motor Response: Purposeful (05/22/18 1023)  LLE Motor Response: Purposeful (05/22/18 1023)  RUE Motor Response: Purposeful (05/22/18 1023)  RLE Motor Response: Purposeful (05/22/18 1023)   At baseline    Mental Status and Level of Consciousness: Arousable    Pulmonary Status:   O2 Device: Nasal cannula (05/22/18 1104)   Adequate oxygenation and airway patent    Complications related to anesthesia: None    Post-anesthesia assessment completed.  No concerns    Signed By: Carley Begum MD     May 22, 2018

## 2018-05-22 NOTE — IP AVS SNAPSHOT
[address] Patient: Sarah Burgos MRN: [id number] LES:3/7/4662 About your hospitalization You were admitted on:  May 22, 2018 You last received care in the:  [facility] [address] 1979 You were discharged on:  May 23, 2018 Why you were hospitalized Your primary diagnosis was:  Not on File Your diagnoses also included:  Primary Osteoarthritis Of Right Knee Follow-up Information Follow up With Details Comments Contact Info Chari Bustamante MD On 5/31/2018 Appointment at 11:30 am [place] [address] [address] 200 [facility] 
[phone] Joey Gutierrez PA-C On 6/13/2018 Appointment at 8:30 am [address] [place] [address] and [facility] [address] 
[phone] Your Scheduled Appointments Thursday May 31, 2018 11:30 AM EDT Office Visit with Chari Bustamante MD  
[facility]) [address] [facility] 200 [facility]  
[phone] Friday June 01, 2018  8:30 AM EDT  
POST OP with Joey Gutierrez PA-C  
[facility]  ([facility]) [address] [place] [address]  
[phone] Tuesday June 05, 2018 COLONOSCOPY with MD ANASTASIYA [facility]) [address] [place] [address] [address] Thursday June 07, 2018  9:00 AM EDT Follow Up with Gisela Dale MD  
[facility] ([facility]) [facility] [address] [address] [address] [phone] Wednesday June 27, 2018  8:45 AM EDT METABOLIC PROGRAM 15 with Tianna Dwyer NP [facility] ([facility]) [address] [facility] 200 [facility]  
[phone] Discharge Orders None A check tricia indicates which time of day the medication should be taken. My Medications START taking these medications Instructions Each Dose to Equal  
 Morning Noon Evening Bedtime  
 enoxaparin 30 mg/0.3 mL injection Commonly known as:  LOVENOX Your last dose was: Your next dose is: 0.3 mL by SubCUTAneous route daily for 14 days. Indications: DEEP VEIN THROMBOSIS PREVENTION, PULMONARY THROMBOEMBOLISM PREVENTION 30 mg  
    
   
   
   
  
 oxyCODONE IR 5 mg immediate release tablet Commonly known as:  Adair Espinoza Your last dose was: Your next dose is: Take 1-3 Tabs by mouth every four (4) hours as needed. Max Daily Amount: 90 mg. Indications: Pain 5-15 mg  
    
   
   
   
  
 polyethylene glycol 17 gram packet Commonly known as:  Loretta Quintanilla Your last dose was: Your next dose is: Take 1 Packet by mouth daily. 17 g CONTINUE taking these medications Instructions Each Dose to Equal  
 Morning Noon Evening Bedtime  
 calcium-cholecalciferol (d3) 600-125 mg-unit Tab Your last dose was: Your next dose is: Take 1,200 mg by mouth daily. 1200 mg  
    
   
   
   
  
 metoprolol tartrate 50 mg tablet Commonly known as:  LOPRESSOR Your last dose was: Your next dose is: Take 1 Tab by mouth two (2) times a day. 50 mg  
    
   
   
   
  
 omega-3 fatty acids-vitamin e 1,000 mg Cap Your last dose was: Your next dose is: Take 1 Cap by mouth two (2) times a day. 1 Cap  
    
   
   
   
  
 peg-electrolyte soln 420 gram solution Commonly known as:  Zula Acrreon Your last dose was: Your next dose is: MIX WITH ANY CLEAR LIQUID. TAKE 2 LITERS AT 6 PM ON THE DAY BEFORE THE COLONOSCOPY. potassium 99 mg tablet Your last dose was: Your next dose is: Take 99 mg by mouth daily as needed. 99 mg  
    
   
   
   
  
 simvastatin 20 mg tablet Commonly known as:  ZOCOR Your last dose was: Your next dose is: Take 1 Tab by mouth nightly. 20 mg  
    
   
   
   
  
 TYLENOL ARTHRITIS PAIN 650 mg Tber Generic drug:  acetaminophen Your last dose was: Your next dose is: Take 650 mg by mouth every six (6) hours as needed for Pain. 650 mg  
    
   
   
   
  
  
STOP taking these medications   
 aspirin 81 mg chewable tablet  
   
  
 levoFLOXacin 750 mg tablet Commonly known as:  LEVAQUIN  
   
  
 meloxicam 15 mg tablet Commonly known as:  MOBIC  
   
  
 VITAMIN D3 2,000 unit Tab Generic drug:  cholecalciferol (vitamin D3) Where to Get Your Medications Information on where to get these meds will be given to you by the nurse or doctor. ! Ask your nurse or doctor about these medications  
  enoxaparin 30 mg/0.3 mL injection  
 oxyCODONE IR 5 mg immediate release tablet  
 polyethylene glycol 17 gram packet Opioid Education Prescription Opioids: What You Need to Know: 
 
Prescription opioids can be used to help relieve moderate-to-severe pain and are often prescribed following a surgery or injury, or for certain health conditions. These medications can be an important part of treatment but also come with serious risks. Opioids are strong pain medicines. Examples include hydrocodone, oxycodone, fentanyl, and morphine. Heroin is an example of an illegal opioid. It is important to work with your health care provider to make sure you are getting the safest, most effective care. WHAT ARE THE RISKS AND SIDE EFFECTS OF OPIOID USE? Prescription opioids carry serious risks of addiction and overdose, especially with prolonged use.   An opioid overdose, often marked by slow breathing, can cause sudden death. The use of prescription opioids can have a number of side effects as well, even when taken as directed. · Tolerance-meaning you might need to take more of a medication for the same pain relief · Physical dependence-meaning you have symptoms of withdrawal when the medication is stopped. Withdrawal symptoms can include nausea, sweating, chills, diarrhea, stomach cramps, and muscle aches. Withdrawal can last up to several weeks, depending on which drug you took and how long you took it. · Increased sensitivity to pain · Constipation · Nausea, vomiting, and dry mouth · Sleepiness and dizziness · Confusion · Depression · Low levels of testosterone that can result in lower sex drive, energy, and strength · Itching and sweating RISKS ARE GREATER WITH:      
· History of drug misuse, substance use disorder, or overdose · Mental health conditions (such as depression or anxiety) · Sleep apnea · Older age (72 years or older) · Pregnancy Avoid alcohol while taking prescription opioids. Also, unless specifically advised by your health care provider, medications to avoid include: · Benzodiazepines (such as Xanax or Valium) · Muscle relaxants (such as Soma or Flexeril) · Hypnotics (such as Ambien or Lunesta) · Other prescription opioids KNOW YOUR OPTIONS Talk to your health care provider about ways to manage your pain that don't involve prescription opioids. Some of these options may actually work better and have fewer risks and side effects. Options may include: 
· Pain relievers such as acetaminophen, ibuprofen, and naproxen · Some medications that are also used for depression or seizures · Physical therapy and exercise · Counseling to help patients learn how to cope better with triggers of pain and stress. · Application of heat or cold compress · Massage therapy · Relaxation techniques Be Informed Make sure you know the name of your medication, how much and how often to take it, and its potential risks & side effects. IF YOU ARE PRESCRIBED OPIOIDS FOR PAIN: 
· Never take opioids in greater amounts or more often than prescribed. Remember the goal is not to be pain-free but to manage your pain at a tolerable level. · Follow up with your primary care provider to: · Work together to create a plan on how to manage your pain. · Talk about ways to help manage your pain that don't involve prescription opioids. · Talk about any and all concerns and side effects. · Help prevent misuse and abuse. · Never sell or share prescription opioids · Help prevent misuse and abuse. · Store prescription opioids in a secure place and out of reach of others (this may include visitors, children, friends, and family). · Safely dispose of unused/unwanted prescription opioids: Find your community drug take-back program or your pharmacy mail-back program, or flush them down the toilet, following guidance from the Food and Drug Administration (www.fda.gov/Drugs/ResourcesForYou). · Visit www.cdc.gov/drugoverdose to learn about the risks of opioid abuse and overdose. · If you believe you may be struggling with addiction, tell your health care provider and ask for guidance or call [employer] (acquired by [employer] in 2014) at 7-985-328-XTKK. Discharge Instructions Learning About Surgery to Restore Joint Cartilage What is surgery to restore joint cartilage? The bones in your joints are covered with a special type of cartilage. This layer of tissue helps the joint bones glide together smoothly. When this tissue is damaged, the bones may rub against each other. And that causes pain. Cartilage doesn't heal easily on its own. The goal of treatment is to get your body to grow new cartilage in the damaged area.  This is done by placing healthy cartilage cells into the area. Over time, the cells multiply and replace the damaged cartilage. The knee is the most common area for this type of surgery. Other areas include the ankle and the shoulder. How is the surgery done? You either will be asleep during the surgery or the area being worked on will be numb. Two common surgeries are: · Osteochondral autologous transplantation (OATS). ¨ During surgery, the doctor removes the damaged cartilage. ¨ He or she also removes one or more samples, called plugs, of healthy bone and cartilage from another part of your body. Sometimes the plugs are taken from another donor instead of your own body. (This is called an allograft.) ¨ The doctor cuts into your bone to make a socket, or hole, for each plug. Then the plug is inserted into the socket. · Autologous chondrocyte implantation (ACI). This procedure has two steps. ¨ Harvesting cells: The doctor takes healthy cartilage cells from one of your bones. You won't need to stay in the hospital for this. The cells are sent to a lab. In the lab, the cells increase in number. This takes several weeks. ¨ Surgery. When the cells are ready, you will have surgery. The doctor removes the damaged cartilage. Then the doctor pulls at the layer of tissue that covers your bones over the joint area. This creates a pocket. He or she injects the new cells into the pocket. These surgeries can be done in two ways: · Arthroscopic surgery: If the damaged area is small, the doctor may use this method. It requires only small cuts (incisions). The doctor puts a lighted tube, called a scope, and other surgical tools through the cuts. The doctor is able to see the inside of your joint with the scope. You may be able to go home the same day. · Open surgery: Your doctor will make one large cut over the joint. If you have open surgery, you will probably stay in the hospital overnight. What can you expect after surgery? You will need to wear a stiff brace for a short time. Then you will need crutches or a walker for a few months. Follow your doctor's instructions about how much weight you can put on the joint. You will start doing exercises while you're still in the hospital. And you will need to do weeks or months of physical rehabilitation. Rehab will help strengthen the muscles of your joint and help you regain movement. In time, most people are able to return to most of their normal activities, including sports. Follow-up care is a key part of your treatment and safety. Be sure to make and go to all appointments, and call your doctor if you are having problems. It's also a good idea to know your test results and keep a list of the medicines you take. Where can you learn more? Go to http://stanton-gonsalo.info/. Enter F275 in the search box to learn more about \"Learning About Surgery to  [place]. \" Current as of: March 21, 2017 Content Version: 11.4 © 9392-1620 [employer]. Care instructions adapted under license by [employer] (which disclaims liability or warranty for this information). If you have questions about a medical condition or this instruction, always ask your healthcare professional. [place] [address] any warranty or liability for your use of this information. DISCHARGE SUMMARY from Nurse PATIENT INSTRUCTIONS: 
 
 
F-face looks uneven A-arms unable to move or move unevenly S-speech slurred or non-existent T-time-call 911 as soon as signs and symptoms begin-DO NOT go  
 Back to bed or wait to see if you get better-TIME IS BRAIN. Warning Signs of HEART ATTACK Call 911 if you have these symptoms: 
? Chest discomfort. Most heart attacks involve discomfort in the center of the chest that lasts more than a few minutes, or that goes away and comes back. It can feel like uncomfortable pressure, squeezing, fullness, or pain. ? Discomfort in other areas of the upper body. Symptoms can include pain or discomfort in one or both arms, the back, neck, jaw, or stomach. ? Shortness of breath with or without chest discomfort. ? Other signs may include breaking out in a cold sweat, nausea, or lightheadedness. Don't wait more than five minutes to call [address]! Fast action can save your life. Calling 911 is almost always the fastest way to get lifesaving treatment. Emergency Medical Services staff can begin treatment when they arrive  up to an hour sooner than if someone gets to the hospital by car. The discharge information has been reviewed with the patient. The patient verbalized understanding. Discharge medications reviewed with the patient and appropriate educational materials and side effects teaching were provided. ___________________________________________________________________________________________________________________________________ Patient armband removed and shredded. MyChart Activation Thank you for requesting access to Trip4real. Please follow the instructions below to securely access and download your online medical record. Trip4real allows you to send messages to your doctor, view your test results, renew your prescriptions, schedule appointments, and more. How Do I Sign Up? 1. In your internet browser, go to [email or web address]. 2. Click on the First Time User? Click Here link in the Sign In box. You will see the New Member Sign Up page. 3. Enter your Trip4real Access Code exactly as it appears below.  You will not need to use this code after youve completed the sign-up process. If you do not sign up before the expiration date, you must request a new code. Bingo.com Access Code: Activation code not generated Current Bingo.com Status: Active (This is the date your Bingo.com access code will ) 4. Enter the last four digits of your Social Security Number (xxxx) and Date of Birth (mm/dd/yyyy) as indicated and click Submit. You will be taken to the next sign-up page. 5. Create a Soceaniqt ID. This will be your Bingo.com login ID and cannot be changed, so think of one that is secure and easy to remember. 6. Create a Bingo.com password. You can change your password at any time. 7. Enter your Password Reset Question and Answer. This can be used at a later time if you forget your password. 8. Enter your e-mail address. You will receive e-mail notification when new information is available in [address] 9. Click Sign Up. You can now view and download portions of your medical record. 10. Click the Download Summary menu link to download a portable copy of your medical information. Additional Information If you have questions, please visit the Frequently Asked Questions section of the Bingo.com website at [email or web address] Remember, Bingo.com is NOT to be used for urgent needs. For medical emergencies, dial 911. Introducing [facility] & HE[facility] SERVICES! Dear Pillo Petty: 
Thank you for requesting a Bingo.com account. Our records indicate that you already have an active Bingo.com account. You can access your account anytime at [email or web address] Did you know that you can access your hospital and ER discharge instructions at any time in Bingo.com? You can also review all of your test results from your hospital stay or ER visit. Additional Information If you have questions, please visit the Frequently Asked Questions section of the Voice Assistt website at [email or web address] Remember, [employer] is NOT to be used for urgent needs. For medical emergencies, dial 911. Now available from your iPhone and Android! Introducing Ru Luna As a [facility] patient, I wanted to make you aware of our electronic visit tool called Ru Luna. [employer] Integration Software 24/7 allows you to connect within minutes with a medical provider 24 hours a day, seven days a week via a mobile device or tablet or logging into a secure website from your computer. You can access Ru Luna from anywhere in the [place]. A virtual visit might be right for you when you have a simple condition and feel like you just dont want to get out of bed, or cant get away from work for an appointment, when your regular [facility] provider is not available (evenings, weekends or holidays), or when youre out of town and need minor care. Electronic visits cost only $49 and if the [facility] 24/7 provider determines a prescription is needed to treat your condition, one can be electronically transmitted to a nearby pharmacy*. Please take a moment to enroll today if you have not already done so. The enrollment process is free and takes just a few minutes. To enroll, please download the Secur[employer]x Integration Software 24/7 cathy to your tablet or phone, or visit [email or web address] to enroll on your computer. And, as an [address] [place] [address] patient with a agÃƒÂ¡mi Systems account, the results of your visits will be scanned into your electronic medical record and your primary care provider will be able to view the scanned results. We urge you to continue to see your regular [facility] provider for your ongoing medical care. And while your primary care provider may not be the one available when you seek a Ru Luna virtual visit, the peace of mind you get from getting a real diagnosis real time can be priceless. For more information on Ru Luna, view our Frequently Asked Questions (FAQs) at [email or web address]. Sincerely, 
 
Gaby Vences MD 
Chief Medical Officer [address] *:  certain medications cannot be prescribed via Ru Luna Providers Seen During Your Hospitalization Provider Specialty Primary office phone Eloina Lopez MD Orthopedic Surgery [phone] Your Primary Care Physician (PCP) Primary Care Physician Office Phone Office Fax Nemo Broussard [phone] You are allergic to the following No active allergies Recent Documentation Height Weight Breastfeeding? BMI OB Status Smoking Status 1.689 m 85.8 kg No 30.09 kg/m2 Hysterectomy Never Smoker Emergency Contacts Name Discharge Info Relation Home Work Mobile Vosler,Dennis DISCHARGE CAREGIVER [3] Spouse [3] [phone] Patient Belongings The following personal items are in your possession at time of discharge: 
  Dental Appliances: None  Visual Aid: None, Glasses      Home Medications: None   Jewelry: None  Clothing: Undergarments, Pants, Shirt, Footwear    Other Valuables: Cell Phone  Personal Items Sent to Safe: n/a Please provide this summary of care documentation to your next provider. Signatures-by signing, you are acknowledging that this After Visit Summary has been reviewed with you and you have received a copy. Patient Signature:  ____________________________________________________________ Date:  ____________________________________________________________  
  
Debbie Terry Provider Signature:  ____________________________________________________________ Date:  ____________________________________________________________

## 2018-05-22 NOTE — OP NOTES
Operative Note      Patient: Luz Gordon     Date of Surgery: 5/22/2018         YOB: 1955      Age:  59 y.o.        LOS:  LOS: 0 days       Preoperative Diagnosis:  Severe right knee osteoarthritis    Postoperative Diagnosis: Same    Surgeon:  Roxann Pond MD     Anesthesia:  general anesthesia and adductor block     Procedure:  Procedure(s):  RIGHT TOTAL KNEE ARTHROPLASTY/BIOMET/2 SA'S/ADDUCTOR BLOCK    Time out performed: YES    Estimated Blood Loss:  min           Implants:    Implant Name Type Inv. Item Serial No.  Lot No. LRB No. Used Action   TRAY TIB MITCHEL INTERLOK 75MM --  - [id number]  TRAY TIB MITCHEL INTERLOK 75MM --   [employer] [id number] Right 1 Implanted   STEM TIB FIN MITCHEL 80X12.5MM --  - [id number]  STEM TIB FIN MITCHEL 80X12.5MM --   [employer] [id number] Right 1 Implanted   PAT SER A THN 37X8. 5 3 PEG -- NO WIRE - [id number]  PAT SER A THN 37X8. 5 3 PEG -- NO WIRE  [employer] [id number] Right 1 Implanted   COMPNT FEM PS OPN RT 67. 5MM -- Nikky [place] - [id number]  COMPNT FEM PS OPN RT 67. 5MM -- [place] [facility] [id number] Right 1 Implanted   INSERT TIB BRNG PS E1 88/58P50 -- VANGUARD - [id number]   INSERT TIB BRNG PS E1 71/75X12 -- Ronnald Wesva [employer] [id number] Right 1 Implanted       Specimens: * No specimens in log *            Complications:  None    DESCRIPTION OF PROCEDURE: After satisfactory general and adductor block anesthesia, in the supine position, patient's knee was prepped with Betacept and ChloroPrep solution and draped in the usual fashion for knee replacement. The tourniquet was inflated to 350 mmHg after exsanguination and elevation. A longitudinal anterior skin incision was made carried down through the subcutaneus tissue to the underlying extensor mechanism.  A medial parapatellar arthrotomy was carried proximally in a subvastus approach sparing the vastus medialis obliqus insertion on the quadriceps tendon. The patella was everted and retracted laterally as the knee was flexed. An anterior joint line debridement was carried out. The proximal tibial cut was made using the extramedullary tibial cutting guide and an oscillating saw. The distal femoral cuts were made using an intramedullary femoral cutting guide and a 4 degree cutting angle based on preoperative x-ray measurements of the femur. The notch cut was made for a 67.5 mm posterior stabilized femoral component using the bone conserving cutting slot. Osteophytes were removed from the joint margins including the posterior femoral condyles. The tibial stem hole and fin impressions were made for the 75 mm tibial base plate. The patella was cut to recreate the native patellar thickness of 21 mm. Three drill holes were placed in the patella for a 37 mm three pegged all polyethylene patellar component. Trial components were inserted and the knee was found to have full extension and good stability with a 12 mm tibial spacer in place. Trial components were then removed and the knee was irrigated thoroughly. The actual components were then cemented into place with gentamycin bone cement. All excess cement was carefully removed. The knee was held in extension with a clamp on the patella as the cement hardened. Once the cement fully hardened, the knee was again checked for excess cement. The knee was injected with 266 mg/20 cc Exparel mixed with 80 ml saline as the cement hardened. The actual E poly tibial spacer was then attached to the tibial base plate with a locking bar. The knee was then reduced and was found to be stable with full knee extension, good stability, and mid line patellar tracking with no thumb pressure applied. The knee was again irrigated thoroughly, including a dilute betadine lavage. A Constavac drain was inserted.  Closure was obtained using #2 vicryl interrupted sutures for the capsular closure, 2-0 vicryl suture for the subcutaneous tissues and staples for the skin. Sterile gauze dressings were applied and Ms. Vosler was transported to the recovery room in good condition. Sponge and needle count was correct.

## 2018-05-22 NOTE — IP AVS SNAPSHOT
[address] Patient: Elizabeth Trinidad MRN: [id number] ZPU:[id number] A check tricia indicates which time of day the medication should be taken. My Medications START taking these medications Instructions Each Dose to Equal  
 Morning Noon Evening Bedtime  
 enoxaparin 30 mg/0.3 mL injection Commonly known as:  LOVENOX Your last dose was: Your next dose is: 0.3 mL by SubCUTAneous route daily for 14 days. Indications: DEEP VEIN THROMBOSIS PREVENTION, PULMONARY THROMBOEMBOLISM PREVENTION 30 mg  
    
   
   
   
  
 oxyCODONE IR 5 mg immediate release tablet Commonly known as:  Karen Wayne Your last dose was: Your next dose is: Take 1-3 Tabs by mouth every four (4) hours as needed. Max Daily Amount: 90 mg. Indications: Pain 5-15 mg  
    
   
   
   
  
 polyethylene glycol 17 gram packet Commonly known as:  Chad Martinez Your last dose was: Your next dose is: Take 1 Packet by mouth daily. 17 g CONTINUE taking these medications Instructions Each Dose to Equal  
 Morning Noon Evening Bedtime  
 calcium-cholecalciferol (d3) 600-125 mg-unit Tab Your last dose was: Your next dose is: Take 1,200 mg by mouth daily. 1200 mg  
    
   
   
   
  
 metoprolol tartrate 50 mg tablet Commonly known as:  LOPRESSOR Your last dose was: Your next dose is: Take 1 Tab by mouth two (2) times a day. 50 mg  
    
   
   
   
  
 omega-3 fatty acids-vitamin e 1,000 mg Cap Your last dose was: Your next dose is: Take 1 Cap by mouth two (2) times a day. 1 Cap  
    
   
   
   
  
 peg-electrolyte soln 420 gram solution Commonly known as:  Poly Barr Your last dose was: Your next dose is: MIX WITH ANY CLEAR LIQUID. TAKE 2 LITERS AT 6 PM ON THE DAY BEFORE THE COLONOSCOPY. potassium 99 mg tablet Your last dose was: Your next dose is: Take 99 mg by mouth daily as needed. 99 mg  
    
   
   
   
  
 simvastatin 20 mg tablet Commonly known as:  ZOCOR Your last dose was: Your next dose is: Take 1 Tab by mouth nightly. 20 mg  
    
   
   
   
  
 TYLENOL ARTHRITIS PAIN 650 mg Tber Generic drug:  acetaminophen Your last dose was: Your next dose is: Take 650 mg by mouth every six (6) hours as needed for Pain. 650 mg  
    
   
   
   
  
  
STOP taking these medications   
 aspirin 81 mg chewable tablet  
   
  
 levoFLOXacin 750 mg tablet Commonly known as:  LEVAQUIN  
   
  
 meloxicam 15 mg tablet Commonly known as:  MOBIC  
   
  
 VITAMIN D3 2,000 unit Tab Generic drug:  cholecalciferol (vitamin D3) Where to Get Your Medications Information on where to get these meds will be given to you by the nurse or doctor. ! Ask your nurse or doctor about these medications  
  enoxaparin 30 mg/0.3 mL injection  
 oxyCODONE IR 5 mg immediate release tablet  
 polyethylene glycol 17 gram packet

## 2018-05-22 NOTE — PERIOP NOTES
TRANSFER - OUT REPORT:    Verbal report given to Ayana Escalante on [employer]  being transferred to [facility] for routine post - op       Report consisted of patients Situation, Background, Assessment and   Recommendations(SBAR). Information from the following report(s) SBAR, OR Summary, Procedure Summary, Intake/Output, MAR and Recent Results was reviewed with the receiving nurse. Lines:   Peripheral IV 05/22/18 Left Hand (Active)   Site Assessment Clean, dry, & intact 5/22/2018 10:23 AM   Phlebitis Assessment 0 5/22/2018 10:23 AM   Infiltration Assessment 0 5/22/2018 10:23 AM   Dressing Status Clean, dry, & intact 5/22/2018 10:23 AM   Dressing Type Transparent 5/22/2018 10:23 AM   Hub Color/Line Status Pink; Infusing;Patent 5/22/2018 10:23 AM        Opportunity for questions and clarification was provided.       Patient transported with:   O2 @ 3 liters  Registered Nurse

## 2018-05-22 NOTE — PROGRESS NOTES
Problem: Mobility Impaired (Adult and Pediatric)  Goal: *Acute Goals and Plan of Care (Insert Text)  Physical Therapy Goals  Initiated 5/22/2018 and to be accomplished within 7 day(s)  1. Patient will move from supine to sit and sit to supine , scoot up and down and roll side to side in bed with supervision/set-up. 2.  Patient will transfer from bed to chair and chair to bed with supervision/set-up using the least restrictive device. 3.  Patient will perform sit to stand with supervision/set-up. 4.  Patient will ambulate with supervision/set-up for >150 feet with the least restrictive device. 5.  Patient will ascend/descend 4 stairs with 1 handrail(s) with supervision/set-up. physical Therapy EVALUATION    Patient: Dasia Turner (38 y.o. female)  Date: 5/22/2018  Primary Diagnosis: Osteoarthritis of right knee, unspecified osteoarthritis type [M17.11]  Procedure(s) (LRB):  RIGHT TOTAL KNEE ARTHROPLASTY/BIOMET/2 SA'S/ADDUCTOR BLOCK (Right) Day of Surgery   Precautions: Fall        ASSESSMENT :  Patient is 58 yo F admitted to hospital for TKA and presents today alert and agreeable to therapy. Patient was educated on weight bearing status, role of therapy, TE (see below), and equipment in room including role of SCDs, towel roll, and ice sleeve. Patient demonstrated impaired SLR as she was unable to lift RLE from bed, however demos 3/5 LAQ. Patient transferred to sitting EOB for objective assessment. Patient was given demo with instruction on sit <> stand transfer and gait training. Patient transferred to standing with CG and ambulated 20ft with RW at [facility] with VC's for sequencing. At conclusion of session patient transferred to supine in bed and was left resting with call bell by the side, SCDs donned, and towel roll under ankle. Patient instructed to call for assistance if they needed to get up for any reason and denied need for further assistance.  Patient demonstrates decreased strength, mobility, and endurance and will benefit from skilled intervention to address the above impairments. Patients rehabilitation potential is considered to be Good  Factors which may influence rehabilitation potential include:   []         None noted  []         Mental ability/status  [x]         Medical condition  [x]         Home/family situation and support systems  [x]         Safety awareness  [x]         Pain tolerance/management  []         Other:      PLAN :  Recommendations and Planned Interventions:  [x]           Bed Mobility Training             [x]    Neuromuscular Re-Education  [x]           Transfer Training                   []    Orthotic/Prosthetic Training  [x]           Gait Training                          []    Modalities  [x]           Therapeutic Exercises          []    Edema Management/Control  [x]           Therapeutic Activities            [x]    Patient and Family Training/Education  []           Other (comment):    Frequency/Duration: Patient will be followed by physical therapy 1-2 times per day/4-7 days per week to address goals. Discharge Recommendations: Home Health  Further Equipment Recommendations for Discharge: rolling walker     G-CODES     Mobility  Current  CJ= 20-39%   Goal  CI= 1-19%. The severity rating is based on the Level of Assistance required for Functional Mobility and ADLs.        G-CODES     Eval Complexity: History: MEDIUM  Complexity : 1-2 comorbidities / personal factors will impact the outcome/ POC Exam:LOW Complexity : 1-2 Standardized tests and measures addressing body structure, function, activity limitation and / or participation in recreation  Presentation: LOW Complexity : Stable, uncomplicated  Clinical Decision Making:Low Complexity    Overall Complexity:LOW     SUBJECTIVE:   Patient stated I think I did pretty good for the first day.     OBJECTIVE DATA SUMMARY:     Past Medical History:   Diagnosis Date    Arthritis     Bilateral Knee, and Bilateral Hand/Thumb    Atrial fibrillation ([facility] [address] 1/29/2018    Carpal tunnel syndrome     H/O echocardiogram 01/30/2018    EF 50%, normal left atrial size, no valvular heart disease    Headache     History of ankle fusion 1997    left    Hx of migraine headaches     Hypercholesterolemia     Hypertension     Morbid obesity ([facility] [address]     HERZOG (nonalcoholic steatohepatitis) suggested by ultrasound report, 2016 8/11/2017    Vertigo      Past Surgical History:   Procedure Laterality Date    HX APPENDECTOMY      age 25 years    HX COLONOSCOPY      Due in 2018: May 2015    HX HYSTERECTOMY      age 30years old    HX ORTHOPAEDIC Left     ankle fusion    HX OTHER SURGICAL  2014    removal of mole left breast    HX TONSILLECTOMY      at age 29years old     Barriers to Learning/Limitations: None  Compensate with: Visual Cues, Verbal Cues and Tactile Cues  Prior Level of Function/Home Situation: Patient lives in 1 story home with  and has 2STE with HR. Patient reports that she has grab bars and SC with elevated commode heights and has RW at home. She was independent with mobility and I/ADL's PTA. Home Situation  Home Environment: Private residence  # Steps to Enter: 2  One/Two Story Residence: One story  Living Alone: No  Support Systems: Spouse/Significant Other/Partner, Parent, Child(ramon)  Patient Expects to be Discharged to[de-identified] Private residence  Current DME Used/Available at Home: None  Critical Behavior:    A&Ox4  Strength:    Strength:  Within functional limits (R knee flex/ext 3+/5, DF 5/5; LLE 5/5)   Tone & Sensation:   Tone: Normal (BLE)   Sensation: Intact (BLE)   Range Of Motion:  AROM: Generally decreased, functional (R knee 0-40 degrees)   Functional Mobility:  Bed Mobility:   Supine to Sit: Minimum assistance (for RLE management)  Sit to Supine: Contact guard assistance  Scooting: Supervision  Transfers:  Sit to Stand: Contact guard assistance  Stand to Sit: Stand-by assistance    Balance:   Sitting: Intact  Standing: Impaired; With support  Standing - Static: Fair  Standing - Dynamic : Fair  Ambulation/Gait Training:  Distance (ft): 20 Feet (ft)  Assistive Device: Walker, rolling  Ambulation - Level of Assistance: Contact guard assistance   Speed/Sara: Slow  Interventions: Verbal cues; Visual/Demos  Therapeutic Exercises:   Instructed patient in performing heel slides and quad sets (3sec hold) x10 on the hour while awake; performed x5 for teach back this session using sheet for assistance. Pain:  Pt reports 8/10 pain or discomfort prior to treatment. (recieved pain meds prior to PT's arrival)   Pt reports 8/10 pain or discomfort post treatment. Activity Tolerance:   Patient tolerated activity fair with no SOB, though was limited by dizziness in further mobility. Upon sitting and transferring to supine, symptoms improved. Please refer to the flowsheet for vital signs taken during this treatment. After treatment:   []         Patient left in no apparent distress sitting up in chair  [x]         Patient left in no apparent distress in bed  [x]         Call bell left within reach  []         Nursing notified   [x]         Caregiver present  [x]         Bed alarm activated  [x]         SCDs in place to B LE     COMMUNICATION/EDUCATION:   [x]         Fall prevention education was provided and the patient/caregiver indicated understanding. [x]         Patient/family have participated as able in goal setting and plan of care. [x]         Patient/family agree to work toward stated goals and plan of care. []         Patient understands intent and goals of therapy, but is neutral about his/her participation. []         Patient is unable to participate in goal setting and plan of care.     Thank you for this referral.  Harlan Hale, PT   Time Calculation: 39 mins

## 2018-05-22 NOTE — ANESTHESIA PREPROCEDURE EVALUATION
Anesthetic History     PONV          Review of Systems / Medical History  Patient summary reviewed and pertinent labs reviewed    Pulmonary  Within defined limits                 Neuro/Psych   Within defined limits           Cardiovascular    Hypertension: well controlled        Dysrhythmias (H/O episode of A Fib in 2/18. Resolved)       Exercise tolerance: >4 METS     GI/Hepatic/Renal           Liver disease (HERZOG)     Endo/Other        Morbid obesity and arthritis     Other Findings   Comments: Documentation of current medication  Current medications obtained, documented and obtained? YES      Risk Factors for Postoperative nausea/vomiting:       History of postoperative nausea/vomiting? YES       Female? YES       Motion sickness? NO       Intended opioid administration for postoperative analgesia? NO      Smoking Abstinence:  Current Smoker? NO  Elective Surgery? YES  Seen preoperatively by anesthesiologist or proxy prior to day of surgery? YES  Pt abstained from smoking 24 hours prior to anesthesia?  N/A    Preventive care/screening for High Blood Pressure:  Aged 18 years and older: YES  Screened for high blood pressure: YES  Patients with high blood pressure referred to primary care provider   for BP management: YES                 Physical Exam    Airway  Mallampati: II  TM Distance: 4 - 6 cm  Neck ROM: normal range of motion   Mouth opening: Normal     Cardiovascular  Regular rate and rhythm,  S1 and S2 normal,  no murmur, click, rub, or gallop             Dental  No notable dental hx       Pulmonary  Breath sounds clear to auscultation               Abdominal  GI exam deferred       Other Findings            Anesthetic Plan    ASA: 2  Anesthesia type: regional and general - femoral single shot          Induction: Intravenous  Anesthetic plan and risks discussed with: Patient

## 2018-05-22 NOTE — ADDENDUM NOTE
Addendum  created 05/22/18 1400 by Denis Haddad MD    Anesthesia Intra Blocks edited, Child order released for a procedure order, Sign clinical note

## 2018-05-22 NOTE — PROGRESS NOTES
Mobility Intervention:       [] Pt dangled at edge of bed    [] Pt assisted OOB to bedside commode    [x] Pt assisted OOB to chair    [] Pt ambulated to bathroom    [] Patient was ambulated in room/hallway    Assistive Device Utilized:       [x] Rolling walker   [] Crutches   [] Straight Cane   [] Knee immobilizer   [] IV pole    After Mobilization:     [x] Patient left in no apparent distress sitting up in chair  [] Patient left in no apparent distress in bed  [x] Call bell left within reach  [x] SCDs on & machine turned on  [x] Ice applied  [x] RN notified  [] Caregiver present  [] Bed alarm activated    Reason patient not mobilized:      [] Patient refused   [] Nausea/vomiting   [] Low blood pressure   [] Drowsy/lethargic    Pain Rating:     [x] 0  [] 1  Assistive Device:        [] 2  [] 3  [] 4  [] 5  [] 6  Assistive Device:        [] 7  [] 8  [] 9  [] 10    Comments:

## 2018-05-22 NOTE — ROUTINE PROCESS
Patient condition is stable. She is up to chair. No neurovascular deficits noted. Ice intact to Rt. Knee.

## 2018-05-22 NOTE — PROGRESS NOTES
conducted a pre-surgery visit with Prakash Baker, who is a 59 y.o.,female. The  provided the following Interventions:  Initiated a relationship of care and support. Offered prayer and assurance of continued prayers on patient's behalf. Plan:  Chaplains will continue to follow and will provide pastoral care on an as needed/requested basis.  recommends bedside caregivers page  on duty if patient shows signs of acute spiritual or emotional distress.     [address] Certified [address] [facility]   [phone]

## 2018-05-22 NOTE — BRIEF OP NOTE
BRIEF OPERATIVE NOTE    Date of Procedure: 5/22/2018   Preoperative Diagnosis: Severe right knee osteoarthritis  Postoperative Diagnosis: Same     Procedure(s):  RIGHT TOTAL KNEE ARTHROPLASTY/BIOMET/2 SA'S/ADDUCTOR BLOCK  Surgeon(s) and Role:     * Cheryl Howell MD - Primary    Surgical Staff:  Circ-1: Angeles Espino  Scrub Tech-1: Gilma Schulz  Surg Asst-1: Sherryl Mortimer  Event Time In   Incision Start 0809   Incision Close      Anesthesia: General   Estimated Blood Loss: min  Specimens: * No specimens in log *   Findings: above   Complications: none  Implants:   Implant Name Type Inv. Item Serial No.  Lot No. LRB No. Used Action   TRAY TIB MICTHEL INTERLOK 75MM --  - [id number]  TRAY TIB MITCHEL INTERLOK 75MM --   [employer] [id number] Right 1 Implanted   STEM TIB FIN MITCHEL 80X12.5MM --  - [id number]  STEM TIB FIN MITCHEL 80X12.5MM --   [employer] [id number] Right 1 Implanted   PAT SER A THN 37X8. 5 3 PEG -- NO WIRE - [id number]  PAT SER A THN 37X8. 5 3 PEG -- NO WIRE  [employer] [id number] Right 1 Implanted   COMPNT FEM PS OPN RT 67. 5MM -- Rashmi Lara - [id number]  COMPNT FEM PS OPN RT 67. 5MM -- Gerry Curry [employer] [id number] Right 1 Implanted   INSERT TIB BRNG PS E1 71/75X12 -- KADE - [id number]   INSERT TIB BRNG PS E1 71/75X12 -- Eduardo Pritchard [employer] [id number] Right 1 Implanted

## 2018-05-22 NOTE — IP AVS SNAPSHOT
Summary of Care Report The Summary of Care report has been created to help improve care coordination. Users with access to Kabam or [address] (Web-based application) may access additional patient information including the Discharge Summary. If you are not currently a [address] user and need more information, please call the number listed below in the [place] [address] section and ask to be connected with Medical Records. Facility Information Name Address Phone [address] [place] [address] [address] [facility] [address] [phone] Patient Information Patient Name Sex BHARAT Montaño ([id number]) Female 1955 Discharge Information Admitting Provider Service Area Unit Josh Jacobsen MD / [address] [facility] [address] [facility] / [phone] Discharge Provider Discharge Date/Time Discharge Disposition Destination (none) 2018 Afternoon (Pending) AHR (none) Patient Language Language ENGLISH [13] Hospital Problems as of 2018  Reviewed: 2018 10:09 PM by Kade Woodard MD  
  
  
  
 Class Noted - Resolved Last Modified POA Active Problems Primary osteoarthritis of right knee  2018 - Present 2018 by Josh Jacobsen MD Unknown Entered by Josh Jacobsen MD  
  
Non-Hospital Problems as of 2018  Reviewed: 2018 10:09 PM by Kade Woodard MD  
  
  
  
 Class Noted - Resolved Last Modified Active Problems   Hyperlipidemia  2017 - Present 2017 by Kade Woodard MD  
  Entered by Kade Woodard MD  
  Essential hypertension  2017 - Present 2017 by Kade Woodard MD  
  Entered by Kade Woodard MD  
  Osteoarthritis of knee  2017 - Present 2017 by Kade Woodard MD  
  Entered by Kade Woodard MD  
 HERZOG (nonalcoholic steatohepatitis) suggested by ultrasound report, 2016  8/11/2017 - Present 8/11/2017 by Mariluz Coon MD  
  Entered by Mariluz Coon MD  
  Atrial fibrillation [facility])  1/29/2018 - Present 1/29/2018 by Kathleen North MD  
  Entered by Kathleen North MD  
  
You are allergic to the following No active allergies Current Discharge Medication List  
  
START taking these medications Dose & Instructions Dispensing Information Comments  
 enoxaparin 30 mg/0.3 mL injection Commonly known as:  LOVENOX Dose:  30 mg  
0.3 mL by SubCUTAneous route daily for 14 days. Indications: DEEP VEIN THROMBOSIS PREVENTION, PULMONARY THROMBOEMBOLISM PREVENTION Quantity:  4.2 mL Refills:  0  
   
 oxyCODONE IR 5 mg immediate release tablet Commonly known as:  Misa Firestone Dose:  5-15 mg Take 1-3 Tabs by mouth every four (4) hours as needed. Max Daily Amount: 90 mg. Indications: Pain Quantity:  28 Tab Refills:  0  
   
 polyethylene glycol 17 gram packet Commonly known as:  Hawa Hussar Dose:  17 g Take 1 Packet by mouth daily. Quantity:  30 Packet Refills:  1 CONTINUE these medications which have NOT CHANGED Dose & Instructions Dispensing Information Comments  
 calcium-cholecalciferol (d3) 600-125 mg-unit Tab Dose:  1200 mg Take 1,200 mg by mouth daily. Refills:  0  
   
 metoprolol tartrate 50 mg tablet Commonly known as:  LOPRESSOR Dose:  50 mg Take 1 Tab by mouth two (2) times a day. Quantity:  180 Tab Refills:  3  
   
 omega-3 fatty acids-vitamin e 1,000 mg Cap Dose:  1 Cap Take 1 Cap by mouth two (2) times a day. Refills:  0  
   
 peg-electrolyte soln 420 gram solution Commonly known as:  NULYTELY  
 MIX WITH ANY CLEAR LIQUID. TAKE 2 LITERS AT 6 PM ON THE DAY BEFORE THE COLONOSCOPY. Refills:  0  
   
 potassium 99 mg tablet Dose:  99 mg Take 99 mg by mouth daily as needed. Refills:  0 simvastatin 20 mg tablet Commonly known as:  ZOCOR Dose:  20 mg Take 1 Tab by mouth nightly. Quantity:  90 Tab Refills:  3  
   
 TYLENOL ARTHRITIS PAIN 650 mg Tber Generic drug:  acetaminophen Dose:  650 mg Take 650 mg by mouth every six (6) hours as needed for Pain. Refills:  0 STOP taking these medications Comments  
 aspirin 81 mg chewable tablet  
   
   
 levoFLOXacin 750 mg tablet Commonly known as:  LEVAQUIN  
   
   
 meloxicam 15 mg tablet Commonly known as:  MOBIC  
   
   
 VITAMIN D3 2,000 unit Tab Generic drug:  cholecalciferol (vitamin D3) Current Immunizations Name Date Influenza Vaccine 10/12/2017 Influenza Vaccine (Quad) PF 10/10/2016 Tdap 2/7/2017 Surgery Information ID Date/Time Status Primary Surgeon All Procedures Location [id number] 5/22/2018 [address] Nichols MD RIGHT TOTAL KNEE ARTHROPLASTY/BIOMET/2 SA'S/ADDUCTOR BLOCK SO CRESCENT BEH [facility] MAIN OR Follow-up Information Follow up With Details Comments Contact Info Fabien Hoskins MD On 5/31/2018 Appointment at 11:30 am Chad [address] [place] [address] 206 [address] [facility] 
[phone] Tao Cheatham PA-C On 6/13/2018 Appointment at 8:30 am [address] [place] [address] Gillian Montes [address] [facility] [id number] [phone] Discharge Instructions Learning About Surgery to Restore Joint Cartilage What is surgery to restore joint cartilage? The bones in your joints are covered with a special type of cartilage. This layer of tissue helps the joint bones glide together smoothly. When this tissue is damaged, the bones may rub against each other. And that causes pain. Cartilage doesn't heal easily on its own. The goal of treatment is to get your body to grow new cartilage in the damaged area. This is done by placing healthy cartilage cells into the area.  Over time, the cells multiply and replace the damaged cartilage. The knee is the most common area for this type of surgery. Other areas include the ankle and the shoulder. How is the surgery done? You either will be asleep during the surgery or the area being worked on will be numb. Two common surgeries are: · Osteochondral autologous transplantation (OATS). ¨ During surgery, the doctor removes the damaged cartilage. ¨ He or she also removes one or more samples, called plugs, of healthy bone and cartilage from another part of your body. Sometimes the plugs are taken from another donor instead of your own body. (This is called an allograft.) ¨ The doctor cuts into your bone to make a socket, or hole, for each plug. Then the plug is inserted into the socket. · Autologous chondrocyte implantation (ACI). This procedure has two steps. ¨ Harvesting cells: The doctor takes healthy cartilage cells from one of your bones. You won't need to stay in the hospital for this. The cells are sent to a lab. In the lab, the cells increase in number. This takes several weeks. ¨ Surgery. When the cells are ready, you will have surgery. The doctor removes the damaged cartilage. Then the doctor pulls at the layer of tissue that covers your bones over the joint area. This creates a pocket. He or she injects the new cells into the pocket. These surgeries can be done in two ways: · Arthroscopic surgery: If the damaged area is small, the doctor may use this method. It requires only small cuts (incisions). The doctor puts a lighted tube, called a scope, and other surgical tools through the cuts. The doctor is able to see the inside of your joint with the scope. You may be able to go home the same day. · Open surgery: Your doctor will make one large cut over the joint. If you have open surgery, you will probably stay in the hospital overnight. What can you expect after surgery? You will need to wear a stiff brace for a short time.  Then you will need crutches or a walker for a few months. Follow your doctor's instructions about how much weight you can put on the joint. You will start doing exercises while you're still in the hospital. And you will need to do weeks or months of physical rehabilitation. Rehab will help strengthen the muscles of your joint and help you regain movement. In time, most people are able to return to most of their normal activities, including sports. Follow-up care is a key part of your treatment and safety. Be sure to make and go to all appointments, and call your doctor if you are having problems. It's also a good idea to know your test results and keep a list of the medicines you take. Where can you learn more? Go to http://stanton-gonsalo.info/. Enter F275 in the search box to learn more about \"Learning About Surgery to Mustapha Mayen. \" Current as of: March 21, 2017 Content Version: 11.4 © 5274-6393 [employer]oom. Care instructions adapted under license by [employer] (which disclaims liability or warranty for this information). If you have questions about a medical condition or this instruction, always ask your healthcare professional. [place] [address] any warranty or liability for your use of this information. DISCHARGE SUMMARY from Nurse PATIENT INSTRUCTIONS: 
 
 
F-face looks uneven A-arms unable to move or move unevenly S-speech slurred or non-existent T-time-call 911 as soon as signs and symptoms begin-DO NOT go Back to bed or wait to see if you get better-TIME IS BRAIN.  
 
Warning Signs of HEART ATTACK  
 
 Call 911 if you have these symptoms: 
? Chest discomfort. Most heart attacks involve discomfort in the center of the chest that lasts more than a few minutes, or that goes away and comes back. It can feel like uncomfortable pressure, squeezing, fullness, or pain. ? Discomfort in other areas of the upper body. Symptoms can include pain or discomfort in one or both arms, the back, neck, jaw, or stomach. ? Shortness of breath with or without chest discomfort. ? Other signs may include breaking out in a cold sweat, nausea, or lightheadedness. Don't wait more than five minutes to call [address]! Fast action can save your life. Calling 911 is almost always the fastest way to get lifesaving treatment. Emergency Medical Services staff can begin treatment when they arrive  up to an hour sooner than if someone gets to the hospital by car. The discharge information has been reviewed with the patient. The patient verbalized understanding. Discharge medications reviewed with the patient and appropriate educational materials and side effects teaching were provided. ___________________________________________________________________________________________________________________________________ Patient armband removed and shredded. MyChart Activation Thank you for requesting access to ESTmob. Please follow the instructions below to securely access and download your online medical record. ESTmob allows you to send messages to your doctor, view your test results, renew your prescriptions, schedule appointments, and more. How Do I Sign Up? 1. In your internet browser, go to [email or web address]. 2. Click on the First Time User? Click Here link in the Sign In box. You will see the New Member Sign Up page. 3. Enter your ESTmob Access Code exactly as it appears below. You will not need to use this code after youve completed the sign-up process.  If you do not sign up before the expiration date, you must request a new code. Tipjoy Access Code: Activation code not generated Current Tipjoy Status: Active (This is the date your Tipjoy access code will ) 4. Enter the last four digits of your Social Security Number (xxxx) and Date of Birth (mm/dd/yyyy) as indicated and click Submit. You will be taken to the next sign-up page. 5. Create a Bookeent ID. This will be your Tipjoy login ID and cannot be changed, so think of one that is secure and easy to remember. 6. Create a Tipjoy password. You can change your password at any time. 7. Enter your Password Reset Question and Answer. This can be used at a later time if you forget your password. 8. Enter your e-mail address. You will receive e-mail notification when new information is available in [address] 9. Click Sign Up. You can now view and download portions of your medical record. 10. Click the Download Summary menu link to download a portable copy of your medical information. Additional Information If you have questions, please visit the Frequently Asked Questions section of the Tipjoy website at [email or web address] Remember, Tipjoy is NOT to be used for urgent needs. For medical emergencies, dial 911. Chart Review Routing History No Routing History on File

## 2018-05-22 NOTE — ANESTHESIA PROCEDURE NOTES
Peripheral Block    Start time: 5/22/2018 7:17 AM  End time: 5/22/2018 7:25 AM  Performed by: Asha Dowell  Authorized by: Janice Mcgrath       Pre-procedure: Indications: at surgeon's request, post-op pain management, procedure for pain and primary anesthetic    Preanesthetic Checklist: patient identified, risks and benefits discussed, site marked, timeout performed, anesthesia consent given and patient being monitored    Timeout Time: 07:16          Block Type:   Block Type:   Adductor canal  Laterality:  Right  Monitoring:  Standard ASA monitoring, continuous pulse ox, frequent vital sign checks, heart rate, responsive to questions and oxygen  Injection Technique:  Single shot  Procedures: ultrasound guided    Patient Position: supine  Prep: chlorhexidine    Needle Type:  Stimuplex  Needle Gauge:  21 G  Needle Localization:  Ultrasound guidance  Medication Injected:  0.2%  ropivacaine  Volume (mL):  30    Assessment:  Number of attempts:  1  Injection Assessment:  Incremental injection every 5 mL, local visualized surrounding nerve on ultrasound, negative aspiration for blood, no intravascular symptoms, no paresthesia and ultrasound image on chart  Patient tolerance:  Patient tolerated the procedure well with no immediate complications

## 2018-05-22 NOTE — PROGRESS NOTES
Rounded on patient post total knee replacement. Activity: Reinforced importance of getting OOB for all meals, going to bathroom to help prevent blood clots. VTE prophylaxis: Instructed patient to use their SCD's when not up and walking. To use while in bed and in the chair. Educated re: ankle pumps to assist with circulation when in hospital and at home. Medications: Reviewed pain medications patient is taking and the importance of keeping pain under control to help with getting OOB and therapy. Reminded the patient to always eat a snack with their pain medication to help to prevent nausea. Encouraged patient to monitor for constipation and to take a stool softner/laxative while recovering and on pain medication. Incentive Spirometry: Reinforced use of incentive spirometer with return demonstration by patient. Wound Care: Dressing to surgical site covered with dry ace wrap and constavac in place with ice machine noted. Patient instructed not to take dressing off at home. Patient given CHG wash to use in hospital and at home. Stressed importance of using a clean towel and washcloth daily. Reminded to put on clean clothes and night clothes daily. Ice Protocol: Ice man machine in place per protocol. Patient Safety: Call light in reach. Patient and spouse reminded to call for help toget OOB or when leaving bathroom for safety. Phone and other items also within reach per patient's request.     Diet: Educated patient on the importance of eating three well balanced meals a day with protein to promote bone/muscle healing. Reminded patient to drink lots of fluids to protect kidneys from all the medications being taken currently with recovery. Patient given educational material to remind them to continue doing everything at home to prevent complications and have a successful recovery. Patient and spouse verbalized understand of all information and education discussed.     Patient and spouse given the opportunity for asking questions.

## 2018-05-23 ENCOUNTER — HOME HEALTH ADMISSION (OUTPATIENT)
Dept: HOME HEALTH SERVICES | Facility: HOME HEALTH | Age: 63
End: 2018-05-23
Payer: COMMERCIAL

## 2018-05-23 VITALS
WEIGHT: 189.25 LBS | BODY MASS INDEX: 29.7 KG/M2 | DIASTOLIC BLOOD PRESSURE: 78 MMHG | TEMPERATURE: 97.9 F | HEIGHT: 67 IN | HEART RATE: 60 BPM | OXYGEN SATURATION: 99 % | SYSTOLIC BLOOD PRESSURE: 128 MMHG | RESPIRATION RATE: 17 BRPM

## 2018-05-23 PROCEDURE — 97165 OT EVAL LOW COMPLEX 30 MIN: CPT

## 2018-05-23 PROCEDURE — 97530 THERAPEUTIC ACTIVITIES: CPT

## 2018-05-23 PROCEDURE — 74011250636 HC RX REV CODE- 250/636: Performed by: SPECIALIST

## 2018-05-23 PROCEDURE — 74011250637 HC RX REV CODE- 250/637: Performed by: SPECIALIST

## 2018-05-23 PROCEDURE — 97535 SELF CARE MNGMENT TRAINING: CPT

## 2018-05-23 PROCEDURE — 97116 GAIT TRAINING THERAPY: CPT

## 2018-05-23 RX ORDER — KETOROLAC TROMETHAMINE 30 MG/ML
30 INJECTION, SOLUTION INTRAMUSCULAR; INTRAVENOUS
Status: DISCONTINUED | OUTPATIENT
Start: 2018-05-23 | End: 2018-05-23 | Stop reason: HOSPADM

## 2018-05-23 RX ORDER — SODIUM CHLORIDE 900 MG/100ML
INJECTION INTRAVENOUS
Status: DISCONTINUED
Start: 2018-05-23 | End: 2018-05-23 | Stop reason: HOSPADM

## 2018-05-23 RX ORDER — OXYCODONE HYDROCHLORIDE 5 MG/1
5-15 TABLET ORAL
Qty: 28 TAB | Refills: 0 | Status: SHIPPED | OUTPATIENT
Start: 2018-05-23 | End: 2018-05-29 | Stop reason: SDUPTHER

## 2018-05-23 RX ORDER — ENOXAPARIN SODIUM 100 MG/ML
30 INJECTION SUBCUTANEOUS DAILY
Qty: 4.2 ML | Refills: 0 | Status: SHIPPED | OUTPATIENT
Start: 2018-05-23 | End: 2018-06-07 | Stop reason: ALTCHOICE

## 2018-05-23 RX ORDER — POLYETHYLENE GLYCOL 3350 17 G/17G
17 POWDER, FOR SOLUTION ORAL DAILY
Qty: 30 PACKET | Refills: 1 | Status: SHIPPED | OUTPATIENT
Start: 2018-05-23

## 2018-05-23 RX ADMIN — ENOXAPARIN SODIUM 30 MG: 30 INJECTION SUBCUTANEOUS at 09:07

## 2018-05-23 RX ADMIN — ACETAMINOPHEN 650 MG: 325 TABLET ORAL at 12:25

## 2018-05-23 RX ADMIN — VITAMIN D, TAB 1000IU (100/BT) 2000 UNITS: 25 TAB at 09:07

## 2018-05-23 RX ADMIN — Medication 1 CAPSULE: at 09:07

## 2018-05-23 RX ADMIN — OXYCODONE HYDROCHLORIDE 15 MG: 5 TABLET ORAL at 06:12

## 2018-05-23 RX ADMIN — ACETAMINOPHEN 650 MG: 325 TABLET ORAL at 06:00

## 2018-05-23 RX ADMIN — METOPROLOL TARTRATE 50 MG: 50 TABLET ORAL at 09:07

## 2018-05-23 RX ADMIN — OXYCODONE HYDROCHLORIDE 15 MG: 5 TABLET ORAL at 12:25

## 2018-05-23 RX ADMIN — DOCUSATE SODIUM 100 MG: 100 CAPSULE, LIQUID FILLED ORAL at 09:07

## 2018-05-23 RX ADMIN — CALCIUM CARBONATE-CHOLECALCIFEROL TAB 250 MG-125 UNIT 1 TABLET: 250-125 TAB at 09:07

## 2018-05-23 RX ADMIN — CEFAZOLIN SODIUM 2 G: 2 SOLUTION INTRAVENOUS at 00:00

## 2018-05-23 RX ADMIN — Medication 10 ML: at 06:15

## 2018-05-23 RX ADMIN — OXYCODONE HYDROCHLORIDE 15 MG: 5 TABLET ORAL at 02:20

## 2018-05-23 NOTE — DISCHARGE INSTRUCTIONS
Learning About Surgery to Restore Joint Cartilage  What is surgery to restore joint cartilage? The bones in your joints are covered with a special type of cartilage. This layer of tissue helps the joint bones glide together smoothly. When this tissue is damaged, the bones may rub against each other. And that causes pain. Cartilage doesn't heal easily on its own. The goal of treatment is to get your body to grow new cartilage in the damaged area. This is done by placing healthy cartilage cells into the area. Over time, the cells multiply and replace the damaged cartilage. The knee is the most common area for this type of surgery. Other areas include the ankle and the shoulder. How is the surgery done? You either will be asleep during the surgery or the area being worked on will be numb. Two common surgeries are:  · Osteochondral autologous transplantation (OATS). ¨ During surgery, the doctor removes the damaged cartilage. ¨ He or she also removes one or more samples, called plugs, of healthy bone and cartilage from another part of your body. Sometimes the plugs are taken from another donor instead of your own body. (This is called an allograft.)  ¨ The doctor cuts into your bone to make a socket, or hole, for each plug. Then the plug is inserted into the socket. · Autologous chondrocyte implantation (ACI). This procedure has two steps. ¨ Harvesting cells: The doctor takes healthy cartilage cells from one of your bones. You won't need to stay in the hospital for this. The cells are sent to a lab. In the lab, the cells increase in number. This takes several weeks. ¨ Surgery. When the cells are ready, you will have surgery. The doctor removes the damaged cartilage. Then the doctor pulls at the layer of tissue that covers your bones over the joint area. This creates a pocket. He or she injects the new cells into the pocket.   These surgeries can be done in two ways:  · Arthroscopic surgery: If the damaged area is small, the doctor may use this method. It requires only small cuts (incisions). The doctor puts a lighted tube, called a scope, and other surgical tools through the cuts. The doctor is able to see the inside of your joint with the scope. You may be able to go home the same day. · Open surgery: Your doctor will make one large cut over the joint. If you have open surgery, you will probably stay in the hospital overnight. What can you expect after surgery? You will need to wear a stiff brace for a short time. Then you will need crutches or a walker for a few months. Follow your doctor's instructions about how much weight you can put on the joint. You will start doing exercises while you're still in the hospital. And you will need to do weeks or months of physical rehabilitation. Rehab will help strengthen the muscles of your joint and help you regain movement. In time, most people are able to return to most of their normal activities, including sports. Follow-up care is a key part of your treatment and safety. Be sure to make and go to all appointments, and call your doctor if you are having problems. It's also a good idea to know your test results and keep a list of the medicines you take. Where can you learn more? Go to http://stanton-gonsalo.info/. Enter F275 in the search box to learn more about \"Learning About Surgery to  [place]. \"  Current as of: March 21, 2017  Content Version: 11.4  © 6919-5485 [employer]. Care instructions adapted under license by [employer] (which disclaims liability or warranty for this information). If you have questions about a medical condition or this instruction, always ask your healthcare professional. [place] [address] any warranty or liability for your use of this information.     DISCHARGE SUMMARY from Nurse    PATIENT INSTRUCTIONS:    After general anesthesia or intravenous sedation, for 24 hours or while taking prescription Narcotics:  · Limit your activities  · Do not drive and operate hazardous machinery  · Do not make important personal or business decisions  · Do  not drink alcoholic beverages  · If you have not urinated within 8 hours after discharge, please contact your surgeon on call. Report the following to your surgeon:  · Excessive pain, swelling, redness or odor of or around the surgical area  · Temperature over 100.5  · Nausea and vomiting lasting longer than 4 hours or if unable to take medications  · Any signs of decreased circulation or nerve impairment to extremity: change in color, persistent  numbness, tingling, coldness or increase pain  · Any questions    What to do at Home:  Recommended activity: Activity as tolerated. If you experience any of the following symptoms redness, swelling, odor, or discharge from incision site please follow up with please follow up with your PCP. A fever over 100.4 should also be reported to your PCP. *  Please give a list of your current medications to your Primary Care Provider. *  Please update this list whenever your medications are discontinued, doses are      changed, or new medications (including over-the-counter products) are added. *  Please carry medication information at all times in case of emergency situations. These are general instructions for a healthy lifestyle:    No smoking/ No tobacco products/ Avoid exposure to second hand smoke  Surgeon General's Warning:  Quitting smoking now greatly reduces serious risk to your health.     Obesity, smoking, and sedentary lifestyle greatly increases your risk for illness    A healthy diet, regular physical exercise & weight monitoring are important for maintaining a healthy lifestyle    You may be retaining fluid if you have a history of heart failure or if you experience any of the following symptoms:  Weight gain of 3 pounds or more overnight or 5 pounds in a week, increased swelling in our hands or feet or shortness of breath while lying flat in bed. Please call your doctor as soon as you notice any of these symptoms; do not wait until your next office visit. Recognize signs and symptoms of STROKE:    F-face looks uneven    A-arms unable to move or move unevenly    S-speech slurred or non-existent    T-time-call 911 as soon as signs and symptoms begin-DO NOT go       Back to bed or wait to see if you get better-TIME IS BRAIN. Warning Signs of HEART ATTACK     Call 911 if you have these symptoms:   Chest discomfort. Most heart attacks involve discomfort in the center of the chest that lasts more than a few minutes, or that goes away and comes back. It can feel like uncomfortable pressure, squeezing, fullness, or pain.  Discomfort in other areas of the upper body. Symptoms can include pain or discomfort in one or both arms, the back, neck, jaw, or stomach.  Shortness of breath with or without chest discomfort.  Other signs may include breaking out in a cold sweat, nausea, or lightheadedness. Don't wait more than five minutes to call 911 - MINUTES MATTER! Fast action can save your life. Calling 911 is almost always the fastest way to get lifesaving treatment. Emergency Medical Services staff can begin treatment when they arrive -- up to an hour sooner than if someone gets to the hospital by car. The discharge information has been reviewed with the patient. The patient verbalized understanding. Discharge medications reviewed with the patient and appropriate educational materials and side effects teaching were provided. ___________________________________________________________________________________________________________________________________  Patient armband removed and shredded. MyChart Activation    Thank you for requesting access to [facility]. Please follow the instructions below to securely access and download your online medical record.  [facility] allows you to send messages to your doctor, view your test results, renew your prescriptions, schedule appointments, and more. How Do I Sign Up? 1. In your internet browser, go to [email or web address]. 2. Click on the First Time User? Click Here link in the Sign In box. You will see the New Member Sign Up page. 3. Enter your [employer] Access Code exactly as it appears below. You will not need to use this code after youve completed the sign-up process. If you do not sign up before the expiration date, you must request a new code. [employer] Access Code: Activation code not generated  Current [employer] Status: Active (This is the date your [employer] access code will )    4. Enter the last four digits of your Social Security Number (xxxx) and Date of Birth (mm/dd/yyyy) as indicated and click Submit. You will be taken to the next sign-up page. 5. Create a [employer] ID. This will be your [employer] login ID and cannot be changed, so think of one that is secure and easy to remember. 6. Create a [employer] password. You can change your password at any time. 7. Enter your Password Reset Question and Answer. This can be used at a later time if you forget your password. 8. Enter your e-mail address. You will receive e-mail notification when new information is available in [address] 9. Click Sign Up. You can now view and download portions of your medical record. 10. Click the Download Summary menu link to download a portable copy of your medical information. Additional Information    If you have questions, please visit the Frequently Asked Questions section of the [employer] website at [email or web address] Remember, [employer] is NOT to be used for urgent needs. For medical emergencies, dial 911.

## 2018-05-23 NOTE — PROGRESS NOTES
Problem: Mobility Impaired (Adult and Pediatric)  Goal: *Acute Goals and Plan of Care (Insert Text)  Physical Therapy Goals  Initiated 5/22/2018 and to be accomplished within 7 day(s)  1. Patient will move from supine to sit and sit to supine , scoot up and down and roll side to side in bed with supervision/set-up. 2.  Patient will transfer from bed to chair and chair to bed with supervision/set-up using the least restrictive device. 3.  Patient will perform sit to stand with supervision/set-up. 4.  Patient will ambulate with supervision/set-up for >150 feet with the least restrictive device. 5.  Patient will ascend/descend 4 stairs with 1 handrail(s) with supervision/set-up. Outcome: Progressing Towards Goal  physical Therapy TREATMENT    Patient: Josef Montejo (74 y.o. female)  Date: 5/23/2018  Diagnosis: Osteoarthritis of right knee, unspecified osteoarthritis type [M17.11] <principal problem not specified>  Procedure(s) (LRB):  RIGHT TOTAL KNEE ARTHROPLASTY/BIOMET/2 SA'S/ADDUCTOR BLOCK (Right) 1 Day Post-Op  Precautions: Fall   Chart, physical therapy assessment, plan of care and goals were reviewed. OBJECTIVE/ ASSESSMENT:  Pt found supine in bed willing to work with PT. Pt ambulated into hallway this tx with RW. Pt present with step to gait pattern and requires multiple verbal and tactile cues to stand erect. Pt returned to b/s chair to perform seated therex. Pt was then transported to stairs with  and was able to negotiate 4 stairs. 2 stairs with bilateral handrails and 2 stairs with 1 handrail and SPC in contralateral UE, CG. Pt returned to room and returned to b/s chair. Pt's  was educated on ice machine and proper application. Pt left sitting with needs in reach. Education: transfers, gait, therex.   Progression toward goals:  [x]      Improving appropriately and progressing toward goals  []      Improving slowly and progressing toward goals  []      Not making progress toward goals and plan of care will be adjusted     PLAN:  Patient continues to benefit from skilled intervention to address the above impairments. Continue treatment per established plan of care. Discharge Recommendations:  Home Health with supervision. Further Equipment Recommendations for Discharge:  rolling walker     SUBJECTIVE:   Patient stated We need you to check and see if that walker is the right height.     OBJECTIVE DATA SUMMARY:   Critical Behavior:  Neurologic State: Alert  Orientation Level: Oriented X4  Functional Mobility Training:  Bed Mobility:  Transfers:  Sit to Stand: Minimum assistance  Stand to Sit: Contact guard assistance  Balance:  Sitting: Intact  Standing: Impaired; With support  Standing - Static: Fair (+)  Standing - Dynamic : Fair  Ambulation/Gait Training:  Distance (ft): 75 Feet (ft)  Assistive Device: Walker, rolling  Ambulation - Level of Assistance: Contact guard assistance;Stand-by assistance  Gait Abnormalities: Antalgic;Decreased step clearance  Speed/Sara: Slow  Step Length: Left shortened;Right shortened  Interventions: Verbal cues  Therapeutic Exercises:   LAQ x 10 on right, Right knee flexion with contralateral LE overpressure x 5. Pain:  Pre tx pain: 5  Post tx pain: 3  Activity Tolerance:   Fair  Please refer to the flowsheet for vital signs taken during this treatment. After treatment:   [x] Patient left in no apparent distress sitting up in chair  [] Patient left in no apparent distress in bed  [x] Call bell left within reach  [] Nursing notified  [x] Family present  [] Bed alarm activated  [] SCDs applied  [] Ice applied      Shon Evangelista PTA   Time Calculation: 42 mins    Mobility [id number] Current  CJ= 20-39%. The severity rating is based on the Level of Assistance required for Functional Mobility and ADLs. Mobility   Goal  CI= 1-19%. The severity rating is based on the Level of Assistance required for Functional Mobility and ADLs.

## 2018-05-23 NOTE — DISCHARGE SUMMARY
Subjective: 59 y.o., female, 1 Day Post-Op, Procedure(s):  RIGHT TOTAL KNEE ARTHROPLASTY/BIOMET/2 SA'S/ADDUCTOR BLOCK    Admitting date:22 May 18    Date of Discharge/Transfer: 23 May 18    Physical Exam (day of transfer / discharge): 23 May 18            History:  Past Medical History:   Diagnosis Date    Arthritis     Bilateral Knee, and Bilateral Hand/Thumb    Atrial fibrillation ([address] 1/29/2018    Carpal tunnel syndrome     H/O echocardiogram 01/30/2018    EF 50%, normal left atrial size, no valvular heart disease    Headache     History of ankle fusion 1997    left    Hx of migraine headaches     Hypercholesterolemia     Hypertension     Morbid obesity ([address]     HERZOG (nonalcoholic steatohepatitis) suggested by ultrasound report, 2016 8/11/2017    Vertigo        Allergies:  No Known Allergies      History of Present Illness:     Ms. Dasia Turner is a pleasant female who suffered a well documented radiographic history of Right knee end stage osteo arthritis. Dasia Turner had failed all conservative measures as directed by Dr. Yvette Alfonso, and in light of Dasia Turner progressive pain and difficultly performing her  necessary Activities of Daily Living, Dr. Elisa Mallory recommended a total Right joint replacement. Social History     Occupational History    Not on file. Social History Main Topics    Smoking status: Never Smoker    Smokeless tobacco: Never Used    Alcohol use No    Drug use: No    Sexual activity: Not on file       Hospital Course:     Ms. Dasia Turner was admitted to [address] [place] [address] on the morning of 22 may 18 under the care of Dr. Yvette Alfonso. she was taken to the operating theater under Dr. Lady Larios direction, first assisted by trained surgical assistant's where she underwent a right knee total joint replacement. she tolerated the procedure well, and there were no intra operative complications.  Post operatively she was transferred medically stable to post op holding. After all safety criteria had been met during her immediate post op recovery phase she was transferred medical stable to [address] [facility] to begin comprehensive physical and occupational therapies, restorative nursing and thrombo embolism (DVT/PE) prophylaxis. Ms. Lucille Pham post operative course progressed slow but directed. The focus throughout the post op period focused on range of motion and pain control. An acute post operative blood loss anemia was noted post operatively and there was no need to transfuse packed red blood cells. Medically she  remained stable through the remainder of the hospital stay. In light of the slow gains attained by Ms. Lucille Pham post operatively she is being recommended for a directed course of comprehensive PT / OT at a  home      DISCHARGE PLAN:      The patient will be discharged to  home    DIET:  Low Calorie High Protein Diet    NUTRITION: OTC Nutritional supplements, multivitamins      ACTIVITY: No lifting, Driving, or Strenuous exercise and No heavy lifting, pushing, pulling. Weight Bearing/Range of Motion Restrictions: Per Protocol    WOUND / INCISION CARE: Keep wound clean and dry, Reinforce dressing PRN and Ice to area for comfort Keep the current dressings on and in place. There is no need to change these current dressings. Dressings to please be changed by home nurse or nursing home staff each day. Keep all pets away from any wound present in order to prevent infection. PAIN CONTROL: Prescriptions written: On chart    PRECAUTIONS: Weight Bearing/Range of Motion per Restrictions:     VTE PROPHYLAXIS: with Lovenox 30mg SQ x 14 days. Chaz Hose Stockings :Continue use at home. ANTIBIOTICS: None at this time.                         Physical and Occupational therapies:    will continue with attention to standard postop precautions / restrictions and below:    [ ] RUE [XX] RLE  [ ] LUE  [ ] LLE    [XX] Full WBAT   [ ] Partial WBAT   [ ] Toetouch WB [ ] Non Weight Bearing: Follow up:    [XX] 1 week  [ ] 10 days  [ ] Specific Date:       Per [facility] Protocol this  case was discussed with attending surgeon and reflects their orders as confirmed by their co signature.      Very Respectfully,        ESMER Mehta, APC, MPAS  Surigical Physician Assistant-C  Department of [facility]   Contact Cell [phone]

## 2018-05-23 NOTE — PROGRESS NOTES
Mobility Intervention:       [] Pt dangled at edge of bed    [] Pt assisted OOB to bedside commode    [] Pt assisted OOB to chair    [] Pt ambulated to bathroom    [x] Patient was ambulated in room/hallway    Assistive Device Utilized:       [] Rolling walker   [] Crutches   [] Straight Cane   [] Knee immobilizer   [x] IV pole    After Mobilization:     [] Patient left in no apparent distress sitting up in chair  [x] Patient left in no apparent distress in bed  [x] Call bell left within reach  [x] SCDs on & machine turned on  [x] Ice applied  [x] RN notified  [] Caregiver present  [] Bed alarm activated    Reason patient not mobilized:      [] Patient refused   [] Nausea/vomiting   [] Low blood pressure   [] Drowsy/lethargic    Pain Rating:     [] 0  [] 1  Assistive Device:        [] 2  [] 3  [x] 4  [] 5  [] 6  Assistive Device:        [] 7  [] 8  [] 9  [] 10    Comments:

## 2018-05-23 NOTE — PROGRESS NOTES
Reason for Admission:   Osteoarthritis of right knee                   RRAT Score:    8                 Plan for utilizing home health:      [facility]                    Likelihood of Readmission:  Green/Low                         Transition of Care Plan:     Pt resides in the home with her spouse Jeffy Phillips ([phone]) and her plan is to return home. Pt's spouse will be transporting her home. Pt indicates being independent with ADls and ambulation prior to surgery. Pt mentions that she has access to rolling walker, shower chair, raised toilet seat. Care Management Interventions  PCP Verified by CM:  Yes  Last Visit to PCP: 05/15/18  Transition of Care Consult (CM Consult): [address]: Yes  Physical Therapy Consult: Yes  Occupational Therapy Consult: Yes  Current Support Network: Lives with Spouse  Confirm Follow Up Transport: Family  Plan discussed with Pt/Family/Caregiver: Yes  Freedom of Choice Offered: Yes  Discharge Location  Discharge Placement: Home with home health

## 2018-05-23 NOTE — PROGRESS NOTES
9105- No n/v. Pain well controlled with PRN medication. Dressing clean, dry, and intact. Polar ICE and SCDs properly placed. Pulses present and palpable. Ambulatory with walker and minimum assistance.

## 2018-05-23 NOTE — PROGRESS NOTES
Problem: Self Care Deficits Care Plan (Adult)  Goal: *Acute Goals and Plan of Care (Insert Text)  Outcome: Progressing Towards Goal  Occupational Therapy EVALUATION/discharge    Patient: Carin Scheuermann [de-identified]59 y.o. female)  Date: 5/23/2018  Primary Diagnosis: Osteoarthritis of right knee, unspecified osteoarthritis type [M17.11]  Procedure(s) (LRB):  RIGHT TOTAL KNEE ARTHROPLASTY/BIOMET/2 SA'S/ADDUCTOR BLOCK (Right) 1 Day Post-Op   Precautions:   Fall    ASSESSMENT AND RECOMMENDATIONS:  JAIMIE high'mekhi pt for OT session. OT eval completed POD #1 s/p R  TKA. Educated in adaptive tech for increased safety and independence in ADL and related transfer. Following instruction pt completed simulated ADL and related transfer at supervision level. Simulation 2' unable to fully dress 2' continued lines/drains. Pt verbalized understanding of home safety recommendations and shower transfer tech. Pt to have assist of family PRN at discharge. Skilled occupational therapy is not indicated at this time. Discharge Recommendations: None  Further Equipment Recommendations for Discharge: N/A      Barriers to Learning/Limitations: None  Compensate with: visual, verbal, tactile, kinesthetic cues/model     COMPLEXITY     Eval Complexity: History: LOW Complexity : Brief history review ; Examination: LOW Complexity : 1-3 performance deficits relating to physical, cognitive , or psychosocial skils that result in activity limitations and / or participation restrictions ; Decision Making:LOW Complexity : No comorbidities that affect functional and no verbal or physical assistance needed to complete eval tasks  Assessment: low Complexity        G-CODES:     Self Care  Current  CI= 1-19%   Goal  CI= 1-19%   D/C  CI= 1-19%. The severity rating is based on the Level of Assistance required for Functional Mobility and ADLs. SUBJECTIVE:   Patient stated My  will be helping me.     OBJECTIVE DATA SUMMARY:     Past Medical History: Diagnosis Date    Arthritis     Bilateral Knee, and Bilateral Hand/Thumb    Atrial fibrillation ([facility] [address] 1/29/2018    Carpal tunnel syndrome     H/O echocardiogram 01/30/2018    EF 50%, normal left atrial size, no valvular heart disease    Headache     History of ankle fusion 1997    left    Hx of migraine headaches     Hypercholesterolemia     Hypertension     Morbid obesity ([facility] [address]     HERZOG (nonalcoholic steatohepatitis) suggested by ultrasound report, 2016 8/11/2017    Vertigo      Past Surgical History:   Procedure Laterality Date    HX APPENDECTOMY      age 25 years    HX COLONOSCOPY      Due in 2018: May 2015    HX HYSTERECTOMY      age 30years old    HX ORTHOPAEDIC Left     ankle fusion    HX OTHER SURGICAL  2014    removal of mole left breast    HX TONSILLECTOMY      at age 29years old     Prior Level of Function/Home Situation: independent  Home Situation  Home Environment: Private residence  # Steps to Enter: 2  One/Two Story Residence: One story  Living Alone: No  Support Systems: Spouse/Significant Other/Partner  Patient Expects to be Discharged to[de-identified] Private residence  Current DME Used/Available at Home:  (shower chair)  Tub or Shower Type: Tub/Shower combination  []     Right hand dominant   []     Left hand dominant  Cognitive/Behavioral Status:  Neurologic State: Alert  Orientation Level: Oriented X4          Skin: no noted concern    Edema: no noted concern    Vision/Perceptual:    Tracking:  (no noted concern)                                Coordination:  Coordination: Within functional limits (BUE)            Balance:   good dynamic balance with fww    Strength:    Strength:  Within functional limits (BUE)                Tone & Sensation:    Tone: Normal (BUE)  Sensation: Intact (BUE)                      Range of Motion:    AROM: Within functional limits (BUE)                         Functional Mobility and Transfers for ADLs:  Bed Mobility:     Supine to Sit: Supervision Transfers:  Sit to Stand: Supervision                Toilet Transfer : Supervision (simulated to chair)                ADL Assessment:  Feeding: Setup    Oral Facial Hygiene/Grooming: Setup    Bathing: Supervision    Upper Body Dressing: Supervision    Lower Body Dressing: Supervision    Toileting: Supervision         Pain:  Pt reports 0/10 pain or discomfort prior to treatment.    Pt reports 7/10 pain or discomfort post treatment. Activity Tolerance:   good    Please refer to the flowsheet for vital signs taken during this treatment. After treatment:   [x]  Patient left in no apparent distress sitting up in chair  []  Patient left in no apparent distress in bed  [x]  Call bell left within reach  []  Nursing notified  []  Caregiver present  []  Bed alarm activated    COMMUNICATION/EDUCATION:   Communication/Collaboration:  [x]      Home safety education was provided and the patient/caregiver indicated understanding. [x]      Patient/family have participated as able and agree with findings and recommendations. []      Patient is unable to participate in plan of care at this time.     Claire Zimmerman OT  Time Calculation: 38 mins

## 2018-05-23 NOTE — HOME CARE
Received [facility] referral; Discharge noted for today, [facility] will follow for yue FENG joint protocol,PT/OT, pt states she already has RW,shower chair,grab bars and elevated toilet, pt states her  Caitlin Sue) will be helping her at home when she discharges . EMILIA JEAN LPN.

## 2018-05-24 ENCOUNTER — HOME CARE VISIT (OUTPATIENT)
Dept: SCHEDULING | Facility: HOME HEALTH | Age: 63
End: 2018-05-24
Payer: COMMERCIAL

## 2018-05-24 PROCEDURE — 400013 HH SOC

## 2018-05-24 PROCEDURE — G0299 HHS/HOSPICE OF RN EA 15 MIN: HCPCS

## 2018-05-24 PROCEDURE — G0151 HHCP-SERV OF PT,EA 15 MIN: HCPCS

## 2018-05-25 ENCOUNTER — HOME CARE VISIT (OUTPATIENT)
Dept: SCHEDULING | Facility: HOME HEALTH | Age: 63
End: 2018-05-25
Payer: COMMERCIAL

## 2018-05-25 ENCOUNTER — TELEPHONE (OUTPATIENT)
Dept: ORTHOPEDIC SURGERY | Facility: CLINIC | Age: 63
End: 2018-05-25

## 2018-05-25 ENCOUNTER — HOME CARE VISIT (OUTPATIENT)
Dept: HOME HEALTH SERVICES | Facility: HOME HEALTH | Age: 63
End: 2018-05-25
Payer: COMMERCIAL

## 2018-05-25 VITALS
RESPIRATION RATE: 20 BRPM | DIASTOLIC BLOOD PRESSURE: 70 MMHG | HEART RATE: 85 BPM | SYSTOLIC BLOOD PRESSURE: 100 MMHG | OXYGEN SATURATION: 94 % | TEMPERATURE: 96.9 F

## 2018-05-25 VITALS
DIASTOLIC BLOOD PRESSURE: 62 MMHG | SYSTOLIC BLOOD PRESSURE: 90 MMHG | HEART RATE: 76 BPM | OXYGEN SATURATION: 95 % | TEMPERATURE: 98.6 F

## 2018-05-25 DIAGNOSIS — R11.0 POSTOPERATIVE NAUSEA: Primary | ICD-10-CM

## 2018-05-25 DIAGNOSIS — Z98.890 POSTOPERATIVE NAUSEA: Primary | ICD-10-CM

## 2018-05-25 PROCEDURE — G0151 HHCP-SERV OF PT,EA 15 MIN: HCPCS

## 2018-05-25 PROCEDURE — G0152 HHCP-SERV OF OT,EA 15 MIN: HCPCS

## 2018-05-25 RX ORDER — ONDANSETRON 4 MG/1
4 TABLET, FILM COATED ORAL
Qty: 56 TAB | Refills: 2 | Status: SHIPPED | OUTPATIENT
Start: 2018-05-25 | End: 2018-06-07 | Stop reason: ALTCHOICE

## 2018-05-25 NOTE — TELEPHONE ENCOUNTER
Patient called in states that she has been having some nausea and her physical therapist suggested she call and ask for medication for it. Patient would like it sent to [facility] on [facility] if possible and she can be reached at [phone].

## 2018-05-25 NOTE — TELEPHONE ENCOUNTER
Spoke to [facility], who advised to give Zofran. Order was entered and sent to patients pharmacy, Patient was called and notified.

## 2018-05-26 ENCOUNTER — HOME CARE VISIT (OUTPATIENT)
Dept: SCHEDULING | Facility: HOME HEALTH | Age: 63
End: 2018-05-26
Payer: COMMERCIAL

## 2018-05-26 PROCEDURE — G0157 HHC PT ASSISTANT EA 15: HCPCS

## 2018-05-27 ENCOUNTER — HOME CARE VISIT (OUTPATIENT)
Dept: SCHEDULING | Facility: HOME HEALTH | Age: 63
End: 2018-05-27
Payer: COMMERCIAL

## 2018-05-27 VITALS
HEART RATE: 51 BPM | TEMPERATURE: 98.5 F | SYSTOLIC BLOOD PRESSURE: 110 MMHG | OXYGEN SATURATION: 96 % | DIASTOLIC BLOOD PRESSURE: 68 MMHG

## 2018-05-27 VITALS
DIASTOLIC BLOOD PRESSURE: 78 MMHG | TEMPERATURE: 97.9 F | SYSTOLIC BLOOD PRESSURE: 130 MMHG | OXYGEN SATURATION: 98 % | HEART RATE: 60 BPM

## 2018-05-27 PROCEDURE — G0157 HHC PT ASSISTANT EA 15: HCPCS

## 2018-05-28 ENCOUNTER — HOME CARE VISIT (OUTPATIENT)
Dept: SCHEDULING | Facility: HOME HEALTH | Age: 63
End: 2018-05-28
Payer: COMMERCIAL

## 2018-05-28 PROCEDURE — G0151 HHCP-SERV OF PT,EA 15 MIN: HCPCS

## 2018-05-29 ENCOUNTER — HOME CARE VISIT (OUTPATIENT)
Dept: SCHEDULING | Facility: HOME HEALTH | Age: 63
End: 2018-05-29
Payer: COMMERCIAL

## 2018-05-29 DIAGNOSIS — G89.18 POST-OPERATIVE PAIN: ICD-10-CM

## 2018-05-29 PROCEDURE — G0151 HHCP-SERV OF PT,EA 15 MIN: HCPCS

## 2018-05-29 PROCEDURE — G0299 HHS/HOSPICE OF RN EA 15 MIN: HCPCS

## 2018-05-29 PROCEDURE — A6255 ABSORPT DRG >16<=48 IN W/BDR: HCPCS

## 2018-05-29 RX ORDER — OXYCODONE HYDROCHLORIDE 5 MG/1
5-15 TABLET ORAL
Qty: 28 TAB | Refills: 0 | Status: SHIPPED | OUTPATIENT
Start: 2018-05-29 | End: 2018-06-01 | Stop reason: SDUPTHER

## 2018-05-29 NOTE — TELEPHONE ENCOUNTER
Patient is requesting a refill on pain medication. Oyxcodone 5 mg. Please call her when ready at [phone].

## 2018-05-30 ENCOUNTER — HOME CARE VISIT (OUTPATIENT)
Dept: SCHEDULING | Facility: HOME HEALTH | Age: 63
End: 2018-05-30
Payer: COMMERCIAL

## 2018-05-30 VITALS
OXYGEN SATURATION: 98 % | SYSTOLIC BLOOD PRESSURE: 130 MMHG | DIASTOLIC BLOOD PRESSURE: 85 MMHG | RESPIRATION RATE: 18 BRPM | TEMPERATURE: 97.8 F | HEART RATE: 60 BPM

## 2018-05-30 PROCEDURE — G0151 HHCP-SERV OF PT,EA 15 MIN: HCPCS

## 2018-05-31 ENCOUNTER — OFFICE VISIT (OUTPATIENT)
Dept: INTERNAL MEDICINE CLINIC | Age: 63
End: 2018-05-31

## 2018-05-31 ENCOUNTER — HOME CARE VISIT (OUTPATIENT)
Dept: SCHEDULING | Facility: HOME HEALTH | Age: 63
End: 2018-05-31
Payer: COMMERCIAL

## 2018-05-31 ENCOUNTER — HOSPITAL ENCOUNTER (OUTPATIENT)
Dept: LAB | Age: 63
Discharge: HOME | End: 2018-05-31
Payer: COMMERCIAL

## 2018-05-31 VITALS
DIASTOLIC BLOOD PRESSURE: 67 MMHG | HEIGHT: 67 IN | OXYGEN SATURATION: 97 % | SYSTOLIC BLOOD PRESSURE: 117 MMHG | BODY MASS INDEX: 30.45 KG/M2 | HEART RATE: 63 BPM | TEMPERATURE: 100.2 F | RESPIRATION RATE: 14 BRPM | WEIGHT: 194 LBS

## 2018-05-31 DIAGNOSIS — M62.838 MUSCLE SPASM: ICD-10-CM

## 2018-05-31 DIAGNOSIS — I10 ESSENTIAL HYPERTENSION: ICD-10-CM

## 2018-05-31 DIAGNOSIS — I48.0 PAROXYSMAL ATRIAL FIBRILLATION (HCC): ICD-10-CM

## 2018-05-31 DIAGNOSIS — M17.11 PRIMARY OSTEOARTHRITIS OF RIGHT KNEE: Primary | ICD-10-CM

## 2018-05-31 PROBLEM — M17.9 OSTEOARTHRITIS OF KNEE: Status: RESOLVED | Noted: 2017-02-07 | Resolved: 2018-05-31

## 2018-05-31 LAB
ANION GAP SERPL CALC-SCNC: 7 MMOL/L (ref 3–18)
BUN SERPL-MCNC: 15 MG/DL (ref 7–18)
BUN/CREAT SERPL: 29 (ref 12–20)
CALCIUM SERPL-MCNC: 10 MG/DL (ref 8.5–10.1)
CHLORIDE SERPL-SCNC: 103 MMOL/L (ref 100–108)
CO2 SERPL-SCNC: 28 MMOL/L (ref 21–32)
CREAT SERPL-MCNC: 0.51 MG/DL (ref 0.6–1.3)
GLUCOSE SERPL-MCNC: 106 MG/DL (ref 74–99)
POTASSIUM SERPL-SCNC: 4.5 MMOL/L (ref 3.5–5.5)
SODIUM SERPL-SCNC: 138 MMOL/L (ref 136–145)

## 2018-05-31 PROCEDURE — 36415 COLL VENOUS BLD VENIPUNCTURE: CPT | Performed by: INTERNAL MEDICINE

## 2018-05-31 PROCEDURE — 80048 BASIC METABOLIC PNL TOTAL CA: CPT | Performed by: INTERNAL MEDICINE

## 2018-05-31 PROCEDURE — G0151 HHCP-SERV OF PT,EA 15 MIN: HCPCS

## 2018-05-31 NOTE — PATIENT INSTRUCTIONS
Knee Pain or Injury: Care Instructions  Your Care Instructions    Injuries are a common cause of knee problems. Sudden (acute) injuries may be caused by a direct blow to the knee. They can also be caused by abnormal twisting, bending, or falling on the knee. Pain, bruising, or swelling may be severe, and may start within minutes of the injury. Overuse is another cause of knee pain. Other causes are climbing stairs, kneeling, and other activities that use the knee. Everyday wear and tear, especially as you get older, also can cause knee pain. Rest, along with home treatment, often relieves pain and allows your knee to heal. If you have a serious knee injury, you may need tests and treatment. Follow-up care is a key part of your treatment and safety. Be sure to make and go to all appointments, and call your doctor if you are having problems. It's also a good idea to know your test results and keep a list of the medicines you take. How can you care for yourself at home? · Be safe with medicines. Read and follow all instructions on the label. ¨ If the doctor gave you a prescription medicine for pain, take it as prescribed. ¨ If you are not taking a prescription pain medicine, ask your doctor if you can take an over-the-counter medicine. · Rest and protect your knee. Take a break from any activity that may cause pain. · Put ice or a cold pack on your knee for 10 to 20 minutes at a time. Put a thin cloth between the ice and your skin. · Prop up a sore knee on a pillow when you ice it or anytime you sit or lie down for the next 3 days. Try to keep it above the level of your heart. This will help reduce swelling. · If your knee is not swollen, you can put moist heat, a heating pad, or a warm cloth on your knee. · If your doctor recommends an elastic bandage, sleeve, or other type of support for your knee, wear it as directed.   · Follow your doctor's instructions about how much weight you can put on your leg. Use a cane, crutches, or a walker as instructed. · Follow your doctor's instructions about activity during your healing process. If you can do mild exercise, slowly increase your activity. · Reach and stay at a healthy weight. Extra weight can strain the joints, especially the knees and hips, and make the pain worse. Losing even a few pounds may help. When should you call for help? Call 911 anytime you think you may need emergency care. For example, call if:  ? · You have symptoms of a blood clot in your lung (called a pulmonary embolism). These may include:  ¨ Sudden chest pain. ¨ Trouble breathing. ¨ Coughing up blood. ?Call your doctor now or seek immediate medical care if:  ? · You have severe or increasing pain. ? · Your leg or foot turns cold or changes color. ? · You cannot stand or put weight on your knee. ? · Your knee looks twisted or bent out of shape. ? · You cannot move your knee. ? · You have signs of infection, such as:  ¨ Increased pain, swelling, warmth, or redness. ¨ Red streaks leading from the knee. ¨ Pus draining from a place on your knee. ¨ A fever. ? · You have signs of a blood clot in your leg (called a deep vein thrombosis), such as:  ¨ Pain in your calf, back of the knee, thigh, or groin. ¨ Redness and swelling in your leg or groin. ? Watch closely for changes in your health, and be sure to contact your doctor if:  ? · You have tingling, weakness, or numbness in your knee. ? · You have any new symptoms, such as swelling. ? · You have bruises from a knee injury that last longer than 2 weeks. ? · You do not get better as expected. Where can you learn more? Go to http://stanton-gonsalo.info/. Enter K195 in the search box to learn more about \"Knee Pain or Injury: Care Instructions. \"  Current as of: March 20, 2017  Content Version: 11.4  © 7669-4806 [employer].  Care instructions adapted under license by [employer] (which disclaims liability or warranty for this information). If you have questions about a medical condition or this instruction, always ask your healthcare professional. [address] any warranty or liability for your use of this information.

## 2018-05-31 NOTE — PROGRESS NOTES
INTERNISTS OF [facility]:  5/31/2018, MRN: [id number]      Elizabeth Trinidad is a 59 y.o. female and presents to clinic for Hospital Follow Up (total right knee replacement. Pt stated she is still in alot of pain from surgery. cramping in back of thigh)    Subjective:   Sunny is a 64yo female with h/o HLD, HTN, AF, and DJD. Right Knee DJD: The patient underwent a right knee replacement surgery since her last appointment. She is scheduled to see the orthopedic surgery team within next month. She has pain along the incision site but pain seems to be improving. She had a temperature of 100.2. She is on lovenox injections for DVT ppx. She also has h/o atrial fibrillation, taking aspirin prior to her surgery for CVD risk reduction. She has sutures that will be removed tomorrow at her Orthopedic Surgery apt. She reports no redness, shortness of breath, chest pain, or rashes. She reports muscle cramps along her right lower extremity from her thigh down to her calf. Her right lower extremity is also more swollen since her surgery. She is taking oxycodone as needed for postoperative pain. She takes on average 1 tablet per day. Pain is worse after physical therapy sessions. She also reports some coccyx pain since her surgery. Patient Active Problem List    Diagnosis Date Noted    Primary osteoarthritis of right knee 05/22/2018    Atrial fibrillation ([facility] [address] 01/29/2018    HERZOG (nonalcoholic steatohepatitis) suggested by ultrasound report, 2016 08/11/2017    Hyperlipidemia 02/07/2017    Essential hypertension 02/07/2017       Current Outpatient Prescriptions   Medication Sig Dispense Refill    oxyCODONE IR (ROXICODONE) 5 mg immediate release tablet Take 1-3 Tabs by mouth every four (4) hours as needed. Max Daily Amount: 90 mg. Indications: Pain 28 Tab 0    meloxicam (MOBIC) 15 mg tablet Take 15 mg by mouth daily.  enoxaparin (LOVENOX) 30 mg/0.3 mL injection 0.3 mL by SubCUTAneous route daily for 14 days. Indications: DEEP VEIN THROMBOSIS PREVENTION, PULMONARY THROMBOEMBOLISM PREVENTION 4.2 mL 0    polyethylene glycol (MIRALAX) 17 gram packet Take 1 Packet by mouth daily. (Patient taking differently: Take 1 Packet by mouth daily. Indications: constipation) 30 Packet 1    peg-electrolyte soln (NULYTELY) 420 gram solution MIX WITH ANY CLEAR LIQUID. TAKE 2 LITERS AT 6 PM ON THE DAY BEFORE THE COLONOSCOPY.  0    metoprolol tartrate (LOPRESSOR) 50 mg tablet Take 1 Tab by mouth two (2) times a day. (Patient taking differently: Take 1 Tab by mouth two (2) times a day. Indications: hypertension) 180 Tab 3    potassium 99 mg tablet Take 99 mg by mouth daily as needed (supplement).  acetaminophen (TYLENOL ARTHRITIS PAIN) 650 mg CR tablet Take 650 mg by mouth every six (6) hours as needed for Pain.  simvastatin (ZOCOR) 20 mg tablet Take 1 Tab by mouth nightly. (Patient taking differently: Take 1 Tab by mouth nightly. Indications: hypercholesterolemia) 90 Tab 3    omega-3 fatty acids-vitamin e 1,000 mg cap Take 1 Cap by mouth two (2) times a day. Indications: supplement      calcium-cholecalciferol, d3, 600-125 mg-unit tab Take 1,200 mg by mouth daily. Indications: Vitamin D Deficiency      ondansetron hcl (ZOFRAN) 4 mg tablet Take 1 Tab by mouth every eight (8) hours as needed for Nausea.  64 Tab 2       No Known Allergies    Past Medical History:   Diagnosis Date    Arthritis     Bilateral Knee, and Bilateral Hand/Thumb    Atrial fibrillation ([address] 1/29/2018    Carpal tunnel syndrome     H/O echocardiogram 01/30/2018    EF 50%, normal left atrial size, no valvular heart disease    Headache     History of ankle fusion 1997    left    Hx of migraine headaches     Hypercholesterolemia     Hypertension     Morbid obesity ([address]     HERZOG (nonalcoholic steatohepatitis) suggested by ultrasound report, 2016 8/11/2017    Vertigo        Past Surgical History:   Procedure Laterality Date    HX APPENDECTOMY age 25 years    HX COLONOSCOPY      Due in 2018: May 2015    HX HYSTERECTOMY      age 30years old   [facility] HX KNEE REPLACEMENT Right 05/22/2018    HX ORTHOPAEDIC Left     ankle fusion    HX OTHER SURGICAL  2014    removal of mole left breast    HX TONSILLECTOMY      at age 29years old       Family History   Problem Relation Age of Onset    Heart Disease Mother     Hypertension Mother     Heart Surgery Mother     Cancer Father      colon    Hypertension Brother     No Known Problems Maternal Grandmother     Heart Disease Maternal Grandfather     MS Paternal Grandmother     Stroke Paternal Grandfather     Heart Disease Paternal Grandfather     No Known Problems Son        Social History   Substance Use Topics    Smoking status: Never Smoker    Smokeless tobacco: Never Used    Alcohol use No       ROS   Review of Systems   Constitutional: Negative for chills and fever. HENT: Negative for ear pain and sore throat. Eyes: Negative for blurred vision and pain. Respiratory: Negative for cough and shortness of breath. Cardiovascular: Negative for chest pain. Gastrointestinal: Negative for abdominal pain, blood in stool and melena. Genitourinary: Negative for dysuria and hematuria. Musculoskeletal: Positive for joint pain (right knee pain) and myalgias. Skin: Negative for rash. Neurological: Negative for tingling, focal weakness and headaches. Endo/Heme/Allergies: Does not bruise/bleed easily. Psychiatric/Behavioral: Negative for substance abuse. Objective     Vitals:    05/31/18 1103   BP: 117/67   Pulse: 63   Resp: 14   Temp: 100.2 °F (37.9 °C)   TempSrc: Oral   SpO2: 97%   Weight: 194 lb (88 kg)   Height: 5' 6.5\" (1.689 m)   PainSc:   8   PainLoc: Knee       Physical Exam   Constitutional: She is oriented to person, place, and time and well-developed, well-nourished, and in no distress. HENT:   Head: Normocephalic and atraumatic.    Right Ear: External ear normal.   Left Ear: External ear normal.   Nose: Nose normal.   Mouth/Throat: Oropharynx is clear and moist. No oropharyngeal exudate. Eyes: Conjunctivae and EOM are normal. Pupils are equal, round, and reactive to light. Right eye exhibits no discharge. Left eye exhibits no discharge. No scleral icterus. Neck: Neck supple. Cardiovascular: Normal rate, regular rhythm, normal heart sounds and intact distal pulses. Exam reveals no gallop and no friction rub. No murmur heard. Pulmonary/Chest: Effort normal and breath sounds normal. No respiratory distress. She has no wheezes. She has no rales. Abdominal: Soft. Bowel sounds are normal. She exhibits no distension. Musculoskeletal: She exhibits no edema or tenderness (BUE). She has limited range of motion along her right knee secondary to pain. Her right lower extremity is larger than her left lower extremity. There is no pitting edema though. There is no erythema surrounding the surgical site bandage. Lymphadenopathy:     She has no cervical adenopathy. Neurological: She is alert and oriented to person, place, and time. She exhibits normal muscle tone. Gait normal.   Skin: Skin is warm and dry. No erythema. Psychiatric: Affect normal.   Nursing note and vitals reviewed.       LABS   Data Review:   Lab Results   Component Value Date/Time    WBC 5.0 05/09/2018 07:20 AM    HGB 13.7 05/22/2018 01:43 PM    HCT 41.9 05/22/2018 01:43 PM    PLATELET 762 15/02/6748 07:20 AM    MCV 88.1 05/09/2018 07:20 AM       Lab Results   Component Value Date/Time    Sodium 143 05/09/2018 07:31 AM    Potassium 4.3 05/09/2018 07:31 AM    Chloride 108 05/09/2018 07:31 AM    CO2 31 05/09/2018 07:31 AM    Anion gap 4 05/09/2018 07:31 AM    Glucose 97 05/09/2018 07:31 AM    BUN 16 05/09/2018 07:31 AM    Creatinine 0.52 (L) 05/09/2018 07:31 AM    BUN/Creatinine ratio 31 (H) 05/09/2018 07:31 AM    GFR est AA >60 05/09/2018 07:31 AM    GFR est non-AA >60 05/09/2018 07:31 AM    Calcium 9.9 05/09/2018 07:31 AM       Lab Results   Component Value Date/Time    Cholesterol, total 155 01/26/2018 07:43 AM    HDL Cholesterol 50 01/26/2018 07:43 AM    LDL, calculated 76.2 01/26/2018 07:43 AM    VLDL, calculated 28.8 01/26/2018 07:43 AM    Triglyceride 144 01/26/2018 07:43 AM    CHOL/HDL Ratio 3.1 01/26/2018 07:43 AM       Lab Results   Component Value Date/Time    Hemoglobin A1c 5.2 05/09/2018 07:20 AM       Assessment/Plan:   Right Knee DJD: Status post right knee replacement surgery. Continue with medications per Orthopedic Surgery team's recommendations. Once she can stop her DVT prophylaxis, she was instructed to restart her aspirin. Will defer when she can stop DVT prophylaxis to her Orthopedic Surgery team.  Activity as tolerated. Continue with physical therapy. Checking a BMP to rule out any electrolyte abnormalities. Health Maintenance Due   Topic Date Due    ZOSTER VACCINE AGE 60>  03/05/2016    COLONOSCOPY  05/28/2018    BREAST CANCER SCRN MAMMOGRAM  07/14/2018     Lab review: labs are reviewed in the EHR    I have discussed the diagnosis with the patient and the intended plan as seen in the above orders. The patient has received an after-visit summary and questions were answered concerning future plans. I have discussed medication side effects and warnings with the patient as well. I have reviewed the plan of care with the patient, accepted their input and they are in agreement with the treatment goals. All questions were answered. The patient understands the plan of care. Handouts provided today with above information. Pt instructed if symptoms worsen to call the office or report to the ED for continued care. Greater than 50% of the visit time was spent in counseling and/or coordination of care. Follow-up Disposition:  Return in about 6 months (around 11/30/2018) for BP check.     Fabien Hoskins MD

## 2018-05-31 NOTE — MR AVS SNAPSHOT
[facility] 
 
 
 [address], [address] 
[phone] Patient: Ariana Kurtz MRN: [id number] DCM:2/0/3371 Visit Information Date & Time Provider Department Dept. Phone Encounter #  
 5/31/2018 11:30 AM Rica Marquez MD [facility] [phone] Follow-up Instructions Return in about 6 months (around 11/30/2018) for BP check. Your Appointments 6/1/2018  8:30 AM  
POST OP with Laurie Tena PA-C  
[facility] - [address]) Appt Note: S/P MV INPT SX 5-22-18 RT TKA/DMM  
 [address] [place] [address], [address] [place] [address]  
[phone]  
  
   
 [address] [place] [address], [address] [place] [id number]  
  
    
 6/7/2018  9:00 AM  
Follow Up with Biju Godfrey MD  
[facility] ([facility]) Appt Note: 3 month f/up [place] [address] [address] [address]  
[phone] [facility] [address] [phone]  
  
    
 6/27/2018  3:05 AM  
METABOLIC PROGRAM 15 with Yanely Wu NP [facility] ([address]) Appt Note: 6 week follow up  
 [address] [facility], [address] [address] [address]  
  
   
 [address]  
  
    
 12/3/2018  8:00 AM  
Office Visit with Rica Marquez MD  
[facility]) Appt Note: 6 month follow up  
 [address], [address] [id number] [address]  
  
   
 [address] Upcoming Health Maintenance Date Due ZOSTER VACCINE AGE 60> 3/5/2016 COLONOSCOPY 5/28/2018 BREAST CANCER SCRN MAMMOGRAM 7/14/2018 Influenza Age 5 to Adult 8/1/2018 PAP AKA CERVICAL CYTOLOGY 7/28/2019 DTaP/Tdap/Td series (2 - Td) 2/7/2027 Allergies as of 5/31/2018  Review Complete On: 5/31/2018 By: Katlin Vila MD  
 No Known Allergies Current Immunizations  Reviewed on 2/7/2017 Name Date Influenza Vaccine 10/12/2017 Influenza Vaccine (Quad) PF 10/10/2016 Tdap 2/7/2017 Not reviewed this visit You Were Diagnosed With   
  
 Codes Comments Muscle spasm    -  Primary ICD-10-CM: Y76.494 ICD-9-CM: 728.85 Paroxysmal atrial fibrillation (HCC)     ICD-10-CM: I48.0 ICD-9-CM: 427.31 Essential hypertension     ICD-10-CM: I10 
ICD-9-CM: 401.9 Vitals BP Pulse Temp Resp Height(growth percentile) Weight(growth percentile)  
 117/67 (BP 1 Location: Right arm, BP Patient Position: Sitting) 63 100.2 °F (37.9 °C) (Oral) 14 5' 6.5\" (1.689 m) 194 lb (88 kg) SpO2 BMI OB Status Smoking Status 97% 30.84 kg/m2 Hysterectomy Never Smoker Vitals History BMI and BSA Data Body Mass Index Body Surface Area  
 30.84 kg/m 2 2.03 m 2 Preferred Pharmacy Pharmacy Name Phone [facility] [address] [phone] Your Updated Medication List  
  
   
This list is accurate as of 5/31/18 11:33 AM.  Always use your most recent med list.  
  
  
  
  
 calcium-cholecalciferol (d3) 600-125 mg-unit Tab Take 1,200 mg by mouth daily. Indications: Vitamin D Deficiency  
  
 enoxaparin 30 mg/0.3 mL injection Commonly known as:  LOVENOX  
0.3 mL by SubCUTAneous route daily for 14 days. Indications: DEEP VEIN THROMBOSIS PREVENTION, PULMONARY THROMBOEMBOLISM PREVENTION  
  
 meloxicam 15 mg tablet Commonly known as:  MOBIC Take 15 mg by mouth daily. metoprolol tartrate 50 mg tablet Commonly known as:  LOPRESSOR Take 1 Tab by mouth two (2) times a day. omega-3 fatty acids-vitamin e 1,000 mg Cap Take 1 Cap by mouth two (2) times a day. Indications: supplement  
  
 ondansetron hcl 4 mg tablet Commonly known as:  Norina Brillion Take 1 Tab by mouth every eight (8) hours as needed for Nausea. oxyCODONE IR 5 mg immediate release tablet Commonly known as:  Jas Laser Take 1-3 Tabs by mouth every four (4) hours as needed. Max Daily Amount: 90 mg. Indications: Pain  
  
 peg-electrolyte soln 420 gram solution Commonly known as:  NULYTELY  
MIX WITH ANY CLEAR LIQUID. TAKE 2 LITERS AT 6 PM ON THE DAY BEFORE THE COLONOSCOPY. polyethylene glycol 17 gram packet Commonly known as:  Almeta Lauth Take 1 Packet by mouth daily. potassium 99 mg tablet Take 99 mg by mouth daily as needed (supplement). simvastatin 20 mg tablet Commonly known as:  ZOCOR Take 1 Tab by mouth nightly. TYLENOL ARTHRITIS PAIN 650 mg Destiney Quispe Generic drug:  acetaminophen Take 650 mg by mouth every six (6) hours as needed for Pain. Follow-up Instructions Return in about 6 months (around 11/30/2018) for BP check. To-Do List   
 Around 05/31/2018 Lab:  METABOLIC PANEL, BASIC   
  
 06/01/2018 To Be Determined Appointment with Mary Ayala RN at [address] [place] [address] [facility]  
  
 06/01/2018 To Be Determined Appointment with Jose Lester PT at [address] [place] [address] MED [facility]  
  
 06/05/2018 To Be Determined Appointment with Mary Ayala RN at [address] [place] [address] [facility]  
  
 06/08/2018 To Be Determined Appointment with Mary Ayala RN at [address] [place] [address] Patient Instructions Knee Pain or Injury: Care Instructions Your Care Instructions Injuries are a common cause of knee problems. Sudden (acute) injuries may be caused by a direct blow to the knee. They can also be caused by abnormal twisting, bending, or falling on the knee. Pain, bruising, or swelling may be severe, and may start within minutes of the injury. Overuse is another cause of knee pain. Other causes are climbing stairs, kneeling, and other activities that use the knee. Everyday wear and tear, especially as you get older, also can cause knee pain. Rest, along with home treatment, often relieves pain and allows your knee to heal. If you have a serious knee injury, you may need tests and treatment. Follow-up care is a key part of your treatment and safety. Be sure to make and go to all appointments, and call your doctor if you are having problems. It's also a good idea to know your test results and keep a list of the medicines you take. How can you care for yourself at home? · Be safe with medicines. Read and follow all instructions on the label. ¨ If the doctor gave you a prescription medicine for pain, take it as prescribed. ¨ If you are not taking a prescription pain medicine, ask your doctor if you can take an over-the-counter medicine. · Rest and protect your knee. Take a break from any activity that may cause pain. · Put ice or a cold pack on your knee for 10 to 20 minutes at a time. Put a thin cloth between the ice and your skin. · Prop up a sore knee on a pillow when you ice it or anytime you sit or lie down for the next 3 days. Try to keep it above the level of your heart. This will help reduce swelling. · If your knee is not swollen, you can put moist heat, a heating pad, or a warm cloth on your knee. · If your doctor recommends an elastic bandage, sleeve, or other type of support for your knee, wear it as directed. · Follow your doctor's instructions about how much weight you can put on your leg. Use a cane, crutches, or a walker as instructed. · Follow your doctor's instructions about activity during your healing process. If you can do mild exercise, slowly increase your activity. · Reach and stay at a healthy weight. Extra weight can strain the joints, especially the knees and hips, and make the pain worse.  Losing even a few pounds may help. When should you call for help? Call 911 anytime you think you may need emergency care. For example, call if: 
? · You have symptoms of a blood clot in your lung (called a pulmonary embolism). These may include: 
¨ Sudden chest pain. ¨ Trouble breathing. ¨ Coughing up blood. ?Call your doctor now or seek immediate medical care if: 
? · You have severe or increasing pain. ? · Your leg or foot turns cold or changes color. ? · You cannot stand or put weight on your knee. ? · Your knee looks twisted or bent out of shape. ? · You cannot move your knee. ? · You have signs of infection, such as: 
¨ Increased pain, swelling, warmth, or redness. ¨ Red streaks leading from the knee. ¨ Pus draining from a place on your knee. ¨ A fever. ? · You have signs of a blood clot in your leg (called a deep vein thrombosis), such as: 
¨ Pain in your calf, back of the knee, thigh, or groin. ¨ Redness and swelling in your leg or groin. ? Watch closely for changes in your health, and be sure to contact your doctor if: 
? · You have tingling, weakness, or numbness in your knee. ? · You have any new symptoms, such as swelling. ? · You have bruises from a knee injury that last longer than 2 weeks. ? · You do not get better as expected. Where can you learn more? Go to http://stanton-gonsalo.info/. Enter K195 in the search box to learn more about \"Knee Pain or Injury: Care Instructions. \" Current as of: March 20, 2017 Content Version: 11.4 © 4583-1265 [employer]. Care instructions adapted under license by [employer] (which disclaims liability or warranty for this information). If you have questions about a medical condition or this instruction, always ask your healthcare professional. [address] any warranty or liability for your use of this information. Introducing \[facility] [place] [facility]\"" & HEALTH SERVICES! Dear Khushi Varela: Thank you for requesting a [employer] account. Our records indicate that you already have an active [employer] account. You can access your account anytime at [email or web address] Did you know that you can access your hospital and ER discharge instructions at any time in [employer]? You can also review all of your test results from your hospital stay or ER visit. Additional Information If you have questions, please visit the Frequently Asked Questions section of the [employer] website at [email or web address] Remember, [employer] is NOT to be used for urgent needs. For medical emergencies, dial 911. Now available from your iPhone and Android! Please provide this summary of care documentation to your next provider. Your primary care clinician is listed as Anca Kimball. If you have any questions after today's visit, please call [phone].

## 2018-05-31 NOTE — PROGRESS NOTES
1. Have you been to the ER, urgent care clinic or hospitalized since your last visit? YES.  5/22/18-5/23/18 [facility]    2. Have you seen or consulted any other health care providers outside of the [address] [place] [address] since your last visit (Include any pap smears or colon screening)? NO        Chief Complaint   Patient presents with   [facility] Follow Up     total right knee replacement. Pt stated she is still in alot of pain from surgery.

## 2018-06-01 ENCOUNTER — TELEPHONE (OUTPATIENT)
Dept: INTERNAL MEDICINE CLINIC | Age: 63
End: 2018-06-01

## 2018-06-01 ENCOUNTER — OFFICE VISIT (OUTPATIENT)
Dept: ORTHOPEDIC SURGERY | Facility: CLINIC | Age: 63
End: 2018-06-01

## 2018-06-01 ENCOUNTER — HOME CARE VISIT (OUTPATIENT)
Dept: HOME HEALTH SERVICES | Facility: HOME HEALTH | Age: 63
End: 2018-06-01
Payer: COMMERCIAL

## 2018-06-01 VITALS
HEART RATE: 57 BPM | TEMPERATURE: 98 F | SYSTOLIC BLOOD PRESSURE: 105 MMHG | WEIGHT: 192.4 LBS | HEIGHT: 67 IN | RESPIRATION RATE: 18 BRPM | DIASTOLIC BLOOD PRESSURE: 64 MMHG | BODY MASS INDEX: 30.2 KG/M2 | OXYGEN SATURATION: 97 %

## 2018-06-01 DIAGNOSIS — G89.18 POST-OPERATIVE PAIN: ICD-10-CM

## 2018-06-01 DIAGNOSIS — M17.11 PRIMARY OSTEOARTHRITIS OF RIGHT KNEE: Primary | ICD-10-CM

## 2018-06-01 DIAGNOSIS — Z48.02: ICD-10-CM

## 2018-06-01 RX ORDER — OXYCODONE HYDROCHLORIDE 5 MG/1
5-10 TABLET ORAL
Qty: 28 TAB | Refills: 0 | Status: SHIPPED | OUTPATIENT
Start: 2018-06-01 | End: 2018-06-13 | Stop reason: SDUPTHER

## 2018-06-01 NOTE — PROGRESS NOTES
Please let her know that her electrolytes (potassium, sodium) are normal.    Dr. Pritesh Thao  [facility], [facility], [facility] [address]  Phone: [phone]  Fax: [phone]

## 2018-06-01 NOTE — PROGRESS NOTES
HISTORY:  Luz Gordon returns 10 days status post her primary right total knee replacement under the care of Dr. Roxann Pond. She is doing very well. She is at home. She is continuing her thromboembolism prophylaxis with Lovenox. She is using her pain medicine as necessary. She is ready, by report, to begin outpatient physical therapy services. PHYSICAL EXAMINATION: Her range of motion is improving slowly. She is able to attain 95? of flexion and -8? of extension today. The anterior portion of the right knee over the midline in a cranial/caudal orientation reveals a 30 cm surgical incision, intact. Wound borders are well approximated. Skin staples are noted. No evidence of wound dehiscence or infection. There is soft tissue swelling bracketing the surgical site, which has improved since just following surgery. Her calf is non-tender on the right. There is no evidence of DVT. Distal sensation intact fully, right lower extremity. PLAN: All of her staples were removed today. Steri-Strips were placed. We are going to begin outpatient physical therapy services per protocol. She is going to continue to taper her pain medication. She is discontinuing her Lovenox and will begin an 81 mg Aspirin for thromboembolism prophylaxis orally on a daily basis. We will see her back in about two weeks. X-ray at next office visit.

## 2018-06-01 NOTE — TELEPHONE ENCOUNTER
----- Message from Fredy Vega MD sent at 6/1/2018  9:47 AM EDT -----  Please let her know that her electrolytes (potassium, sodium) are normal.    Dr. Emir Barba  [facility], [address] [place] [address], [facility] [address]  Phone: [phone]  Fax: [phone]

## 2018-06-04 ENCOUNTER — ANESTHESIA EVENT (OUTPATIENT)
Dept: ENDOSCOPY | Age: 63
End: 2018-06-04
Payer: COMMERCIAL

## 2018-06-05 ENCOUNTER — HOSPITAL ENCOUNTER (OUTPATIENT)
Age: 63
Setting detail: OUTPATIENT SURGERY
Discharge: HOME | End: 2018-06-05
Attending: INTERNAL MEDICINE | Admitting: INTERNAL MEDICINE
Payer: COMMERCIAL

## 2018-06-05 ENCOUNTER — HOME CARE VISIT (OUTPATIENT)
Dept: SCHEDULING | Facility: HOME HEALTH | Age: 63
End: 2018-06-05
Payer: COMMERCIAL

## 2018-06-05 ENCOUNTER — ANESTHESIA (OUTPATIENT)
Dept: ENDOSCOPY | Age: 63
End: 2018-06-05
Payer: COMMERCIAL

## 2018-06-05 VITALS
WEIGHT: 187.4 LBS | RESPIRATION RATE: 14 BRPM | HEART RATE: 98 BPM | OXYGEN SATURATION: 99 % | TEMPERATURE: 96.8 F | HEIGHT: 66 IN | DIASTOLIC BLOOD PRESSURE: 78 MMHG | SYSTOLIC BLOOD PRESSURE: 113 MMHG | BODY MASS INDEX: 30.12 KG/M2

## 2018-06-05 VITALS
TEMPERATURE: 96.9 F | SYSTOLIC BLOOD PRESSURE: 110 MMHG | RESPIRATION RATE: 18 BRPM | HEART RATE: 82 BPM | OXYGEN SATURATION: 97 % | DIASTOLIC BLOOD PRESSURE: 60 MMHG

## 2018-06-05 PROCEDURE — 76060000032 HC ANESTHESIA 0.5 TO 1 HR: Performed by: INTERNAL MEDICINE

## 2018-06-05 PROCEDURE — 88305 TISSUE EXAM BY PATHOLOGIST: CPT | Performed by: INTERNAL MEDICINE

## 2018-06-05 PROCEDURE — 74011000250 HC RX REV CODE- 250

## 2018-06-05 PROCEDURE — 77030008565 HC TBNG SUC IRR ERBE -B: Performed by: INTERNAL MEDICINE

## 2018-06-05 PROCEDURE — 74011250636 HC RX REV CODE- 250/636: Performed by: NURSE ANESTHETIST, CERTIFIED REGISTERED

## 2018-06-05 PROCEDURE — 77030038604 HC SNR ENDO EXACTO USEN -B: Performed by: INTERNAL MEDICINE

## 2018-06-05 PROCEDURE — G0299 HHS/HOSPICE OF RN EA 15 MIN: HCPCS

## 2018-06-05 PROCEDURE — G0151 HHCP-SERV OF PT,EA 15 MIN: HCPCS

## 2018-06-05 PROCEDURE — 74011000250 HC RX REV CODE- 250: Performed by: NURSE ANESTHETIST, CERTIFIED REGISTERED

## 2018-06-05 PROCEDURE — 74011250636 HC RX REV CODE- 250/636

## 2018-06-05 PROCEDURE — 77030013992 HC SNR POLYP ENDOSC BSC -B: Performed by: INTERNAL MEDICINE

## 2018-06-05 PROCEDURE — 77030010936 HC CLP LIG BSC -C: Performed by: INTERNAL MEDICINE

## 2018-06-05 PROCEDURE — 77030009426 HC FCPS BIOP ENDOSC BSC -B: Performed by: INTERNAL MEDICINE

## 2018-06-05 PROCEDURE — 76040000007: Performed by: INTERNAL MEDICINE

## 2018-06-05 PROCEDURE — 77030018846 HC SOL IRR STRL H20 ICUM -A: Performed by: INTERNAL MEDICINE

## 2018-06-05 RX ORDER — SODIUM CHLORIDE, SODIUM LACTATE, POTASSIUM CHLORIDE, CALCIUM CHLORIDE 600; 310; 30; 20 MG/100ML; MG/100ML; MG/100ML; MG/100ML
75 INJECTION, SOLUTION INTRAVENOUS CONTINUOUS
Status: DISCONTINUED | OUTPATIENT
Start: 2018-06-05 | End: 2018-06-05 | Stop reason: HOSPADM

## 2018-06-05 RX ORDER — GUAIFENESIN 100 MG/5ML
81 LIQUID (ML) ORAL DAILY
COMMUNITY
End: 2018-06-07 | Stop reason: ALTCHOICE

## 2018-06-05 RX ORDER — CEFAZOLIN SODIUM 1 G/3ML
INJECTION, POWDER, FOR SOLUTION INTRAMUSCULAR; INTRAVENOUS AS NEEDED
Status: DISCONTINUED | OUTPATIENT
Start: 2018-06-05 | End: 2018-06-05 | Stop reason: HOSPADM

## 2018-06-05 RX ORDER — INSULIN LISPRO 100 [IU]/ML
INJECTION, SOLUTION INTRAVENOUS; SUBCUTANEOUS ONCE
Status: DISCONTINUED | OUTPATIENT
Start: 2018-06-05 | End: 2018-06-05 | Stop reason: HOSPADM

## 2018-06-05 RX ORDER — SODIUM CHLORIDE 0.9 % (FLUSH) 0.9 %
5-10 SYRINGE (ML) INJECTION AS NEEDED
Status: DISCONTINUED | OUTPATIENT
Start: 2018-06-05 | End: 2018-06-05 | Stop reason: HOSPADM

## 2018-06-05 RX ORDER — DEXTROSE 50 % IN WATER (D50W) INTRAVENOUS SYRINGE
25-50 AS NEEDED
Status: DISCONTINUED | OUTPATIENT
Start: 2018-06-05 | End: 2018-06-05 | Stop reason: HOSPADM

## 2018-06-05 RX ORDER — MAGNESIUM SULFATE 100 %
4 CRYSTALS MISCELLANEOUS AS NEEDED
Status: DISCONTINUED | OUTPATIENT
Start: 2018-06-05 | End: 2018-06-05 | Stop reason: HOSPADM

## 2018-06-05 RX ORDER — SODIUM CHLORIDE 0.9 % (FLUSH) 0.9 %
5-10 SYRINGE (ML) INJECTION EVERY 8 HOURS
Status: DISCONTINUED | OUTPATIENT
Start: 2018-06-05 | End: 2018-06-05 | Stop reason: HOSPADM

## 2018-06-05 RX ORDER — METOPROLOL TARTRATE 5 MG/5ML
2.5 INJECTION INTRAVENOUS ONCE
Status: COMPLETED | OUTPATIENT
Start: 2018-06-05 | End: 2018-06-05

## 2018-06-05 RX ORDER — LIDOCAINE HYDROCHLORIDE 20 MG/ML
INJECTION, SOLUTION EPIDURAL; INFILTRATION; INTRACAUDAL; PERINEURAL AS NEEDED
Status: DISCONTINUED | OUTPATIENT
Start: 2018-06-05 | End: 2018-06-05 | Stop reason: HOSPADM

## 2018-06-05 RX ORDER — PROPOFOL 10 MG/ML
INJECTION, EMULSION INTRAVENOUS AS NEEDED
Status: DISCONTINUED | OUTPATIENT
Start: 2018-06-05 | End: 2018-06-05 | Stop reason: HOSPADM

## 2018-06-05 RX ORDER — ONDANSETRON 2 MG/ML
4 INJECTION INTRAMUSCULAR; INTRAVENOUS ONCE
Status: DISCONTINUED | OUTPATIENT
Start: 2018-06-05 | End: 2018-06-05 | Stop reason: HOSPADM

## 2018-06-05 RX ORDER — METOPROLOL TARTRATE 5 MG/5ML
2.5 INJECTION INTRAVENOUS AS NEEDED
Status: DISCONTINUED | OUTPATIENT
Start: 2018-06-05 | End: 2018-06-05 | Stop reason: HOSPADM

## 2018-06-05 RX ORDER — NALOXONE HYDROCHLORIDE 0.4 MG/ML
0.2 INJECTION, SOLUTION INTRAMUSCULAR; INTRAVENOUS; SUBCUTANEOUS AS NEEDED
Status: DISCONTINUED | OUTPATIENT
Start: 2018-06-05 | End: 2018-06-05 | Stop reason: HOSPADM

## 2018-06-05 RX ORDER — METOPROLOL TARTRATE 5 MG/5ML
INJECTION INTRAVENOUS
Status: DISCONTINUED
Start: 2018-06-05 | End: 2018-06-05 | Stop reason: HOSPADM

## 2018-06-05 RX ADMIN — CEFAZOLIN SODIUM 2 G: 1 INJECTION, POWDER, FOR SOLUTION INTRAMUSCULAR; INTRAVENOUS at 08:55

## 2018-06-05 RX ADMIN — FAMOTIDINE 20 MG: 10 INJECTION, SOLUTION INTRAVENOUS at 07:58

## 2018-06-05 RX ADMIN — SODIUM CHLORIDE, SODIUM LACTATE, POTASSIUM CHLORIDE, AND CALCIUM CHLORIDE 75 ML/HR: 600; 310; 30; 20 INJECTION, SOLUTION INTRAVENOUS at 07:58

## 2018-06-05 RX ADMIN — PROPOFOL 90 MG: 10 INJECTION, EMULSION INTRAVENOUS at 08:59

## 2018-06-05 RX ADMIN — METOPROLOL TARTRATE 2.5 MG: 5 INJECTION, SOLUTION INTRAVENOUS at 09:44

## 2018-06-05 RX ADMIN — LIDOCAINE HYDROCHLORIDE 50 MG: 20 INJECTION, SOLUTION EPIDURAL; INFILTRATION; INTRACAUDAL; PERINEURAL at 08:58

## 2018-06-05 RX ADMIN — METOPROLOL TARTRATE 2.5 MG: 5 INJECTION, SOLUTION INTRAVENOUS at 09:55

## 2018-06-05 RX ADMIN — PROPOFOL 30 MG: 10 INJECTION, EMULSION INTRAVENOUS at 09:16

## 2018-06-05 RX ADMIN — PROPOFOL 40 MG: 10 INJECTION, EMULSION INTRAVENOUS at 09:07

## 2018-06-05 RX ADMIN — PROPOFOL 40 MG: 10 INJECTION, EMULSION INTRAVENOUS at 09:03

## 2018-06-05 NOTE — H&P
Chief Complaint: Family history of colon ca. History of present illness: No complaints.     PMH:   Past Medical History:   Diagnosis Date    Arthritis     Bilateral Knee, and Bilateral Hand/Thumb    Atrial fibrillation ([address] 1/29/2018    Carpal tunnel syndrome     H/O echocardiogram 01/30/2018    EF 50%, normal left atrial size, no valvular heart disease    Headache     History of ankle fusion 1997    left    Hx of migraine headaches     Hypercholesterolemia     Hypertension     Morbid obesity ([address]     HERZOG (nonalcoholic steatohepatitis) suggested by ultrasound report, 2016 8/11/2017    Vertigo      Allergies: No Known Allergies  Medications:   Current Facility-Administered Medications:     lactated Ringers infusion, 75 mL/hr, IntraVENous, CONTINUOUS, Freada Appl, CRNA, Last Rate: 75 mL/hr at 06/05/18 0758, 75 mL/hr at 06/05/18 0758    sodium chloride (NS) flush 5-10 mL, 5-10 mL, IntraVENous, Q8H, Freada Appl, CRNA    sodium chloride (NS) flush 5-10 mL, 5-10 mL, IntraVENous, PRN, Freada Appl, CRNA  FH:   Family History   Problem Relation Age of Onset    Heart Disease Mother     Hypertension Mother     Heart Surgery Mother     Cancer Father      colon    Hypertension Brother     No Known Problems Maternal Grandmother     Heart Disease Maternal Grandfather     MS Paternal Grandmother     Stroke Paternal Grandfather     Heart Disease Paternal Grandfather     No Known Problems Son      Social:   Social History     Social History    Marital status:      Spouse name: N/A    Number of children: N/A    Years of education: N/A     Social History Main Topics    Smoking status: Never Smoker    Smokeless tobacco: Never Used    Alcohol use No    Drug use: No    Sexual activity: Not Asked     Other Topics Concern    None     Social History Narrative    Works at [employer] in the [employer] department     Surgical H:   Past Surgical History:   Procedure Laterality Date    HX APPENDECTOMY      age 25 years    HX COLONOSCOPY      Due in 2018: May 2015    HX HYSTERECTOMY      age 30years old    HX KNEE REPLACEMENT Right 05/22/2018    HX ORTHOPAEDIC Left     ankle fusion    HX OTHER SURGICAL  2014    removal of mole left breast    HX TONSILLECTOMY      at age 29years old       ROS: negative    Physical Exam:   Visit Vitals    /84    Pulse 92    Temp 98 °F (36.7 °C)    Resp 18    Ht 5' 6\" (1.676 m)    Wt 85 kg (187 lb 6.4 oz)    SpO2 98%    Breastfeeding No    BMI 30.25 kg/m2     General appearance: alert, no distress  Eyes: pupils equal and reactive, extraocular eye movements intact  Nodes: No gross adenopathy in neck. Skin: no spider angiomata, jaundice, palmar erythema   Respiratory: clear to auscultation bilaterally  Cardiovascular: regular heart rate, no murmurs, no JVD, normal rate and regular rhythm  Abdomen: soft, non-tender, liver not enlarged, spleen not palpable, no obvious ascites  Extremities: no muscle wasting, no gross arthritic changes  Neurologic: alert and oriented, cranial nerves grossly intact, no asterixis    Labs: No results found for this or any previous visit (from the past 24 hour(s)). Imp/ Plan: Will proceed with colonoscopy as planned. Risk benefits alternative including but not limited to infection, bleeding, perforation of viscous, allergic reaction and resultant morbidity and mortality was discussed. Chance of missing a significant lesion due to various reasons were discussed.       Deepak Freedman MD  [facility] [address], [address]

## 2018-06-05 NOTE — DISCHARGE INSTRUCTIONS
Colonoscopy: What to Expect at [address] [place] [address]  After you have a colonoscopy, you will stay at the clinic for 1 to 2 hours until the medicines wear off. Then you can go home. But you will need to arrange for a ride. Your doctor will tell you when you can eat and do your other usual activities. Your doctor will talk to you about when you will need your next colonoscopy. Your doctor can help you decide how often you need to be checked. This will depend on the results of your test and your risk for colorectal cancer. After the test, you may be bloated or have gas pains. You may need to pass gas. If a biopsy was done or a polyp was removed, you may have streaks of blood in your stool (feces) for a few days. This care sheet gives you a general idea about how long it will take for you to recover. But each person recovers at a different pace. Follow the steps below to get better as quickly as possible. How can you care for yourself at home? Activity  ? · Rest when you feel tired. ? · You can do your normal activities when it feels okay to do so. Diet  ? · Follow your doctor's directions for eating. ? · Unless your doctor has told you not to, drink plenty of fluids. This helps to replace the fluids that were lost during the colon prep. ? · Do not drink alcohol. Medicines  ? · Your doctor will tell you if and when you can restart your medicines. He or she will also give you instructions about taking any new medicines. ? · If you take blood thinners, such as warfarin (Coumadin), clopidogrel (Plavix), or aspirin, be sure to talk to your doctor. He or she will tell you if and when to start taking those medicines again. Make sure that you understand exactly what your doctor wants you to do. ? · If polyps were removed or a biopsy was done during the test, your doctor may tell you not to take aspirin or other anti-inflammatory medicines for a few days.  These include ibuprofen (Advil, Motrin) and naproxen (Ronald). Other instructions  ? · For your safety, do not drive or operate machinery until the medicine wears off and you can think clearly. Your doctor may tell you not to drive or operate machinery until the day after your test.   ? · Do not sign legal documents or make major decisions until the medicine wears off and you can think clearly. The anesthesia can make it hard for you to fully understand what you are agreeing to. Follow-up care is a key part of your treatment and safety. Be sure to make and go to all appointments, and call your doctor if you are having problems. It's also a good idea to know your test results and keep a list of the medicines you take. When should you call for help? Call 911 anytime you think you may need emergency care. For example, call if:  ? · You passed out (lost consciousness). ? · You pass maroon or bloody stools. ? · You have trouble breathing. ?Call your doctor now or seek immediate medical care if:  ? · You have pain that does not get better after you take pain medicine. ? · You are sick to your stomach or cannot drink fluids. ? · You have new or worse belly pain. ? · You have blood in your stools. ? · You have a fever. ? · You cannot pass stools or gas. ? Watch closely for changes in your health, and be sure to contact your doctor if you have any problems. Where can you learn more? Go to http://stanton-gonsalo.info/. Enter E264 in the search box to learn more about \"Colonoscopy: What to Expect at Home. \"  Current as of: May 12, 2017  Content Version: 11.4  © 2047-3860 [employer]. Care instructions adapted under license by [employer] (which disclaims liability or warranty for this information). If you have questions about a medical condition or this instruction, always ask your healthcare professional. [address] any warranty or liability for your use of this information.        Colon Polyps: Care Instructions  Your Care Instructions    Colon polyps are growths in the colon or the rectum. The cause of most colon polyps is not known, and most people who get them do not have any problems. But a certain kind can turn into cancer. For this reason, regular testing for colon polyps is important for people age 48 and older and anyone who has an increased risk for colon cancer. Polyps are usually found through routine colon cancer screening tests. Although most colon polyps are not cancerous, they are usually removed and then tested for cancer. Screening for colon cancer saves lives because the cancer can usually be cured if it is caught early. If you have a polyp that is the type that can turn into cancer, you may need more tests to examine your entire colon. The doctor will remove any other polyps that he or she finds, and you will be tested more often. Follow-up care is a key part of your treatment and safety. Be sure to make and go to all appointments, and call your doctor if you are having problems. It's also a good idea to know your test results and keep a list of the medicines you take. How can you care for yourself at home? Regular exams to look for colon polyps are the best way to prevent polyps from turning into colon cancer. These can include stool tests, sigmoidoscopy, colonoscopy, and CT colonography. Talk with your doctor about a testing schedule that is right for you. To prevent polyps  There is no home treatment that can prevent colon polyps. But these steps may help lower your risk for cancer. · Stay active. Being active can help you get to and stay at a healthy weight. Try to exercise on most days of the week. Walking is a good choice. · Eat well. Choose a variety of vegetables, fruits, legumes (such as peas and beans), fish, poultry, and whole grains. · Do not smoke. If you need help quitting, talk to your doctor about stop-smoking programs and medicines.  These can increase your chances of quitting for good. · If you drink alcohol, limit how much you drink. Limit alcohol to 2 drinks a day for men and 1 drink a day for women. When should you call for help? Call your doctor now or seek immediate medical care if:  ? · You have severe belly pain. ? · Your stools are maroon or very bloody. ? Watch closely for changes in your health, and be sure to contact your doctor if:  ? · You have a fever. ? · You have nausea or vomiting. ? · You have a change in bowel habits (new constipation or diarrhea). ? · Your symptoms get worse or are not improving as expected. Where can you learn more? Go to http://stanton-gonsalo.info/. Enter 95 [id number] in the search box to learn more about \"Colon Polyps: Care Instructions. \"  Current as of: May 12, 2017  Content Version: 11.4  © 8396-8891 [employer]. Care instructions adapted under license by [employer] (which disclaims liability or warranty for this information). If you have questions about a medical condition or this instruction, always ask your healthcare professional. [place] [address] any warranty or liability for your use of this information. DISCHARGE SUMMARY from Nurse    PATIENT INSTRUCTIONS:    After general anesthesia or intravenous sedation, for 24 hours or while taking prescription Narcotics:  · Limit your activities  · Do not drive and operate hazardous machinery  · Do not make important personal or business decisions  · Do  not drink alcoholic beverages  · If you have not urinated within 8 hours after discharge, please contact your surgeon on call.     Report the following to your surgeon:  · Excessive pain, swelling, redness or odor of or around the surgical area  · Temperature over 100.5  · Nausea and vomiting lasting longer than 4 hours or if unable to take medications  · Any signs of decreased circulation or nerve impairment to extremity: change in color, persistent numbness, tingling, coldness or increase pain  · Any questions  *  Please give a list of your current medications to your Primary Care Provider. *  Please update this list whenever your medications are discontinued, doses are      changed, or new medications (including over-the-counter products) are added. *  Please carry medication information at all times in case of emergency situations. These are general instructions for a healthy lifestyle:    No smoking/ No tobacco products/ Avoid exposure to second hand smoke  Surgeon General's Warning:  Quitting smoking now greatly reduces serious risk to your health. Obesity, smoking, and sedentary lifestyle greatly increases your risk for illness    A healthy diet, regular physical exercise & weight monitoring are important for maintaining a healthy lifestyle    You may be retaining fluid if you have a history of heart failure or if you experience any of the following symptoms:  Weight gain of 3 pounds or more overnight or 5 pounds in a week, increased swelling in our hands or feet or shortness of breath while lying flat in bed. Please call your doctor as soon as you notice any of these symptoms; do not wait until your next office visit. Recognize signs and symptoms of STROKE:    F-face looks uneven    A-arms unable to move or move unevenly    S-speech slurred or non-existent    T-time-call 911 as soon as signs and symptoms begin-DO NOT go       Back to bed or wait to see if you get better-TIME IS BRAIN. Warning Signs of HEART ATTACK     Call 911 if you have these symptoms:   Chest discomfort. Most heart attacks involve discomfort in the center of the chest that lasts more than a few minutes, or that goes away and comes back. It can feel like uncomfortable pressure, squeezing, fullness, or pain.  Discomfort in other areas of the upper body. Symptoms can include pain or discomfort in one or both arms, the back, neck, jaw, or stomach.    Shortness of breath with or without chest discomfort.  Other signs may include breaking out in a cold sweat, nausea, or lightheadedness. Don't wait more than five minutes to call 911 - MINUTES MATTER! Fast action can save your life. Calling 911 is almost always the fastest way to get lifesaving treatment. Emergency Medical Services staff can begin treatment when they arrive -- up to an hour sooner than if someone gets to the hospital by car. The discharge information has been reviewed with the patient and spouse. The patient and spouse verbalized understanding. Discharge medications reviewed with the patient and spouse and appropriate educational materials and side effects teaching were provided.   ___________________________________________________________________________________________________________________________________

## 2018-06-05 NOTE — ANESTHESIA PREPROCEDURE EVALUATION
Anesthetic History   No history of anesthetic complications            Review of Systems / Medical History  Patient summary reviewed and pertinent labs reviewed    Pulmonary  Within defined limits                 Neuro/Psych   Within defined limits           Cardiovascular    Hypertension        Dysrhythmias : atrial fibrillation      Exercise tolerance: >4 METS     GI/Hepatic/Renal           Liver disease     Endo/Other        Obesity and arthritis     Other Findings   Comments: Documentation of current medication  Current medications obtained, documented and obtained? YES      Risk Factors for Postoperative nausea/vomiting:       History of postoperative nausea/vomiting? NO       Female? YES       Motion sickness? NO       Intended opioid administration for postoperative analgesia? YES      Smoking Abstinence:  Current Smoker? NO  Elective Surgery? YES  Seen preoperatively by anesthesiologist or proxy prior to day of surgery? YES  Pt abstained from smoking 24 hours prior to anesthesia?  YES    Preventive care/screening for High Blood Pressure:  Aged 18 years and older: YES  Screened for high blood pressure: YES  Patients with high blood pressure referred to primary care provider   for BP management: YES                     Physical Exam    Airway  Mallampati: II  TM Distance: 4 - 6 cm  Neck ROM: normal range of motion   Mouth opening: Normal     Cardiovascular    Rhythm: regular  Rate: normal         Dental  No notable dental hx       Pulmonary  Breath sounds clear to auscultation               Abdominal  GI exam deferred       Other Findings            Anesthetic Plan    ASA: 3  Anesthesia type: MAC          Induction: Intravenous  Anesthetic plan and risks discussed with: Patient

## 2018-06-05 NOTE — IP AVS SNAPSHOT
[address] [facility] 
 
 
 [address] Patient: Sneha Ramos MRN: [id number] XCR:[id number] About your hospitalization You were admitted on:  June 5, 2018 You last received care in the:  [facility] PHASE 2 RECOVERY You were discharged on:  June 5, 2018 Why you were hospitalized Your primary diagnosis was:  Not on File Follow-up Information Follow up With Details Comments Contact Info Jace Alegria [address] [facility] [address] 
[phone] Gary Ivy MD  Colonoscopy in 3 years. [address] [facility] 194 200 [facility] 
[phone] Your Scheduled Appointments Thursday June 07, 2018  9:00 AM EDT Follow Up with Dariel Cruz MD  
[facility] ([address] [place] [address]) [facility] [address] [phone] Friday June 08, 2018 To Be Determined SN REASSESSMENT with Patrick Casarez RN  
[facility] HOME CARE SCHEDULING/INTAKE ([facility]) [address] [place] [address] CARE SCHEDULING/INTAKE ( HOME HEALTH/ HOSPICE) Friday June 08, 2018  3:00 PM EDT  
pt eval with Ivy Watts, PT  
[facility] PT [address] [facility]) [address]  
[phone] Wednesday June 13, 2018  4:00 PM EDT Follow Up with Yoly Davila PA-C  
[facility] [address] [place] [address]) [address] [place] [address] [id number] [phone] Wednesday June 27, 2018  8:45 AM EDT METABOLIC PROGRAM 15 with Tonya Lucero NP [facility] ([facility]) [address] [facility] 200 [facility]  
[phone] Discharge Orders None A check tricia indicates which time of day the medication should be taken. My Medications CONTINUE taking these medications Instructions Each Dose to Equal  
 Morning Noon Evening Bedtime  
 aspirin 81 mg chewable tablet Your last dose was: Your next dose is: Take 81 mg by mouth daily. 81 mg  
    
   
   
   
  
 calcium-cholecalciferol (d3) 600-125 mg-unit Tab Your last dose was: Your next dose is: Take 1,200 mg by mouth daily. Indications: Vitamin D Deficiency 1200 mg  
    
   
   
   
  
 enoxaparin 30 mg/0.3 mL injection Commonly known as:  LOVENOX Your last dose was: Your next dose is: 0.3 mL by SubCUTAneous route daily for 14 days. Indications: DEEP VEIN THROMBOSIS PREVENTION, PULMONARY THROMBOEMBOLISM PREVENTION 30 mg  
    
   
   
   
  
 meloxicam 15 mg tablet Commonly known as:  MOBIC Your last dose was: Your next dose is: Take 15 mg by mouth daily. 15 mg  
    
   
   
   
  
 metoprolol tartrate 50 mg tablet Commonly known as:  LOPRESSOR Your last dose was: Your next dose is: Take 1 Tab by mouth two (2) times a day. 50 mg  
    
   
   
   
  
 omega-3 fatty acids-vitamin e 1,000 mg Cap Your last dose was: Your next dose is: Take 1 Cap by mouth two (2) times a day. Indications: supplement 1 Cap  
    
   
   
   
  
 ondansetron hcl 4 mg tablet Commonly known as:  Sahra Downing Your last dose was: Your next dose is: Take 1 Tab by mouth every eight (8) hours as needed for Nausea. 4 mg  
    
   
   
   
  
 oxyCODONE IR 5 mg immediate release tablet Commonly known as:  Kaylan Parks Your last dose was: Your next dose is: Take 1-2 Tabs by mouth every four (4) hours as needed. Max Daily Amount: 60 mg. Indications: Pain  5-10 mg  
    
   
   
   
  
 peg-electrolyte soln 420 gram solution Commonly known as:  Dhruv Pacini Your last dose was: Your next dose is: MIX WITH ANY CLEAR LIQUID. TAKE 2 LITERS AT 6 PM ON THE DAY BEFORE THE COLONOSCOPY. polyethylene glycol 17 gram packet Commonly known as:  Antonio Blinks Your last dose was: Your next dose is: Take 1 Packet by mouth daily. 17 g  
    
   
   
   
  
 potassium 99 mg tablet Your last dose was: Your next dose is: Take 99 mg by mouth daily as needed (supplement). 99 mg  
    
   
   
   
  
 simvastatin 20 mg tablet Commonly known as:  ZOCOR Your last dose was: Your next dose is: Take 1 Tab by mouth nightly. 20 mg  
    
   
   
   
  
 TYLENOL ARTHRITIS PAIN 650 mg Tber Generic drug:  acetaminophen Your last dose was: Your next dose is: Take 650 mg by mouth every six (6) hours as needed for Pain. 650 mg Opioid Education Prescription Opioids: What You Need to Know: 
 
Prescription opioids can be used to help relieve moderate-to-severe pain and are often prescribed following a surgery or injury, or for certain health conditions. These medications can be an important part of treatment but also come with serious risks. Opioids are strong pain medicines. Examples include hydrocodone, oxycodone, fentanyl, and morphine. Heroin is an example of an illegal opioid. It is important to work with your health care provider to make sure you are getting the safest, most effective care. WHAT ARE THE RISKS AND SIDE EFFECTS OF OPIOID USE? Prescription opioids carry serious risks of addiction and overdose, especially with prolonged use. An opioid overdose, often marked by slow breathing, can cause sudden death. The use of prescription opioids can have a number of side effects as well, even when taken as directed. · Tolerance-meaning you might need to take more of a medication for the same pain relief · Physical dependence-meaning you have symptoms of withdrawal when the medication is stopped. Withdrawal symptoms can include nausea, sweating, chills, diarrhea, stomach cramps, and muscle aches. Withdrawal can last up to several weeks, depending on which drug you took and how long you took it. · Increased sensitivity to pain · Constipation · Nausea, vomiting, and dry mouth · Sleepiness and dizziness · Confusion · Depression · Low levels of testosterone that can result in lower sex drive, energy, and strength · Itching and sweating RISKS ARE GREATER WITH:      
· History of drug misuse, substance use disorder, or overdose · Mental health conditions (such as depression or anxiety) · Sleep apnea · Older age (72 years or older) · Pregnancy Avoid alcohol while taking prescription opioids. Also, unless specifically advised by your health care provider, medications to avoid include: · Benzodiazepines (such as Xanax or Valium) · Muscle relaxants (such as Soma or Flexeril) · Hypnotics (such as Ambien or Lunesta) · Other prescription opioids KNOW YOUR OPTIONS Talk to your health care provider about ways to manage your pain that don't involve prescription opioids. Some of these options may actually work better and have fewer risks and side effects. Options may include: 
· Pain relievers such as acetaminophen, ibuprofen, and naproxen · Some medications that are also used for depression or seizures · Physical therapy and exercise · Counseling to help patients learn how to cope better with triggers of pain and stress. · Application of heat or cold compress · Massage therapy · Relaxation techniques Be Informed Make sure you know the name of your medication, how much and how often to take it, and its potential risks & side effects.  
 
IF YOU ARE PRESCRIBED OPIOIDS FOR PAIN: 
 · Never take opioids in greater amounts or more often than prescribed. Remember the goal is not to be pain-free but to manage your pain at a tolerable level. · Follow up with your primary care provider to: · Work together to create a plan on how to manage your pain. · Talk about ways to help manage your pain that don't involve prescription opioids. · Talk about any and all concerns and side effects. · Help prevent misuse and abuse. · Never sell or share prescription opioids · Help prevent misuse and abuse. · Store prescription opioids in a secure place and out of reach of others (this may include visitors, children, friends, and family). · Safely dispose of unused/unwanted prescription opioids: Find your community drug take-back program or your pharmacy mail-back program, or flush them down the toilet, following guidance from the Food and Drug Administration (www.fda.gov/Drugs/ResourcesForYou). · Visit www.cdc.gov/drugoverdose to learn about the risks of opioid abuse and overdose. · If you believe you may be struggling with addiction, tell your health care provider and ask for guidance or call [facility] [employer] at [phone]. Discharge Instructions Colonoscopy: What to Expect at [facility] Your Recovery After you have a colonoscopy, you will stay at the clinic for 1 to 2 hours until the medicines wear off. Then you can go home. But you will need to arrange for a ride. Your doctor will tell you when you can eat and do your other usual activities. Your doctor will talk to you about when you will need your next colonoscopy. Your doctor can help you decide how often you need to be checked. This will depend on the results of your test and your risk for colorectal cancer. After the test, you may be bloated or have gas pains. You may need to pass gas.  If a biopsy was done or a polyp was removed, you may have streaks of blood in your stool (feces) for a few days. This care sheet gives you a general idea about how long it will take for you to recover. But each person recovers at a different pace. Follow the steps below to get better as quickly as possible. How can you care for yourself at home? Activity ? · Rest when you feel tired. ? · You can do your normal activities when it feels okay to do so. Diet ? · Follow your doctor's directions for eating. ? · Unless your doctor has told you not to, drink plenty of fluids. This helps to replace the fluids that were lost during the colon prep. ? · Do not drink alcohol. Medicines ? · Your doctor will tell you if and when you can restart your medicines. He or she will also give you instructions about taking any new medicines. ? · If you take blood thinners, such as warfarin (Coumadin), clopidogrel (Plavix), or aspirin, be sure to talk to your doctor. He or she will tell you if and when to start taking those medicines again. Make sure that you understand exactly what your doctor wants you to do. ? · If polyps were removed or a biopsy was done during the test, your doctor may tell you not to take aspirin or other anti-inflammatory medicines for a few days. These include ibuprofen (Advil, Motrin) and naproxen (Aleve). Other instructions ? · For your safety, do not drive or operate machinery until the medicine wears off and you can think clearly. Your doctor may tell you not to drive or operate machinery until the day after your test.  
? · Do not sign legal documents or make major decisions until the medicine wears off and you can think clearly. The anesthesia can make it hard for you to fully understand what you are agreeing to. Follow-up care is a key part of your treatment and safety. Be sure to make and go to all appointments, and call your doctor if you are having problems. It's also a good idea to know your test results and keep a list of the medicines you take. When should you call for help? Call 911 anytime you think you may need emergency care. For example, call if: 
? · You passed out (lost consciousness). ? · You pass maroon or bloody stools. ? · You have trouble breathing. ?Call your doctor now or seek immediate medical care if: 
? · You have pain that does not get better after you take pain medicine. ? · You are sick to your stomach or cannot drink fluids. ? · You have new or worse belly pain. ? · You have blood in your stools. ? · You have a fever. ? · You cannot pass stools or gas. ? Watch closely for changes in your health, and be sure to contact your doctor if you have any problems. Where can you learn more? Go to http://stanton-gonsalo.info/. Enter E264 in the search box to learn more about \"Colonoscopy: What to Expect at Home. \" Current as of: May 12, 2017 Content Version: 11.4 © 1548-5256 [email or web address].[employer]. Care instructions adapted under license by [employer] (which disclaims liability or warranty for this information). If you have questions about a medical condition or this instruction, always ask your healthcare professional. [place] [address] any warranty or liability for your use of this information. Colon Polyps: Care Instructions Your Care Instructions Colon polyps are growths in the colon or the rectum. The cause of most colon polyps is not known, and most people who get them do not have any problems. But a certain kind can turn into cancer. For this reason, regular testing for colon polyps is important for people age 48 and older and anyone who has an increased risk for colon cancer. Polyps are usually found through routine colon cancer screening tests. Although most colon polyps are not cancerous, they are usually removed and then tested for cancer. Screening for colon cancer saves lives because the cancer can usually be cured if it is caught early. If you have a polyp that is the type that can turn into cancer, you may need more tests to examine your entire colon. The doctor will remove any other polyps that he or she finds, and you will be tested more often. Follow-up care is a key part of your treatment and safety. Be sure to make and go to all appointments, and call your doctor if you are having problems. It's also a good idea to know your test results and keep a list of the medicines you take. How can you care for yourself at home? Regular exams to look for colon polyps are the best way to prevent polyps from turning into colon cancer. These can include stool tests, sigmoidoscopy, colonoscopy, and CT colonography. Talk with your doctor about a testing schedule that is right for you. To prevent polyps There is no home treatment that can prevent colon polyps. But these steps may help lower your risk for cancer. · Stay active. Being active can help you get to and stay at a healthy weight. Try to exercise on most days of the week. Walking is a good choice. · Eat well. Choose a variety of vegetables, fruits, legumes (such as peas and beans), fish, poultry, and whole grains. · Do not smoke. If you need help quitting, talk to your doctor about stop-smoking programs and medicines. These can increase your chances of quitting for good. · If you drink alcohol, limit how much you drink. Limit alcohol to 2 drinks a day for men and 1 drink a day for women. When should you call for help? Call your doctor now or seek immediate medical care if: 
? · You have severe belly pain. ? · Your stools are maroon or very bloody. ? Watch closely for changes in your health, and be sure to contact your doctor if: 
? · You have a fever. ? · You have nausea or vomiting. ? · You have a change in bowel habits (new constipation or diarrhea). ? · Your symptoms get worse or are not improving as expected. Where can you learn more? Go to http://stanton-gonsalo.info/. Enter 95 [id number] in the search box to learn more about \"Colon Polyps: Care Instructions. \" Current as of: May 12, 2017 Content Version: 11.4 © 1461-0682 Any[employer]Times. Care instructions adapted under license by [employer] (which disclaims liability or warranty for this information). If you have questions about a medical condition or this instruction, always ask your healthcare professional. [address] any warranty or liability for your use of this information. DISCHARGE SUMMARY from Nurse PATIENT INSTRUCTIONS: 
 
 
F-face looks uneven A-arms unable to move or move unevenly S-speech slurred or non-existent T-time-call 911 as soon as signs and symptoms begin-DO NOT go Back to bed or wait to see if you get better-TIME IS BRAIN. Warning Signs of HEART ATTACK Call 911 if you have these symptoms: 
? Chest discomfort. Most heart attacks involve discomfort in the center of the chest that lasts more than a few minutes, or that goes away and comes back. It can feel like uncomfortable pressure, squeezing, fullness, or pain. ? Discomfort in other areas of the upper body. Symptoms can include pain or discomfort in one or both arms, the back, neck, jaw, or stomach. ? Shortness of breath with or without chest discomfort. ? Other signs may include breaking out in a cold sweat, nausea, or lightheadedness. Don't wait more than five minutes to call [address]! Fast action can save your life. Calling 911 is almost always the fastest way to get lifesaving treatment. Emergency Medical Services staff can begin treatment when they arrive  up to an hour sooner than if someone gets to the hospital by car. The discharge information has been reviewed with the patient and spouse. The patient and spouse verbalized understanding. Discharge medications reviewed with the patient and spouse and appropriate educational materials and side effects teaching were provided. ___________________________________________________________________________________________________________________________________ Introducing [facility]! Dear Rene Salcedo: 
Thank you for requesting a [employer] account. Our records indicate that you already have an active [employer] account. You can access your account anytime at [email or web address] Did you know that you can access your hospital and ER discharge instructions at any time in [employer]? You can also review all of your test results from your hospital stay or ER visit. Additional Information If you have questions, please visit the Frequently Asked Questions section of the [employer] website at [email or web address] Remember, [employer] is NOT to be used for urgent needs. For medical emergencies, dial 911. Now available from your iPhone and Android! Introducing Ru Luna As a [employer] patient, I wanted to make you aware of our electronic visit tool called Ru Luna. [employer] 24/7 allows you to connect within minutes with a medical provider 24 hours a day, seven days a week via a mobile device or tablet or logging into a secure website from your computer. You can access Ru Luna from anywhere in the [place]. A virtual visit might be right for you when you have a simple condition and feel like you just dont want to get out of bed, or cant get away from work for an appointment, when your regular [employer] provider is not available (evenings, weekends or holidays), or when youre out of town and need minor care. Electronic visits cost only $49 and if the [employer] 24/7 provider determines a prescription is needed to treat your condition, one can be electronically transmitted to a nearby pharmacy*. Please take a moment to enroll today if you have not already done so. The enrollment process is free and takes just a few minutes. To enroll, please download the [employer] 24/7 cathy to your tablet or phone, or visit [email or web address] to enroll on your computer. And, as an [address] [place] [address] patient with a [employer] account, the results of your visits will be scanned into your electronic medical record and your primary care provider will be able to view the scanned results. We urge you to continue to see your regular [employer] provider for your ongoing medical care. And while your primary care provider may not be the one available when you seek a [employer] virtual visit, the peace of mind you get from getting a real diagnosis real time can be priceless. For more information on [employer]ision[employer]on[employer], view our Frequently Asked Questions (FAQs) at [email or web address]. Sincerely, 
 
Temitope Christianson MD 
Chief Medical Officer [facility] Brianna Price *:  certain medications cannot be prescribed via [employer]ision[employer]on[employer] Providers Seen During Your Hospitalization Provider Specialty Primary office phone Dennis Sal MD Gastroenterology [phone] Your Primary Care Physician (PCP) Primary Care Physician Office Phone Office Fax Abimael Kapadia [phone] You are allergic to the following No active allergies Recent Documentation Height Weight Breastfeeding? BMI OB Status Smoking Status 1.676 m 85 kg No 30.25 kg/m2 Hysterectomy Never Smoker Emergency Contacts Name Discharge Info Relation Home Work Mobile Vosler,Dennis DISCHARGE CAREGIVER [3] Spouse [3] [phone] Patient Belongings The following personal items are in your possession at time of discharge: 
  Dental Appliances: None  Visual Aid: None Please provide this summary of care documentation to your next provider. Signatures-by signing, you are acknowledging that this After Visit Summary has been reviewed with you and you have received a copy. Patient Signature:  ____________________________________________________________ Date:  ____________________________________________________________  
  
Gwendlo Finger Provider Signature:  ____________________________________________________________ Date:  ____________________________________________________________

## 2018-06-05 NOTE — PROCEDURES
[place], [address]      Brief Procedure Note    Geni Pugh  1955  [id number]    Date of Procedure: 6/5/2018    Preoperative diagnosis: Colon cancer screening [Z12.11]  Family history malignant neoplasm of biliary tract [Z80.0]  Atrial fibrillation, unspecified type ([address] [I48.91]    Postoperative diagnosis:  ascending polyp with clip,     Type of Anesthesia: MAC (monitered anesthesia care)    Description of Findings: same as post op dx    Procedure: Procedure(s):  COLONOSCOPY    :  Dr. Derian Akins MD    Assistant(s): @[facility]@    Type of Anesthesia:MAC     EBL:None    Specimens:   ID Type Source Tests Collected by Time Destination   1 : ascending polyp Preservative Colon  Derian Akins MD 6/5/2018 7926 Pathology       Findings: See printed and scanned procedure note    Complications: None    Dr. Derian Akins MD  6/5/2018  9:20 AM

## 2018-06-05 NOTE — ANESTHESIA POSTPROCEDURE EVALUATION
Post-Anesthesia Evaluation and Assessment    Patient: Sherrill Saravia MRN: [id number]  SSN: [id number]    YOB: 1955  Age: 59 y.o. Sex: female       Cardiovascular Function/Vital Signs  Visit Vitals    /75    Pulse 95    Temp 36.8 °C (98.3 °F)    Resp 17    Ht 5' 6\" (1.676 m)    Wt 85 kg (187 lb 6.4 oz)    SpO2 97%    Breastfeeding No    BMI 30.25 kg/m2       Patient is status post MAC anesthesia for Procedure(s):  COLONOSCOPY. Nausea/Vomiting: None    Postoperative hydration reviewed and adequate. Pain:  Pain Scale 1: Numeric (0 - 10) (06/05/18 1007)  Pain Intensity 1: 0 (06/05/18 1007)   Managed    Neurological Status: At baseline    Mental Status and Level of Consciousness: Arousable    Pulmonary Status:   O2 Device: Room air (Simultaneous filing. User may not have seen previous data.) (06/05/18 0919)   Adequate oxygenation and airway patent    Complications related to anesthesia: None    Post-anesthesia assessment completed.  No concerns    Signed By: Lindsay Garcia MD     June 5, 2018

## 2018-06-06 ENCOUNTER — TELEPHONE (OUTPATIENT)
Dept: INTERNAL MEDICINE CLINIC | Age: 63
End: 2018-06-06

## 2018-06-06 NOTE — TELEPHONE ENCOUNTER
Chief Complaint   Patient presents with    Labs     patient has questions about her 's Lab Amna Mc not her [facility] [facility]     Patient reached and stated she was calling on behalf of her  who is on the Weight Loss Program too, and the 2 bills they have received for Labs drawn within the [facility] are for him. Patient was informed I could not discuss anything with her regarding another patient, but if she had questions regarding her labs I would be glad to help her. She stated she has not had any problem with receiving a Lab Bill for any lab work she has had done within [place], and that she has different insurance than her 's, that it was her  who is getting billed, and she understands I can not discuss other patient care with her. She also understands speaking directly with [employer], and the [employer] where she is receiving bills from are the best place to begin in order to understand what part of responsibility you may or may not have regarding labs or testing that is needing to be done. All understood.

## 2018-06-06 NOTE — TELEPHONE ENCOUNTER
Called pt and asked her if she contacted insurance company to see why it was not covered and she said no. Said she was told that we would write off any bill that insurance left for labs. Suzanne Vences is aware of this. Please advise patient.

## 2018-06-06 NOTE — TELEPHONE ENCOUNTER
Pt called and said she has received 2 bills for her lab work done for weight loss and she was under the impression there would be no bill. Please call her at [phone].

## 2018-06-07 ENCOUNTER — OFFICE VISIT (OUTPATIENT)
Dept: CARDIOLOGY CLINIC | Age: 63
End: 2018-06-07

## 2018-06-07 VITALS
SYSTOLIC BLOOD PRESSURE: 110 MMHG | HEIGHT: 66 IN | WEIGHT: 193 LBS | OXYGEN SATURATION: 97 % | DIASTOLIC BLOOD PRESSURE: 74 MMHG | BODY MASS INDEX: 31.02 KG/M2 | HEART RATE: 124 BPM

## 2018-06-07 DIAGNOSIS — I48.0 PAROXYSMAL ATRIAL FIBRILLATION (HCC): Primary | ICD-10-CM

## 2018-06-07 DIAGNOSIS — E78.2 MIXED HYPERLIPIDEMIA: ICD-10-CM

## 2018-06-07 DIAGNOSIS — I10 ESSENTIAL HYPERTENSION: ICD-10-CM

## 2018-06-07 NOTE — MR AVS SNAPSHOT
[address] [place] [address] 
[phone] Patient: Parvez Barba MRN: [id number] CNP:[id number] Visit Information Date & Time Provider Department Dept. Phone Encounter #  
 6/7/2018  9:00 AM Don Berger MD [facility] [id number] Your Appointments 6/13/2018  4:00 PM  
Follow Up with Jack Lemus PA-C  
[facility] [place] [address] [place] [address]) Appt Note: 2 WK FU RT TKA; 2 WK FU RT TKA  
 [address] [place], [address]  [address] [facility]  
[phone]  
  
   
 [address] [place], [address] [facility] [address]  
  
    
 6/27/2018  9:02 AM  
METABOLIC PROGRAM 15 with Renuka Pineda NP [facility] [address] [place] [address] ([facility]) Appt Note: 6 week follow up  
 [address] [facility], [address] [place] [address] [place]  
  
   
 [address], [address]  
  
    
 12/3/2018  8:00 AM  
Office Visit with Sheila Williamson MD  
[facility] [address] [place] [address] Appt Note: 6 month follow up  
 [address], [address] [place] [address] [place]  
  
   
 [address], [address] Upcoming Health Maintenance Date Due ZOSTER VACCINE AGE 60> 3/5/2016 BREAST CANCER SCRN MAMMOGRAM 7/14/2018 Influenza Age 5 to Adult 8/1/2018 PAP AKA CERVICAL CYTOLOGY 7/28/2019 COLONOSCOPY 6/5/2021 DTaP/Tdap/Td series (2 - Td) 2/7/2027 Allergies as of 6/7/2018  Review Complete On: 6/5/2018 By: Jose Vale RN No Known Allergies Current Immunizations  Reviewed on 2/7/2017 Name Date Influenza Vaccine 10/12/2017 Influenza Vaccine (Quad) PF 10/10/2016 Tdap 2/7/2017 Not reviewed this visit You Were Diagnosed With   
  
 Codes Comments Paroxysmal atrial fibrillation (HCC)    -  Primary ICD-10-CM: I48.0 ICD-9-CM: 427.31 Essential hypertension     ICD-10-CM: I10 
ICD-9-CM: 401.9 Mixed hyperlipidemia     ICD-10-CM: E78.2 ICD-9-CM: 272.2 Vitals BP Pulse Height(growth percentile) Weight(growth percentile) SpO2 BMI  
 110/74 (!) 124 5' 6\" (1.676 m) 193 lb (87.5 kg) 97% 31.15 kg/m2 OB Status Smoking Status Hysterectomy Never Smoker Vitals History BMI and BSA Data Body Mass Index Body Surface Area  
 31.15 kg/m 2 2.02 m 2 Preferred Pharmacy Pharm[facility] Name Phone [facility] [address] [place] [address] [phone] Your Updated Medication List  
  
   
This list is accurate as of 6/7/18  9:25 AM.  Always use your most recent med list.  
  
  
  
  
 aspirin 81 mg chewable tablet Take 81 mg by mouth daily. calcium-cholecalciferol (d3) 600-125 mg-unit Tab Take 1,200 mg by mouth daily. Indications: Vitamin D Deficiency  
  
 meloxicam 15 mg tablet Commonly known as:  MOBIC Take 15 mg by mouth daily. metoprolol tartrate 50 mg tablet Commonly known as:  LOPRESSOR Take 1 Tab by mouth two (2) times a day. omega-3 fatty acids-vitamin e 1,000 mg Cap Take 1 Cap by mouth two (2) times a day. Indications: supplement  
  
 oxyCODONE IR 5 mg immediate release tablet Commonly known as:  Tallula Au Take 1-2 Tabs by mouth every four (4) hours as needed. Max Daily Amount: 60 mg. Indications: Pain  
  
 polyethylene glycol 17 gram packet Commonly known as:  Ricarda Mule Take 1 Packet by mouth daily. potassium 99 mg tablet Take 99 mg by mouth daily as needed (supplement). simvastatin 20 mg tablet Commonly known as:  ZOCOR Take 1 Tab by mouth nightly. TYLENOL ARTHRITIS PAIN 650 mg Linnea Rizzo Generic drug:  acetaminophen Take 650 mg by mouth every six (6) hours as needed for Pain. We Performed the Following AMB POC EKG ROUTINE W/ 12 LEADS, INTER & REP [35558 CPT(R)] To-Do List   
 06/08/2018 3:00 PM  
  Appointment with Jeanne Marcelo, PT at [facility]  [facility] ([phone]) Patient Instructions Stop Asipirn Start Xaerlto 20 mg once daily. Introducing [facility]! Dear Chriss Real: 
Thank you for requesting a Ntractive account. Our records indicate that you already have an active Ntractive account. You can access your account anytime at [email or web address]. [email or web address] Did you know that you can access your hospital and ER discharge instructions at any time in Ntractive? You can also review all of your test results from your hospital stay or ER visit. Additional Information If you have questions, please visit the Frequently Asked Questions section of the Ntractive website at [email or web address] Remember, Ntractive is NOT to be used for urgent needs. For medical emergencies, dial 911. Now available from your iPhone and Android! Please provide this summary of care documentation to your next provider. Your primary care clinician is listed as Michael Bear. If you have any questions after today's visit, please call [phone].

## 2018-06-07 NOTE — PROGRESS NOTES
1. Have you been to the ER, urgent care clinic since your last visit? Hospitalized since your last visit? No     2. Have you seen or consulted any other health care providers outside of the [facility] since your last visit? Include any pap smears or colon screening.  No

## 2018-06-07 NOTE — PROGRESS NOTES
HISTORY OF PRESENT ILLNESS  Kierra Robbins is a 59 y.o. female. Irregular Heart Beat    Pertinent negatives include no fever, no chest pain, no claudication, no orthopnea, no PND, no abdominal pain, no nausea, no vomiting, no headaches, no dizziness, no cough and no shortness of breath. Patient presents for a follow-up office visit. She does have a history of hypertension and obesity who recently started a nonsurgical weight loss program and lost approximately 60 pounds over the past 4 months. She has been treated for what sounds like dependent edema in the past with Lasix which she continues to take. Patient was diagnosed with atrial fibrillation with a rapid ventricular response by her PCP in January 2018. She was started on metoprolol for rate control. I started her on Xarelto for anticoagulation at last visit. She was then scheduled for a IGLESIA and cardioversion, however, when she showed up for her procedure, she had already converted back to sinus rhythm. Her anticoagulation was stopped at that time. She underwent an echocardiogram which showed low normal LV systolic function, EF 20% with no valvular heart disease and a normal left atrial size. She has remained on metoprolol for rate control. The patient was last seen in the office 3 months ago. Since last visit she underwent a right total knee replacement approximately 2 weeks ago. She had no cardiac issues perioperatively. She then underwent a colonoscopy just 2 days ago and started having palpitations post procedure from the colonoscopy. She states she has been taking her metoprolol regularly. She does notice some fatigue especially when ambulating over the past 2 days. No prolonged palpitations, dizziness or syncope. No chest pain or shortness of breath. She does have some right lower extremity swelling from her knee replacement.     Past Medical History:   Diagnosis Date    Arthritis     Bilateral Knee, and Bilateral Hand/Thumb  Atrial fibrillation ([facility] [address] 1/29/2018    Carpal tunnel syndrome     H/O echocardiogram 01/30/2018    EF 50%, normal left atrial size, no valvular heart disease    Headache     History of ankle fusion 1997    left    Hx of migraine headaches     Hypercholesterolemia     Hypertension     Morbid obesity ([facility] [address]     HERZOG (nonalcoholic steatohepatitis) suggested by ultrasound report, 2016 8/11/2017    Vertigo      Current Outpatient Prescriptions   Medication Sig Dispense Refill    rivaroxaban (XARELTO) 20 mg tab tablet Take 1 Tab by mouth daily (with breakfast). 30 Tab 6    oxyCODONE IR (ROXICODONE) 5 mg immediate release tablet Take 1-2 Tabs by mouth every four (4) hours as needed. Max Daily Amount: 60 mg. Indications: Pain 28 Tab 0    meloxicam (MOBIC) 15 mg tablet Take 15 mg by mouth daily.  polyethylene glycol (MIRALAX) 17 gram packet Take 1 Packet by mouth daily. (Patient taking differently: Take 1 Packet by mouth daily. Indications: constipation) 30 Packet 1    metoprolol tartrate (LOPRESSOR) 50 mg tablet Take 1 Tab by mouth two (2) times a day. (Patient taking differently: Take 1 Tab by mouth two (2) times a day. Indications: hypertension) 180 Tab 3    potassium 99 mg tablet Take 99 mg by mouth daily as needed (supplement).  acetaminophen (TYLENOL ARTHRITIS PAIN) 650 mg CR tablet Take 650 mg by mouth every six (6) hours as needed for Pain.  simvastatin (ZOCOR) 20 mg tablet Take 1 Tab by mouth nightly. (Patient taking differently: Take 1 Tab by mouth nightly. Indications: hypercholesterolemia) 90 Tab 3    omega-3 fatty acids-vitamin e 1,000 mg cap Take 1 Cap by mouth two (2) times a day. Indications: supplement      calcium-cholecalciferol, d3, 600-125 mg-unit tab Take 1,200 mg by mouth daily.  Indications: Vitamin D Deficiency       No Known Allergies     Social History   Substance Use Topics    Smoking status: Never Smoker    Smokeless tobacco: Never Used    Alcohol use No Review of Systems   Constitutional: Negative for chills, fever and weight loss. HENT: Negative for nosebleeds. Eyes: Negative for blurred vision and double vision. Respiratory: Negative for cough, shortness of breath and wheezing. Cardiovascular: Positive for palpitations. Negative for chest pain, orthopnea, claudication, leg swelling and PND. Gastrointestinal: Negative for abdominal pain, heartburn, nausea and vomiting. Genitourinary: Negative for dysuria and hematuria. Musculoskeletal: Negative for falls and myalgias. Skin: Negative for rash. Neurological: Negative for dizziness, focal weakness and headaches. Endo/Heme/Allergies: Does not bruise/bleed easily. Psychiatric/Behavioral: Negative for substance abuse. Visit Vitals    /74    Pulse (!) 124    Ht 5' 6\" (1.676 m)    Wt 87.5 kg (193 lb)    SpO2 97%    BMI 31.15 kg/m2       Physical Exam   Constitutional: She is oriented to person, place, and time. She appears well-developed and well-nourished. HENT:   Head: Normocephalic and atraumatic. Eyes: Conjunctivae are normal.   Neck: Neck supple. No JVD present. Carotid bruit is not present. Cardiovascular: S1 normal, S2 normal and normal pulses. An irregularly irregular rhythm present. Tachycardia present. Exam reveals no gallop and no distant heart sounds. No murmur heard. Pulmonary/Chest: Effort normal and breath sounds normal. She has no wheezes. She has no rales. Abdominal: Soft. Bowel sounds are normal. There is no tenderness. Musculoskeletal: She exhibits no edema, tenderness or deformity. Neurological: She is alert and oriented to person, place, and time. Skin: Skin is warm and dry. Psychiatric: She has a normal mood and affect. Her behavior is normal. Thought content normal.     EKG: Atrial fibrillation, increased heart rate, normal axis, low voltage, no ST or T-wave abnormalities. Otherwise normal EKG.   Compared to the previous EKG, atrial fibrillation has replaced sinus rhythm. ASSESSMENT and PLAN    Paroxysmal atrial fibrillation. Initially diagnosed in January 2018. Patient converted on her own prior to her scheduled cardioversion. She was briefly on Xarelto for anticoagulation, but that was since discontinued since she had a structurally normal heart on echocardiogram.  Her CHADs-Vasc score is 2. The patient is back in atrial fibrillation today and likely has been in atrial for ablation for the past 2 days. Her rates are slightly increased, but I would continue her current dosage of metoprolol. I recommended that she resume Xarelto for oral anticoagulation and should likely remain on this now indefinitely. She can stop her aspirin when she stops the Xarelto. Hopefully she will convert to sinus rhythm on her own within the coming week. Essential hypertension. Patient blood pressure is normal on her current dose of metoprolol, which I would continue. Dyslipidemia. Patient is been managed with simvastatin. This followed by her PCP.     Follow-up in 3 months, sooner as needed

## 2018-06-13 ENCOUNTER — OFFICE VISIT (OUTPATIENT)
Dept: ORTHOPEDIC SURGERY | Facility: CLINIC | Age: 63
End: 2018-06-13

## 2018-06-13 ENCOUNTER — HOSPITAL ENCOUNTER (OUTPATIENT)
Dept: PHYSICAL THERAPY | Age: 63
Discharge: HOME | End: 2018-06-13
Payer: COMMERCIAL

## 2018-06-13 VITALS
WEIGHT: 191.6 LBS | HEART RATE: 64 BPM | TEMPERATURE: 98.6 F | SYSTOLIC BLOOD PRESSURE: 120 MMHG | BODY MASS INDEX: 30.79 KG/M2 | HEIGHT: 66 IN | OXYGEN SATURATION: 99 % | DIASTOLIC BLOOD PRESSURE: 71 MMHG | RESPIRATION RATE: 18 BRPM

## 2018-06-13 DIAGNOSIS — Z96.651 STATUS POST RIGHT KNEE REPLACEMENT: ICD-10-CM

## 2018-06-13 DIAGNOSIS — Z96.659 AFTERCARE FOLLOWING KNEE JOINT REPLACEMENT SURGERY, UNSPECIFIED LATERALITY: ICD-10-CM

## 2018-06-13 DIAGNOSIS — G89.18 POST-OPERATIVE PAIN: Primary | ICD-10-CM

## 2018-06-13 DIAGNOSIS — Z47.1 AFTERCARE FOLLOWING KNEE JOINT REPLACEMENT SURGERY, UNSPECIFIED LATERALITY: ICD-10-CM

## 2018-06-13 PROCEDURE — 97161 PT EVAL LOW COMPLEX 20 MIN: CPT | Performed by: PHYSICAL THERAPIST

## 2018-06-13 PROCEDURE — 97110 THERAPEUTIC EXERCISES: CPT | Performed by: PHYSICAL THERAPIST

## 2018-06-13 RX ORDER — OXYCODONE HYDROCHLORIDE 5 MG/1
5-10 TABLET ORAL
Qty: 28 TAB | Refills: 0 | Status: SHIPPED | OUTPATIENT
Start: 2018-06-13 | End: 2018-08-15 | Stop reason: ALTCHOICE

## 2018-06-13 NOTE — PROGRESS NOTES
[facility] [place] [address]  [address] [facility] [address], [address] [phone] fax    Plan of Care/ Statement of Necessity for Physical Therapy Services           Patient name: Sneha Ramos Start of Care: 2018   Referral source: Dc Hart MD : 1955    Medical Diagnosis: Pain in right knee [M25.561]  Unilateral primary osteoarthritis, right knee [M17.11]   Onset Date:chronic; DOS 18    Treatment Diagnosis: s/p R TKR   Prior Hospitalization: see medical history Provider#: [id number]   Medications: Verified on Patient summary List    Comorbidities: arthritis, high BP   Prior Level of Function: Pt reports prior [place] with all tasks. She was previously working full time at the [employer] as an . Pt lives at home with her  and mother. She has two stairs to enter her house. The Plan of Care and following information is based on the information from the initial evaluation. Assessment/ key information: Pt is a 58 y/o female who presents today s/p R TKR. She currently ambulates with a SPC and slight antalgic pattern. She notes reduced ROM and strength with MMT. There is more swelling today, pt feels, because she was active yesterday. Her ROM is slightly more limited today than at her last MD follow up. PT does take girth measurements. Education provided regarding use of ice and not heat, and importance of scar massage. Pt would benefit from skilled PT intervention to address the above limitations and return to her PLOF.   Evaluation Complexity History MEDIUM  Complexity : 1-2 comorbidities / personal factors will impact the outcome/ POC ; Examination MEDIUM Complexity : 3 Standardized tests and measures addressing body structure, function, activity limitation and / or participation in recreation  ;Presentation LOW Complexity : Stable, uncomplicated  ;Clinical Decision Making MEDIUM Complexity : cc:

CAMDEN FLOWERS MD

****

 

 

DATE OF CONSULTATION

11/27/2017

 

REASON FOR CONSULTATION

( Bilateral lower extremity weakness, falls. MRI with demyelinization ).

 

HISTORY OF PRESENT ILLNESS

Ms. Stout is a 57-year-old  female with past medical history of

anxiety, depression, renal cell carcinoma status post nephrectomy, history of

gastrectomy, on TPA and narcotic dependence and history of stroke with mild

left-sided lower extremity weakness presented to the [facility]

because of generalized weakness and recurrent falls.  She was initially seen in

the [facility] Emergency Room on 11/25/2017. The patient

persistently requested IV narcotics and she left against medical advice.

Returned the next day and she states that she does not recall leaving AMA.  She

was there for the same complaint.  The patient states that she has fallen

several times because of occasional feeling lightheaded and sometimes her legs

give out on her.  She denies headache, double vision, blurred vision, slurred

speech, disorientation, speech difficulty, convulsions, epilepsy or loss of

consciousness.  She has a history of remote stroke that she states that she is

only left with mild residual left lower extremity weakness mainly affecting the

left foot.

 

REVIEW OF SYSTEMS

Next a 12-point review of systems is negative except for what is stated in the

HPI.

 

PAST MEDICAL HISTORY

1. Anxiety, depression.

2. Renal cell carcinoma.

3. Narcotic dependence.

4. History of stroke with residual mild left-sided lower extremity weakness.

 

PAST SURGICAL HISTORY

1. Total gastrectomy.

2. Hernia repair.

3. Appendectomy.

4. Cardiac surgery.

5. Cholecystectomy.

6. Right nephrectomy.

7. Tonsillectomy.

 

ALLERGIES

SULFA, GADOBENIC ACID, GADOTERIDOL, GADODIAMIDE, KETOROLAC, METOCLOPRAMIDE,

MORPHINE, ONDANSETRON, PENICILLIN-G, PROCHLORPERAZINE, PROMETHAZINE, SHELLFISH.

TRIMETHOBENZAMIDE.

 

FAMILY HISTORY

Noncontributory.

 

SOCIAL HISTORY

Negative for alcohol, tobacco or drug abuse.

 

MEDICATIONS

Reviewed, as per medical records.

 

PHYSICAL EXAMINATION

GENERAL:  Lays in bed, anxious, chronically ill appearing, very thin.

Cachectic.

HEENT:  Atraumatic, normocephalic.  Intact hearing.  Intact vision.

CARDIOVASCULAR:  Regular rate and rhythm.

RESPIRATORY: Clear to auscultation.  No wheezes.

GASTROINTESTINAL: Soft abdomen, nontender.

EXTREMITIES: Mild deformities.  No cyanosis.

NEUROLOGIC:  Awake, alert, oriented to time, person and place.  No dysarthria. 
No dysphagia.  Intact external ocular motility.  No nystagmus.  No diplopia. 
Intact facial sensation.  No facial asymmetry.  Muscle strength, there is a lot 
of give-way due to lack of cooperation and pain but generally nonfocal. 
Generalized muscle wasting without fasciculation upper extremity bilateral 
grossly 5/5 with a lot of give-way due to pain and lack of cooperation.  No 
abnormal movements.  Lower extremity, left lower extremity grade 5-, left hip 
flexion grade 4-, left foot dorsiflexion grade 5-, left knee extension, right 
lower extremity grade 5-, right hip flexion.  Otherwise grade 5/5 with give-way

reflexes. 1+ bilateral symmetrical.  Plantar bilaterally downgoing.  Sensation 
is intact throughout the intact finger-to-nose test.

 

LABORATORY DATA

Diagnostic tests, labs, sodium 142, potassium 3.4, BUN 10, creatinine 0.91. 
Normal LFTs.

Albumin 3.2. TSH very low at 0.084. White blood cells 3, hemoglobin 9.7, 
platelets 133.

 

IMAGING STUDIES

Diagnostic imaging, review of the diagnostic imaging revealed:

-Cervical spine MRI without contrast. A C5-6 level mild-to-moderate AP canal 
stenosis with mild flattening of the cord. Less severe degenerative changes in 
the remainder of the cervical spine.  No cord signal abnormality.  Alignment is 
within normal limits.

 

- Lumbar spine and thoracic spine MRI were reported with no acute findings and 
no

canal stenosis, mild degenerative disk disease with no significant cord 
compression.

 

- Brain MRI without contrast revealed no acute findings, minimal white matter 
changes.  No recent infarct or hemorrhage.

 

DIAGNOSTIC IMPRESSION

1. History of remote stroke with residual mild left lower extremity weakness 
and foot drop.

2. Generalized weakness, likely due to malnutrition.

3. Narcotic dependence and polypharmacy.

4. Multiple falls

Likely due to generalized weakness, malnutrition, polypharmacy.

5. Very low TSH.

6. History of nephrectomy status post renal cancer.

7. History of total gastrectomy.

8. Anemia.

 

PLAN

1. Examination is nonfocal and the abnormal physical findings are chronic with 
neurological investigation that did not reveal any acute central nervous system 
abnormality or pathology.

2. I reviewed the MRI and the white matter changes are essentially due to 
nonspecific white matter ischemic changes.

3. The patient needs supportive medical therapy.

4. Consult nutrition specialist.

5. Fall precautions.

6. Inpatient rehab.

7. Education about narcotics abuse.

8. DVT prophylaxis.

9. The patient will need follow-up as an outpatient.

10. No need for further neurologic workup.

11. Please call for questions.

 

Thank you for the opportunity to participate in the care of your patient.

 

 

 

                              _________________________________

                              Thaddeusid MD MICHAEL Jean/DHARA

D:  11/27/2017/7:49 PM

T:  11/27/2017/8:11 PM

Visit #:  [id number]

Job #:  [id number]

MTDD FOTO score of 26-74  Overall Complexity Rating: LOW   Problem List: pain affecting function, decrease ROM, decrease strength, edema affecting function, impaired gait/ balance, decrease ADL/ functional abilitiies, decrease activity tolerance, decrease flexibility/ joint mobility and decrease transfer abilities   Treatment Plan may include any combination of the following: Therapeutic exercise, Therapeutic activities, Neuromuscular re-education, Physical agent/modality, Gait/balance training, Manual therapy, Patient education, Self Care training, Functional mobility training, Home safety training and Stair training  Patient / Family readiness to learn indicated by: asking questions, trying to perform skills and interest  Persons(s) to be included in education: patient (P)  Barriers to Learning/Limitations: None  Patient Goal (s): be more flexible in R knee  Patient Self Reported Health Status: good  Rehabilitation Potential: good  Short Term Goals: To be accomplished in 2 weeks:  1. Pt to report [place] with HEP. Eval status: HEP issued and reviewed physically/verbally with pt    Long Term Goals: To be accomplished in 4 weeks. 1.   Pt to report score of 63 on FOTO, indicating improved tolerance to activity and reduced pain. Eval status: 44 on initial FOTO  2. Pt to report max level of pain <3/10 with activity, indicating reduced pain and improved mobility. Eval status: max level of pain 10/10  3. Pt to demonstrate full R knee AROM/PROM from 0 degrees extension to 125 degrees flexion to improve ambulation and reduce fall risk. Eval status: R knee AROM lacking 6 degrees from full extension, 88 degrees flexion  4. Pt to demonstrate full 5/5 R knee strength with MMT, indicating improved tolerance to activity and reduced fall risk. Eval status: 4+/5 hip flexion, 4+/5 knee flexion, 4+/5 knee extension, 5/5 DF  5.   Pt  to be able to ambulate without gait deviations with no AD to improve mobility and reduce fall risk. Eval status: pt currently ambulates with SPC and slight antalgic pattern  6. Pt to be able to return to full PLOF at home and in the community, including her job at [employer], with improved mobility, strength and stability, and reduced fall risk. Eval status: limited  7. Pt to be able to ascend/descend 12 stairs with reciprocal gait pattern, indicating improved mobility in the home and community and reduced fall risk. Eval status: pt unable to use reciprocal pattern  Frequency / Duration: Patient to be seen 2 times per week for 12 treatments. Patient/ Caregiver education and instruction: Diagnosis, prognosis, self care, activity modification and exercises   [x]  Plan of care has been reviewed with HUMZA Flroes PT 6/13/2018 3:49 PM    ________________________________________________________________________    I certify that the above Therapy Services are being furnished while the patient is under my care. I agree with the treatment plan and certify that this therapy is necessary.     Physician's Signature:____________________  Date:____________Time: _________    Please sign and return to [facility] [address]  [address] [place], [address] [phone] fax

## 2018-06-13 NOTE — PROGRESS NOTES
PT DAILY TREATMENT NOTE     Patient Name: Teena Lux  Date:2018  : 1955  [x]  Patient  Verified  Payor: BLUE ANNA / Plan: Dalton Naranjo [id number] PPO / Product Type: PPO /    In time:233  Out time:320  Total Treatment Time (min): 52  Visit #: 1 of 12    Treatment Area: Pain in right knee [M25.561]  Unilateral primary osteoarthritis, right knee [M17.11]    SUBJECTIVE  Pain Level (0-10 scale): 7  Any medication changes, allergies to medications, adverse drug reactions, diagnosis change, or new procedure performed?: [x] No    [] Yes (see summary sheet for update)  Subjective functional status/changes:   [] No changes reported  See eval.    OBJECTIVE    10 min [x]Eval                  []Re-Eval       37 min Therapeutic Exercise:  [] See flow sheet :   Rationale: increase ROM, increase strength, improve coordination, improve balance and increase proprioception to improve the patients ability to tolerate daily tasks with reduced pain. With   [] TE   [] TA   [] neuro   [] other: Patient Education: [x] Review HEP    [] Progressed/Changed HEP based on:   [] positioning   [] body mechanics   [] transfers   [] heat/ice application    [] other:      Other Objective/Functional Measures: See eval.     Pain Level (0-10 scale) post treatment: 4    ASSESSMENT/Changes in Function:   [x]  See Plan of Care    Progress towards goals / Updated goals:  Short Term Goals: To be accomplished in 2 weeks:  1. Pt to report [place] with HEP. Eval status: HEP issued and reviewed physically/verbally with pt    Long Term Goals: To be accomplished in 4 weeks. 1.   Pt to report score of 63 on FOTO, indicating improved tolerance to activity and reduced pain. Eval status: 44 on initial FOTO  2. Pt to report max level of pain <3/10 with activity, indicating reduced pain and improved mobility. Eval status: max level of pain 10/10  3.   Pt to demonstrate full R knee AROM/PROM from 0 degrees extension to 125 degrees flexion to improve ambulation and reduce fall risk. Eval status: R knee AROM lacking 6 degrees from full extension, 88 degrees flexion  4. Pt to demonstrate full 5/5 R knee strength with MMT, indicating improved tolerance to activity and reduced fall risk. Eval status: 4+/5 hip flexion, 4+/5 knee flexion, 4+/5 knee extension, 5/5 DF  5. Pt  to be able to ambulate without gait deviations with no AD to improve mobility and reduce fall risk. Eval status: pt currently ambulates with SPC and slight antalgic pattern  6. Pt to be able to return to full PLOF at home and in the community, including her job at [employer], with improved mobility, strength and stability, and reduced fall risk. Eval status: limited  7. Pt to be able to ascend/descend 12 stairs with reciprocal gait pattern, indicating improved mobility in the home and community and reduced fall risk.    Eval status: pt unable to use reciprocal pattern    PLAN  [x]  Upgrade activities as tolerated     [x]  Continue plan of care  []  Update interventions per flow sheet       []  Discharge due to:_  []  Other:_      Shan Peralta PT 6/13/2018  2:34 PM    Future Appointments  Date Time Provider Katharine Cerna   6/13/2018 3:00 PM [facility]   6/13/2018 4:00 PM Tari Villatoro PA-C [facility]   6/27/2018 8:45 AM Yifan Colin NP [facility]   9/10/2018 3:00 PM Ty Dakins, MD [address] [place] [address]   12/3/2018 8:00 AM Nupur Hernandez MD [facility]

## 2018-06-13 NOTE — PROGRESS NOTES
HISTORY OF PRESENT ILLNESS:  Geni Pugh returns. She is now three weeks status post her right primary total knee replacement. She is doing very well today. She has started outpatient physical therapy. She is full weightbearing on her right lower extremity. She has minimal pain currently at rest and does have some with activity to include extended standing and walking. She requested a pain medication refill today. PHYSICAL EXAM:  Over the inferior pole of her surgical site, she has protruding suture measuring 2 millimeters in length. The area was cleaned with Betadine and using a needle soto, a suture remnant was removed roughly 1 cm in length. A sterile Band-Aid was placed. RADIOGRAPHS:  X-rays reveal, AP and lateral of the right knee, a well-seated total joint replacement with anatomical alignment. No evidence of periprosthetic fracture or dislocation is noted. PLAN:   The patient will follow with our office in about three weeks. She is going to avoid driving until that time. We are going to refill her medicines today, and she will continue outpatient physical therapy services. X-rays today were reviewed, and all questions were answered.

## 2018-06-14 ENCOUNTER — TELEPHONE (OUTPATIENT)
Dept: ORTHOPEDIC SURGERY | Facility: CLINIC | Age: 63
End: 2018-06-14

## 2018-06-14 NOTE — TELEPHONE ENCOUNTER
Called pharmacy melia [phone]--[facility] meds approved--no further action needed--I spoke with pt--informed her RX is ready at pharmacy

## 2018-06-14 NOTE — TELEPHONE ENCOUNTER
Pt calling states that her rx oxyCODONE IR (ROXICODONE) 5 mg immediate release tablet needs Prior Auth. Pt may be reached at [phone].

## 2018-06-19 ENCOUNTER — HOSPITAL ENCOUNTER (OUTPATIENT)
Dept: PHYSICAL THERAPY | Age: 63
Discharge: HOME | End: 2018-06-19
Payer: COMMERCIAL

## 2018-06-19 PROCEDURE — 97112 NEUROMUSCULAR REEDUCATION: CPT

## 2018-06-19 PROCEDURE — 97110 THERAPEUTIC EXERCISES: CPT

## 2018-06-19 NOTE — PROGRESS NOTES
PT DAILY TREATMENT NOTE     Patient Name: Sourav Slaughter  Date:2018  : 1955  [x]  Patient  Verified  Payor: BLUE CROSS / Plan: Dalton Naranjo [id number] PPO / Product Type: PPO /    In time:500  Out time:545  Total Treatment Time (min):45  Visit #: 2 of 12    Treatment Area: Pain in right knee [M25.561]  Unilateral primary osteoarthritis, right knee [M17.11]    SUBJECTIVE  Pain Level (0-10 scale): 3  Any medication changes, allergies to medications, adverse drug reactions, diagnosis change, or new procedure performed?: [x] No    [] Yes (see summary sheet for update)  Subjective functional status/changes:   [] No changes reported  \"I'm doing ok today, I've been working on my exercises. \"  OBJECTIVE    15 min Therapeutic Exercise:  [x] See flow sheet :   Rationale: increase ROM, increase strength and improve coordination to improve the patients ability to perform ADLs. 30 min Neuromuscular Re-education:  [x]  See flow sheet :quad keith   Rationale: increase ROM, increase strength, improve coordination, improve balance and increase proprioception  to improve the patients ability to perform ADls. With   [] TE   [] TA   [] neuro   [] other: Patient Education: [x] Review HEP    [] Progressed/Changed HEP based on:   [] positioning   [] body mechanics   [] transfers   [] heat/ice application    [] other:      Other Objective/Functional Measures: (91 deg AROM flexion, 94 deg AAROM flexion)     Pain Level (0-10 scale) post treatment: 3    ASSESSMENT/Changes in Function: Ms. Kiya Manzo did well with initiation of exercises.      Patient will continue to benefit from skilled PT services to modify and progress therapeutic interventions, address functional mobility deficits, address ROM deficits, address strength deficits, analyze and address soft tissue restrictions, analyze and cue movement patterns, analyze and modify body mechanics/ergonomics, assess and modify postural abnormalities, address imbalance/dizziness and instruct in home and community integration to attain remaining goals. []  See Plan of Care  []  See progress note/recertification  []  See Discharge Summary         Progress towards goals / Updated goals:  Short Term Goals: To be accomplished in 2 weeks:  1. Pt to report [place] with HEP. Eval status: HEP issued and reviewed physically/verbally with pt     Long Term Goals: To be accomplished in 4 weeks. 1.   Pt to report score of 63 on FOTO, indicating improved tolerance to activity and reduced pain. Eval status: 44 on initial FOTO  2. Pt to report max level of pain <3/10 with activity, indicating reduced pain and improved mobility. Eval status: max level of pain 10/10  3. Pt to demonstrate full R knee AROM/PROM from 0 degrees extension to 125 degrees flexion to improve ambulation and reduce fall risk. Eval status: R knee AROM lacking 6 degrees from full extension, 88 degrees flexion  4. Pt to demonstrate full 5/5 R knee strength with MMT, indicating improved tolerance to activity and reduced fall risk. Eval status: 4+/5 hip flexion, 4+/5 knee flexion, 4+/5 knee extension, 5/5 DF  5. Pt  to be able to ambulate without gait deviations with no AD to improve mobility and reduce fall risk. Eval status: pt currently ambulates with SPC and slight antalgic pattern  6. Pt to be able to return to full PLOF at home and in the community, including her job at [employer], with improved mobility, strength and stability, and reduced fall risk. Eval status: limited  7. Pt to be able to ascend/descend 12 stairs with reciprocal gait pattern, indicating improved mobility in the home and community and reduced fall risk.                          Eval status: pt unable to use reciprocal pattern    PLAN  []  Upgrade activities as tolerated     [x]  Continue plan of care  []  Update interventions per flow sheet       []  Discharge due to:_  []  Other:_ Johnnie Mehta, PT 6/19/2018  5:51 PM    Future Appointments  Date Time Provider Katharine Buiisti   6/22/2018 4:30 PM Chantale Robles [facility]   6/27/2018 4:30 PM Isamar Royal PTA [facility]   6/29/2018 4:30 PM Isamar Royal PTA [facility]   7/9/2018 4:00 PM Ilya Cline PA-C [facility] SCHED   7/11/2018 10:45 AM [facility] NURSE VISIT [facility] SCHED   7/18/2018 10:30 AM Heaven Barnard NP [facility]   9/10/2018 3:00 PM Ngoc Barahona MD [address] [place] [place] [address]   12/3/2018 8:00 AM Erika Otto MD [facility]

## 2018-06-22 ENCOUNTER — HOSPITAL ENCOUNTER (OUTPATIENT)
Dept: PHYSICAL THERAPY | Age: 63
Discharge: HOME | End: 2018-06-22
Payer: COMMERCIAL

## 2018-06-22 PROCEDURE — 97112 NEUROMUSCULAR REEDUCATION: CPT

## 2018-06-22 PROCEDURE — 97110 THERAPEUTIC EXERCISES: CPT

## 2018-06-22 NOTE — PROGRESS NOTES
PT DAILY TREATMENT NOTE     Patient Name: Costa Lopez  Date:2018  : 1955  [x]  Patient  Verified  Payor: BLUE CROSS / Plan: Dalton Naranjo [id number] PPO / Product Type: PPO /    In time:424  Out time:454  Total Treatment Time (min): 30  Visit #: 3 of 12    Treatment Area: Pain in right knee [M25.561]  Unilateral primary osteoarthritis, right knee [M17.11]    SUBJECTIVE  Pain Level (0-10 scale): 0  Any medication changes, allergies to medications, adverse drug reactions, diagnosis change, or new procedure performed?: [x] No    [] Yes (see summary sheet for update)  Subjective functional status/changes:   [] No changes reported  \"No pain. \"    OBJECTIVE    Modality rationale: patient declined   Min Type Additional Details    [] Estim:  []Unatt       []IFC  []Premod                        []Other:  []w/ice   []w/heat  Position:  Location:    [] Estim: []Att    []TENS instruct  []NMES                    []Other:  []w/US   []w/ice   []w/heat  Position:  Location:    []  Traction: [] Cervical       []Lumbar                       [] Prone          []Supine                       []Intermittent   []Continuous Lbs:  [] before manual  [] after manual    []  Ultrasound: []Continuous   [] Pulsed                           []1MHz   []3MHz W/cm2:  Location:    []  Iontophoresis with dexamethasone         Location: [] Take home patch   [] In clinic    []  Ice     []  heat  []  Ice massage  []  Laser   []  Anodyne Position:  Location:    []  Laser with stim  []  Other:  Position:  Location:    []  Vasopneumatic Device Pressure:       [] lo [] med [] hi   Temperature: [] lo [] med [] hi   [] Skin assessment post-treatment:  []intact []redness- no adverse reaction    []redness  adverse reaction:     10 min Therapeutic Exercise:  [x] See flow sheet :   Rationale: increase ROM and increase strength to improve the patients ability to perform ADLs    20 min Neuromuscular Re-education:  [x]  See flow sheet : quad re-ed activities   Rationale: increase ROM, increase strength, improve coordination, improve balance and increase proprioception  to improve the patients ability to improve mobility, stance stability, and gait        With   [x] TE   [] TA   [x] neuro   [] other: Patient Education: [x] Review HEP    [] Progressed/Changed HEP based on:   [x] positioning   [x] body mechanics   [] transfers   [] heat/ice application    [] other:      Other Objective/Functional Measures:      Pain Level (0-10 scale) post treatment: 0    ASSESSMENT/Changes in Function: Pt is making good progress with her flexion. Needs cuing to slow down and focus on eccentric control and prevent compensatory patterns at trunk and hip. Patient will continue to benefit from skilled PT services to modify and progress therapeutic interventions, address functional mobility deficits, address ROM deficits, address strength deficits, analyze and address soft tissue restrictions, analyze and cue movement patterns, analyze and modify body mechanics/ergonomics, assess and modify postural abnormalities, address imbalance/dizziness and instruct in home and community integration to attain remaining goals. [x]  See Plan of Care  []  See progress note/recertification  []  See Discharge Summary         Progress towards goals / Updated goals:  Short Term Goals: To be accomplished in 2 weeks:  1.  Pt to report [place] with HEP. Eval status: HEP issued and reviewed physically/verbally with pt     MET  Long Term Goals: To be accomplished in 4 weeks. 1.   Pt to report score of 63 on FOTO, indicating improved tolerance to activity and reduced pain. Eval status: 44 on initial FOTO   Assess at 5th visit  2.   Pt to report max level of pain <3/10 with activity, indicating reduced pain and improved mobility.    Eval status: max level of pain 10/10   Pain is declining  3.  Pt to demonstrate full R knee AROM/PROM from 0 degrees extension to 125 degrees flexion to improve ambulation and reduce fall risk. Eval status: R knee AROM lacking 6 degrees from full extension, 88 degrees flexion   91 deg flexion  4.  Pt to demonstrate full 5/5 R knee strength with MMT, indicating improved tolerance to activity and reduced fall risk. Eval status: 4+/5 hip flexion, 4+/5 knee flexion, 4+/5 knee extension, 5/5 DF   Assess at later visit  5.  Pt  to be able to ambulate without gait deviations with no AD to improve mobility and reduce fall risk. Eval status: pt currently ambulates with SPC and slight antalgic pattern   Continues to have deviations  6.  Pt to be able to return to full PLOF at home and in the community, including her job at [employer] improved mobility, strength and stability, and reduced fall risk. Eval status: limited   Remains limited  7.  Pt to be able to ascend/descend 12 stairs with reciprocal gait pattern, indicating improved mobility in the home and community and reduced fall risk.    Eval status: pt unable to use reciprocal pattern   No change    PLAN  []  Upgrade activities as tolerated     [x]  Continue plan of care  []  Update interventions per flow sheet       []  Discharge due to:_  []  Other:_      Thompson Johnson PTA, CSCS 6/22/2018  5:01 PM    Future Appointments  Date Time Provider Katharine Cerna   6/22/2018 4:30 PM Thompson Johnson PTA [facility]   6/27/2018 4:30 PM Thompson Johnson PTA [facility]   6/29/2018 4:30 PM Thompson Johnson PTA [facility]   7/9/2018 4:00 PM Hina Foster PA-C [facility] HAM SCHED   7/11/2018 10:45 AM [facility] NURSE VISIT [facility] HAM SCHED   7/18/2018 10:30 AM Bart Hyatt NP [facility]   9/10/2018 3:00 PM Early May, MD [address] [place] [address]   12/3/2018 8:00 AM Olga De Guzman MD [facility]

## 2018-06-25 ENCOUNTER — DOCUMENTATION ONLY (OUTPATIENT)
Dept: ORTHOPEDIC SURGERY | Facility: CLINIC | Age: 63
End: 2018-06-25

## 2018-06-25 NOTE — PROGRESS NOTES
[employer] Functional Limitations and Return to Work forms received via fax and put in forms bin at [facility].

## 2018-06-27 ENCOUNTER — HOSPITAL ENCOUNTER (OUTPATIENT)
Dept: PHYSICAL THERAPY | Age: 63
Discharge: HOME | End: 2018-06-27
Payer: COMMERCIAL

## 2018-06-27 PROCEDURE — 97110 THERAPEUTIC EXERCISES: CPT

## 2018-06-27 PROCEDURE — 97112 NEUROMUSCULAR REEDUCATION: CPT

## 2018-06-27 NOTE — PROGRESS NOTES
PT DAILY TREATMENT NOTE - [facility]     Patient Name: Alexander Hobbs  Date:2018  : 1955  [x]  Patient  Verified  Payor: BLUE CROSS / Plan: Dalton Naranjo [id number] PPO / Product Type: PPO /    In time:415  Out time:453  Total Treatment Time (min): 38  Total Timed Codes (min): 38  1:1 Treatment Time (MC only): 38   Visit #: 4 of 12    Treatment Area: Pain in right knee [M25.561]  Unilateral primary osteoarthritis, right knee [M17.11]    SUBJECTIVE  Pain Level (0-10 scale): 4  Any medication changes, allergies to medications, adverse drug reactions, diagnosis change, or new procedure performed?: [x] No    [] Yes (see summary sheet for update)  Subjective functional status/changes:   [] No changes reported  \"I didn't take my pain meds today so we'll see how it goes. \"    OBJECTIVE    Modality rationale: PD   Min Type Additional Details    [] Estim:  []Unatt       []IFC  []Premod                        []Other:  []w/ice   []w/heat  Position:  Location:    [] Estim: []Att    []TENS instruct  []NMES                    []Other:  []w/US   []w/ice   []w/heat  Position:  Location:    []  Traction: [] Cervical       []Lumbar                       [] Prone          []Supine                       []Intermittent   []Continuous Lbs:  [] before manual  [] after manual    []  Ultrasound: []Continuous   [] Pulsed                           []1MHz   []3MHz W/cm2:  Location:    []  Iontophoresis with dexamethasone         Location: [] Take home patch   [] In clinic    []  Ice     []  heat  []  Ice massage  []  Laser   []  Anodyne Position:  Location:    []  Laser with stim  []  Other:  Position:  Location:    []  Vasopneumatic Device Pressure:       [] lo [] med [] hi   Temperature: [] lo [] med [] hi   [] Skin assessment post-treatment:  []intact []redness- no adverse reaction    []redness  adverse reaction:       10 min Therapeutic Exercise:  [x] See flow sheet :   Rationale: increase ROM, increase strength and improve coordination to improve the patients ability to perform ADls. 28 min Neuromuscular Re-education:  [x]  See flow sheet :quad re-ed, stair training   Rationale: increase ROM, increase strength, improve coordination, improve balance and increase proprioception  to improve the patients ability to normalize gait & balance. With   [] TE   [] TA   [] neuro   [] other: Patient Education: [x] Review HEP    [] Progressed/Changed HEP based on:   [] positioning   [] body mechanics   [] transfers   [] heat/ice application    [] other:      Other Objective/Functional Measures: 99 deg AAROM knee flexion     Pain Level (0-10 scale) post treatment: 0    ASSESSMENT/Changes in Function: attempted manual to increased flexion, patient unable to tolerate today. Knee slowly improving. Patient will continue to benefit from skilled PT services to modify and progress therapeutic interventions, address functional mobility deficits, address ROM deficits, address strength deficits, analyze and address soft tissue restrictions, analyze and cue movement patterns, analyze and modify body mechanics/ergonomics, assess and modify postural abnormalities, address imbalance/dizziness and instruct in home and community integration to attain remaining goals. []  See Plan of Care  []  See progress note/recertification  []  See Discharge Summary         Progress towards goals / Updated goals:  Short Term Goals: To be accomplished in 2 weeks:  1.  Pt to report [place] with HEP. Eval status: HEP issued and reviewed physically/verbally with pt                        MET  Long Term Goals: To be accomplished in 4 weeks. 1.   Pt to report score of 63 on FOTO, indicating improved tolerance to activity and reduced pain.                         Eval status: 44 on initial FOTO                        Assess at 5th visit  2.   Pt to report max level of pain <3/10 with activity, indicating reduced pain and improved mobility. Eval status: max level of pain 10/10                        Pain is declining  3.  Pt to demonstrate full R knee AROM/PROM from 0 degrees extension to 125 degrees flexion to improve ambulation and reduce fall risk. Eval status: R knee AROM lacking 6 degrees from full extension, 88 degrees flexion                        91 deg flexion  4.  Pt to demonstrate full 5/5 R knee strength with MMT, indicating improved tolerance to activity and reduced fall risk. Eval status: 4+/5 hip flexion, 4+/5 knee flexion, 4+/5 knee extension, 5/5 DF                        Assess at later visit  5.  Pt  to be able to ambulate without gait deviations with no AD to improve mobility and reduce fall risk. Eval status: pt currently ambulates with SPC and slight antalgic pattern                        Continues to have deviations  6.  Pt to be able to return to full PLOF at home and in the community, including her job at [employer] improved mobility, strength and stability, and reduced fall risk. Eval status: limited                        Remains limited  7.  Pt to be able to ascend/descend 12 stairs with reciprocal gait pattern, indicating improved mobility in the home and community and reduced fall risk.                         Eval status: pt unable to use reciprocal pattern                        progressing well     PLAN  []  Upgrade activities as tolerated     [x]  Continue plan of care  []  Update interventions per flow sheet       []  Discharge due to:_  []  Other:_      Janice Abdi PT 6/27/2018  4:18 PM    Future Appointments  Date Time Provider Katharine Cerna   6/27/2018 4:30 PM Janice Abdi PT [facility]   6/29/2018 4:30 PM Kike Barnard PTA [facility]   7/9/2018 4:00 PM Gabby Buckner PA-C [facility] HAM SCHED   7/11/2018 10:45 AM [facility] NURSE VISIT [facility] HAM SCHED   7/18/2018 10:30 AM Oleksandr Paniagua NP [address]   9/10/2018 3:00 PM Bryan Ramirez MD [address] [place] [address]   12/3/2018 8:00 AM Deepika Lam MD [address]

## 2018-06-29 ENCOUNTER — HOSPITAL ENCOUNTER (OUTPATIENT)
Dept: PHYSICAL THERAPY | Age: 63
Discharge: HOME | End: 2018-06-29
Payer: COMMERCIAL

## 2018-06-29 PROCEDURE — 97110 THERAPEUTIC EXERCISES: CPT

## 2018-06-29 PROCEDURE — 97112 NEUROMUSCULAR REEDUCATION: CPT

## 2018-07-03 ENCOUNTER — HOSPITAL ENCOUNTER (OUTPATIENT)
Dept: PHYSICAL THERAPY | Age: 63
Discharge: HOME | End: 2018-07-03
Payer: COMMERCIAL

## 2018-07-03 PROCEDURE — 97112 NEUROMUSCULAR REEDUCATION: CPT

## 2018-07-03 PROCEDURE — 97110 THERAPEUTIC EXERCISES: CPT

## 2018-07-03 NOTE — PROGRESS NOTES
PT DAILY TREATMENT NOTE     Patient Name: Brendon Bautista  Date:7/3/2018  : 1955  [x]  Patient  Verified  Payor: BLUE ANNA / Plan: Dalton Naranjo [id number] PPO / Product Type: PPO /    In time:426  Out time:501  Total Treatment Time (min): 35  Visit #: 6 of 12    Treatment Area: Pain in right knee [M25.561]  Unilateral primary osteoarthritis, right knee [M17.11]    SUBJECTIVE  Pain Level (0-10 scale): 0  Any medication changes, allergies to medications, adverse drug reactions, diagnosis change, or new procedure performed?: [x] No    [] Yes (see summary sheet for update)  Subjective functional status/changes:   [x] No changes reported      OBJECTIVE      10 min Therapeutic Exercise:  [x] See flow sheet :   Rationale: increase ROM, increase strength and improve coordination to improve the patients ability to perform ADLs. 25 min Neuromuscular Re-education:  [x]  See flow sheet :quad re-ed, stair training   Rationale: increase ROM, increase strength, improve coordination, improve balance and increase proprioception  to improve the patients ability to normalize gait & balance. With   [] TE   [] TA   [] neuro   [] other: Patient Education: [x] Review HEP    [] Progressed/Changed HEP based on:   [] positioning   [] body mechanics   [] transfers   [] heat/ice application    [] other:      Other Objective/Functional Measures:      Pain Level (0-10 scale) post treatment: 0    ASSESSMENT/Changes in Function: flexion remains limited passively, but patient remains motivated and compliant.      Patient will continue to benefit from skilled PT services to modify and progress therapeutic interventions, address functional mobility deficits, address ROM deficits, address strength deficits, analyze and address soft tissue restrictions, analyze and cue movement patterns, analyze and modify body mechanics/ergonomics, assess and modify postural abnormalities, address imbalance/dizziness and instruct in home and community integration to attain remaining goals. []  See Plan of Care  []  See progress note/recertification  []  See Discharge Summary         Progress towards goals / Updated goals:  Short Term Goals: To be accomplished in 2 weeks:  1.  Pt to report [place] with HEP. Eval status: HEP issued and reviewed physically/verbally with pt                        MET  Long Term Goals: To be accomplished in 4 weeks. 1.   Pt to report score of 63 on FOTO, indicating improved tolerance to activity and reduced pain. Eval status: 44 on initial FOTO                        Assess at 5th visit  2.   Pt to report max level of pain <3/10 with activity, indicating reduced pain and improved mobility. Eval status: max level of pain 10/10                        Pain is declining  3.  Pt to demonstrate full R knee AROM/PROM from 0 degrees extension to 125 degrees flexion to improve ambulation and reduce fall risk. Eval status: R knee AROM lacking 6 degrees from full extension, 88 degrees flexion                        100 deg flexion AAROM  4.  Pt to demonstrate full 5/5 R knee strength with MMT, indicating improved tolerance to activity and reduced fall risk. Eval status: 4+/5 hip flexion, 4+/5 knee flexion, 4+/5 knee extension, 5/5 DF                        4+/5 grossly  5.  Pt  to be able to ambulate without gait deviations with no AD to improve mobility and reduce fall risk. Eval status: pt currently ambulates with SPC and slight antalgic pattern                        Continues to have deviations  6.  Pt to be able to return to full PLOF at home and in the community, including her job at [employer] improved mobility, strength and stability, and reduced fall risk.                         Eval status: limited                        Remains limited  7.  Pt to be able to ascend/descend 12 stairs with reciprocal gait pattern, indicating improved mobility in the home and community and reduced fall risk.                         Eval status: pt unable to use reciprocal pattern                        No change       PLAN  []  Upgrade activities as tolerated     [x]  Continue plan of care  []  Update interventions per flow sheet       []  Discharge due to:_  []  Other:_      Oscar Mcintosh, PT 7/3/2018  4:43 PM    Future Appointments  Date Time Provider Katharine Cerna   7/5/2018 4:30 PM [facility]   7/9/2018 4:00 PM BHAVANI Villarreal [address]   7/11/2018 10:45 AM [facility] NURSE VISIT [facility] HAM SCHED   7/18/2018 10:30 AM Hilaria Roa NP [address]   9/10/2018 3:00 PM Anamika Juarez MD [address] [place] [address]   12/3/2018 8:00 AM Catalina Coleman MD [address]

## 2018-07-05 ENCOUNTER — HOSPITAL ENCOUNTER (OUTPATIENT)
Dept: PHYSICAL THERAPY | Age: 63
Discharge: HOME | End: 2018-07-05
Payer: COMMERCIAL

## 2018-07-05 ENCOUNTER — TELEPHONE (OUTPATIENT)
Dept: ORTHOPEDIC SURGERY | Facility: CLINIC | Age: 63
End: 2018-07-05

## 2018-07-05 DIAGNOSIS — G89.18 POST-OP PAIN: Primary | ICD-10-CM

## 2018-07-05 PROCEDURE — 97112 NEUROMUSCULAR REEDUCATION: CPT

## 2018-07-05 PROCEDURE — 97110 THERAPEUTIC EXERCISES: CPT

## 2018-07-05 PROCEDURE — 97140 MANUAL THERAPY 1/> REGIONS: CPT

## 2018-07-05 RX ORDER — OXYCODONE HYDROCHLORIDE 5 MG/1
5-10 TABLET ORAL
Qty: 28 TAB | Refills: 0 | Status: SHIPPED | OUTPATIENT
Start: 2018-07-05 | End: 2018-07-09 | Stop reason: SDUPTHER

## 2018-07-05 NOTE — PROGRESS NOTES
PT DAILY TREATMENT NOTE     Patient Name: Ashanti Fonseca  Date:2018  : 1955  [x]  Patient  Verified  Payor: BLUE CROSS / Plan: [facility] PPO / Product Type: PPO /    In time:408  Out time:456  Total Treatment Time (min): 48  Visit #: 7 of 12    Treatment Area: Pain in right knee [M25.561]  Unilateral primary osteoarthritis, right knee [M17.11]    SUBJECTIVE  Pain Level (0-10 scale): 0  Any medication changes, allergies to medications, adverse drug reactions, diagnosis change, or new procedure performed?: [x] No    [] Yes (see summary sheet for update)  Subjective functional status/changes:   [] No changes reported  \"No pain right now, but I had a lot of swelling and pain yesterday. \"    OBJECTIVE    Modality rationale: patient declined   Min Type Additional Details    [] Estim:  []Unatt       []IFC  []Premod                        []Other:  []w/ice   []w/heat  Position:  Location:    [] Estim: []Att    []TENS instruct  []NMES                    []Other:  []w/US   []w/ice   []w/heat  Position:  Location:    []  Traction: [] Cervical       []Lumbar                       [] Prone          []Supine                       []Intermittent   []Continuous Lbs:  [] before manual  [] after manual    []  Ultrasound: []Continuous   [] Pulsed                           []1MHz   []3MHz W/cm2:  Location:    []  Iontophoresis with dexamethasone         Location: [] Take home patch   [] In clinic    []  Ice     []  heat  []  Ice massage  []  Laser   []  Anodyne Position:  Location:    []  Laser with stim  []  Other:  Position:  Location:    []  Vasopneumatic Device Pressure:       [] lo [] med [] hi   Temperature: [] lo [] med [] hi   [] Skin assessment post-treatment:  []intact []redness- no adverse reaction    []redness  adverse reaction:     15 min Therapeutic Exercise:  [x] See flow sheet :   Rationale: increase ROM and increase strength to improve the patients ability to perform ADLs    25 min Neuromuscular Re-education:  [x]  See flow sheet : quad re-ed activities   Rationale: increase ROM, increase strength, improve coordination, improve balance and increase proprioception  to improve the patients ability to improve mobility, stance stability, and gait    8 min Manual Therapy:  IASTM to right medial and lateral knee   Rationale: decrease pain, increase ROM, increase tissue extensibility, decrease trigger points and increase postural awareness to improve mobility and gait        With   [x] TE   [] TA   [x] neuro   [] other: Patient Education: [x] Review HEP    [] Progressed/Changed HEP based on:   [x] positioning   [] body mechanics   [] transfers   [] heat/ice application    [] other:      Other Objective/Functional Measures:   98 deg AAROM with heel slides right knee flexion     Pain Level (0-10 scale) post treatment: 0    ASSESSMENT/Changes in Function: Pt is progressing slowly with her ROM. Attempted IASTM to medial and lateral knee today. Assess at [place]. Patient will continue to benefit from skilled PT services to modify and progress therapeutic interventions, address functional mobility deficits, address ROM deficits, address strength deficits, analyze and address soft tissue restrictions, analyze and cue movement patterns, analyze and modify body mechanics/ergonomics, assess and modify postural abnormalities, address imbalance/dizziness and instruct in home and community integration to attain remaining goals. [x]  See Plan of Care  []  See progress note/recertification  []  See Discharge Summary         Progress towards goals / Updated goals:  Short Term Goals: To be accomplished in 2 weeks:  1.  Pt to report [place] with HEP. Eval status: HEP issued and reviewed physically/verbally with pt   MET  Long Term Goals: To be accomplished in 4 weeks. 1.   Pt to report score of 63 on FOTO, indicating improved tolerance to activity and reduced pain.    Eval status: 44 on initial FOTO   Assess at 5th visit  2.   Pt to report max level of pain <3/10 with activity, indicating reduced pain and improved mobility. Eval status: max level of pain 10/10   Pain is declining  3.  Pt to demonstrate full R knee AROM/PROM from 0 degrees extension to 125 degrees flexion to improve ambulation and reduce fall risk. Eval status: R knee AROM lacking 6 degrees from full extension, 88 degrees flexion   100 deg flexion AAROM  4.  Pt to demonstrate full 5/5 R knee strength with MMT, indicating improved tolerance to activity and reduced fall risk. Eval status: 4+/5 hip flexion, 4+/5 knee flexion, 4+/5 knee extension, 5/5 DF   4+/5 grossly  5.  Pt  to be able to ambulate without gait deviations with no AD to improve mobility and reduce fall risk. Eval status: pt currently ambulates with SPC and slight antalgic pattern   Continues to have deviations  6.  Pt to be able to return to full PLOF at home and in the community, including her job at [employer] improved mobility, strength and stability, and reduced fall risk. Eval status: limited   Remains limited  7.  Pt to be able to ascend/descend 12 stairs with reciprocal gait pattern, indicating improved mobility in the home and community and reduced fall risk.    Eval status: pt unable to use reciprocal pattern   No change    PLAN  []  Upgrade activities as tolerated     [x]  Continue plan of care  []  Update interventions per flow sheet       []  Discharge due to:_  []  Other:_      Deandre Rhodes PTA, [facility] 7/5/2018  5:32 PM    Future Appointments  Date Time Provider Katharine Buiisti   7/5/2018 4:30 PM Deandre Rhodes PTA [facility]   7/9/2018 4:00 PM Rachelle Centeno PA-C [facility] HAM SCHED   7/11/2018 10:45 AM IO NURSE VISIT [facility] HAM SCHED   7/18/2018 10:30 AM Marylou Churchill NP [facility]   9/10/2018 3:00 PM Zunilda Lara MD [address] [place] [address]   12/3/2018 8:00 AM Solange Edge MD [facility]

## 2018-07-05 NOTE — TELEPHONE ENCOUNTER
Medication Refill            Theresa Arcadia routed conversation to You 13 minutes ago (11:01 AM)                     Theresa Arcadia routed conversation to v Nurses 14 minutes ago (11:00 AM)                       Moriah Barrett [phone]  Jaent Bañuelos 15 minutes ago (11:00 AM)                       Denys Bañuelos 15 minutes ago (11:00 AM)                 Pt calling requesting a refill on her medication oxyCODONE IR (ROXICODONE) 5 mg immediate release tablet.  Please contact pt at [phone].                     Documentation

## 2018-07-05 NOTE — TELEPHONE ENCOUNTER
Patient called back at 2:18 pm has been waiting for a response. If the Oxycodone cannot be prescribed patient is requesting a different pain medication. Please call her today.

## 2018-07-05 NOTE — TELEPHONE ENCOUNTER
Pt calling requesting a refill on her medication oxyCODONE IR (ROXICODONE) 5 mg immediate release tablet. Please contact pt at [phone].

## 2018-07-05 NOTE — TELEPHONE ENCOUNTER
Spoke with patient and advised her that her Rx is ready for pickup at the [facility] office. Patient verbalized understanding.

## 2018-07-05 NOTE — TELEPHONE ENCOUNTER
Last Visit: 06/13/2018 with MICHAEL [address]   Date of Surgery: 05/22/2018 right primary TKR    Next Appointment: 07/09/2018 with MICHAEL Davidson   Previous Refill Encounters: 06/13/2018 per MICHAEL Davidson #[id number]    Requested Prescriptions     Pending Prescriptions Disp Refills    oxyCODONE IR (ROXICODONE) 5 mg immediate release tablet 28 Tab 0     Sig: Take 1-2 Tabs by mouth every four (4) hours as needed for Pain. Max Daily Amount: 60 mg.

## 2018-07-09 ENCOUNTER — OFFICE VISIT (OUTPATIENT)
Dept: ORTHOPEDIC SURGERY | Facility: CLINIC | Age: 63
End: 2018-07-09

## 2018-07-09 VITALS
TEMPERATURE: 98.1 F | DIASTOLIC BLOOD PRESSURE: 82 MMHG | OXYGEN SATURATION: 97 % | BODY MASS INDEX: 31.02 KG/M2 | RESPIRATION RATE: 16 BRPM | SYSTOLIC BLOOD PRESSURE: 148 MMHG | HEART RATE: 59 BPM | HEIGHT: 66 IN | WEIGHT: 193 LBS

## 2018-07-09 DIAGNOSIS — G89.18 POST-OP PAIN: Primary | ICD-10-CM

## 2018-07-09 DIAGNOSIS — Z96.651 STATUS POST RIGHT KNEE REPLACEMENT: ICD-10-CM

## 2018-07-09 NOTE — PROGRESS NOTES
HISTORY OF PRESENT ILLNESS:  Phuong Ruano returns. She is now 6 weeks status post her right primary total knee replacement. She is doing very well today. She has started outpatient physical therapy. She is full weightbearing on her right lower extremity. She has minimal pain currently at rest and does have some with activity to include extended standing and walking. She requested to drive today. PHYSICAL EXAM:  Well healed anterior right knee sx site, (A)  degrees minus 5 degrees, weak in Quad and Hamstrings 4-/5. RADIOGRAPHS:  X-rays reveal, AP and lateral of the right knee, a well-seated total joint replacement with anatomical alignment. No evidence of periprosthetic fracture or dislocation is noted. PLAN:   The patient will follow with our office in about 4 weeks. She is going to drive and she will continue outpatient physical therapy services. X-rays today were reviewed, and all questions were answered. Ok to RTW on July 16 2018.

## 2018-07-11 ENCOUNTER — HOSPITAL ENCOUNTER (OUTPATIENT)
Dept: PHYSICAL THERAPY | Age: 63
Discharge: HOME | End: 2018-07-11
Payer: COMMERCIAL

## 2018-07-11 ENCOUNTER — HOSPITAL ENCOUNTER (OUTPATIENT)
Dept: LAB | Age: 63
Discharge: HOME | End: 2018-07-11
Payer: COMMERCIAL

## 2018-07-11 ENCOUNTER — LAB ONLY (OUTPATIENT)
Dept: INTERNAL MEDICINE CLINIC | Age: 63
End: 2018-07-11

## 2018-07-11 DIAGNOSIS — E66.9 OBESITY (BMI 30-39.9): Primary | ICD-10-CM

## 2018-07-11 DIAGNOSIS — E66.9 OBESITY (BMI 30-39.9): ICD-10-CM

## 2018-07-11 LAB
ALBUMIN SERPL-MCNC: 4.1 G/DL (ref 3.4–5)
ALBUMIN/GLOB SERPL: 1.3 {RATIO} (ref 0.8–1.7)
ALP SERPL-CCNC: 92 U/L (ref 45–117)
ALT SERPL-CCNC: 58 U/L (ref 13–56)
ANION GAP SERPL CALC-SCNC: 7 MMOL/L (ref 3–18)
AST SERPL-CCNC: 21 U/L (ref 15–37)
BILIRUB SERPL-MCNC: 0.6 MG/DL (ref 0.2–1)
BUN SERPL-MCNC: 18 MG/DL (ref 7–18)
BUN/CREAT SERPL: 35 (ref 12–20)
CALCIUM SERPL-MCNC: 9.3 MG/DL (ref 8.5–10.1)
CHLORIDE SERPL-SCNC: 108 MMOL/L (ref 100–108)
CO2 SERPL-SCNC: 27 MMOL/L (ref 21–32)
CREAT SERPL-MCNC: 0.52 MG/DL (ref 0.6–1.3)
GLOBULIN SER CALC-MCNC: 3.1 G/DL (ref 2–4)
GLUCOSE SERPL-MCNC: 99 MG/DL (ref 74–99)
POTASSIUM SERPL-SCNC: 4.4 MMOL/L (ref 3.5–5.5)
PROT SERPL-MCNC: 7.2 G/DL (ref 6.4–8.2)
SODIUM SERPL-SCNC: 142 MMOL/L (ref 136–145)
URATE SERPL-MCNC: 3 MG/DL (ref 2.6–7.2)

## 2018-07-11 PROCEDURE — 97110 THERAPEUTIC EXERCISES: CPT

## 2018-07-11 PROCEDURE — 84550 ASSAY OF BLOOD/URIC ACID: CPT | Performed by: NURSE PRACTITIONER

## 2018-07-11 PROCEDURE — 80053 COMPREHEN METABOLIC PANEL: CPT | Performed by: NURSE PRACTITIONER

## 2018-07-11 PROCEDURE — 36415 COLL VENOUS BLD VENIPUNCTURE: CPT | Performed by: NURSE PRACTITIONER

## 2018-07-11 PROCEDURE — 97112 NEUROMUSCULAR REEDUCATION: CPT

## 2018-07-11 NOTE — PROGRESS NOTES
[facility] [place] [address]  [address], [address] [phone] fax    Physician Update  [x] Progress Note  [] Discharge Summary    Patient name: Quincy Knott Start of Care: 2018   Referral source: Lincoln Barahona MD : 1955                          Medical Diagnosis: Pain in right knee [M25.561]  Unilateral primary osteoarthritis, right knee [M17.11] Onset Date:chronic; DOS 18                          Treatment Diagnosis: s/p R TKR   Prior Hospitalization: see medical history Provider#: [id number]   Medications: Verified on Patient summary List    Comorbidities: arthritis, high BP   Prior Level of Function: Pt reports prior [place] with all tasks. She was previously working full time at the [employer] as an . Pt lives at home with her  and mother. She has two stairs to enter her house. Visits from Start of Care: 8    Missed Visits: 0    Status at Evaluation/Last Progress Note: Ms. Estee Mark has been a pleasure to treat and reports 95% improvement since beginning therapy . Pt reports improvements with most ADLs and getting around in the community. She reports that she is able to perform stairs with a reciprocal pattern, but not consistently. She is lacking full knee flexion currently. We will continue with therapy to address her remaining functional deficits. Progress towards Goals:   Short Term Goals: To be accomplished in 2 weeks:  1.  Pt to report [place] with HEP. Eval status: HEP issued and reviewed physically/verbally with pt                        MET  Long Term Goals: To be accomplished in 4 weeks. 1.   Pt to report score of 63 on FOTO, indicating improved tolerance to activity and reduced pain.                         Eval status: 44 on initial FOTO                        PROGRESSING; 60  2.   Pt to report max level of pain <3/10 with activity, indicating reduced pain and improved mobility. Eval status: max level of pain 10/10                        PROGRESSING; max level of 3/10  3.  Pt to demonstrate full R knee AROM/PROM from 0 degrees extension to 125 degrees flexion to improve ambulation and reduce fall risk. Eval status: R knee AROM lacking 6 degrees from full extension, 88 degrees flexion                        PROGRESSING; 0-108 deg  4.  Pt to demonstrate full 5/5 R knee strength with MMT, indicating improved tolerance to activity and reduced fall risk. Eval status: 4+/5 hip flexion, 4+/5 knee flexion, 4+/5 knee extension, 5/5 DF                        PROGRESSING; 4+/5 hip flexion, 5/5 knee flexion, 5/5 knee extension, 5/5 DF  5.  Pt  to be able to ambulate without gait deviations with no AD to improve mobility and reduce fall risk. Eval status: pt currently ambulates with SPC and slight antalgic pattern                        MET  6.  Pt to be able to return to full PLOF at home and in the community, including her job at [employer] improved mobility, strength and stability, and reduced fall risk. Eval status: limited                        MET  7.  Pt to be able to ascend/descend 12 stairs with reciprocal gait pattern, indicating improved mobility in the home and community and reduced fall risk. Eval status: pt unable to use reciprocal pattern                        PROGRESSING; able to perform with a reciprocal pattern, but not consistently      Functional Gains: ADLs, ease with stair negotiation, squatting, strength   Functional Deficits: full knee flexion  % improvement: 95%  Pain   Average: 0/10                            Best: 0/10                          Worst: 3/10  Patient Goal: \"ride a bike\"    Goals: to be achieved in 4 weeks:   1.   Pt to report score of 63 on FOTO, indicating improved tolerance to activity and reduced pain. PROGRESSING; 60  2.   Pt to report max level of pain <3/10 with activity, indicating reduced pain and improved mobility,                        PROGRESSING; max level of 3/10  3.  Pt to demonstrate full R knee AROM/PROM from 0 degrees extension to 125 degrees flexion to improve ambulation and reduce fall risk. PROGRESSING; 0-108 deg  4.  Pt to demonstrate full 5/5 R knee strength with MMT, indicating improved tolerance to activity and reduced fall risk. Eval status: 4+/5 hip flexion, 4+/5 knee flexion, 4+/5 knee extension, 5/5 DF                        PROGRESSING; 4+/5 hip flexion, 5/5 knee flexion, 5/5 knee extension, 5/5 DF  5.  Pt to be able to ascend/descend 12 stairs with reciprocal gait pattern, indicating improved mobility in the home and community and reduced fall risk.                        PROGRESSING; able to perform with a reciprocal pattern, but not consistently           ASSESSMENT/RECOMMENDATIONS:  [x]Continue therapy per initial plan/protocol at a frequency of  2 x per week for 4 weeks  []Continue therapy with the following recommended changes:_____________________      _____________________________________________________________________  []Discontinue therapy progressing towards or have reached established goals  []Discontinue therapy due to lack of appreciable progress towards goals  []Discontinue therapy due to lack of attendance or compliance  []Await Physician's recommendations/decisions regarding therapy  []Other:________________________________________________________________    Thank you for this referral.   Royal Collier, PT 7/11/2018 5:14 PM  NOTE TO PHYSICIAN:  [address] AND   FAX TO [facility]: [phone]  If you are unable to process this request in 24 hours please contact our office: [phone]    []  I have read the above report and request that my patient continue as recommended. []  I have read the above report and request that my patient continue therapy with the following changes/special instructions:________________________________________  []I have read the above report and request that my patient be discharged from therapy.     Physicians signature: ______________________________Date: _____Time:_______

## 2018-07-11 NOTE — PROGRESS NOTES
PT DAILY TREATMENT NOTE     Patient Name: Meme Castillo  Date:2018  : 1955  [x]  Patient  Verified  Payor: BLUE CROSS / Plan: Dalton Naranjo [id number] PPO / Product Type: PPO /    In time:828  Out time:920  Total Treatment Time (min): 52  Visit #: 8 of 12    Treatment Area: Pain in right knee [M25.561]  Unilateral primary osteoarthritis, right knee [M17.11]    SUBJECTIVE  Pain Level (0-10 scale): 0  Any medication changes, allergies to medications, adverse drug reactions, diagnosis change, or new procedure performed?: [x] No    [] Yes (see summary sheet for update)  Subjective functional status/changes:   [] No changes reported  \"No pain. \"    OBJECTIVE    Modality rationale: decrease inflammation and decrease pain to improve the patients ability to improve mobility and gait   Min Type Additional Details    [] Estim:  []Unatt       []IFC  []Premod                        []Other:  []w/ice   []w/heat  Position:  Location:    [] Estim: []Att    []TENS instruct  []NMES                    []Other:  []w/US   []w/ice   []w/heat  Position:  Location:    []  Traction: [] Cervical       []Lumbar                       [] Prone          []Supine                       []Intermittent   []Continuous Lbs:  [] before manual  [] after manual    []  Ultrasound: []Continuous   [] Pulsed                           []1MHz   []3MHz W/cm2:  Location:    []  Iontophoresis with dexamethasone         Location: [] Take home patch   [] In clinic   10 [x]  Ice     []  heat  []  Ice massage  []  Laser   []  Anodyne Position: seated   Location: right knee    []  Laser with stim  []  Other:  Position:  Location:    []  Vasopneumatic Device Pressure:       [] lo [] med [] hi   Temperature: [] lo [] med [] hi   [x] Skin assessment post-treatment:  [x]intact []redness- no adverse reaction    []redness  adverse reaction:     12 min Therapeutic Exercise:  [x] See flow sheet :   Rationale: increase ROM and increase strength to improve the patients ability to perform ADLs    30 min Neuromuscular Re-education:  [x]  See flow sheet : quad re-ed activities   Rationale: increase ROM, increase strength, improve coordination, improve balance and increase proprioception  to improve the patients ability to improve mobility, stance stability, and gait       With   [x] TE   [] TA   [x] neuro   [] other: Patient Education: [x] Review HEP    [] Progressed/Changed HEP based on:   [x] positioning   [x] body mechanics   [] transfers   [] heat/ice application    [] other:      Other Objective/Functional Measures:   AROM right knee 0-108 deg  FOTO 60     Pain Level (0-10 scale) post treatment: 0    ASSESSMENT/Changes in Function: Ms. Ok Encinas has been a pleasure to treat and reports 95% improvement since beginning therapy . Pt reports improvements with most ADLs and getting around in the community. She reports that she is able to perform stairs with a reciprocal pattern, but not consistently. She is lacking full knee flexion currently. We will continue with therapy to address her remaining functional deficits. Patient will continue to benefit from skilled PT services to modify and progress therapeutic interventions, address functional mobility deficits, address ROM deficits, address strength deficits, analyze and address soft tissue restrictions, analyze and cue movement patterns, analyze and modify body mechanics/ergonomics, assess and modify postural abnormalities, address imbalance/dizziness and instruct in home and community integration to attain remaining goals. [x]  See Plan of Care  [x]  See progress note/recertification  []  See Discharge Summary         Progress towards goals / Updated goals:  Short Term Goals: To be accomplished in 2 weeks:  1.  Pt to report [place] with HEP. Eval status: HEP issued and reviewed physically/verbally with pt   MET  Long Term Goals: To be accomplished in 4 weeks.   1.   Pt to report score of 63 on FOTO, indicating improved tolerance to activity and reduced pain. Eval status: 44 on initial FOTO   PROGRESSING; 60  2.   Pt to report max level of pain <3/10 with activity, indicating reduced pain and improved mobility. Eval status: max level of pain 10/10   PROGRESSING; max level of 3/10  3.  Pt to demonstrate full R knee AROM/PROM from 0 degrees extension to 125 degrees flexion to improve ambulation and reduce fall risk. Eval status: R knee AROM lacking 6 degrees from full extension, 88 degrees flexion   PROGRESSING; 0-108 deg  4.  Pt to demonstrate full 5/5 R knee strength with MMT, indicating improved tolerance to activity and reduced fall risk. Eval status: 4+/5 hip flexion, 4+/5 knee flexion, 4+/5 knee extension, 5/5 DF   PROGRESSING; 4+/5 hip flexion, 5/5 knee flexion, 5/5 knee extension, 5/5 DF  5.  Pt  to be able to ambulate without gait deviations with no AD to improve mobility and reduce fall risk. Eval status: pt currently ambulates with SPC and slight antalgic pattern   MET  6.  Pt to be able to return to full PLOF at home and in the community, including her job at [employer] improved mobility, strength and stability, and reduced fall risk. Eval status: limited   MET  7.  Pt to be able to ascend/descend 12 stairs with reciprocal gait pattern, indicating improved mobility in the home and community and reduced fall risk.    Eval status: pt unable to use reciprocal pattern   PROGRESSING; able to perform with a reciprocal pattern, but not consistently     Functional Gains: ADLs, ease with stair negotiation, squatting, strength   Functional Deficits: full knee flexion  % improvement: 95%  Pain   Average: 0/10       Best: 0/10     Worst: 3/10  Patient Goal: \"ride a bike\"    PLAN  []  Upgrade activities as tolerated     [x]  Continue plan of care  []  Update interventions per flow sheet       []  Discharge due to:_  []  Other:_      Jose Rivera, HUMZA, CSCS 7/11/2018  9:24 AM    Future Appointments  Date Time Provider Katharine Eryn   7/11/2018 8:30 AM [facility]   7/11/2018 10:45 AM [facility] NURSE VISIT [facility] HAM CALDERA   7/18/2018 8:30 AM HUMZA Dang Mems [facility]   7/18/2018 10:30 AM Hilaria Roa NP [address]   7/25/2018 9:30 AM [facility] Key [facility]   9/10/2018 3:00 PM Anamika Juarez MD [address] [place] [address]   12/3/2018 8:00 AM Catalina Coleman MD [address]

## 2018-07-18 ENCOUNTER — OFFICE VISIT (OUTPATIENT)
Dept: INTERNAL MEDICINE CLINIC | Age: 63
End: 2018-07-18

## 2018-07-18 ENCOUNTER — HOSPITAL ENCOUNTER (OUTPATIENT)
Dept: PHYSICAL THERAPY | Age: 63
Discharge: HOME | End: 2018-07-18
Payer: COMMERCIAL

## 2018-07-18 VITALS
RESPIRATION RATE: 12 BRPM | HEIGHT: 66 IN | OXYGEN SATURATION: 98 % | HEART RATE: 59 BPM | SYSTOLIC BLOOD PRESSURE: 128 MMHG | BODY MASS INDEX: 31.13 KG/M2 | DIASTOLIC BLOOD PRESSURE: 83 MMHG | WEIGHT: 193.7 LBS | TEMPERATURE: 98.4 F

## 2018-07-18 DIAGNOSIS — E66.9 OBESITY (BMI 30-39.9): Primary | ICD-10-CM

## 2018-07-18 DIAGNOSIS — K75.81 NASH (NONALCOHOLIC STEATOHEPATITIS): ICD-10-CM

## 2018-07-18 DIAGNOSIS — E78.2 MIXED HYPERLIPIDEMIA: ICD-10-CM

## 2018-07-18 DIAGNOSIS — I10 ESSENTIAL HYPERTENSION: ICD-10-CM

## 2018-07-18 PROCEDURE — 97112 NEUROMUSCULAR REEDUCATION: CPT

## 2018-07-18 PROCEDURE — 97110 THERAPEUTIC EXERCISES: CPT

## 2018-07-18 RX ORDER — LEVOFLOXACIN 750 MG/1
TABLET ORAL
Refills: 0 | COMMUNITY
Start: 2018-05-09 | End: 2018-07-18 | Stop reason: ALTCHOICE

## 2018-07-18 RX ORDER — ENOXAPARIN SODIUM 100 MG/ML
INJECTION SUBCUTANEOUS
Refills: 0 | COMMUNITY
Start: 2018-05-25 | End: 2018-07-18 | Stop reason: ALTCHOICE

## 2018-07-18 RX ORDER — FUROSEMIDE 40 MG/1
TABLET ORAL
Refills: 1 | COMMUNITY
Start: 2018-07-09 | End: 2018-07-18 | Stop reason: ALTCHOICE

## 2018-07-18 NOTE — PROGRESS NOTES
PT DAILY TREATMENT NOTE     Patient Name: Phan Saenz  Date:2018  : 1955  [x]  Patient  Verified  Payor: BLUE CROSS / Plan: Dalton Naranjo [id number] PPO / Product Type: PPO /    In time:823  Out time:907  Total Treatment Time (min): 44  Visit #: 1 of 4    Treatment Area: Pain in right knee [M25.561]  Unilateral primary osteoarthritis, right knee [M17.11]    SUBJECTIVE  Pain Level (0-10 scale): 0  Any medication changes, allergies to medications, adverse drug reactions, diagnosis change, or new procedure performed?: [x] No    [] Yes (see summary sheet for update)  Subjective functional status/changes:   [] No changes reported  \"No pain. \"    OBJECTIVE    Modality rationale: decrease inflammation and decrease pain to improve the patients ability to improve mobility and gait   Min Type Additional Details    [] Estim:  []Unatt       []IFC  []Premod                        []Other:  []w/ice   []w/heat  Position:  Location:    [] Estim: []Att    []TENS instruct  []NMES                    []Other:  []w/US   []w/ice   []w/heat  Position:  Location:    []  Traction: [] Cervical       []Lumbar                       [] Prone          []Supine                       []Intermittent   []Continuous Lbs:  [] before manual  [] after manual    []  Ultrasound: []Continuous   [] Pulsed                           []1MHz   []3MHz W/cm2:  Location:    []  Iontophoresis with dexamethasone         Location: [] Take home patch   [] In clinic   10 [x]  Ice     []  heat  []  Ice massage  []  Laser   []  Anodyne Position: seated  Location: right knee    []  Laser with stim  []  Other:  Position:  Location:    []  Vasopneumatic Device Pressure:       [] lo [] med [] hi   Temperature: [] lo [] med [] hi   [x] Skin assessment post-treatment:  [x]intact []redness- no adverse reaction    []redness  adverse reaction:     10 min Therapeutic Exercise:  [x] See flow sheet :   Rationale: increase ROM and increase strength to improve the patients ability to perform ADLs    24 min Neuromuscular Re-education:  [x]  See flow sheet : quad re-ed activities   Rationale: increase ROM, increase strength, improve coordination, improve balance and increase proprioception  to improve the patients ability to improve mobility, stance stability, and gait         With   [x] TE   [] TA   [x] neuro   [] other: Patient Education: [x] Review HEP    [] Progressed/Changed HEP based on:   [x] positioning   [x] body mechanics   [] transfers   [] heat/ice application    [] other:      Other Objective/Functional Measures:      Pain Level (0-10 scale) post treatment: 0    ASSESSMENT/Changes in Function: Pt is making steady progress with her knee flexion recently. She reports increased walking and overall mobility in the past week due to returning to work. Patient will continue to benefit from skilled PT services to modify and progress therapeutic interventions, address functional mobility deficits, address ROM deficits, address strength deficits, analyze and address soft tissue restrictions, analyze and cue movement patterns, analyze and modify body mechanics/ergonomics, assess and modify postural abnormalities, address imbalance/dizziness and instruct in home and community integration to attain remaining goals. [x]  See Plan of Care  []  See progress note/recertification  []  See Discharge Summary         Progress towards goals / Updated goals: 1.   Pt to report score of 63 on FOTO, indicating improved tolerance to activity and reduced pain. PROGRESSING; 60  2.   Pt to report max level of pain <3/10 with activity, indicating reduced pain and improved mobility,   PROGRESSING; max level of 3/10  3.  Pt to demonstrate full R knee AROM/PROM from 0 degrees extension to 125 degrees flexion to improve ambulation and reduce fall risk.    PROGRESSING; 0-108 deg  4.  Pt to demonstrate full 5/5 R knee strength with MMT, indicating improved tolerance to activity and reduced fall risk. Eval status: 4+/5 hip flexion, 4+/5 knee flexion, 4+/5 knee extension, 5/5 DF   PROGRESSING; 4+/5 hip flexion, 5/5 knee flexion, 5/5 knee extension, 5/5 DF  5.  Pt to be able to ascend/descend 12 stairs with reciprocal gait pattern, indicating improved mobility in the home and community and reduced fall risk.    PROGRESSING; able to perform with a reciprocal pattern, but not consistently     PLAN  []  Upgrade activities as tolerated     [x]  Continue plan of care  []  Update interventions per flow sheet       []  Discharge due to:_  []  Other:_      Dariel Coleman PTA, [facility] 7/18/2018  9:11 AM    Future Appointments  Date Time Provider Katharine Cerna   7/18/2018 8:30 AM [facility]   7/18/2018 10:30 AM Sherren Silos, NP [facility]   7/25/2018 9:30 AM David Hills [facility]   9/10/2018 3:00 PM Del Umanzor MD [address] [place] [address]   12/3/2018 8:00 AM Alondra Heredia MD [facility]

## 2018-07-18 NOTE — PROGRESS NOTES
Chief Complaint   Patient presents with    Weight Management     1. Have you been to the ER, urgent care clinic since your last visit? Hospitalized since your last visit? No    2. Have you seen or consulted any other health care providers outside of the [address] [place] [address] since your last visit? Include any pap smears or colon screening.  No

## 2018-07-18 NOTE — PROGRESS NOTES
[facility] Progress Note:   F/up Physician Visit    CC: Obesity      Michelle Benedict is a 59 y.o. female who is here for her  f/up physician visit for the VLCD Program. Karen Alicia has completed 8 weeks of the program to date. Has had knee surgery, been on LCD/maintenance since last visit. 6 lbs weight gain, steady last 2-3 weeks.     Weight History  Starting  weight 270.1 lb Body mass index is 43.6 kg/(m^2). Current weight 187 lb  Goal weight 165 lb  Highest weight 277 lb, at 47years old       Weight Metrics 7/18/2018 7/18/2018 7/9/2018 6/13/2018 6/7/2018 6/5/2018 6/1/2018   Weight - 193 lb 11.2 oz 193 lb 191 lb 9.6 oz 193 lb 187 lb 6.4 oz 192 lb 6.4 oz   Waist Measure Inches 36 - - - - - -   Exercise Mins/week 80 - - - - - -   Body Fat % 39.9 - - - - - -   BMI - 31.26 kg/m2 31.15 kg/m2 30.93 kg/m2 31.15 kg/m2 30.25 kg/m2 30.59 kg/m2         Current Outpatient Prescriptions   Medication Sig Dispense Refill    simvastatin (ZOCOR) 20 mg tablet TAKE 1 TABLET BY MOUTH EVERY NIGHT 90 Tab 1    oxyCODONE IR (ROXICODONE) 5 mg immediate release tablet Take 1-2 Tabs by mouth every four (4) hours as needed. Max Daily Amount: 60 mg. Indications: Pain 28 Tab 0    apixaban (ELIQUIS) 5 mg tablet Take 1 Tab by mouth two (2) times a day. 60 Tab 6    meloxicam (MOBIC) 15 mg tablet Take 15 mg by mouth daily.  polyethylene glycol (MIRALAX) 17 gram packet Take 1 Packet by mouth daily. (Patient taking differently: Take 1 Packet by mouth daily. Indications: constipation) 30 Packet 1    metoprolol tartrate (LOPRESSOR) 50 mg tablet Take 1 Tab by mouth two (2) times a day. (Patient taking differently: Take 1 Tab by mouth two (2) times a day. Indications: hypertension) 180 Tab 3    potassium 99 mg tablet Take 99 mg by mouth daily as needed (supplement).  acetaminophen (TYLENOL ARTHRITIS PAIN) 650 mg CR tablet Take 650 mg by mouth every six (6) hours as needed for Pain.       omega-3 fatty acids-vitamin e 1,000 mg cap Take 1 Cap by mouth two (2) times a day. Indications: supplement      calcium-cholecalciferol, d3, 600-125 mg-unit tab Take 1,200 mg by mouth daily. Indications: Vitamin D Deficiency           Participation   Did you attend clinic and class last week? No due to Right Knee Surgery    Review of Systems  Since your last visit, have you experienced any complications? no  If yes, please list:     No CP, SOB, palpitations, lightheadedness, dizziness, constipation. Positives highlight in BOLD. Are you taking an appetite suppressant? no  If so, is there any Chest Pain, Palpitations or Dizziness? BP Readings from Last 10 Encounters:   07/18/18 128/83   07/09/18 148/82   06/13/18 120/71   06/07/18 110/74   06/05/18 113/78   06/05/18 110/60   06/01/18 105/64   05/31/18 117/67   05/29/18 130/85   05/27/18 130/78         Have you received any other medical care this week? yes  If yes, where and for what? Physical Therapy once a week for the next 3 weeks      Have you discontinued or changed any medicine or dose of your medicine since your last visit? no  If yes, where and for what? Diet  How many ounces of calorie-free liquids did you consume each day? 84-90 oz    How many meal replacements did you take each day? 2    Did you have any problems adhering to the program?  no If yes, please explain:       Exercise  Aerobic exercise: 80 min  Resistance exercise: 0 workouts / week  Any discomfort? yes     If yes, where? Right Knee      Review of Systems  Complete ROS negative except where noted above    Objective  Visit Vitals    /83 (BP 1 Location: Left arm, BP Patient Position: Sitting)    Pulse (!) 59    Temp 98.4 °F (36.9 °C) (Oral)    Resp 12    Ht 5' 6\" (1.676 m)    Wt 193 lb 11.2 oz (87.9 kg)    SpO2 98%    BMI 31.26 kg/m2     No LMP recorded. Patient has had a hysterectomy.     PHQ over the last two weeks 7/9/2018   PHQ Not Done -   Little interest or pleasure in doing things Not at all   Feeling down, depressed, irritable, or hopeless Not at all   Total Score PHQ 2 0         Waist Circumference: I personally reviewed patient's Weight Management Doc Flowsheet  Neck Circumference: I personally reviewed patient's Weight Management Doc Flowsheet  Percent Body Fat: I personally reviewed patient's Weight Management Doc Flowsheet    Physical Exam  Appearance: well appearing, obese, A&O, NAD  HEENT:  NC/AT, PERRL, No scleral icterus  Heart:  RRR without M/R/G  Lungs:  CTAB, no rhonchi, rales, or wheezes with good air exchange   Ext:  No LE Edema    Assessment / Plan    Encounter Diagnoses   Name Primary?  Obesity (BMI 30-39. 9) Yes    Mixed hyperlipidemia     HERZOG (nonalcoholic steatohepatitis) suggested by ultrasound report, 2016     Essential hypertension        1. Weight management reasonably well controlled   Progress was reviewed with patient    2. Labs    Latest results reviewed with patient   Lab slip given to pt for f/up HDL labs    3. Diet regimen   # of meal replacements prescribed: 4   If modified LCD-nutritional guidelines:    Monthly Goal   As below    Medical monitoring schedule:   Weekly BP/Weight checks   Monthly provider appointments  I have reviewed/discussed the above normal BMI with the patient. I have recommended the following interventions: dietary management education, guidance, and counseling, monitor weight and prescribed dietary intake . Sarahy Camarena Ms. Tonio Gonzalez has a reminder for a \"due or due soon\" health maintenance. I have asked that she contact her primary care provider for follow-up on this health maintenance.           Patient Instructions     Health Maintenance Due   Topic Date Due    ZOSTER VACCINE AGE 60>  03/05/2016    BREAST CANCER SCRN MAMMOGRAM  07/14/2018     Return to reduction to get final few lbs off to get to goal.    Monthly goal:    5-10 lbs weight loss         Body Mass Index: Care Instructions  Your Care Instructions    Body mass index (BMI) can help you see if your weight is raising your risk for health problems. It uses a formula to compare how much you weigh with how tall you are. · A BMI lower than 18.5 is considered underweight. · A BMI between 18.5 and 24.9 is considered healthy. · A BMI between 25 and 29.9 is considered overweight. A BMI of 30 or higher is considered obese. If your BMI is in the normal range, it means that you have a lower risk for weight-related health problems. If your BMI is in the overweight or obese range, you may be at increased risk for weight-related health problems, such as high blood pressure, heart disease, stroke, arthritis or joint pain, and diabetes. If your BMI is in the underweight range, you may be at increased risk for health problems such as fatigue, lower protection (immunity) against illness, muscle loss, bone loss, hair loss, and hormone problems. BMI is just one measure of your risk for weight-related health problems. You may be at higher risk for health problems if you are not active, you eat an unhealthy diet, or you drink too much alcohol or use tobacco products. Follow-up care is a key part of your treatment and safety. Be sure to make and go to all appointments, and call your doctor if you are having problems. It's also a good idea to know your test results and keep a list of the medicines you take. How can you care for yourself at home? · Practice healthy eating habits. This includes eating plenty of fruits, vegetables, whole grains, lean protein, and low-fat dairy. · If your doctor recommends it, get more exercise. Walking is a good choice. Bit by bit, increase the amount you walk every day. Try for at least 30 minutes on most days of the week. · Do not smoke. Smoking can increase your risk for health problems. If you need help quitting, talk to your doctor about stop-smoking programs and medicines. These can increase your chances of quitting for good.   · Limit alcohol to 2 drinks a day for men and 1 drink a day for women. Too much alcohol can cause health problems. If you have a BMI higher than 25  · Your doctor may do other tests to check your risk for weight-related health problems. This may include measuring the distance around your waist. A waist measurement of more than 40 inches in men or 35 inches in women can increase the risk of weight-related health problems. · Talk with your doctor about steps you can take to stay healthy or improve your health. You may need to make lifestyle changes to lose weight and stay healthy, such as changing your diet and getting regular exercise. If you have a BMI lower than 18.5  · Your doctor may do other tests to check your risk for health problems. · Talk with your doctor about steps you can take to stay healthy or improve your health. You may need to make lifestyle changes to gain or maintain weight and stay healthy, such as getting more healthy foods in your diet and doing exercises to build muscle. Where can you learn more? Go to http://stanton-gonsalo.info/. Enter S176 in the search box to learn more about \"Body Mass Index: Care Instructions. \"  Current as of: October 13, 2016  Content Version: 11.4  © 8035-4600 [employer]. Care instructions adapted under license by [employer] (which disclaims liability or warranty for this information). If you have questions about a medical condition or this instruction, always ask your healthcare professional. [address] any warranty or liability for your use of this information. Follow-up Disposition:  Return in about 4 weeks (around 8/15/2018). 10 minutes of the 15 minutes face to face time with Linda Rodriguez consisted of counseling & coordinating and/or discussing treatment plans in reference to her The primary encounter diagnosis was Obesity (BMI 30-39.9).  Diagnoses of Mixed hyperlipidemia, HERZOG (nonalcoholic steatohepatitis) suggested by ultrasound report, 2016, and Essential hypertension were also pertinent to this visit. The patient is to follow up as scheduled and will report to the ED or the office if symptoms change or increase. The patient has voiced understanding and will comply.

## 2018-07-18 NOTE — MR AVS SNAPSHOT
[address] [facility] 
 
 
 [address], [address] [facility] 
[phone] Patient: Edwige Landa MRN: [id number] WRS:[birth year] Visit Information Date & Time Provider Department Dept. Phone Encounter #  
 7/18/2018 10:30 AM Yi Parks NP [facility] [address] [place] [address] [phone] Follow-up Instructions Return in about 4 weeks (around 8/15/2018). Follow-up and Disposition History Your Appointments 8/15/2018  2:67 AM  
METABOLIC PROGRAM 15 with Yi Parks NP [facility] [address] [place] [address] ([facility]) Appt Note: 1 month f/u  
 [address] [facility], [facility] [address] [facility] [address]  
  
   
 [address], [address]  
  
    
 9/10/2018  3:00 PM  
Follow Up with Mary Mary MD  
[facility] ([facility]) Appt Note: 3 mo f/u  
 [address] [place] [id number]  
[phone] [facility]  
  
    
 12/3/2018  8:00 AM  
Office Visit with Bette Alicea MD  
[facility] [address] [place] [address]) Appt Note: 6 month follow up  
 [address], [address] [place] [address]  
  
   
 [address], [address] Upcoming Health Maintenance Date Due ZOSTER VACCINE AGE 60> 3/5/2016 BREAST CANCER SCRN MAMMOGRAM 7/14/2018 Influenza Age 5 to Adult 8/1/2018 PAP AKA CERVICAL CYTOLOGY 7/28/2019 COLONOSCOPY 6/5/2021 DTaP/Tdap/Td series (2 - Td) 2/7/2027 Allergies as of 7/18/2018  Review Complete On: 7/18/2018 By: Yi Parks NP No Known Allergies Current Immunizations  Reviewed on 2/7/2017 Name Date Influenza Vaccine 10/12/2017 Influenza Vaccine (Quad) PF 10/10/2016 Tdap 2/7/2017 Not reviewed this visit You Were Diagnosed With   
  
 Codes Comments Obesity (BMI 30-39.9)    -  Primary ICD-10-CM: O41.9 ICD-9-CM: 278.00 Mixed hyperlipidemia     ICD-10-CM: E78.2 ICD-9-CM: 272.2 HERZOG (nonalcoholic steatohepatitis)     ICD-10-CM: R39.43 ICD-9-CM: 571.8 Essential hypertension     ICD-10-CM: I10 
ICD-9-CM: 401.9 Vitals BP Pulse Temp Resp Height(growth percentile) Weight(growth percentile) 128/83 (BP 1 Location: Left arm, BP Patient Position: Sitting) (!) 59 98.4 °F (36.9 °C) (Oral) 12 5' 6\" (1.676 m) 193 lb 11.2 oz (87.9 kg) SpO2 BMI OB Status Smoking Status 98% 31.26 kg/m2 Hysterectomy Never Smoker BMI and BSA Data Body Mass Index Body Surface Area  
 31.26 kg/m 2 2.02 m 2 Preferred Pharmacy Pharmacy Name Phone [facility] [address] [phone] Your Updated Medication List  
  
   
This list is accurate as of 7/18/18 10:51 AM.  Always use your most recent med list.  
  
  
  
  
 apixaban 5 mg tablet Commonly known as:  Avril Bowdenan Take 1 Tab by mouth two (2) times a day. calcium-cholecalciferol (d3) 600-125 mg-unit Tab Take 1,200 mg by mouth daily. Indications: Vitamin D Deficiency  
  
 meloxicam 15 mg tablet Commonly known as:  MOBIC Take 15 mg by mouth daily. metoprolol tartrate 50 mg tablet Commonly known as:  LOPRESSOR Take 1 Tab by mouth two (2) times a day. omega-3 fatty acids-vitamin e 1,000 mg Cap Take 1 Cap by mouth two (2) times a day. Indications: supplement  
  
 oxyCODONE IR 5 mg immediate release tablet Commonly known as:  Tanya Roberta Take 1-2 Tabs by mouth every four (4) hours as needed. Max Daily Amount: 60 mg. Indications: Pain  
  
 polyethylene glycol 17 gram packet Commonly known as:  Lelan Situ Take 1 Packet by mouth daily. potassium 99 mg tablet Take 99 mg by mouth daily as needed (supplement). simvastatin 20 mg tablet Commonly known as:  ZOCOR  
 TAKE 1 TABLET BY MOUTH EVERY NIGHT  
  
 TYLENOL ARTHRITIS PAIN 650 mg Tber Generic drug:  acetaminophen Take 650 mg by mouth every six (6) hours as needed for Pain. Follow-up Instructions Return in about 4 weeks (around 8/15/2018). To-Do List   
 07/25/2018 9:30 AM  
  Appointment with Jason Higgins, PT at [facility] ([phone]) Patient Instructions Health Maintenance Due Topic Date Due  
 ZOSTER VACCINE AGE 60>  03/05/2016  BREAST CANCER SCRN MAMMOGRAM  07/14/2018 Return to reduction to get final few lbs off to get to goal. 
 
Monthly goal: 
 
5-10 lbs weight loss Body Mass Index: Care Instructions Your Care Instructions Body mass index (BMI) can help you see if your weight is raising your risk for health problems. It uses a formula to compare how much you weigh with how tall you are. · A BMI lower than 18.5 is considered underweight. · A BMI between 18.5 and 24.9 is considered healthy. · A BMI between 25 and 29.9 is considered overweight. A BMI of 30 or higher is considered obese. If your BMI is in the normal range, it means that you have a lower risk for weight-related health problems. If your BMI is in the overweight or obese range, you may be at increased risk for weight-related health problems, such as high blood pressure, heart disease, stroke, arthritis or joint pain, and diabetes. If your BMI is in the underweight range, you may be at increased risk for health problems such as fatigue, lower protection (immunity) against illness, muscle loss, bone loss, hair loss, and hormone problems. BMI is just one measure of your risk for weight-related health problems. You may be at higher risk for health problems if you are not active, you eat an unhealthy diet, or you drink too much alcohol or use tobacco products. Follow-up care is a key part of your treatment and safety.  Be sure to make and go to all appointments, and call your doctor if you are having problems. It's also a good idea to know your test results and keep a list of the medicines you take. How can you care for yourself at home? · Practice healthy eating habits. This includes eating plenty of fruits, vegetables, whole grains, lean protein, and low-fat dairy. · If your doctor recommends it, get more exercise. Walking is a good choice. Bit by bit, increase the amount you walk every day. Try for at least 30 minutes on most days of the week. · Do not smoke. Smoking can increase your risk for health problems. If you need help quitting, talk to your doctor about stop-smoking programs and medicines. These can increase your chances of quitting for good. · Limit alcohol to 2 drinks a day for men and 1 drink a day for women. Too much alcohol can cause health problems. If you have a BMI higher than 25 · Your doctor may do other tests to check your risk for weight-related health problems. This may include measuring the distance around your waist. A waist measurement of more than 40 inches in men or 35 inches in women can increase the risk of weight-related health problems. · Talk with your doctor about steps you can take to stay healthy or improve your health. You may need to make lifestyle changes to lose weight and stay healthy, such as changing your diet and getting regular exercise. If you have a BMI lower than 18.5 · Your doctor may do other tests to check your risk for health problems. · Talk with your doctor about steps you can take to stay healthy or improve your health. You may need to make lifestyle changes to gain or maintain weight and stay healthy, such as getting more healthy foods in your diet and doing exercises to build muscle. Where can you learn more? Go to http://stanton-gonsalo.info/. Enter S176 in the search box to learn more about \"Body Mass Index: Care Instructions. \" 
 Current as of: October 13, 2016 Content Version: 11.4 © 5701-1170 Healthwise, [employer]. Care instructions adapted under license by [employer] (which disclaims liability or warranty for this information). If you have questions about a medical condition or this instruction, always ask your healthcare professional. [address] any warranty or liability for your use of this information. Patient Instructions History Introducing [facility]! Dear Jazlyn Staff: 
Thank you for requesting a Amazon account. Our records indicate that you already have an active Amazon account. You can access your account anytime at [email or web address] Did you know that you can access your hospital and ER discharge instructions at any time in Amazon? You can also review all of your test results from your hospital stay or ER visit. Additional Information If you have questions, please visit the Frequently Asked Questions section of the Amazon website at [email or web address] Remember, Amazon is NOT to be used for urgent needs. For medical emergencies, dial 911. Now available from your iPhone and Android! Please provide this summary of care documentation to your next provider. Your primary care clinician is listed as Leann Wiggins. If you have any questions after today's visit, please call [phone].

## 2018-07-18 NOTE — PATIENT INSTRUCTIONS
Health Maintenance Due   Topic Date Due    ZOSTER VACCINE AGE 60>  03/05/2016    BREAST CANCER SCRN MAMMOGRAM  07/14/2018     Return to reduction to get final few lbs off to get to goal.    Monthly goal:    5-10 lbs weight loss         Body Mass Index: Care Instructions  Your Care Instructions    Body mass index (BMI) can help you see if your weight is raising your risk for health problems. It uses a formula to compare how much you weigh with how tall you are. · A BMI lower than 18.5 is considered underweight. · A BMI between 18.5 and 24.9 is considered healthy. · A BMI between 25 and 29.9 is considered overweight. A BMI of 30 or higher is considered obese. If your BMI is in the normal range, it means that you have a lower risk for weight-related health problems. If your BMI is in the overweight or obese range, you may be at increased risk for weight-related health problems, such as high blood pressure, heart disease, stroke, arthritis or joint pain, and diabetes. If your BMI is in the underweight range, you may be at increased risk for health problems such as fatigue, lower protection (immunity) against illness, muscle loss, bone loss, hair loss, and hormone problems. BMI is just one measure of your risk for weight-related health problems. You may be at higher risk for health problems if you are not active, you eat an unhealthy diet, or you drink too much alcohol or use tobacco products. Follow-up care is a key part of your treatment and safety. Be sure to make and go to all appointments, and call your doctor if you are having problems. It's also a good idea to know your test results and keep a list of the medicines you take. How can you care for yourself at home? · Practice healthy eating habits. This includes eating plenty of fruits, vegetables, whole grains, lean protein, and low-fat dairy. · If your doctor recommends it, get more exercise. Walking is a good choice.  Bit by bit, increase the amount you walk every day. Try for at least 30 minutes on most days of the week. · Do not smoke. Smoking can increase your risk for health problems. If you need help quitting, talk to your doctor about stop-smoking programs and medicines. These can increase your chances of quitting for good. · Limit alcohol to 2 drinks a day for men and 1 drink a day for women. Too much alcohol can cause health problems. If you have a BMI higher than 25  · Your doctor may do other tests to check your risk for weight-related health problems. This may include measuring the distance around your waist. A waist measurement of more than 40 inches in men or 35 inches in women can increase the risk of weight-related health problems. · Talk with your doctor about steps you can take to stay healthy or improve your health. You may need to make lifestyle changes to lose weight and stay healthy, such as changing your diet and getting regular exercise. If you have a BMI lower than 18.5  · Your doctor may do other tests to check your risk for health problems. · Talk with your doctor about steps you can take to stay healthy or improve your health. You may need to make lifestyle changes to gain or maintain weight and stay healthy, such as getting more healthy foods in your diet and doing exercises to build muscle. Where can you learn more? Go to http://stanton-gonsalo.info/. Enter S176 in the search box to learn more about \"Body Mass Index: Care Instructions. \"  Current as of: October 13, 2016  Content Version: 11.4  © 4925-5520 Healthwise, Incorporated. Care instructions adapted under license by [employer] (which disclaims liability or warranty for this information). If you have questions about a medical condition or this instruction, always ask your healthcare professional. [place] [address] any warranty or liability for your use of this information.

## 2018-07-25 ENCOUNTER — CLINICAL SUPPORT (OUTPATIENT)
Dept: FAMILY MEDICINE CLINIC | Age: 63
End: 2018-07-25

## 2018-07-25 ENCOUNTER — APPOINTMENT (OUTPATIENT)
Dept: PHYSICAL THERAPY | Age: 63
End: 2018-07-25
Payer: COMMERCIAL

## 2018-07-25 VITALS
DIASTOLIC BLOOD PRESSURE: 88 MMHG | HEIGHT: 66 IN | SYSTOLIC BLOOD PRESSURE: 147 MMHG | BODY MASS INDEX: 31.31 KG/M2 | HEART RATE: 63 BPM | WEIGHT: 194.8 LBS

## 2018-07-25 DIAGNOSIS — E66.9 OBESITY (BMI 30-39.9): Primary | ICD-10-CM

## 2018-07-25 NOTE — PROGRESS NOTES
Progress Note: Weekly Education Class in the [facility] Loss Program   Is there anything that you or the patient needs to let the [facility] Physician know about? no  Over the past week, have you experienced any side-effects? no    Terrence Mac is a 59 y.o. female who is enrolled in [facility]    Visit Vitals    /88 (BP 1 Location: Left arm, BP Patient Position: Sitting)    Pulse 63    Ht 5' 6\" (1.676 m)    Wt 194 lb 12.8 oz (88.4 kg)    BMI 31.44 kg/m2     Weight Metrics 7/25/2018 7/18/2018 7/18/2018 7/9/2018 6/13/2018 6/7/2018 6/5/2018   Weight 194 lb 12.8 oz - 193 lb 11.2 oz 193 lb 191 lb 9.6 oz 193 lb 187 lb 6.4 oz   Waist Measure Inches 37 36 - - - - -   Exercise Mins/week - 80 - - - - -   Body Fat % - 39.9 - - - - -   BMI 31.44 kg/m2 - 31.26 kg/m2 31.15 kg/m2 30.93 kg/m2 31.15 kg/m2 30.25 kg/m2         Have you received any other medical care this week? no  If yes, where and for what? Have you had any change in your medications since your last visit? no  If yes what? Did you have any problems adhering to the program last week? no  If yes, please explain:       Eating Habits Over Last Week:  Did you take in 64 oz of non-caloric fluids? yes     Did you consume your 4 meal replacements each day?  yes       Physical Activity Over the Past Week:    Aerobic exercise: 210 min  Resistance exercise: 0 workouts / week

## 2018-08-01 ENCOUNTER — HOSPITAL ENCOUNTER (OUTPATIENT)
Dept: PHYSICAL THERAPY | Age: 63
Discharge: HOME | End: 2018-08-01
Payer: COMMERCIAL

## 2018-08-01 PROCEDURE — 97110 THERAPEUTIC EXERCISES: CPT | Performed by: PHYSICAL THERAPIST

## 2018-08-01 NOTE — PROGRESS NOTES
PT DAILY TREATMENT NOTE  Patient Name: Corazon Rosa Date:2018 : 1955 [x]  Patient  Verified Payor: BLUE CROSS / Plan: [facility] PPO / Product Type: PPO / In time:1030  Out time:1105 Total Treatment Time (min): 35 Visit #: 2 of 4 Treatment Area: Pain in right knee [M25.561] Unilateral primary osteoarthritis, right knee [M17.11] SUBJECTIVE Pain Level (0-10 scale): 0 Any medication changes, allergies to medications, adverse drug reactions, diagnosis change, or new procedure performed?: [x] No    [] Yes (see summary sheet for update) Subjective functional status/changes:   [x] No changes reported OBJECTIVE 35 min Therapeutic Exercise:  [x] See flow sheet :  
Rationale: increase ROM, increase strength, improve coordination, improve balance and increase proprioception to improve the patients ability to tolerate daily tasks with improved mobility and stability of the R LE. With 
 [x] TE 
 [] TA 
 [] neuro 
 [] other: Patient Education: [x] Review HEP [] Progressed/Changed HEP based on:  
[] positioning   [] body mechanics   [] transfers   [] heat/ice application   
[] other:   
 
Other Objective/Functional Measures:   
 
Pain Level (0-10 scale) post treatment: 0 
 
ASSESSMENT/Changes in Function: Pt reports no pain throughout session today. She continues to tolerate increased repetitions/difficulty with exercises. Patient will continue to benefit from skilled PT services to modify and progress therapeutic interventions, address functional mobility deficits, address ROM deficits, address strength deficits, analyze and address soft tissue restrictions, analyze and cue movement patterns and analyze and modify body mechanics/ergonomics to attain remaining goals. Progress towards goals / Updated goals: 1.   Pt to report score of 63 on FOTO, indicating improved tolerance to activity and reduced pain. PROGRESSING; 60 
2.   Pt to report max level of pain <3/10 with activity, indicating reduced pain and improved mobility, PROGRESSING; max level of 3/10 3.  Pt to demonstrate full R knee AROM/PROM from 0 degrees extension to 125 degrees flexion to improve ambulation and reduce fall risk. PROGRESSING; 0-108 deg 4.  Pt to demonstrate full 5/5 R knee strength with MMT, indicating improved tolerance to activity and reduced fall risk. Eval status: 4+/5 hip flexion, 4+/5 knee flexion, 4+/5 knee extension, 5/5 DF PROGRESSING; 4+/5 hip flexion, 5/5 knee flexion, 5/5 knee extension, 5/5 DF 5.  Pt to be able to ascend/descend 12 stairs with reciprocal gait pattern, indicating improved mobility in the home and community and reduced fall risk. PROGRESSING; able to perform with a reciprocal pattern, but not consistently  
  
 
PLAN 
[]  Upgrade activities as tolerated     [x]  Continue plan of care 
[]  Update interventions per flow sheet      
[]  Discharge due to:_ 
[]  Other:_   
 
Narinder Nicholson, PT 8/1/2018  11:42 AM 
 
Future Appointments Date Time Provider Katharine Cerna 8/8/2018 9:00 AM Donal Kumar PTA [facility]  
8/8/2018 10:25 AM IO NURSE VISIT [facility] HAM CALDERA  
8/15/2018 9:15 AM Oleksandr Paniagua NP [facility]  
9/10/2018 3:00 PM Bryan Ramirez MD [address] [place] [address]  
12/3/2018 8:00 AM Deepika Lam MD [facility]

## 2018-08-08 ENCOUNTER — HOSPITAL ENCOUNTER (OUTPATIENT)
Dept: LAB | Age: 63
Discharge: HOME | End: 2018-08-08
Payer: COMMERCIAL

## 2018-08-08 ENCOUNTER — LAB ONLY (OUTPATIENT)
Dept: INTERNAL MEDICINE CLINIC | Age: 63
End: 2018-08-08

## 2018-08-08 ENCOUNTER — HOSPITAL ENCOUNTER (OUTPATIENT)
Dept: PHYSICAL THERAPY | Age: 63
Discharge: HOME | End: 2018-08-08
Payer: COMMERCIAL

## 2018-08-08 ENCOUNTER — CLINICAL SUPPORT (OUTPATIENT)
Dept: FAMILY MEDICINE CLINIC | Age: 63
End: 2018-08-08

## 2018-08-08 DIAGNOSIS — E66.9 OBESITY (BMI 30-39.9): Primary | ICD-10-CM

## 2018-08-08 DIAGNOSIS — E66.9 OBESITY (BMI 30-39.9): ICD-10-CM

## 2018-08-08 LAB
ALBUMIN SERPL-MCNC: 4.2 G/DL (ref 3.4–5)
ALBUMIN/GLOB SERPL: 1.4 {RATIO} (ref 0.8–1.7)
ALP SERPL-CCNC: 111 U/L (ref 45–117)
ALT SERPL-CCNC: 118 U/L (ref 13–56)
ANION GAP SERPL CALC-SCNC: 9 MMOL/L (ref 3–18)
AST SERPL-CCNC: 44 U/L (ref 15–37)
BILIRUB SERPL-MCNC: 0.3 MG/DL (ref 0.2–1)
BUN SERPL-MCNC: 23 MG/DL (ref 7–18)
BUN/CREAT SERPL: 35 (ref 12–20)
CALCIUM SERPL-MCNC: 9.5 MG/DL (ref 8.5–10.1)
CHLORIDE SERPL-SCNC: 106 MMOL/L (ref 100–108)
CO2 SERPL-SCNC: 28 MMOL/L (ref 21–32)
CREAT SERPL-MCNC: 0.65 MG/DL (ref 0.6–1.3)
GLOBULIN SER CALC-MCNC: 3.1 G/DL (ref 2–4)
GLUCOSE SERPL-MCNC: 114 MG/DL (ref 74–99)
POTASSIUM SERPL-SCNC: 4.4 MMOL/L (ref 3.5–5.5)
PROT SERPL-MCNC: 7.3 G/DL (ref 6.4–8.2)
SODIUM SERPL-SCNC: 143 MMOL/L (ref 136–145)
URATE SERPL-MCNC: 4 MG/DL (ref 2.6–7.2)

## 2018-08-08 PROCEDURE — 36415 COLL VENOUS BLD VENIPUNCTURE: CPT | Performed by: NURSE PRACTITIONER

## 2018-08-08 PROCEDURE — 84550 ASSAY OF BLOOD/URIC ACID: CPT | Performed by: NURSE PRACTITIONER

## 2018-08-08 PROCEDURE — 97110 THERAPEUTIC EXERCISES: CPT

## 2018-08-08 PROCEDURE — 97112 NEUROMUSCULAR REEDUCATION: CPT

## 2018-08-08 PROCEDURE — 80053 COMPREHEN METABOLIC PANEL: CPT | Performed by: NURSE PRACTITIONER

## 2018-08-08 NOTE — PROGRESS NOTES
PT DAILY TREATMENT NOTE     Patient Name: Lossie Severs  Date:2018  : 1955  [x]  Patient  Verified  Payor: BLUE CROSS / Plan: [id number] PPO / Product Type: PPO /    In time:845  Out time:917  Total Treatment Time (min): 32  Visit #: 3 of 4    Treatment Area: Pain in right knee [M25.561]  Unilateral primary osteoarthritis, right knee [M17.11]    SUBJECTIVE  Pain Level (0-10 scale): 0  Any medication changes, allergies to medications, adverse drug reactions, diagnosis change, or new procedure performed?: [x] No    [] Yes (see summary sheet for update)  Subjective functional status/changes:   [] No changes reported  \"No pain. \"    OBJECTIVE    Modality rationale: patient declined   Min Type Additional Details    [] Estim:  []Unatt       []IFC  []Premod                        []Other:  []w/ice   []w/heat  Position:  Location:    [] Estim: []Att    []TENS instruct  []NMES                    []Other:  []w/US   []w/ice   []w/heat  Position:  Location:    []  Traction: [] Cervical       []Lumbar                       [] Prone          []Supine                       []Intermittent   []Continuous Lbs:  [] before manual  [] after manual    []  Ultrasound: []Continuous   [] Pulsed                           []1MHz   []3MHz W/cm2:  Location:    []  Iontophoresis with dexamethasone         Location: [] Take home patch   [] In clinic    []  Ice     []  heat  []  Ice massage  []  Laser   []  Anodyne Position:  Location:    []  Laser with stim  []  Other:  Position:  Location:    []  Vasopneumatic Device Pressure:       [] lo [] med [] hi   Temperature: [] lo [] med [] hi   [] Skin assessment post-treatment:  []intact []redness- no adverse reaction    []redness  adverse reaction:     32 min Therapeutic Exercise:  [x] See flow sheet :   Rationale: increase ROM and increase strength to improve the patients ability to perform ADLs        With   [x] TE   [] TA   [] neuro   [] other: Patient Education: [x] Review HEP    [] Progressed/Changed HEP based on:   [x] positioning   [x] body mechanics   [] transfers   [] heat/ice application    [] other:      Other Objective/Functional Measures:   FOTO 99     Pain Level (0-10 scale) post treatment: 0    ASSESSMENT/Changes in Function: Ms. Enid Aase has been a pleasure to treat and reports 100% improvement since beginning therapy. She reports improvements with functional mobility and pain. Her motion and strength have both improved. She reports no difficulty with walking and is ascending stairs with a reciprocal pattern. We are discharging to an updated HEP at this time. []  See Plan of Care  []  See progress note/recertification  [x]  See Discharge Summary         Progress towards goals / Updated goals: 1.   Pt to report score of 63 on FOTO, indicating improved tolerance to activity and reduced pain. MET; 99  2.   Pt to report max level of pain <3/10 with activity, indicating reduced pain and improved mobility,   MET; max level of 0/10  3.  Pt to demonstrate full R knee AROM/PROM from 0 degrees extension to 125 degrees flexion to improve ambulation and reduce fall risk. NOT MET; 0-114 deg  4.  Pt to demonstrate full 5/5 R knee strength with MMT, indicating improved tolerance to activity and reduced fall risk. Eval status: 4+/5 hip flexion, 4+/5 knee flexion, 4+/5 knee extension, 5/5 DF   MET; 5/5 hip flexion, 5/5 knee flexion, 5/5 knee extension, 5/5 DF  5.  Pt to be able to ascend/descend 12 stairs with reciprocal gait pattern, indicating improved mobility in the home and community and reduced fall risk.    MET    Functional Gains: bending knee, ADLs, stair negotiation, walking tolerance, strength  Functional Deficits: none to report  % improvement: 100%  Pain   Average: 0/10       Best: 0/10     Worst: 0/10  Patient Goal: \"to do my normal activities like riding bikes and swimming\"    PLAN  []  Upgrade activities as tolerated     []  Continue plan of care  [] Update interventions per flow sheet       [x]  Discharge due to: PROGRAM COMPLETE  []  Other:_      Mariposa Meade, PTA 8/8/2018  9:25 AM    Future Appointments  Date Time Provider Katharine Cerna   [facility]   8/15/2018 9:15 AM Samira Pratt, KAMARI [facility]   9/10/2018 3:00 PM Janella Pallas, MD [address] [place] [address]   12/3/2018 8:00 AM Arthur Kinsey MD [facility]

## 2018-08-09 VITALS
DIASTOLIC BLOOD PRESSURE: 78 MMHG | BODY MASS INDEX: 30.98 KG/M2 | HEIGHT: 66 IN | WEIGHT: 192.8 LBS | SYSTOLIC BLOOD PRESSURE: 134 MMHG | HEART RATE: 63 BPM

## 2018-08-09 NOTE — PROGRESS NOTES
[facility] [address]  [address] [facility] [address], [address] [phone] fax    Physician Update-- Discharge Summary    Patient name: Rogerio Mesa Start of Care: 2018   Referral source: Jadon Naranjo MD : 1955                          Medical Diagnosis: Pain in right knee [M25.561]  Unilateral primary osteoarthritis, right knee [M17.11] Onset Date:chronic; DOS 18                          Treatment Diagnosis: s/p R TKR   Prior Hospitalization: see medical history Provider#: [id number]   Medications: Verified on Patient summary List    Comorbidities: arthritis, high BP   Prior Level of Function: Pt reports prior Nance with all tasks. Jeana Jones was previously working full time at the [employer] as an .  Pt lives at home with her  and mother. Jeana Jones has two stairs to enter her house. Visits from Start of Care: 11    Missed Visits: 0    Status at Evaluation/Last Progress Note: Ms. Marjorie Fleming has been a pleasure to treat and reports 100% improvement since beginning therapy. She reports improvements with functional mobility, pain, ROM, and strength. She reports no difficulty with walking and is now ascending stairs with a reciprocal pattern. We are discharging to an updated HEP at this time. Progress towards Goals: 1.   Pt to report score of 63 on FOTO, indicating improved tolerance to activity and reduced pain. MET; 99  2.   Pt to report max level of pain <3/10 with activity, indicating reduced pain and improved mobility,                        MET; max level of 0/10  3.  Pt to demonstrate full R knee AROM/PROM from 0 degrees extension to 125 degrees flexion to improve ambulation and reduce fall risk. NOT MET; 0-114 deg  4.  Pt to demonstrate full 5/5 R knee strength with MMT, indicating improved tolerance to activity and reduced fall risk. Eval status: 4+/5 hip flexion, 4+/5 knee flexion, 4+/5 knee extension, 5/5 DF                        MET; 5/5 hip flexion, 5/5 knee flexion, 5/5 knee extension, 5/5 DF  5.  Pt to be able to ascend/descend 12 stairs with reciprocal gait pattern, indicating improved mobility in the home and community and reduced fall risk. MET     Functional Gains: bending knee, ADLs, stair negotiation, walking tolerance, strength  Functional Deficits: none to report  % improvement: 100%  Pain   Average: 0/10                            Best: 0/10                          Worst: 0/10  Patient Goal: \"to do my normal activities like riding bikes and swimming\"      ASSESSMENT/RECOMMENDATIONS:  []Continue therapy per initial plan/protocol at a frequency of   x per week for  weeks  []Continue therapy with the following recommended changes:_____________________      _____________________________________________________________________  [x]Discontinue therapy progressing--pt has reached established goals  []Discontinue therapy due to lack of appreciable progress towards goals  []Discontinue therapy due to lack of attendance or compliance  []Await Physician's recommendations/decisions regarding therapy  []Other:________________________________________________________________    Thank you for this referral.   Tk Fernando, PT 8/9/2018 12:17 PM  NOTE TO PHYSICIAN:  PLEASE COMPLETE THE ORDERS BELOW AND   FAX TO [facility]: ([phone]  If you are unable to process this request in 24 hours please contact our office: [phone]    []  I have read the above report and request that my patient continue as recommended. []  I have read the above report and request that my patient continue therapy with the following changes/special instructions:________________________________________  []I have read the above report and request that my patient be discharged from therapy.     [address] [facility] Signature:____________Date:_________TIME:________    [facility], Date and Time must be completed for valid certification **

## 2018-08-09 NOTE — PROGRESS NOTES
Progress Note: Weekly Education Class in the [facility] Weight Loss Program   Is there anything that you or the patient needs to let the [facility] Physician know about? no  Over the past week, have you experienced any side-effects? no    Moriah Barrett is a 59 y.o. female who is enrolled in [facility]    Visit Vitals    /78 (BP 1 Location: Right arm, BP Patient Position: Sitting)    Pulse 63    Ht 5' 6\" (1.676 m)    Wt 192 lb 12.8 oz (87.5 kg)    BMI 31.12 kg/m2     Weight Metrics 8/9/2018 8/8/2018 7/25/2018 7/18/2018 7/18/2018 7/9/2018 6/13/2018   Weight - 192 lb 12.8 oz 194 lb 12.8 oz - 193 lb 11.2 oz 193 lb 191 lb 9.6 oz   Waist Measure Inches 37 - 37 36 - - -   Exercise Mins/week - - - 80 - - -   Body Fat % - - - 39.9 - - -   BMI - 31.12 kg/m2 31.44 kg/m2 - 31.26 kg/m2 31.15 kg/m2 30.93 kg/m2         Have you received any other medical care this week? no  If yes, where and for what? Have you had any change in your medications since your last visit? no  If yes what? Did you have any problems adhering to the program last week? yes  If yes, please explain: sleeping is difficult at night      Eating Habits Over Last Week:  Did you take in 64 oz of non-caloric fluids? yes     Did you consume your 4 meal replacements each day?  yes       Physical Activity Over the Past Week:    Aerobic exercise: 210 min  Resistance exercise: 0 workouts / week

## 2018-08-09 NOTE — PROGRESS NOTES
Please let pt know that liver enzymes (AST/ALT) elevated, pt should return to LCD diet and follow up with PCP.

## 2018-08-13 ENCOUNTER — TELEPHONE (OUTPATIENT)
Dept: FAMILY MEDICINE CLINIC | Age: 63
End: 2018-08-13

## 2018-08-13 NOTE — TELEPHONE ENCOUNTER
Patient given lab results as detailed in providers note. Patient verbalized understanding of the information given.

## 2018-08-13 NOTE — TELEPHONE ENCOUNTER
----- Message from Isadora Bruce NP sent at 8/9/2018  3:17 PM EDT -----  Please let pt know that liver enzymes (AST/ALT) elevated, pt should return to LCD diet and follow up with PCP.

## 2018-08-15 ENCOUNTER — OFFICE VISIT (OUTPATIENT)
Dept: INTERNAL MEDICINE CLINIC | Age: 63
End: 2018-08-15

## 2018-08-15 VITALS
SYSTOLIC BLOOD PRESSURE: 129 MMHG | OXYGEN SATURATION: 97 % | BODY MASS INDEX: 30.49 KG/M2 | HEIGHT: 66 IN | WEIGHT: 189.7 LBS | TEMPERATURE: 97.6 F | DIASTOLIC BLOOD PRESSURE: 83 MMHG | HEART RATE: 54 BPM | RESPIRATION RATE: 14 BRPM

## 2018-08-15 DIAGNOSIS — I48.0 PAROXYSMAL ATRIAL FIBRILLATION (HCC): ICD-10-CM

## 2018-08-15 DIAGNOSIS — I10 ESSENTIAL HYPERTENSION: ICD-10-CM

## 2018-08-15 DIAGNOSIS — E66.9 OBESITY (BMI 30-39.9): Primary | ICD-10-CM

## 2018-08-15 DIAGNOSIS — K75.81 NASH (NONALCOHOLIC STEATOHEPATITIS): ICD-10-CM

## 2018-08-15 DIAGNOSIS — E78.2 MIXED HYPERLIPIDEMIA: ICD-10-CM

## 2018-08-15 NOTE — PROGRESS NOTES
[facility] Progress Note:   F/up Physician Visit    CC: Obesity      Yadi Jose is a 59 y.o. female who is here for her  f/up physician visit for the [facility]. Chloe Cyr has completed 33 weeks of the program to date. Liver enzymes elevated with return to program, discussed with PCP, Dr. Himanshu Jimenez, and she wants pt to return to [facility] and follow up with PCP. Had full GI evaluation with  in 2016. Pt recently returned to [facility] 1 week prior to lab draw. Also, had episode of PAF during recent GI eval EGD/colonoscopy and now on eliquis. 2 lbs weight gain      Weight History  Starting  weight 270.1 lb Body mass index is 43.6 kg/(m^2). Current weight 189 lb  Goal weight 165 lb  Highest weight 277 lb, at 47years old        Weight Metrics 8/15/2018 8/15/2018 8/9/2018 8/8/2018 7/25/2018 7/18/2018 7/18/2018   Weight - 189 lb 11.2 oz - 192 lb 12.8 oz 194 lb 12.8 oz - 193 lb 11.2 oz   Waist Measure Inches 36 - 37 - 37 36 -   Exercise Mins/week 150 - - - - 80 -   Body Fat % 39.3 - - - - 39.9 -   BMI - 30.62 kg/m2 - 31.12 kg/m2 31.44 kg/m2 - 31.26 kg/m2         Current Outpatient Prescriptions   Medication Sig Dispense Refill    simvastatin (ZOCOR) 20 mg tablet TAKE 1 TABLET BY MOUTH EVERY NIGHT 90 Tab 1    apixaban (ELIQUIS) 5 mg tablet Take 1 Tab by mouth two (2) times a day. 60 Tab 6    meloxicam (MOBIC) 15 mg tablet Take 15 mg by mouth daily.  polyethylene glycol (MIRALAX) 17 gram packet Take 1 Packet by mouth daily. (Patient taking differently: Take 1 Packet by mouth daily. Indications: constipation) 30 Packet 1    metoprolol tartrate (LOPRESSOR) 50 mg tablet Take 1 Tab by mouth two (2) times a day. (Patient taking differently: Take 1 Tab by mouth two (2) times a day. Indications: hypertension) 180 Tab 3    potassium 99 mg tablet Take 99 mg by mouth daily as needed (supplement).       acetaminophen (TYLENOL ARTHRITIS PAIN) 650 mg CR tablet Take 650 mg by mouth every six (6) hours as needed for Pain.  omega-3 fatty acids-vitamin e 1,000 mg cap Take 1 Cap by mouth two (2) times a day. Indications: supplement      calcium-cholecalciferol, d3, 600-125 mg-unit tab Take 1,200 mg by mouth daily. Indications: Vitamin D Deficiency           Participation   Did you attend clinic and class last week? yes    Review of Systems  Since your last visit, have you experienced any complications? no  If yes, please list:     No CP, SOB, palpitations, lightheadedness, dizziness, constipation. Positives highlight in BOLD. Are you taking an appetite suppressant? no  If so, is there any Chest Pain, Palpitations or Dizziness? BP Readings from Last 10 Encounters:   08/15/18 129/83   08/09/18 134/78   07/25/18 147/88   07/18/18 128/83   07/09/18 148/82   06/13/18 120/71   06/07/18 110/74   06/05/18 113/78   06/05/18 110/60   06/01/18 105/64         Have you received any other medical care this week? no  If yes, where and for what? Have you discontinued or changed any medicine or dose of your medicine since your last visit? no  If yes, where and for what? Diet  How many ounces of calorie-free liquids did you consume each day? 64 oz plus    How many meal replacements did you take each day? 4    Did you have any problems adhering to the program?  yes If yes, please explain: every once in awhile      Exercise  Aerobic exercise: 150 min  Resistance exercise: 0 workouts / week  Any discomfort?  no     If yes, where? Review of Systems  Complete ROS negative except where noted above    Objective  Visit Vitals    /83 (BP 1 Location: Left arm, BP Patient Position: Sitting)    Pulse (!) 54    Temp 97.6 °F (36.4 °C) (Oral)    Resp 14    Ht 5' 6\" (1.676 m)    Wt 189 lb 11.2 oz (86 kg)    SpO2 97%    BMI 30.62 kg/m2     No LMP recorded. Patient has had a hysterectomy.     PHQ over the last two weeks 7/9/2018   PHQ Not Done -   Little interest or pleasure in doing things Not at all Feeling down, depressed, irritable, or hopeless Not at all   Total Score PHQ 2 0         Waist Circumference: I personally reviewed patient's Weight Management Doc Flowsheet  Neck Circumference: I personally reviewed patient's Weight Management Doc Flowsheet  Percent Body Fat: I personally reviewed patient's Weight Management Doc Flowsheet    Physical Exam  Appearance: well appearing, obese, A&O, NAD  HEENT:  NC/AT, PERRL, No scleral icterus  Heart:  RRR without M/R/G  Lungs:  CTAB, no rhonchi, rales, or wheezes with good air exchange   Ext:  No LE Edema    Assessment / Plan    Encounter Diagnoses   Name Primary?  Obesity (BMI 30-39. 9) Yes    HERZOG (nonalcoholic steatohepatitis) suggested by ultrasound report, 2016     Essential hypertension     Paroxysmal atrial fibrillation (HCC)     Mixed hyperlipidemia        1. Weight management well controlled   Progress was reviewed with patient    2. Labs    Latest results reviewed with patient   Lab slip given to pt for f/up HDL labs    3. Diet regimen   # of meal replacements prescribed: 3 + meal until re-evaluated for LFTs   If modified LCD-nutritional guidelines:    Monthly Goal   As below    Medical monitoring schedule:   Weekly BP/Weight checks   Monthly provider appointments  I have reviewed/discussed the above normal BMI with the patient. I have recommended the following interventions: dietary management education, guidance, and counseling, monitor weight and prescribed dietary intake . Esme Goldsmith Ms. Iqra Sheikh has a reminder for a \"due or due soon\" health maintenance. I have asked that she contact her primary care provider for follow-up on this health maintenance. Patient Instructions     Health Maintenance Due   Topic Date Due    ZOSTER VACCINE AGE 60>  03/05/2016    BREAST CANCER SCRN MAMMOGRAM  07/14/2018    Influenza Age 5 to Adult  08/01/2018     Monthy goal:    Get labs redrawn in next day or 2.              Body Mass Index: Care Instructions  Your Care Instructions    Body mass index (BMI) can help you see if your weight is raising your risk for health problems. It uses a formula to compare how much you weigh with how tall you are. · A BMI lower than 18.5 is considered underweight. · A BMI between 18.5 and 24.9 is considered healthy. · A BMI between 25 and 29.9 is considered overweight. A BMI of 30 or higher is considered obese. If your BMI is in the normal range, it means that you have a lower risk for weight-related health problems. If your BMI is in the overweight or obese range, you may be at increased risk for weight-related health problems, such as high blood pressure, heart disease, stroke, arthritis or joint pain, and diabetes. If your BMI is in the underweight range, you may be at increased risk for health problems such as fatigue, lower protection (immunity) against illness, muscle loss, bone loss, hair loss, and hormone problems. BMI is just one measure of your risk for weight-related health problems. You may be at higher risk for health problems if you are not active, you eat an unhealthy diet, or you drink too much alcohol or use tobacco products. Follow-up care is a key part of your treatment and safety. Be sure to make and go to all appointments, and call your doctor if you are having problems. It's also a good idea to know your test results and keep a list of the medicines you take. How can you care for yourself at home? · Practice healthy eating habits. This includes eating plenty of fruits, vegetables, whole grains, lean protein, and low-fat dairy. · If your doctor recommends it, get more exercise. Walking is a good choice. Bit by bit, increase the amount you walk every day. Try for at least 30 minutes on most days of the week. · Do not smoke. Smoking can increase your risk for health problems. If you need help quitting, talk to your doctor about stop-smoking programs and medicines.  These can increase your chances of quitting for good. · Limit alcohol to 2 drinks a day for men and 1 drink a day for women. Too much alcohol can cause health problems. If you have a BMI higher than 25  · Your doctor may do other tests to check your risk for weight-related health problems. This may include measuring the distance around your waist. A waist measurement of more than 40 inches in men or 35 inches in women can increase the risk of weight-related health problems. · Talk with your doctor about steps you can take to stay healthy or improve your health. You may need to make lifestyle changes to lose weight and stay healthy, such as changing your diet and getting regular exercise. If you have a BMI lower than 18.5  · Your doctor may do other tests to check your risk for health problems. · Talk with your doctor about steps you can take to stay healthy or improve your health. You may need to make lifestyle changes to gain or maintain weight and stay healthy, such as getting more healthy foods in your diet and doing exercises to build muscle. Where can you learn more? Go to http://stanton-gonsalo.info/. Enter S176 in the search box to learn more about \"Body Mass Index: Care Instructions. \"  Current as of: October 9, 2017  Content Version: 11.7  © 0708-6706 [employer], [employer]. Care instructions adapted under license by go[employer]Map (which disclaims liability or warranty for this information). If you have questions about a medical condition or this instruction, always ask your healthcare professional. [place] [address] any warranty or liability for your use of this information. Follow-up Disposition:  Return in about 4 weeks (around 9/12/2018). 10 minutes of the 15 minutes face to face time with Venancio Dobbins consisted of counseling & coordinating and/or discussing treatment plans in reference to her The primary encounter diagnosis was Obesity (BMI 30-39.9).  Diagnoses of HERZOG (nonalcoholic steatohepatitis) suggested by ultrasound report, 2016, Essential hypertension, Paroxysmal atrial fibrillation ([address] and Mixed hyperlipidemia were also pertinent to this visit. The patient is to follow up as scheduled and will report to the ED or the office if symptoms change or increase. The patient has voiced understanding and will comply.

## 2018-08-15 NOTE — PATIENT INSTRUCTIONS
Health Maintenance Due   Topic Date Due    ZOSTER VACCINE AGE 60>  03/05/2016    BREAST CANCER SCRN MAMMOGRAM  07/14/2018    Influenza Age 5 to Adult  08/01/2018     Monthy goal:    Get labs redrawn in next day or 2. Body Mass Index: Care Instructions  Your Care Instructions    Body mass index (BMI) can help you see if your weight is raising your risk for health problems. It uses a formula to compare how much you weigh with how tall you are. · A BMI lower than 18.5 is considered underweight. · A BMI between 18.5 and 24.9 is considered healthy. · A BMI between 25 and 29.9 is considered overweight. A BMI of 30 or higher is considered obese. If your BMI is in the normal range, it means that you have a lower risk for weight-related health problems. If your BMI is in the overweight or obese range, you may be at increased risk for weight-related health problems, such as high blood pressure, heart disease, stroke, arthritis or joint pain, and diabetes. If your BMI is in the underweight range, you may be at increased risk for health problems such as fatigue, lower protection (immunity) against illness, muscle loss, bone loss, hair loss, and hormone problems. BMI is just one measure of your risk for weight-related health problems. You may be at higher risk for health problems if you are not active, you eat an unhealthy diet, or you drink too much alcohol or use tobacco products. Follow-up care is a key part of your treatment and safety. Be sure to make and go to all appointments, and call your doctor if you are having problems. It's also a good idea to know your test results and keep a list of the medicines you take. How can you care for yourself at home? · Practice healthy eating habits. This includes eating plenty of fruits, vegetables, whole grains, lean protein, and low-fat dairy. · If your doctor recommends it, get more exercise. Walking is a good choice.  Bit by bit, increase the amount you walk every day. Try for at least 30 minutes on most days of the week. · Do not smoke. Smoking can increase your risk for health problems. If you need help quitting, talk to your doctor about stop-smoking programs and medicines. These can increase your chances of quitting for good. · Limit alcohol to 2 drinks a day for men and 1 drink a day for women. Too much alcohol can cause health problems. If you have a BMI higher than 25  · Your doctor may do other tests to check your risk for weight-related health problems. This may include measuring the distance around your waist. A waist measurement of more than 40 inches in men or 35 inches in women can increase the risk of weight-related health problems. · Talk with your doctor about steps you can take to stay healthy or improve your health. You may need to make lifestyle changes to lose weight and stay healthy, such as changing your diet and getting regular exercise. If you have a BMI lower than 18.5  · Your doctor may do other tests to check your risk for health problems. · Talk with your doctor about steps you can take to stay healthy or improve your health. You may need to make lifestyle changes to gain or maintain weight and stay healthy, such as getting more healthy foods in your diet and doing exercises to build muscle. Where can you learn more? Go to http://stanton-gonsalo.info/. Enter S176 in the search box to learn more about \"Body Mass Index: Care Instructions. \"  Current as of: October 9, 2017  Content Version: 11.7  © 8638-2198 [employer], [employer]. Care instructions adapted under license by ReTenant (which disclaims liability or warranty for this information). If you have questions about a medical condition or this instruction, always ask your healthcare professional. [place] [address] any warranty or liability for your use of this information.

## 2018-08-15 NOTE — PROGRESS NOTES
Chief Complaint   Patient presents with    Weight Management     1. Have you been to the ER, urgent care clinic since your last visit? Hospitalized since your last visit? No    2. Have you seen or consulted any other health care providers outside of the [facility] since your last visit? Include any pap smears or colon screening.  No

## 2018-08-15 NOTE — MR AVS SNAPSHOT
[address] [facility] 
 
 
 [address], [facility] [address] [facility] 
[phone] Patient: Rogerio Mesa MRN: [id number] MMW:[id number] Visit Information Date & Time Provider Department Dept. Phone Encounter #  
 8/15/2018  9:15 AM Samira Pratt NP [facility] [address] [place] [address] [phone] Follow-up Instructions Return in about 4 weeks (around 9/12/2018). Your Appointments 9/10/2018  3:00 PM  
Follow Up with Janella Pallas, MD  
[facility] [place] ([facility]) Appt Note: 3 mo f/u  
 [place] [id number]  
[phone] Mckenzie Belén  
  
    
 12/3/2018  8:00 AM  
Office Visit with Arthur Kinsey MD  
[facility] [address] [place] [address]) Appt Note: 6 month follow up  
 [address]  
  
   
 [address] [facility] Upcoming Health Maintenance Date Due ZOSTER VACCINE AGE 60> 3/5/2016 BREAST CANCER SCRN MAMMOGRAM 7/14/2018 Influenza Age 5 to Adult 8/1/2018 PAP AKA CERVICAL CYTOLOGY 7/28/2019 COLONOSCOPY 6/5/2021 DTaP/Tdap/Td series (2 - Td) 2/7/2027 Allergies as of 8/15/2018  Review Complete On: 8/15/2018 By: Sapphire Weller No Known Allergies Current Immunizations  Reviewed on 2/7/2017 Name Date Influenza Vaccine 10/12/2017 Influenza Vaccine (Quad) PF 10/10/2016 Tdap 2/7/2017 Not reviewed this visit You Were Diagnosed With   
  
 Codes Comments Obesity (BMI 30-39.9)    -  Primary ICD-10-CM: J62.7 ICD-9-CM: 278.00 HERZOG (nonalcoholic steatohepatitis)     ICD-10-CM: D84.43 ICD-9-CM: 571.8 Essential hypertension     ICD-10-CM: I10 
ICD-9-CM: 401.9 Paroxysmal atrial fibrillation (HCC)     ICD-10-CM: I48.0 ICD-9-CM: 427.31   
 Mixed hyperlipidemia     ICD-10-CM: E78.2 ICD-9-CM: 272.2 Vitals BP Pulse Temp Resp Height(growth percentile) Weight(growth percentile) 129/83 (BP 1 Location: Left arm, BP Patient Position: Sitting) (!) 54 97.6 °F (36.4 °C) (Oral) 14 5' 6\" (1.676 m) 189 lb 11.2 oz (86 kg) SpO2 BMI OB Status Smoking Status 97% 30.62 kg/m2 Hysterectomy Never Smoker BMI and BSA Data Body Mass Index Body Surface Area  
 30.62 kg/m 2 2 m 2 Preferred Pharmacy Pharmacy Name Phone [facility] [address] [phone] Your Updated Medication List  
  
   
This list is accurate as of 8/15/18 10:16 AM.  Always use your most recent med list.  
  
  
  
  
 apixaban 5 mg tablet Commonly known as:  Ros[place] Axel Take 1 Tab by mouth two (2) times a day. calcium-cholecalciferol (d3) 600-125 mg-unit Tab Take 1,200 mg by mouth daily. Indications: Vitamin D Deficiency  
  
 meloxicam 15 mg tablet Commonly known as:  MOBIC Take 15 mg by mouth daily. metoprolol tartrate 50 mg tablet Commonly known as:  LOPRESSOR Take 1 Tab by mouth two (2) times a day. omega-3 fatty acids-vitamin e 1,000 mg Cap Take 1 Cap by mouth two (2) times a day. Indications: supplement  
  
 polyethylene glycol 17 gram packet Commonly known as:  [place] Jesse Take 1 Packet by mouth daily. potassium 99 mg tablet Take 99 mg by mouth daily as needed (supplement). simvastatin 20 mg tablet Commonly known as:  ZOCOR  
TAKE 1 TABLET BY MOUTH EVERY NIGHT  
  
 TYLENOL ARTHRITIS PAIN 650 mg Tber Generic drug:  acetaminophen Take 650 mg by mouth every six (6) hours as needed for Pain. Follow-up Instructions Return in about 4 weeks (around 9/12/2018). To-Do List   
 08/15/2018 Lab:  METABOLIC PANEL, COMPREHENSIVE   
  
 08/15/2018 Lab:  URIC ACID Patient Instructions Health Maintenance Due Topic Date Due  
 ZOSTER VACCINE AGE 60>  03/05/2016  BREAST CANCER SCRN MAMMOGRAM  07/14/2018  Influenza Age 5 to Adult  08/01/2018 Monthy goal: 
 
Get labs redrawn in next day or 2. Body Mass Index: Care Instructions Your Care Instructions Body mass index (BMI) can help you see if your weight is raising your risk for health problems. It uses a formula to compare how much you weigh with how tall you are. · A BMI lower than 18.5 is considered underweight. · A BMI between 18.5 and 24.9 is considered healthy. · A BMI between 25 and 29.9 is considered overweight. A BMI of 30 or higher is considered obese. If your BMI is in the normal range, it means that you have a lower risk for weight-related health problems. If your BMI is in the overweight or obese range, you may be at increased risk for weight-related health problems, such as high blood pressure, heart disease, stroke, arthritis or joint pain, and diabetes. If your BMI is in the underweight range, you may be at increased risk for health problems such as fatigue, lower protection (immunity) against illness, muscle loss, bone loss, hair loss, and hormone problems. BMI is just one measure of your risk for weight-related health problems. You may be at higher risk for health problems if you are not active, you eat an unhealthy diet, or you drink too much alcohol or use tobacco products. Follow-up care is a key part of your treatment and safety. Be sure to make and go to all appointments, and call your doctor if you are having problems. It's also a good idea to know your test results and keep a list of the medicines you take. How can you care for yourself at home? · Practice healthy eating habits. This includes eating plenty of fruits, vegetables, whole grains, lean protein, and low-fat dairy. · If your doctor recommends it, get more exercise.  Walking is a good choice. Bit by bit, increase the amount you walk every day. Try for at least 30 minutes on most days of the week. · Do not smoke. Smoking can increase your risk for health problems. If you need help quitting, talk to your doctor about stop-smoking programs and medicines. These can increase your chances of quitting for good. · Limit alcohol to 2 drinks a day for men and 1 drink a day for women. Too much alcohol can cause health problems. If you have a BMI higher than 25 · Your doctor may do other tests to check your risk for weight-related health problems. This may include measuring the distance around your waist. A waist measurement of more than 40 inches in men or 35 inches in women can increase the risk of weight-related health problems. · Talk with your doctor about steps you can take to stay healthy or improve your health. You may need to make lifestyle changes to lose weight and stay healthy, such as changing your diet and getting regular exercise. If you have a BMI lower than 18.5 · Your doctor may do other tests to check your risk for health problems. · Talk with your doctor about steps you can take to stay healthy or improve your health. You may need to make lifestyle changes to gain or maintain weight and stay healthy, such as getting more healthy foods in your diet and doing exercises to build muscle. Where can you learn more? Go to http://stanton-gonsalo.info/. Enter S176 in the search box to learn more about \"Body Mass Index: Care Instructions. \" Current as of: October 9, 2017 Content Version: 11.7 © 8004-3908 Options Away, Incorporated. Care instructions adapted under license by Geothermal Engineering (which disclaims liability or warranty for this information). If you have questions about a medical condition or this instruction, always ask your healthcare professional. [place] [address] any warranty or liability for your use of this information. Introducing \[facility] [place] [facility]\"" & HEALTH SERVICES! Dear Meeta Zaldivar: 
Thank you for requesting a [employer] account. Our records indicate that you already have an active [employer] account. You can access your account anytime at [email or web address] [employer] Did you know that you can access your hospital and ER discharge instructions at any time in [employer]? You can also review all of your test results from your hospital stay or ER visit. Additional Information If you have questions, please visit the Frequently Asked Questions section of the [employer] website at [email or web address] Remember, [employer] is NOT to be used for urgent needs. For medical emergencies, dial 911. Now available from your iPhone and Android! Please provide this summary of care documentation to your next provider. Your primary care clinician is listed as Vernette Severin. If you have any questions after today's visit, please call [phone].

## 2018-08-18 ENCOUNTER — HOSPITAL ENCOUNTER (OUTPATIENT)
Dept: LAB | Age: 63
Discharge: HOME | End: 2018-08-18
Payer: COMMERCIAL

## 2018-08-18 DIAGNOSIS — E66.9 OBESITY (BMI 30-39.9): ICD-10-CM

## 2018-08-18 DIAGNOSIS — R74.01 TRANSAMINITIS: Primary | ICD-10-CM

## 2018-08-18 DIAGNOSIS — K75.81 NASH (NONALCOHOLIC STEATOHEPATITIS): ICD-10-CM

## 2018-08-18 DIAGNOSIS — I10 ESSENTIAL HYPERTENSION: ICD-10-CM

## 2018-08-18 LAB
ALBUMIN SERPL-MCNC: 3.7 G/DL (ref 3.4–5)
ALBUMIN/GLOB SERPL: 1.3 {RATIO} (ref 0.8–1.7)
ALP SERPL-CCNC: 115 U/L (ref 45–117)
ALT SERPL-CCNC: 224 U/L (ref 13–56)
ANION GAP SERPL CALC-SCNC: 6 MMOL/L (ref 3–18)
AST SERPL-CCNC: 105 U/L (ref 15–37)
BILIRUB SERPL-MCNC: 0.2 MG/DL (ref 0.2–1)
BUN SERPL-MCNC: 16 MG/DL (ref 7–18)
BUN/CREAT SERPL: 37 (ref 12–20)
CALCIUM SERPL-MCNC: 8.6 MG/DL (ref 8.5–10.1)
CHLORIDE SERPL-SCNC: 109 MMOL/L (ref 100–108)
CO2 SERPL-SCNC: 28 MMOL/L (ref 21–32)
CREAT SERPL-MCNC: 0.43 MG/DL (ref 0.6–1.3)
GLOBULIN SER CALC-MCNC: 2.8 G/DL (ref 2–4)
GLUCOSE SERPL-MCNC: 117 MG/DL (ref 74–99)
POTASSIUM SERPL-SCNC: 4.4 MMOL/L (ref 3.5–5.5)
PROT SERPL-MCNC: 6.5 G/DL (ref 6.4–8.2)
SODIUM SERPL-SCNC: 143 MMOL/L (ref 136–145)
URATE SERPL-MCNC: 3.2 MG/DL (ref 2.6–7.2)

## 2018-08-18 PROCEDURE — 36415 COLL VENOUS BLD VENIPUNCTURE: CPT | Performed by: NURSE PRACTITIONER

## 2018-08-18 PROCEDURE — 84550 ASSAY OF BLOOD/URIC ACID: CPT | Performed by: NURSE PRACTITIONER

## 2018-08-18 PROCEDURE — 80053 COMPREHEN METABOLIC PANEL: CPT | Performed by: NURSE PRACTITIONER

## 2018-08-18 NOTE — PROGRESS NOTES
Please schedule her for labs in 4 wks to recheck her liver tests. Her liver tests are worse. She had a RUQ ultrasound in January 2018 that showed findings c/w HERZOG. Instruct her to stop her simvastatin immediately. We will recheck her liver labs in 4 wks. If they are still elevated, I will refer her back to GI for evaluation.     Dr. Altaf Munoz  [facility], [address] [place] [address], [address] [place] [address] Str.  Phone: [phone]  Fax: [phone]

## 2018-08-20 ENCOUNTER — TELEPHONE (OUTPATIENT)
Dept: INTERNAL MEDICINE CLINIC | Age: 63
End: 2018-08-20

## 2018-08-20 NOTE — TELEPHONE ENCOUNTER
Chief Complaint   Patient presents with    Labs     done 8-18-18 per Dr Qiana Russo     Please schedule her for labs in 4 wks to recheck her liver tests. Her liver tests are worse. She had a RUQ ultrasound in January 2018 that showed findings c/w HERZOG. Instruct her to stop her simvastatin immediately. We will recheck her liver labs in 4 wks. If they are still elevated, I will refer her back to GI for evaluation. Patient reached and informed, she states she understands all, and will stop the Simvastatin immediately.

## 2018-08-20 NOTE — PROGRESS NOTES
Please let pt know she needs to stop the diet, liver enzymes continue to increase. She needs to follow up with Dr. Raven Odonnell.

## 2018-08-20 NOTE — TELEPHONE ENCOUNTER
Chief Complaint   Patient presents with    Labs     done 8-18-18 per NP Cherylene Figures with [facility] patient is to STOP DIET due to elevated enzymes     Please let pt know she needs to stop the diet, liver enzymes continue to increase.  She needs to follow up with Dr. Shon Ayers. Patient reached and informed, she is already following up with Dr Shon Ayers. All understood.

## 2018-08-27 ENCOUNTER — TELEPHONE (OUTPATIENT)
Dept: INTERNAL MEDICINE CLINIC | Age: 63
End: 2018-08-27

## 2018-08-27 ENCOUNTER — HOSPITAL ENCOUNTER (OUTPATIENT)
Dept: MAMMOGRAPHY | Age: 63
Discharge: HOME | End: 2018-08-27
Attending: INTERNAL MEDICINE
Payer: COMMERCIAL

## 2018-08-27 DIAGNOSIS — Z12.31 VISIT FOR SCREENING MAMMOGRAM: ICD-10-CM

## 2018-08-27 PROCEDURE — 77067 SCR MAMMO BI INCL CAD: CPT

## 2018-08-27 NOTE — TELEPHONE ENCOUNTER
Chief Complaint   Patient presents with    Results     done 8-27-18 Mammogram per Dr George Clubs     Please let her know that her mammogram shows no suspicious findings for breast cancer. Patient reached and informed she understands all.

## 2018-08-27 NOTE — PROGRESS NOTES
Please let her know that her mammogram shows no suspicious findings for breast cancer.     Dr. Holly Perales  [facility], [facility], [address] [place] [address] Str.  Phone: [phone]  Fax: [phone]

## 2018-09-10 ENCOUNTER — OFFICE VISIT (OUTPATIENT)
Dept: CARDIOLOGY CLINIC | Age: 63
End: 2018-09-10

## 2018-09-10 VITALS
SYSTOLIC BLOOD PRESSURE: 152 MMHG | HEIGHT: 66 IN | BODY MASS INDEX: 32.14 KG/M2 | WEIGHT: 200 LBS | HEART RATE: 55 BPM | OXYGEN SATURATION: 98 % | DIASTOLIC BLOOD PRESSURE: 88 MMHG

## 2018-09-10 DIAGNOSIS — E78.2 MIXED HYPERLIPIDEMIA: ICD-10-CM

## 2018-09-10 DIAGNOSIS — I10 ESSENTIAL HYPERTENSION: ICD-10-CM

## 2018-09-10 DIAGNOSIS — I48.0 PAROXYSMAL ATRIAL FIBRILLATION (HCC): Primary | ICD-10-CM

## 2018-09-10 NOTE — PROGRESS NOTES
HISTORY OF PRESENT ILLNESS Ashanti Fonseca is a 59 y.o. female. Follow-up Pertinent negatives include no chest pain, no abdominal pain, no headaches and no shortness of breath. Irregular Heart Beat Pertinent negatives include no fever, no chest pain, no claudication, no orthopnea, no PND, no abdominal pain, no nausea, no vomiting, no headaches, no dizziness, no cough and no shortness of breath. Patient presents for a follow-up office visit. Patient was diagnosed with atrial fibrillation with a rapid ventricular response by her PCP in January 2018. She was started on metoprolol for rate control. I started her on Xarelto for anticoagulation at last visit. She was then scheduled for a IGLESIA and cardioversion, however, when she showed up for her procedure, she had already converted back to sinus rhythm. Her anticoagulation was stopped at that time. She underwent an echocardiogram which showed low normal LV systolic function, EF 48% with no valvular heart disease and a normal left atrial size. She has remained on metoprolol for rate control. The patient was again found to be in atrial fibrillation at last visit in June 2018. Her heart rate was around 120 bpm.  She converted back to sinus rhythm the following day. She was started on Eliquis for long-term anticoagulation. Over the past 3 months, she has only had about 2 episodes of atrial fibrillation longest one lasting for about 15 or 20 minutes in duration. She reports that she was recently discovered to have elevated LFTs and routine blood work and as result her statin was discontinued. Past Medical History:  
Diagnosis Date  Arthritis Bilateral Knee, and Bilateral Hand/Thumb  Atrial fibrillation ([address] 1/29/2018  Carpal tunnel syndrome  H/O echocardiogram 01/30/2018 EF 50%, normal left atrial size, no valvular heart disease  Headache  History of ankle fusion 1997  
 left  Hx of migraine headaches  Hypercholesterolemia  Hypertension  Morbid obesity ([facility] [address]  HERZOG (nonalcoholic steatohepatitis) suggested by ultrasound report, 2016 8/11/2017  Vertigo Current Outpatient Prescriptions Medication Sig Dispense Refill  apixaban (ELIQUIS) 5 mg tablet Take 1 Tab by mouth two (2) times a day. 60 Tab 6  
 meloxicam (MOBIC) 15 mg tablet Take 15 mg by mouth daily.  polyethylene glycol (MIRALAX) 17 gram packet Take 1 Packet by mouth daily. (Patient taking differently: Take 1 Packet by mouth daily. Indications: constipation) 30 Packet 1  
 metoprolol tartrate (LOPRESSOR) 50 mg tablet Take 1 Tab by mouth two (2) times a day. (Patient taking differently: Take 1 Tab by mouth two (2) times a day. Indications: hypertension) 180 Tab 3  potassium 99 mg tablet Take 99 mg by mouth daily as needed (supplement).  acetaminophen (TYLENOL ARTHRITIS PAIN) 650 mg CR tablet Take 650 mg by mouth every six (6) hours as needed for Pain.  omega-3 fatty acids-vitamin e 1,000 mg cap Take 1 Cap by mouth two (2) times a day. Indications: supplement  calcium-cholecalciferol, d3, 600-125 mg-unit tab Take 1,200 mg by mouth daily. Indications: Vitamin D Deficiency No Known Allergies Social History Substance Use Topics  Smoking status: Never Smoker  Smokeless tobacco: Never Used  Alcohol use No  
 
 
 
 
Review of Systems Constitutional: Negative for chills, fever and weight loss. HENT: Negative for nosebleeds. Eyes: Negative for blurred vision and double vision. Respiratory: Negative for cough, shortness of breath and wheezing. Cardiovascular: Positive for palpitations. Negative for chest pain, orthopnea, claudication, leg swelling and PND. Gastrointestinal: Negative for abdominal pain, heartburn, nausea and vomiting. Genitourinary: Negative for dysuria and hematuria. Musculoskeletal: Negative for falls and myalgias. Skin: Negative for rash. Neurological: Negative for dizziness, focal weakness and headaches. Endo/Heme/Allergies: Does not bruise/bleed easily. Psychiatric/Behavioral: Negative for substance abuse. Visit Vitals  /88 (BP 1 Location: Left arm, BP Patient Position: Sitting)  Pulse (!) 55  Ht 5' 6\" (1.676 m)  Wt 90.7 kg (200 lb)  SpO2 98%  BMI 32.28 kg/m2 Physical Exam  
Constitutional: She is oriented to person, place, and time. She appears well-developed and well-nourished. HENT:  
Head: Normocephalic and atraumatic. Eyes: Conjunctivae are normal.  
Neck: Neck supple. No JVD present. Carotid bruit is not present. Cardiovascular: Normal rate, regular rhythm, S1 normal, S2 normal and normal pulses. Exam reveals no gallop and no distant heart sounds. No murmur heard. Pulmonary/Chest: Effort normal and breath sounds normal. She has no wheezes. She has no rales. Abdominal: Soft. Bowel sounds are normal. There is no tenderness. Musculoskeletal: She exhibits no edema, tenderness or deformity. Neurological: She is alert and oriented to person, place, and time. Skin: Skin is warm and dry. Psychiatric: She has a normal mood and affect. Her behavior is normal. Thought content normal.  
 
EKG: A normal sinus rhythm, normal axis, low voltage, no ST or T-wave abnormalities. Compared to the previous EKG, sinus rhythm has replaced atrial fibrillation. ASSESSMENT and PLAN Paroxysmal atrial fibrillation. Initially diagnosed in January 2018. Patient converted on her own prior to her scheduled cardioversion. Her CHADs-Vasc score is 2. The patient was back in atrial fibrillation in June 2018 but has since converted to sinus rhythm. She only reports 2 significant episodes of heart palpitations over the past 3 months. I would continue her current dosage of metoprolol and her Eliquis for oral anticoagulation. Essential hypertension.   Patient blood pressure is mildly elevated today in the office, however, at home she states is much better controlled. For now continue her beta-blocker. If her blood pressure remains elevated, I would consider adding a thiazide diuretic to her regimen. Kieran Antoine Dyslipidemia. Patient was previously taking simvastatin, however that was stopped last month because of elevated transaminases. This is being managed by her PCP. Follow-up in 6 months, sooner as needed

## 2018-09-10 NOTE — MR AVS SNAPSHOT
[address] [place] [address] 
[phone] Patient: Meme Castillo MRN: [id number] YDL:[birth year] Visit Information Date & Time Provider Department Dept. Phone Encounter #  
 9/10/2018  3:00 PM Félix Davis MD [facility] [address] [id number] Your Appointments 9/21/2018  9:00 AM  
Follow Up with KAMAR[facility] [place] ([facility]) Appt Note: [facility] followup - first visit with [address], [address] [facility] [address] [place] [facility]\"" [address]  
  
    
 12/3/2018  8:00 AM  
Office Visit with Amanda Casillas MD  
[facility]) Appt Note: 6 month follow up  
 [address] [facility], [address] [place] [address]  
  
   
 [address], [address] Upcoming Health Maintenance Date Due ZOSTER VACCINE AGE 60> 3/5/2016 Influenza Age 5 to Adult 8/1/2018 PAP AKA CERVICAL CYTOLOGY 7/28/2019 BREAST CANCER SCRN MAMMOGRAM 8/27/2019 COLONOSCOPY 6/5/2021 DTaP/Tdap/Td series (2 - Td) 2/7/2027 Allergies as of 9/10/2018  Review Complete On: 8/15/2018 By: Juanita Rader NP No Known Allergies Current Immunizations  Reviewed on 2/7/2017 Name Date Influenza Vaccine 10/12/2017 Influenza Vaccine (Quad) PF 10/10/2016 Tdap 2/7/2017 Not reviewed this visit You Were Diagnosed With   
  
 Codes Comments Paroxysmal atrial fibrillation (HCC)    -  Primary ICD-10-CM: I48.0 ICD-9-CM: 427.31 Vitals BP Pulse Height(growth percentile) Weight(growth percentile) SpO2 BMI  
 152/88 (BP 1 Location: Left arm, BP Patient Position: Sitting) (!) 55 5' 6\" (1.676 m) 200 lb (90.7 kg) 98% 32.28 kg/m2 OB Status Smoking Status Hysterectomy Never Smoker Vitals History BMI and BSA Data Body Mass Index Body Surface Area  
 32.28 kg/m 2 2.06 m 2 Preferred Pharmacy Pharmacy Name Phone [facility] Carla Hawkins [address] [place] [address] [phone] Your Updated Medication List  
  
   
This list is accurate as of 9/10/18  3:16 PM.  Always use your most recent med list.  
  
  
  
  
 apixaban 5 mg tablet Commonly known as:  Jazz Loll Take 1 Tab by mouth two (2) times a day. calcium-cholecalciferol (d3) 600-125 mg-unit Tab Take 1,200 mg by mouth daily. Indications: Vitamin D Deficiency  
  
 meloxicam 15 mg tablet Commonly known as:  MOBIC Take 15 mg by mouth daily. metoprolol tartrate 50 mg tablet Commonly known as:  LOPRESSOR Take 1 Tab by mouth two (2) times a day. omega-3 fatty acids-vitamin e 1,000 mg Cap Take 1 Cap by mouth two (2) times a day. Indications: supplement  
  
 polyethylene glycol 17 gram packet Commonly known as:  Juhi Floor Take 1 Packet by mouth daily. potassium 99 mg tablet Take 99 mg by mouth daily as needed (supplement). TYLENOL ARTHRITIS PAIN 650 mg Sohan Race Generic drug:  acetaminophen Take 650 mg by mouth every six (6) hours as needed for Pain. We Performed the Following AMB POC EKG ROUTINE W/ 12 LEADS, INTER & REP [24158 CPT(R)] Introducing [facility]! Dear Adrien Magallanes: 
Thank you for requesting a [employer] account. Our records indicate that you already have an active [employer] account. You can access your account anytime at [email or web address] Bu F?rsat. [employer]/F?rsat Bu F?rsat Did you know that you can access your hospital and ER discharge instructions at any time in [employer]? You can also review all of your test results from your hospital stay or ER visit. Additional Information If you have questions, please visit the Frequently Asked Questions section of the [employer] website at [email or web address] Remember, [employer] is NOT to be used for urgent needs. For medical emergencies, dial 911. Now available from your iPhone and Android! Please provide this summary of care documentation to your next provider. Your primary care clinician is listed as Becky Carrington. If you have any questions after today's visit, please call [phone]. Biopsy Type: H and E

## 2018-09-17 DIAGNOSIS — I10 ESSENTIAL HYPERTENSION: Primary | ICD-10-CM

## 2018-09-17 DIAGNOSIS — K75.81 NASH (NONALCOHOLIC STEATOHEPATITIS): ICD-10-CM

## 2018-09-17 DIAGNOSIS — Z13.0 SCREENING FOR DEFICIENCY ANEMIA: ICD-10-CM

## 2018-09-17 DIAGNOSIS — E78.2 MIXED HYPERLIPIDEMIA: ICD-10-CM

## 2018-09-17 RX ORDER — MELOXICAM 15 MG/1
15 TABLET ORAL DAILY
Qty: 30 TAB | Refills: 5 | OUTPATIENT
Start: 2018-09-17

## 2018-09-17 NOTE — TELEPHONE ENCOUNTER
Hi Dr. Jena Scherer,     I have 0 refills for Meloxican 15 mg would you please send refill request to [facility]? Jessy Brunner you   The patient was last seen 5-31-18 and will follow up with Dr Jena Scherer on 12-03-18. The requested medication has not been filled by Dr Jena Scheerr before. Chief Complaint   Patient presents with    Medication Refill     Meloxicam 15 mg per DR Jena Scherer is refused, due to being too dangerous to take with Eliquis medication   patient reached and informed that Dr Jena Scherer has refused the refill request, due to it being too dangerous to take along with Eliquis. The patient states she understands all.

## 2018-10-09 ENCOUNTER — TELEPHONE (OUTPATIENT)
Dept: ORTHOPEDIC SURGERY | Facility: CLINIC | Age: 63
End: 2018-10-09

## 2018-10-09 RX ORDER — CEFADROXIL 500 MG/1
CAPSULE ORAL
Qty: 5 CAP | Refills: 1 | Status: SHIPPED | OUTPATIENT
Start: 2018-10-09 | End: 2019-03-11 | Stop reason: ALTCHOICE

## 2018-10-09 RX ORDER — CEFADROXIL 500 MG/1
CAPSULE ORAL
Qty: 5 CAP | Refills: 1 | Status: CANCELLED | OUTPATIENT
Start: 2018-10-09

## 2018-10-09 NOTE — TELEPHONE ENCOUNTER
Nakul Watters from [address] [place] [address] called in requesting to know what the procedure was for pre meds. I advised her what the nurse stated and she is asking for this to be faxed to them at [phone] and for the pt to get an Rx as they do not carry that medication.

## 2018-11-29 ENCOUNTER — OFFICE VISIT (OUTPATIENT)
Dept: ORTHOPEDIC SURGERY | Facility: CLINIC | Age: 63
End: 2018-11-29

## 2018-11-29 VITALS
HEIGHT: 66 IN | RESPIRATION RATE: 16 BRPM | BODY MASS INDEX: 33.59 KG/M2 | WEIGHT: 209 LBS | DIASTOLIC BLOOD PRESSURE: 73 MMHG | OXYGEN SATURATION: 99 % | TEMPERATURE: 98.9 F | SYSTOLIC BLOOD PRESSURE: 143 MMHG | HEART RATE: 51 BPM

## 2018-11-29 DIAGNOSIS — M17.12 PRIMARY OSTEOARTHRITIS OF LEFT KNEE: Primary | ICD-10-CM

## 2018-11-29 DIAGNOSIS — G89.29 CHRONIC PAIN OF LEFT KNEE: ICD-10-CM

## 2018-11-29 DIAGNOSIS — Z01.812 BLOOD TESTS PRIOR TO TREATMENT OR PROCEDURE: ICD-10-CM

## 2018-11-29 DIAGNOSIS — M25.562 CHRONIC PAIN OF LEFT KNEE: ICD-10-CM

## 2018-11-29 DIAGNOSIS — Z01.811 PRE-OPERATIVE RESPIRATORY EXAMINATION: ICD-10-CM

## 2018-11-29 RX ORDER — BUPIVACAINE HYDROCHLORIDE 2.5 MG/ML
4 INJECTION, SOLUTION EPIDURAL; INFILTRATION; INTRACAUDAL ONCE
Qty: 4 ML | Refills: 0
Start: 2018-11-29 | End: 2018-11-29

## 2018-11-29 RX ORDER — BETAMETHASONE SODIUM PHOSPHATE AND BETAMETHASONE ACETATE 3; 3 MG/ML; MG/ML
6 INJECTION, SUSPENSION INTRA-ARTICULAR; INTRALESIONAL; INTRAMUSCULAR; SOFT TISSUE ONCE
Qty: 0.5 ML | Refills: 0
Start: 2018-11-29 | End: 2018-11-29

## 2018-11-29 NOTE — PROGRESS NOTES
Patient: Mami Mo                MRN: [id number]       SSN: [id number] YOB: 1955        AGE: 59 y.o. SEX: female PCP: Reshma Moreno MD 
11/29/18 Chief Complaint Patient presents with  Knee Pain Left knee pain HISTORY:  Mami Mo is a 59 y.o. female who is seen for L knee pain. She notes pain with standing and walking. She notes increased pain recently. She denies any previous injury or trauma. She is s/p right TKR and is doing very well, she would like to discuss a left knee arthroplasty. [facility] with benefit but she did not wish to be seen again. She states that she is right-leg dominant. She states that she has needed left knee replacement surgery for over 10 years. She completed a successful bilateral Euflexxa series on 12/29/16. She reports her left knee pain is mostly anterior. 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 She noticed that she has been walking less due to her left knee pain. She has a h/o atrial fibrillation and was recently been taking Eliquis. She was previously seen for bilateral thumb and wrist pain. She notes increased bilateral thumb pain, numbness, and tingling as well. She reports increased leg pain and swelling with increased activity recently. She notes thumb pain with pinch and . Pain Assessment  11/29/2018 Location of Pain Knee Location Modifiers Left Severity of Pain 8 Quality of Pain Throbbing; Sharp;Dull;Grinding Quality of Pain Comment - Duration of Pain Persistent Duration of Pain Comment - Frequency of Pain Constant Aggravating Factors Standing;Stairs; Walking Aggravating Factors Comment - Limiting Behavior Some Relieving Factors Nothing Result of Injury No  
Work-Related Injury - Type of Injury - Occupation, etc:  Ms. 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 She states that her job is primarily sedentary. She previously played clarinet and piano. She previously worked at [employer] in [place], [place] in the [employer]. She previously lived in [place], [place] until 2010 and in [place] until 2014. Her son is in the [employer]. She states that she moved to be close to be closer her grandchildren. She has two granddaughters (9 and 15) and one 9 yo grandson. Her mother is a [facility] patient and Ms. Jess Artis is very pleased with her treatment. She has high blood pressure and high cholesterol for which she takes medication. She reports that she has gained 3 pounds recently. She enjoys staying active with her grandchildren. She is 213 pounds. She reports she has lost 60 pounds over the past 4 months. She is 5'5\". She is not diabetic. She states she was recently diagnosed with AFIB and has been taking Eliquis. Weight Metrics 11/29/2018 9/10/2018 8/15/2018 8/15/2018 8/9/2018 8/8/2018 7/25/2018 Weight 209 lb 200 lb - 189 lb 11.2 oz - 192 lb 12.8 oz 194 lb 12.8 oz Waist Measure Inches - - 36 - 37 - 37 Exercise Mins/week - - 150 - - - - Body Fat % - - 39.3 - - - -  
BMI 33.73 kg/m2 32.28 kg/m2 - 30.62 kg/m2 - 31.12 kg/m2 31.44 kg/m2 Patient Active Problem List  
Diagnosis Code  Hyperlipidemia E78.5  Essential hypertension I10  
 Obesity (BMI 30-39. 9) E66.9  
 HERZOG (nonalcoholic steatohepatitis) suggested by ultrasound report, 2016 K75.81  
 Atrial fibrillation (HCC) I48.91  
 Primary osteoarthritis of right knee M17.11 REVIEW OF SYSTEMS: All Below are Negative except: See HPI Constitutional: negative for fever, chills, and weight loss. Cardiovascular: negative for chest pain, claudication, leg swelling, SOB, BETHEA Gastrointestinal: Negative for pain, N/V/C/D, Blood in stool or urine, dysuria,  hematuria, incontinence, pelvic pain.  
 Musculoskeletal: See HPI 
 Neurological: Negative for dizziness and weakness. Negative for headaches, Visual changes, confusion, seizures Phychiatric/Behavioral: Negative for depression, memory loss, substance  abuse. Extremities: Negative for hair changes, rash, or skin lesion changes. Hematologic: Negative for bleeding problems, bruising, pallor or swollen lymph  nodes Peripheral Vascular: No calf pain, no circulation deficits. Social History Socioeconomic History  Marital status:  Spouse name: Not on file  Number of children: Not on file  Years of education: Not on file  Highest education level: Not on file Social Needs  Financial resource strain: Not on file  Food insecurity - worry: Not on file  Food insecurity - inability: Not on file  Transportation needs - medical: Not on file  Transportation needs - non-medical: Not on file Occupational History  Not on file Tobacco Use  Smoking status: Never Smoker  Smokeless tobacco: Never Used Substance and Sexual Activity  Alcohol use: No  
  Alcohol/week: 0.0 oz  Drug use: No  
 Sexual activity: Not on file Other Topics Concern  Not on file Social History Narrative Works at [employer] in [place] [address] No Known Allergies Current Outpatient Medications Medication Sig  
 betamethasone (CELESTONE SOLUSPAN) 6 mg/mL injection 1 mL by Intra artICUlar route once for 1 dose.  bupivacaine, PF, (MARCAINE, PF,) 0.25 % (2.5 mg/mL) injection 4 mL by Intra artICUlar route once for 1 dose.  apixaban (ELIQUIS) 5 mg tablet Take 1 Tab by mouth two (2) times a day.  polyethylene glycol (MIRALAX) 17 gram packet Take 1 Packet by mouth daily. (Patient taking differently: Take 1 Packet by mouth daily. Indications: constipation)  metoprolol tartrate (LOPRESSOR) 50 mg tablet Take 1 Tab by mouth two (2) times a day.  (Patient taking differently: Take 1 Tab by mouth two (2) times a day. Indications: hypertension)  potassium 99 mg tablet Take 99 mg by mouth daily as needed (supplement).  acetaminophen (TYLENOL ARTHRITIS PAIN) 650 mg CR tablet Take 650 mg by mouth every six (6) hours as needed for Pain.  omega-3 fatty acids-vitamin e 1,000 mg cap Take 1 Cap by mouth two (2) times a day. Indications: supplement  calcium-cholecalciferol, d3, 600-125 mg-unit tab Take 1,200 mg by mouth daily. Indications: Vitamin D Deficiency  cefadroxil (DURICEF) 500 mg capsule At bedtime prior to procedure x 3 (PO) Duricef 500 mg tabs, and x 2 (PO) Duricef 500 mg tabs 1 hour prior to procedure No current facility-administered medications for this visit. PHYSICAL EXAMINATION: 
Visit Vitals /73 (BP 1 Location: Left arm, BP Patient Position: Sitting) Pulse (!) 51 Temp 98.9 °F (37.2 °C) (Oral) Resp 16 Ht 5' 6\" (1.676 m) Wt 209 lb (94.8 kg) SpO2 99% BMI 33.73 kg/m² Appearance: Alert, well appearing and pleasant patient who is in no distress, oriented to person, place/time, and who follows commands. HEENT: Hans Montano hears well, does not require hearing aids. Her sclera of the eyes are non-icteric. She is breathing normally and no respiratory accessory muscle use is noted. No JVD present and Neck ROM within normal limits. Psychiatric: Affect and mood are appropriate. Oriented x3 Heart[de-identified]  S1, S2 without murmer, regular rhythm Lungs:  Breath sounds clear to auscultation Cardiovascular/Peripheral Vascular: Normal pulses to each foot. Integumentary: No rashes,  wounds, or abrasions. Warm and normal color. No drainage. Gait: + limp Sensory Exam: Intact/Normal Sensation Lymphatic: No evidence of Lymphedema Vascular:      
Pulses: palpable Varicosities none Wounds/Abrasion: None Present Neuro: Negative, no tremors ORTHO EXAMINATION: 
Examination Right knee Left knee Skin Well healed incision scars Intact Range of motion 115-0 115-0 Effusion - 1+ Medial joint line tenderness - + Lateral joint line tenderness - - Popliteal tenderness - -  
Osteophytes palpable - + Fredericks - - Patella crepitus - + Anterior drawer - - Lateral laxity - - Medial laxity - - Varus deformity - -  
Valgus deformity - + Pretibial edema - + Calf tenderness - - Examination Lumbar Thoracic Skin Intact Intact Tenderness + - Tightness - - Lordosis Normal N/A Kyphosis N/A Increased Scoliosis - - Flexion Fingertips to mid shin N/A Extension 10 N/A Knee reflexes Normal N/A Ankle reflexes Normal N/A Straight leg raise - N/A Calf tenderness - N/A Examination Right Wrist Left Wrist  
Skin Intact Intact Tenderness +, basal joint +, basal joint Flexion 60 60 Extension 60 60 Deformity - - Effusion - - Finger flexion Full Full Finger extension Full Full Tinnel's sign - - Phalen's test - - Finklestein maneuver - -  
Pain with thumb abduction - - Capillary refill - - PROCEDURE: After discussing treatment options, patient's left knee was injected with 4 cc Marcaine and 1/2 cc Celestone. Chart reviewed for the following: 
 Radha Iqbal MD, have reviewed the History, Physical and updated the Allergic reactions for [facility] Dus TIME OUT performed immediately prior to start of procedure: 
Radha Iqbal MD, have performed the following reviews on [facility] Dus prior to the start of the procedure: 
         
* Patient was identified by name and date of birth * Agreement on procedure being performed was verified * Risks and Benefits explained to the patient * Procedure site verified and marked as necessary * Patient was positioned for comfort * Consent was obtained Time: 8:42 AM  
 
Date of procedure: 11/29/2018 Procedure performed by:  Lisette Gtz MD 
 
Ms. Vosler tolerated the procedure well with no complications. MRI RIGHT KNEE WO CONT 1/16/18 IMPRESSION: 
 1. Severe tricompartmental degenerative joint disease with no fracture or significant joint effusion. 2. Maceration of the menisci. 3. No significant joint effusion. Nonspecific edema in the gastrocnemius, incompletely imaged. RADIOGRAPHS: 
XR LEFT KNEE LONG FILMS 11/29/18 ABBY IMPRESSION:  Three views - No fractures, no effusion, near complete lateral joint space narrowing with deformity, large osteophytes present. IKDC Grade D, valgus deformity. Femoral valgus angle: 4 degrees. Well-fixed prosthetic components in satisfactory position and alignment of the right knee. XR RIGHT KNEE 1/12/18  
-I have independently reviewed these images during this office visit. -Dr. Chi Castrejon IMPRESSION:  Three views - No fractures, no effusion, severe joint space narrowing R>L, + osteophytes present. Severe CMP. Mild Valgus deformity L,  
 
XR WILLEM KNEES 5/16/16 IMPRESSION:  Grade 3 osteoarthritis of both knees. IMPRESSION:   
  ICD-10-CM ICD-9-CM 1. Primary osteoarthritis of left knee M17.12 715.16 betamethasone (CELESTONE SOLUSPAN) 6 mg/mL injection BETAMETHASONE ACETATE & SODIUM PHOSPHATE INJECTION 3 MG EA.  
   DRAIN/INJECT LARGE JOINT/BURSA  
   bupivacaine, PF, (MARCAINE, PF,) 0.25 % (2.5 mg/mL) injection AMB POC XRAY, KNEE; COMPLETE, 4+ VIEW 2. Chronic pain of left knee M25.562 719.46 betamethasone (CELESTONE SOLUSPAN) 6 mg/mL injection G89.29 338.29 BETAMETHASONE ACETATE & SODIUM PHOSPHATE INJECTION 3 MG EA.  
   DRAIN/INJECT LARGE JOINT/BURSA  
   bupivacaine, PF, (MARCAINE, PF,) 0.25 % (2.5 mg/mL) injection AMB POC XRAY, KNEE; COMPLETE, 4+ VIEW PLAN:  After discussing treatment options, patient's left knee was injected with 4 cc Marcaine and 1/2 cc Celestone. She will be scheduled for a left TKR around May 21 2019. Risks of surgery outlined and informed consent obtained. She will follow up for H&P.   
 
Scribed by Vianney Sierra ([address] [facility]) as dictated by [facility] Lars Marion MD

## 2018-12-15 ENCOUNTER — HOSPITAL ENCOUNTER (OUTPATIENT)
Dept: LAB | Age: 63
Discharge: HOME | End: 2018-12-15
Payer: COMMERCIAL

## 2018-12-15 DIAGNOSIS — E78.2 MIXED HYPERLIPIDEMIA: ICD-10-CM

## 2018-12-15 DIAGNOSIS — K75.81 NASH (NONALCOHOLIC STEATOHEPATITIS): ICD-10-CM

## 2018-12-15 DIAGNOSIS — Z13.0 SCREENING FOR DEFICIENCY ANEMIA: ICD-10-CM

## 2018-12-15 DIAGNOSIS — I10 ESSENTIAL HYPERTENSION: ICD-10-CM

## 2018-12-15 LAB
ALBUMIN SERPL-MCNC: 3.8 G/DL (ref 3.4–5)
ALBUMIN/GLOB SERPL: 1.2 {RATIO} (ref 0.8–1.7)
ALP SERPL-CCNC: 105 U/L (ref 45–117)
ALT SERPL-CCNC: 154 U/L (ref 13–56)
ANION GAP SERPL CALC-SCNC: 5 MMOL/L (ref 3–18)
AST SERPL-CCNC: 48 U/L (ref 15–37)
BASOPHILS # BLD: 0 K/UL (ref 0–0.1)
BASOPHILS NFR BLD: 0 % (ref 0–2)
BILIRUB SERPL-MCNC: 0.3 MG/DL (ref 0.2–1)
BUN SERPL-MCNC: 12 MG/DL (ref 7–18)
BUN/CREAT SERPL: 22 (ref 12–20)
CALCIUM SERPL-MCNC: 9.3 MG/DL (ref 8.5–10.1)
CHLORIDE SERPL-SCNC: 108 MMOL/L (ref 100–108)
CHOLEST SERPL-MCNC: 242 MG/DL
CO2 SERPL-SCNC: 28 MMOL/L (ref 21–32)
CREAT SERPL-MCNC: 0.55 MG/DL (ref 0.6–1.3)
CREAT UR-MCNC: 15.4 MG/DL (ref 30–125)
DIFFERENTIAL METHOD BLD: NORMAL
EOSINOPHIL # BLD: 0.1 K/UL (ref 0–0.4)
EOSINOPHIL NFR BLD: 1 % (ref 0–5)
ERYTHROCYTE [DISTWIDTH] IN BLOOD BY AUTOMATED COUNT: 12.3 % (ref 11.6–14.5)
GLOBULIN SER CALC-MCNC: 3.1 G/DL (ref 2–4)
GLUCOSE SERPL-MCNC: 105 MG/DL (ref 74–99)
HBA1C MFR BLD: 5.2 % (ref 4.2–5.6)
HCT VFR BLD AUTO: 43 % (ref 35–45)
HDLC SERPL-MCNC: 79 MG/DL (ref 40–60)
HDLC SERPL: 3.1 {RATIO} (ref 0–5)
HGB BLD-MCNC: 14.2 G/DL (ref 12–16)
LDLC SERPL CALC-MCNC: 122.4 MG/DL (ref 0–100)
LIPID PROFILE,FLP: ABNORMAL
LYMPHOCYTES # BLD: 2 K/UL (ref 0.9–3.6)
LYMPHOCYTES NFR BLD: 40 % (ref 21–52)
MCH RBC QN AUTO: 30 PG (ref 24–34)
MCHC RBC AUTO-ENTMCNC: 33 G/DL (ref 31–37)
MCV RBC AUTO: 90.7 FL (ref 74–97)
MICROALBUMIN UR-MCNC: <0.5 MG/DL (ref 0–3)
MICROALBUMIN/CREAT UR-RTO: ABNORMAL MG/G (ref 0–30)
MONOCYTES # BLD: 0.4 K/UL (ref 0.05–1.2)
MONOCYTES NFR BLD: 8 % (ref 3–10)
NEUTS SEG # BLD: 2.6 K/UL (ref 1.8–8)
NEUTS SEG NFR BLD: 51 % (ref 40–73)
PLATELET # BLD AUTO: 241 K/UL (ref 135–420)
PMV BLD AUTO: 9.5 FL (ref 9.2–11.8)
POTASSIUM SERPL-SCNC: 4 MMOL/L (ref 3.5–5.5)
PROT SERPL-MCNC: 6.9 G/DL (ref 6.4–8.2)
RBC # BLD AUTO: 4.74 M/UL (ref 4.2–5.3)
SODIUM SERPL-SCNC: 141 MMOL/L (ref 136–145)
TRIGL SERPL-MCNC: 203 MG/DL (ref ?–150)
VLDLC SERPL CALC-MCNC: 40.6 MG/DL
WBC # BLD AUTO: 5.1 K/UL (ref 4.6–13.2)

## 2018-12-15 PROCEDURE — 85025 COMPLETE CBC W/AUTO DIFF WBC: CPT

## 2018-12-15 PROCEDURE — 80061 LIPID PANEL: CPT

## 2018-12-15 PROCEDURE — 83036 HEMOGLOBIN GLYCOSYLATED A1C: CPT

## 2018-12-15 PROCEDURE — 36415 COLL VENOUS BLD VENIPUNCTURE: CPT

## 2018-12-15 PROCEDURE — 82043 UR ALBUMIN QUANTITATIVE: CPT

## 2018-12-15 PROCEDURE — 80053 COMPREHEN METABOLIC PANEL: CPT

## 2018-12-17 DIAGNOSIS — Z01.812 BLOOD TESTS PRIOR TO TREATMENT OR PROCEDURE: ICD-10-CM

## 2018-12-17 DIAGNOSIS — Z01.811 PRE-OPERATIVE RESPIRATORY EXAMINATION: ICD-10-CM

## 2018-12-17 DIAGNOSIS — Z01.810 PRE-OPERATIVE CARDIOVASCULAR EXAMINATION: ICD-10-CM

## 2018-12-17 DIAGNOSIS — M17.12 PRIMARY OSTEOARTHRITIS OF LEFT KNEE: Primary | ICD-10-CM

## 2018-12-18 ENCOUNTER — OFFICE VISIT (OUTPATIENT)
Dept: INTERNAL MEDICINE CLINIC | Age: 63
End: 2018-12-18

## 2018-12-18 VITALS
BODY MASS INDEX: 34.2 KG/M2 | WEIGHT: 212.8 LBS | HEIGHT: 66 IN | DIASTOLIC BLOOD PRESSURE: 75 MMHG | SYSTOLIC BLOOD PRESSURE: 131 MMHG | HEART RATE: 50 BPM | OXYGEN SATURATION: 97 % | RESPIRATION RATE: 14 BRPM | TEMPERATURE: 98.6 F

## 2018-12-18 DIAGNOSIS — E78.2 MIXED HYPERLIPIDEMIA: ICD-10-CM

## 2018-12-18 DIAGNOSIS — R74.01 TRANSAMINITIS: Primary | ICD-10-CM

## 2018-12-18 DIAGNOSIS — I10 ESSENTIAL HYPERTENSION: ICD-10-CM

## 2018-12-18 DIAGNOSIS — K75.81 NASH (NONALCOHOLIC STEATOHEPATITIS): ICD-10-CM

## 2018-12-18 DIAGNOSIS — I48.0 PAROXYSMAL ATRIAL FIBRILLATION (HCC): ICD-10-CM

## 2018-12-18 DIAGNOSIS — E66.9 OBESITY (BMI 30-39.9): ICD-10-CM
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
## 2018-12-18 NOTE — PROGRESS NOTES
INTERNISTS OF [facility] [place]:  12/18/2018, MRN: [id number]      Hipolito Estrada is a 59 y.o. female and presents to clinic for Hypertension (follow up); Cholesterol Problem (follow up); and Labs (done 12-15-18)    Subjective:   Pt is a 66yo female with h/o HLD, HTN, AF, and DJD. 1. HLD/HERZOG: Present for over 6 months. Her most recent lab results show: Her transaminitis is still present but improved since stopping zocor. +H/o HERZOG. I will order a f/u ultrasound (RUQ) for 1/19. Her cholesterol is 242. Her triglycerides are 203. Her HDL is 79. Her LDL is 122. Her AST is 48, down from 105. Her ALT is 154, down from 224 four months ago. Her CBC is normal.  There is no evidence of prediabetes or diabetes. No excessive alcoholic beverage consumption. Her weight is 212lbs today. She is not dieting. \"I do quite a bit of walking. \"     Wt Readings from Last 3 Encounters:   12/18/18 212 lb 12.8 oz (96.5 kg)   11/29/18 209 lb (94.8 kg)   09/10/18 200 lb (90.7 kg)     1/25/18 RUQ Ultrasound: No acute findings on this right upper quadrant ultrasound. Increased hepatic echotexture commonly seen in hepatic steatosis. 2.  Hypertension/AF: Followed by Cardiology. On Eliquis and metoprolol. She also takes potassium. She reports no adverse side effects of taking her medications. No tobacco use. 3. Fall: She fell down in her bath tub 2-3 months ago. She slipped accidentally. She may have passed out \"for a second. \" \"I don't know if I was turning too fast and my foot slipped. \" No palpitations, lightheadedness, CP, or SOB. She hurt her mid back along the faucet when she fell backwards. She bought a mat to place in the tub. Bars have also been added to the shower.        Patient Active Problem List    Diagnosis Date Noted    Primary osteoarthritis of right knee 05/22/2018    Atrial fibrillation ([address] 01/29/2018    HERZOG (nonalcoholic steatohepatitis) suggested by ultrasound report, 2016 08/11/2017    Obesity (BMI 30-39.9) 02/12/2017  Hyperlipidemia 02/07/2017    Essential hypertension 02/07/2017       Current Outpatient Medications   Medication Sig Dispense Refill    apixaban (ELIQUIS) 5 mg tablet Take 1 Tab by mouth two (2) times a day. 60 Tab 6    polyethylene glycol (MIRALAX) 17 gram packet Take 1 Packet by mouth daily. (Patient taking differently: Take 1 Packet by mouth daily. Indications: constipation) 30 Packet 1    metoprolol tartrate (LOPRESSOR) 50 mg tablet Take 1 Tab by mouth two (2) times a day. (Patient taking differently: Take 1 Tab by mouth two (2) times a day. Indications: hypertension) 180 Tab 3    potassium 99 mg tablet Take 99 mg by mouth daily as needed (supplement).  acetaminophen (TYLENOL ARTHRITIS PAIN) 650 mg CR tablet Take 650 mg by mouth every six (6) hours as needed for Pain.  omega-3 fatty acids-vitamin e 1,000 mg cap Take 1 Cap by mouth two (2) times a day. Indications: supplement      calcium-cholecalciferol, d3, 600-125 mg-unit tab Take 1,200 mg by mouth daily.  Indications: Vitamin D Deficiency      cefadroxil (DURICEF) 500 mg capsule At bedtime prior to procedure x 3 (PO) Duricef 500 mg tabs, and x 2 (PO) Duricef 500 mg tabs 1 hour prior to procedure 5 Cap 1       No Known Allergies    Past Medical History:   Diagnosis Date    Arthritis     Bilateral Knee, and Bilateral Hand/Thumb    Atrial fibrillation ([address] 1/29/2018    Carpal tunnel syndrome     H/O echocardiogram 01/30/2018    EF 50%, normal left atrial size, no valvular heart disease    Headache     History of ankle fusion 1997    left    Hx of migraine headaches     Hypercholesterolemia     Hypertension     Morbid obesity ([address]     HERZOG (nonalcoholic steatohepatitis) suggested by ultrasound report, 2016 8/11/2017    Vertigo        Past Surgical History:   Procedure Laterality Date    COLONOSCOPY N/A 6/5/2018    COLONOSCOPY performed by Lindsay Fragoso MD at [facility]COPY    HX APPENDECTOMY      age 25 years   [facility] [place] HX COLONOSCOPY Due in 2018: May 2015    HX HYSTERECTOMY      age 30years old   [facility] HX KNEE REPLACEMENT Right 05/22/2018    HX ORTHOPAEDIC Left     ankle fusion    HX OTHER SURGICAL  2014    removal of mole left breast    HX TONSILLECTOMY      at age 29years old       Family History   Problem Relation Age of Onset    Heart Disease Mother     Hypertension Mother     Heart Surgery Mother     Cancer Father         colon    Hypertension Brother     No Known Problems Maternal Grandmother     Heart Disease Maternal Grandfather     MS Paternal Grandmother     Stroke Paternal Grandfather     Heart Disease Paternal Grandfather     No Known Problems Son        Social History     Tobacco Use    Smoking status: Never Smoker    Smokeless tobacco: Never Used   Substance Use Topics    Alcohol use: No     Alcohol/week: 0.0 oz       ROS   Review of Systems   Constitutional: Negative for chills and fever. HENT: Negative for ear pain, hearing loss and sore throat. Eyes: Negative for blurred vision and pain. Respiratory: Negative for cough and shortness of breath. Cardiovascular: Negative for chest pain. Gastrointestinal: Negative for abdominal pain, blood in stool and melena. Genitourinary: Negative for dysuria and hematuria. Musculoskeletal: Positive for back pain (lower back pain) and joint pain (left elbow jt pain). Negative for myalgias. +Knee jt pain   Skin: Negative for rash. Neurological: Negative for tingling, focal weakness and headaches. Endo/Heme/Allergies: Does not bruise/bleed easily. Psychiatric/Behavioral: Negative for substance abuse.        Objective     Vitals:    12/18/18 0839   BP: 131/75   Pulse: (!) 50   Resp: 14   Temp: 98.6 °F (37 °C)   TempSrc: Oral   SpO2: 97%   Weight: 212 lb 12.8 oz (96.5 kg)   Height: 5' 6\" (1.676 m)   PainSc:   5   PainLoc: Knee       Physical Exam   Constitutional: She is oriented to person, place, and time and well-developed, well-nourished, and in no distress. HENT:   Head: Normocephalic and atraumatic. Right Ear: External ear normal.   Left Ear: External ear normal.   Nose: Nose normal.   Mouth/Throat: Oropharynx is clear and moist. No oropharyngeal exudate. Eyes: Conjunctivae and EOM are normal. Pupils are equal, round, and reactive to light. Right eye exhibits no discharge. Left eye exhibits no discharge. No scleral icterus. Neck: Neck supple. Cardiovascular: Normal rate, regular rhythm, normal heart sounds and intact distal pulses. Exam reveals no gallop and no friction rub. No murmur heard. Pulmonary/Chest: Effort normal and breath sounds normal. No respiratory distress. She has no wheezes. She has no rales. Abdominal: Soft. Bowel sounds are normal. She exhibits no distension. There is no tenderness. There is no rebound and no guarding. Musculoskeletal: She exhibits no edema or tenderness (BUE). Lymphadenopathy:     She has no cervical adenopathy. Neurological: She is alert and oriented to person, place, and time. She exhibits normal muscle tone. Gait normal.   Skin: Skin is warm and dry. No erythema. Psychiatric: Affect normal.   Nursing note and vitals reviewed.       LABS   Data Review:   Lab Results   Component Value Date/Time    WBC 5.1 12/15/2018 12:05 PM    HGB 14.2 12/15/2018 12:05 PM    HCT 43.0 12/15/2018 12:05 PM    PLATELET 179 51/88/0674 12:05 PM    MCV 90.7 12/15/2018 12:05 PM       Lab Results   Component Value Date/Time    Sodium 141 12/15/2018 12:05 PM    Potassium 4.0 12/15/2018 12:05 PM    Chloride 108 12/15/2018 12:05 PM    CO2 28 12/15/2018 12:05 PM    Anion gap 5 12/15/2018 12:05 PM    Glucose 105 (H) 12/15/2018 12:05 PM    BUN 12 12/15/2018 12:05 PM    Creatinine 0.55 (L) 12/15/2018 12:05 PM    BUN/Creatinine ratio 22 (H) 12/15/2018 12:05 PM    GFR est AA >60 12/15/2018 12:05 PM    GFR est non-AA >60 12/15/2018 12:05 PM    Calcium 9.3 12/15/2018 12:05 PM       Lab Results   Component Value Date/Time Cholesterol, total 242 (H) 12/15/2018 12:05 PM    HDL Cholesterol 79 (H) 12/15/2018 12:05 PM    LDL, calculated 122.4 (H) 12/15/2018 12:05 PM    VLDL, calculated 40.6 12/15/2018 12:05 PM    Triglyceride 203 (H) 12/15/2018 12:05 PM    CHOL/HDL Ratio 3.1 12/15/2018 12:05 PM       Lab Results   Component Value Date/Time    Hemoglobin A1c 5.2 12/15/2018 12:05 PM       Assessment/Plan:   1. HLD/HERZOG: +H/o transaminitis, which improved with stopping her statin rx. - I will recheck her LFTs in 6-12 months  -I encouraged her to reduce her weight by limiting her processed food intake. I will recheck her weight at her follow-up appointment. 2. HTN/AF: Stable. Continue with medication as prescribed. C/w Cardiology f/u    3. Fall:   -Fall risk reduction measures were discussed. 4.  Health Maintenance:  I encouraged her to get all recommended vaccinations including shingles vaccine. She will let me know if her left elbow pain worsens. Activity as tolerated given her lower back pain and left elbow pain. Return to clinic for a preoperative clearance for anticipated knee replacement surgery. Health Maintenance Due   Topic Date Due    Shingrix Vaccine Age 49> (1 of 2) 05/05/2006     Lab review: labs are reviewed in the EHR    I have discussed the diagnosis with the patient and the intended plan as seen in the above orders. The patient has received an after-visit summary and questions were answered concerning future plans. I have discussed medication side effects and warnings with the patient as well. I have reviewed the plan of care with the patient, accepted their input and they are in agreement with the treatment goals. All questions were answered. The patient understands the plan of care. Handouts provided today with above information. Pt instructed if symptoms worsen to call the office or report to the ED for continued care.   Greater than 50% of the visit time was spent in counseling and/or coordination of care. Voice recognition was used to generate this report, which may have resulted in some phonetic based errors in grammar and contents. Even though attempts were made to correct all the mistakes, some may have been missed, and remained in the body of the document. Follow-up Disposition:  Return in about 6 months (around 6/18/2019) for BP check, weight check.     Chelsy Lopez MD

## 2018-12-18 NOTE — PROGRESS NOTES
Chief Complaint   Patient presents with    Hypertension     follow up    Cholesterol Problem     follow up    Labs     done 12-15-18       1. Have you been to the ER, urgent care clinic since your last visit? Hospitalized since your last visit? No    2. Have you seen or consulted any other health care providers outside of the [address] [place] [place] [address] since your last visit? Include any pap smears or colon screening.  No   Health Maintenance Due   Topic Date Due    Shingrix Vaccine Age 49> (1 of 2) 05/05/2006

## 2018-12-18 NOTE — PROGRESS NOTES
I will discuss her results with her at her apt today. Her A1C is normal at 5.2. Her renal function is normal. Her transaminitis is still present but improved since stopping zocor. +H/o HERZOG. I will order a f/u ultrasound (RUQ) for 1/19. Her cholesterol is 242. Her triglycerides are 203. Her HDL is 79. Her LDL is 122. Her AST is 48, down from 105. Her ALT is 154, down from 224 four months ago.  Her CBC is normal.    Dr. Steve Schwartz  [facility] of [facility], [address] [place] [address], [address] [place], [place] [address] [address]  Phone: [phone]  Fax: [phone]

## 2019-01-09 RX ORDER — APIXABAN 5 MG/1
TABLET, FILM COATED ORAL
Qty: 60 TAB | Refills: 6 | Status: SHIPPED | OUTPATIENT
Start: 2019-01-09 | End: 2019-05-15

## 2019-01-29 ENCOUNTER — DOCUMENTATION ONLY (OUTPATIENT)
Dept: ORTHOPEDIC SURGERY | Facility: CLINIC | Age: 64
End: 2019-01-29

## 2019-02-08 ENCOUNTER — DOCUMENTATION ONLY (OUTPATIENT)
Dept: ORTHOPEDIC SURGERY | Facility: CLINIC | Age: 64
End: 2019-02-08

## 2019-02-08 NOTE — PROGRESS NOTES
Waiting for disability ins[employer] to send release of info signed form-to complete paperwork. Pt made aware. She verbalized understanding & said she will call [employer]. We also faxed [employer] requesting a release form.

## 2019-02-14 ENCOUNTER — DOCUMENTATION ONLY (OUTPATIENT)
Dept: ORTHOPEDIC SURGERY | Facility: CLINIC | Age: 64
End: 2019-02-14

## 2019-02-14 NOTE — PROGRESS NOTES
[employer] Attending [employer] Statement form and signed release were received via fax and put in the forms bin at .

## 2019-02-19 ENCOUNTER — DOCUMENTATION ONLY (OUTPATIENT)
Dept: ORTHOPEDIC SURGERY | Facility: CLINIC | Age: 64
End: 2019-02-19

## 2019-02-19 NOTE — PROGRESS NOTES
[employer] form faxed after I confirmed with [employer] they have a signed release of information form on file.

## 2019-02-22 RX ORDER — METOPROLOL TARTRATE 50 MG/1
50 TABLET ORAL 2 TIMES DAILY
Qty: 180 TAB | Refills: 3 | Status: SHIPPED | OUTPATIENT
Start: 2019-02-22 | End: 2020-02-13

## 2019-03-11 ENCOUNTER — OFFICE VISIT (OUTPATIENT)
Dept: CARDIOLOGY CLINIC | Age: 64
End: 2019-03-11

## 2019-03-11 VITALS
HEART RATE: 53 BPM | OXYGEN SATURATION: 97 % | DIASTOLIC BLOOD PRESSURE: 70 MMHG | WEIGHT: 224 LBS | SYSTOLIC BLOOD PRESSURE: 116 MMHG | BODY MASS INDEX: 36 KG/M2 | HEIGHT: 66 IN

## 2019-03-11 DIAGNOSIS — I48.0 PAROXYSMAL ATRIAL FIBRILLATION (HCC): Primary | ICD-10-CM

## 2019-03-11 DIAGNOSIS — I10 ESSENTIAL HYPERTENSION: ICD-10-CM

## 2019-03-11 DIAGNOSIS — Z01.818 PRE-OP EXAM: ICD-10-CM

## 2019-03-11 DIAGNOSIS — E78.2 MIXED HYPERLIPIDEMIA: ICD-10-CM

## 2019-03-11 PROBLEM — E66.01 SEVERE OBESITY (HCC): Status: ACTIVE | Noted: 2019-03-11

## 2019-03-11 NOTE — PROGRESS NOTES
Masha Diaz presents today for   Chief Complaint   Patient presents with    Irregular Heart Beat     6 month follow up - having knee surgery on 5/14/19 with Dr. Yahaira Pérez Leg Swelling     mild       Masha Perales preferred language for health care discussion is english/other. Is someone accompanying this pt? no    Is the patient using any DME equipment during [address]? no    Depression Screening:  3 most recent PHQ Screens 11/29/2018   PHQ Not Done -   Little interest or pleasure in doing things Not at all   Feeling down, depressed, irritable, or hopeless Not at all   Total Score PHQ 2 0       Learning Assessment:  Learning Assessment 3/11/2019   PRIMARY LEARNER Patient   HIGHEST LEVEL OF EDUCATION - PRIMARY LEARNER  -   BARRIERS PRIMARY LEARNER -   [address]  CAREGIVER -   PRIMARY LANGUAGE ENGLISH   LEARNER PREFERENCE PRIMARY DEMONSTRATION   ANSWERED BY Patient   RELATIONSHIP SELF       Abuse Screening:  Abuse Screening Questionnaire 12/18/2018   Do you ever feel afraid of your partner? N   Are you in a relationship with someone who physically or mentally threatens you? N   Is it safe for you to go home? Y       Fall Risk  No flowsheet data found. Pt currently taking Anticoagulant therapy? Eliquis    Coordination of Care:  1. Have you been to the ER, urgent care clinic since your last visit? Hospitalized since your last visit? no    2. Have you seen or consulted any other health care providers outside of the [address] [place] [address] since your last visit? Include any pap smears or colon screening.  no

## 2019-03-11 NOTE — PROGRESS NOTES
HISTORY OF PRESENT ILLNESS  Sofia Vargas is a 59 y.o. female. Follow-up   Pertinent negatives include no chest pain, no abdominal pain, no headaches and no shortness of breath. Leg Swelling   Pertinent negatives include no chest pain, no abdominal pain, no headaches and no shortness of breath. Patient presents for a follow-up office visit. Patient was diagnosed with atrial fibrillation with a rapid ventricular response by her PCP in January 2018. She was started on metoprolol for rate control. I started her on Xarelto for anticoagulation at last visit. She was then scheduled for a IGLESIA and cardioversion, however, when she showed up for her procedure, she had already converted back to sinus rhythm. Her anticoagulation was stopped at that time. She underwent an echocardiogram which showed low normal LV systolic function, EF 06% with no valvular heart disease and a normal left atrial size. She has remained on metoprolol for rate control. The patient was again found to be in atrial fibrillation at last visit in June 2018. Her heart rate was around 120 bpm.  She converted back to sinus rhythm the following day. She was started on Eliquis for long-term anticoagulation. The patient was last seen in our office approximately 6 months ago. Since that time she has been feeling relatively well. She does admit to some swelling in her legs, but no shortness of breath at rest or with exertion, no new chest pain or pressure. She is scheduled for a knee replacement in May of this year. She denies any prolonged heart palpitations, dizziness or syncope.     Past Medical History:   Diagnosis Date    Arthritis     Bilateral Knee, and Bilateral Hand/Thumb    Atrial fibrillation ([facility] 1/29/2018    Carpal tunnel syndrome     H/O echocardiogram 01/30/2018    EF 50%, normal left atrial size, no valvular heart disease    Headache     History of ankle fusion 1997    left    Hx of migraine headaches     Hypercholesterolemia     Hypertension     Morbid obesity ([facility]     HERZOG (nonalcoholic steatohepatitis) suggested by ultrasound report, 2016 8/11/2017    Vertigo      Current Outpatient Medications   Medication Sig Dispense Refill    metoprolol tartrate (LOPRESSOR) 50 mg tablet Take 1 Tab by mouth two (2) times a day. 180 Tab 3    ELIQUIS 5 mg tablet TAKE 1 TABLET BY MOUTH TWICE DAILY 60 Tab 6    varicella-zoster (SHINGRIX) injection 1 Each by IntraMUSCular route every two (2) months. 0.5 mL 1    polyethylene glycol (MIRALAX) 17 gram packet Take 1 Packet by mouth daily. (Patient taking differently: Take 1 Packet by mouth daily. Indications: constipation) 30 Packet 1    potassium 99 mg tablet Take 99 mg by mouth daily as needed (supplement).  acetaminophen (TYLENOL ARTHRITIS PAIN) 650 mg CR tablet Take 650 mg by mouth every six (6) hours as needed for Pain.  omega-3 fatty acids-vitamin e 1,000 mg cap Take 1 Cap by mouth two (2) times a day. Indications: supplement      calcium-cholecalciferol, d3, 600-125 mg-unit tab Take 1,200 mg by mouth daily. Indications: Vitamin D Deficiency       No Known Allergies     Social History     Tobacco Use    Smoking status: Never Smoker    Smokeless tobacco: Never Used   Substance Use Topics    Alcohol use: No     Alcohol/week: 0.0 oz    Drug use: No           Review of Systems   Constitutional: Negative for chills, fever and weight loss. HENT: Negative for nosebleeds. Eyes: Negative for blurred vision and double vision. Respiratory: Negative for cough, shortness of breath and wheezing. Cardiovascular: Positive for leg swelling. Negative for chest pain, palpitations, orthopnea, claudication and PND. Gastrointestinal: Negative for abdominal pain, heartburn, nausea and vomiting. Genitourinary: Negative for dysuria and hematuria. Musculoskeletal: Positive for joint pain. Negative for falls and myalgias. Skin: Negative for rash.    Neurological: Negative for dizziness, focal weakness and headaches. Endo/Heme/Allergies: Does not bruise/bleed easily. Psychiatric/Behavioral: Negative for substance abuse. Visit Vitals  /70   Pulse (!) 53   Ht 5' 6\" (1.676 m)   Wt 101.6 kg (224 lb)   SpO2 97%   BMI 36.15 kg/m²       Physical Exam   Constitutional: She is oriented to person, place, and time. She appears well-developed and well-nourished. HENT:   Head: Normocephalic and atraumatic. Eyes: Conjunctivae are normal.   Neck: Neck supple. No JVD present. Carotid bruit is not present. Cardiovascular: Regular rhythm, S1 normal, S2 normal and normal pulses. Bradycardia present. Exam reveals no gallop and no distant heart sounds. No murmur heard. Pulmonary/Chest: Effort normal and breath sounds normal. She has no wheezes. She has no rales. Abdominal: Soft. Bowel sounds are normal. There is no tenderness. Musculoskeletal: She exhibits no edema, tenderness or deformity. Neurological: She is alert and oriented to person, place, and time. Skin: Skin is warm and dry. Psychiatric: She has a normal mood and affect. Her behavior is normal. Thought content normal.     EKG: Sinus bradycardia, normal axis, low voltage, no ST or T-wave abnormalities. Compared to the previous EKG, no significant interval change. ASSESSMENT and PLAN    Paroxysmal atrial fibrillation. Initially diagnosed in January 2018. Patient converted on her own prior to her scheduled cardioversion. Her CHADs-Vasc score is 2. The patient was back in atrial fibrillation in June 2018 but has since converted to sinus rhythm. Patient has been relatively asymptomatic when she is in atrial fibrillation, so I would recommend continuing her oral anticoagulation indefinitely. Also continue her beta-blocker. Patient can stop the Eliquis briefly perioperatively.   She should stop it 48 hours prior to orthopedic surgery and resume it several days following the procedure once safe from a surgical standpoint. Low risk from a cardiac standpoint for orthopedic surgery as scheduled in May 2019. No further testing needed for further risk stratification. Patient should remain on her beta-blocker perioperatively. He can stop her oral anticoagulation briefly as described above. Essential hypertension. This now appears well-controlled on her current medical regimen. Dyslipidemia. Patient was previously taking simvastatin, however that was stopped last month because of elevated transaminases. This is being managed by her PCP.     Follow-up in 6 months, sooner as needed

## 2019-04-26 ENCOUNTER — TELEPHONE (OUTPATIENT)
Dept: INTERNAL MEDICINE CLINIC | Age: 64
End: 2019-04-26

## 2019-04-26 DIAGNOSIS — K75.81 NASH (NONALCOHOLIC STEATOHEPATITIS): ICD-10-CM

## 2019-04-26 DIAGNOSIS — E78.00 PURE HYPERCHOLESTEROLEMIA: ICD-10-CM

## 2019-04-26 DIAGNOSIS — R73.9 HYPERGLYCEMIA: ICD-10-CM

## 2019-04-26 DIAGNOSIS — Z13.0 SCREENING FOR DEFICIENCY ANEMIA: ICD-10-CM

## 2019-04-26 DIAGNOSIS — I10 ESSENTIAL HYPERTENSION: Primary | ICD-10-CM

## 2019-04-26 DIAGNOSIS — I48.20 CHRONIC ATRIAL FIBRILLATION (HCC): ICD-10-CM

## 2019-04-26 NOTE — TELEPHONE ENCOUNTER
Patient contacted, patient identified with two identifiers (Name & ). Patient aware of lab order per DR. Cas Aguilar.

## 2019-04-27 ENCOUNTER — HOSPITAL ENCOUNTER (OUTPATIENT)
Dept: LAB | Age: 64
Discharge: HOME | End: 2019-04-27
Payer: COMMERCIAL

## 2019-04-27 DIAGNOSIS — Z13.0 SCREENING FOR DEFICIENCY ANEMIA: ICD-10-CM

## 2019-04-27 DIAGNOSIS — K75.81 NASH (NONALCOHOLIC STEATOHEPATITIS): ICD-10-CM

## 2019-04-27 DIAGNOSIS — R73.9 HYPERGLYCEMIA: ICD-10-CM

## 2019-04-27 DIAGNOSIS — I10 ESSENTIAL HYPERTENSION: ICD-10-CM

## 2019-04-27 DIAGNOSIS — E78.00 PURE HYPERCHOLESTEROLEMIA: ICD-10-CM

## 2019-04-27 DIAGNOSIS — I48.20 CHRONIC ATRIAL FIBRILLATION (HCC): ICD-10-CM

## 2019-04-27 LAB
ALBUMIN SERPL-MCNC: 3.9 G/DL (ref 3.4–5)
ALBUMIN/GLOB SERPL: 1.2 {RATIO} (ref 0.8–1.7)
ALP SERPL-CCNC: 96 U/L (ref 45–117)
ALT SERPL-CCNC: 103 U/L (ref 13–56)
ANION GAP SERPL CALC-SCNC: 5 MMOL/L (ref 3–18)
AST SERPL-CCNC: 23 U/L (ref 15–37)
BASOPHILS # BLD: 0 K/UL (ref 0–0.1)
BASOPHILS NFR BLD: 0 % (ref 0–2)
BILIRUB SERPL-MCNC: 0.4 MG/DL (ref 0.2–1)
BUN SERPL-MCNC: 19 MG/DL (ref 7–18)
BUN/CREAT SERPL: 33 (ref 12–20)
CALCIUM SERPL-MCNC: 9.1 MG/DL (ref 8.5–10.1)
CHLORIDE SERPL-SCNC: 107 MMOL/L (ref 100–108)
CHOLEST SERPL-MCNC: 252 MG/DL
CO2 SERPL-SCNC: 31 MMOL/L (ref 21–32)
CREAT SERPL-MCNC: 0.58 MG/DL (ref 0.6–1.3)
CREAT UR-MCNC: 127 MG/DL (ref 30–125)
DIFFERENTIAL METHOD BLD: NORMAL
EOSINOPHIL # BLD: 0.1 K/UL (ref 0–0.4)
EOSINOPHIL NFR BLD: 1 % (ref 0–5)
ERYTHROCYTE [DISTWIDTH] IN BLOOD BY AUTOMATED COUNT: 11.7 % (ref 11.6–14.5)
GLOBULIN SER CALC-MCNC: 3.2 G/DL (ref 2–4)
GLUCOSE SERPL-MCNC: 97 MG/DL (ref 74–99)
HBA1C MFR BLD: 5.5 % (ref 4.2–5.6)
HCT VFR BLD AUTO: 44.9 % (ref 35–45)
HDLC SERPL-MCNC: 57 MG/DL (ref 40–60)
HDLC SERPL: 4.4 {RATIO} (ref 0–5)
HGB BLD-MCNC: 14.5 G/DL (ref 12–16)
LDLC SERPL CALC-MCNC: 160.2 MG/DL (ref 0–100)
LIPID PROFILE,FLP: ABNORMAL
LYMPHOCYTES # BLD: 2.2 K/UL (ref 0.9–3.6)
LYMPHOCYTES NFR BLD: 49 % (ref 21–52)
MCH RBC QN AUTO: 29.7 PG (ref 24–34)
MCHC RBC AUTO-ENTMCNC: 32.3 G/DL (ref 31–37)
MCV RBC AUTO: 91.8 FL (ref 74–97)
MICROALBUMIN UR-MCNC: 0.63 MG/DL (ref 0–3)
MICROALBUMIN/CREAT UR-RTO: 5 MG/G (ref 0–30)
MONOCYTES # BLD: 0.4 K/UL (ref 0.05–1.2)
MONOCYTES NFR BLD: 8 % (ref 3–10)
NEUTS SEG # BLD: 1.9 K/UL (ref 1.8–8)
NEUTS SEG NFR BLD: 42 % (ref 40–73)
PLATELET # BLD AUTO: 269 K/UL (ref 135–420)
PMV BLD AUTO: 10 FL (ref 9.2–11.8)
POTASSIUM SERPL-SCNC: 4.2 MMOL/L (ref 3.5–5.5)
PROT SERPL-MCNC: 7.1 G/DL (ref 6.4–8.2)
RBC # BLD AUTO: 4.89 M/UL (ref 4.2–5.3)
SODIUM SERPL-SCNC: 143 MMOL/L (ref 136–145)
TRIGL SERPL-MCNC: 174 MG/DL (ref ?–150)
VLDLC SERPL CALC-MCNC: 34.8 MG/DL
WBC # BLD AUTO: 4.6 K/UL (ref 4.6–13.2)

## 2019-04-27 PROCEDURE — 80061 LIPID PANEL: CPT

## 2019-04-27 PROCEDURE — 80053 COMPREHEN METABOLIC PANEL: CPT

## 2019-04-27 PROCEDURE — 85025 COMPLETE CBC W/AUTO DIFF WBC: CPT

## 2019-04-27 PROCEDURE — 36415 COLL VENOUS BLD VENIPUNCTURE: CPT

## 2019-04-27 PROCEDURE — 82043 UR ALBUMIN QUANTITATIVE: CPT

## 2019-04-27 PROCEDURE — 83036 HEMOGLOBIN GLYCOSYLATED A1C: CPT

## 2019-04-29 NOTE — PROGRESS NOTES
I will discuss with her her most recent labs at her f/u apt next month: Her most recent labs show a normal A1c of 5.5. Her renal function labs are unremarkable. Her ALT is slightly elevated at 103. Her AST is normal at 23. Her total cholesterol is 252. Her triglycerides are 174. Her HDL is 57. Her LDL is 160, this is up from 122 just 4 months ago. Her blood counts are unremarkable. Her elevated ALT is likely from underlying fatty liver disease. We discussed the option of starting a cholesterol-lowering medication at her follow-up appointment. Will defer ultimately to her Cardiology team for recommendations regarding her hyperlipidemia. Dr. Cortes President [facility] of [address] [place] [address], [facility], [facility] [address] Phone: [phone] Fax: [phone]

## 2019-05-01 ENCOUNTER — HOSPITAL ENCOUNTER (OUTPATIENT)
Dept: GENERAL RADIOLOGY | Age: 64
Discharge: HOME | End: 2019-05-01
Payer: COMMERCIAL

## 2019-05-01 ENCOUNTER — HOSPITAL ENCOUNTER (OUTPATIENT)
Dept: PREADMISSION TESTING | Age: 64
Discharge: HOME | End: 2019-05-01
Payer: COMMERCIAL

## 2019-05-01 DIAGNOSIS — Z01.812 BLOOD TESTS PRIOR TO TREATMENT OR PROCEDURE: ICD-10-CM

## 2019-05-01 DIAGNOSIS — M17.12 PRIMARY OSTEOARTHRITIS OF LEFT KNEE: ICD-10-CM

## 2019-05-01 DIAGNOSIS — M25.562 CHRONIC PAIN OF LEFT KNEE: ICD-10-CM

## 2019-05-01 DIAGNOSIS — G89.29 CHRONIC PAIN OF LEFT KNEE: ICD-10-CM

## 2019-05-01 DIAGNOSIS — Z01.811 PRE-OPERATIVE RESPIRATORY EXAMINATION: ICD-10-CM

## 2019-05-01 LAB
ANION GAP SERPL CALC-SCNC: 6 MMOL/L (ref 3–18)
APPEARANCE UR: CLEAR
APTT PPP: 33.6 SEC (ref 23–36.4)
BACTERIA URNS QL MICRO: ABNORMAL /HPF
BASOPHILS # BLD: 0 K/UL (ref 0–0.1)
BASOPHILS NFR BLD: 0 % (ref 0–2)
BILIRUB UR QL: NEGATIVE
BUN SERPL-MCNC: 13 MG/DL (ref 7–18)
BUN/CREAT SERPL: 22 (ref 12–20)
CALCIUM SERPL-MCNC: 10.5 MG/DL (ref 8.5–10.1)
CHLORIDE SERPL-SCNC: 106 MMOL/L (ref 100–108)
CO2 SERPL-SCNC: 30 MMOL/L (ref 21–32)
COLOR UR: YELLOW
CREAT SERPL-MCNC: 0.59 MG/DL (ref 0.6–1.3)
DIFFERENTIAL METHOD BLD: ABNORMAL
EOSINOPHIL # BLD: 0.1 K/UL (ref 0–0.4)
EOSINOPHIL NFR BLD: 2 % (ref 0–5)
EPITH CASTS URNS QL MICRO: ABNORMAL /LPF (ref 0–5)
ERYTHROCYTE [DISTWIDTH] IN BLOOD BY AUTOMATED COUNT: 11.5 % (ref 11.6–14.5)
GLUCOSE SERPL-MCNC: 105 MG/DL (ref 74–99)
GLUCOSE UR STRIP.AUTO-MCNC: NEGATIVE MG/DL
HBA1C MFR BLD: 5.4 % (ref 4.2–5.6)
HCT VFR BLD AUTO: 43.9 % (ref 35–45)
HGB BLD-MCNC: 14.7 G/DL (ref 12–16)
HGB UR QL STRIP: NEGATIVE
HYALINE CASTS URNS QL MICRO: ABNORMAL /LPF (ref 0–2)
INR PPP: 1 (ref 0.8–1.2)
KETONES UR QL STRIP.AUTO: NEGATIVE MG/DL
LEUKOCYTE ESTERASE UR QL STRIP.AUTO: ABNORMAL
LYMPHOCYTES # BLD: 2.1 K/UL (ref 0.9–3.6)
LYMPHOCYTES NFR BLD: 47 % (ref 21–52)
MCH RBC QN AUTO: 30.2 PG (ref 24–34)
MCHC RBC AUTO-ENTMCNC: 33.5 G/DL (ref 31–37)
MCV RBC AUTO: 90.1 FL (ref 74–97)
MONOCYTES # BLD: 0.4 K/UL (ref 0.05–1.2)
MONOCYTES NFR BLD: 8 % (ref 3–10)
NEUTS SEG # BLD: 2 K/UL (ref 1.8–8)
NEUTS SEG NFR BLD: 43 % (ref 40–73)
NITRITE UR QL STRIP.AUTO: NEGATIVE
PH UR STRIP: 5.5 [PH] (ref 5–8)
PLATELET # BLD AUTO: 296 K/UL (ref 135–420)
PMV BLD AUTO: 10.3 FL (ref 9.2–11.8)
POTASSIUM SERPL-SCNC: 4.4 MMOL/L (ref 3.5–5.5)
PROT UR STRIP-MCNC: NEGATIVE MG/DL
PROTHROMBIN TIME: 12.5 SEC (ref 11.5–15.2)
RBC # BLD AUTO: 4.87 M/UL (ref 4.2–5.3)
RBC #/AREA URNS HPF: ABNORMAL /HPF (ref 0–5)
SODIUM SERPL-SCNC: 142 MMOL/L (ref 136–145)
SP GR UR REFRACTOMETRY: >1.03 (ref 1–1.03)
UROBILINOGEN UR QL STRIP.AUTO: 0.2 EU/DL (ref 0.2–1)
WBC # BLD AUTO: 4.6 K/UL (ref 4.6–13.2)
WBC URNS QL MICRO: ABNORMAL /HPF (ref 0–4)

## 2019-05-01 PROCEDURE — 83036 HEMOGLOBIN GLYCOSYLATED A1C: CPT

## 2019-05-01 PROCEDURE — 85730 THROMBOPLASTIN TIME PARTIAL: CPT

## 2019-05-01 PROCEDURE — 80048 BASIC METABOLIC PNL TOTAL CA: CPT

## 2019-05-01 PROCEDURE — 81001 URINALYSIS AUTO W/SCOPE: CPT

## 2019-05-01 PROCEDURE — 85025 COMPLETE CBC W/AUTO DIFF WBC: CPT

## 2019-05-01 PROCEDURE — 36415 COLL VENOUS BLD VENIPUNCTURE: CPT

## 2019-05-01 PROCEDURE — 86900 BLOOD TYPING SEROLOGIC ABO: CPT

## 2019-05-01 PROCEDURE — 71046 X-RAY EXAM CHEST 2 VIEWS: CPT

## 2019-05-01 PROCEDURE — 85610 PROTHROMBIN TIME: CPT

## 2019-05-01 PROCEDURE — 87086 URINE CULTURE/COLONY COUNT: CPT

## 2019-05-01 NOTE — PERIOP NOTES
Nato Young was here today for her PAT appointment. Health assessment was completed and instructions given regarding NPO status, medications, Hibiclens washes, and removal of all jewelry and/or body piercing. Instructed patient not to remove the red Blood Bank armband that was placed on their arm when the Type and Screen was drawn. Instructed to bring walker to the hospital on the day of surgery so it can be properly adjusted by the physical therapist.  Bentley Redman was given to ask questions and all questions were answered. Understanding of instructions was verbalized.

## 2019-05-02 LAB
BACTERIA SPEC CULT: NORMAL
SERVICE CMNT-IMP: NORMAL

## 2019-05-02 NOTE — PROGRESS NOTES
INTERNISTS OF [facility]:  
Preoperative Evaluation Date of Exam: 05/03/19 MRN: [id number] April Jones  Is a 59 y.o.  female  who presents for preoperative evaluation. Chief Complaint Patient presents with  Pre-op Exam  
  upcoming Total Left Knee Arthroplasty with [facility] Dr Liana Llanos scheduled for 5-14-19   ROOM 3 Subjective:  
Pt is a 64yo female with h/o HLD, HTN, HERZOG, AF, and DJD. 1. EHRZOG: Present for >1 yr. Her most recent labs show: ALT is slightly elevated at 103. Her AST is normal at 23.   
 
2. Left Knee Pain: Present for >6 months. Worsening. Not relieved with tylenol. General Information: 
Procedure/Surgery: left total knee replacement surgery Surgeon: Melisa Stein Primary Physician: Kate Santos MD 
Surgery status: Elective Surgery risk: Intermediate (head/neck, intraperitoneal, intrathoracic, orthopedic, and prostate Cardiovascular Risk Factors: 1. Coronary revascularization within 5 years: no 
2. Recurrent chest pain: no 
3. Shortness of breath:  no 
4. Recent coronary evaluation/stress test/angiogram:  no 
5. Recent MI (less than 1 month ago):  no 
6. Prior MI (by way of history or pathological waves):  no 
7. Compensated CHF or h/o CHF:  no 
8. Severe valvular disease:  no 
9. Decompensated CHF:  no 
10. High-grade atrioventricular block:  no 
11. Arrhythmia:  yes. +AF hx. Followed by Cardiology. On metoprolol and eliquis Echocardiogram 1/2018: Left ventricle: Systolic function was at the lower limits of normal by visual assessment. Ejection fraction was estimated to be 50 %. No obvious wall motion abnormalities identified in the  views obtained. Wall thickness was at the upper limits of normal.  COMPARISONS: Comparison was made with the previous study of 19-Jul-2016. Left atrium has  increased in size. Otherwise no significant change. Other Risk Factors: 1. Diabetes hx:  no 
2. H/o CVA:  no 
3.  Uncontrolled hypertension:  no 
 4, Advanced age:  no 
5. Low functional capacity:  no 
6. Recent use of: No recent use of aspirin (ASA), NSAIDS or steroids 7. Tetanus up to date: tetanus status unknown 8. Anesthesia Complications: None 9. History of abnormal bleeding : None 10. History of Blood Transfusions: no 
11. Health Care Directive or Living Will: yes 12. Latex Allergy: no 
 
 
Problem List:    
Patient Active Problem List  
 Diagnosis Date Noted  Severe obesity ([address] 03/11/2019  Primary osteoarthritis of right knee 05/22/2018  Atrial fibrillation ([address] 01/29/2018  HERZOG (nonalcoholic steatohepatitis) suggested by ultrasound report, 2016 08/11/2017  Obesity (BMI 30-39.9) 02/12/2017  Hyperlipidemia 02/07/2017  Essential hypertension 02/07/2017 Medical History:    
Past Medical History:  
Diagnosis Date  Arthritis Bilateral Knee, and Bilateral Hand/Thumb  Atrial fibrillation ([address] 1/29/2018  Carpal tunnel syndrome  H/O echocardiogram 01/30/2018 EF 50%, normal left atrial size, no valvular heart disease  Headache  History of ankle fusion 1997  
 left  Hx of migraine headaches  Hypercholesterolemia  Hypertension  Morbid obesity ([address]  HERZOG (nonalcoholic steatohepatitis) suggested by ultrasound report, 2016 8/11/2017  Nausea & vomiting  Vertigo Allergies:   No Known Allergies Medications:    
Current Outpatient Medications Medication Sig  
 metoprolol tartrate (LOPRESSOR) 50 mg tablet Take 1 Tab by mouth two (2) times a day.  ELIQUIS 5 mg tablet TAKE 1 TABLET BY MOUTH TWICE DAILY  varicella-zoster [facility]) injection 1 Each by IntraMUSCular route every two (2) months.  polyethylene glycol (MIRALAX) 17 gram packet Take 1 Packet by mouth daily. (Patient taking differently: Take 1 Packet by mouth daily. Indications: constipation)  potassium 99 mg tablet Take 99 mg by mouth daily as needed (supplement).  acetaminophen (TYLENOL ARTHRITIS PAIN) 650 mg CR tablet Take 650 mg by mouth every six (6) hours as needed for Pain.  omega-3 fatty acids-vitamin e 1,000 mg cap Take 1 Cap by mouth two (2) times a day. Indications: supplement  calcium-cholecalciferol, d3, 600-125 mg-unit tab Take 1,200 mg by mouth daily. Indications: Vitamin D Deficiency No current facility-administered medications for this visit. Surgical History:    
Past Surgical History:  
Procedure Laterality Date  COLONOSCOPY N/A 6/5/2018 COLONOSCOPY performed by Candice Isidro MD at [facility] HX APPENDECTOMY    
 age 25 years  HX COLONOSCOPY Due in 2018: May 2015  HX HYSTERECTOMY    
 age 30years old  HX KNEE REPLACEMENT Right 05/22/2018  HX ORTHOPAEDIC Left   
 ankle fusion  HX OTHER SURGICAL  2014  
 removal of mole left breast  
 HX TONSILLECTOMY    
 at age 29years old Social History:    
Social History Socioeconomic History  Marital status:  Spouse name: Not on file  Number of children: Not on file  Years of education: Not on file  Highest education level: Not on file Tobacco Use  Smoking status: Never Smoker  Smokeless tobacco: Never Used Substance and Sexual Activity  Alcohol use: No  
  Alcohol/week: 0.0 oz  Drug use: No  
Social History Narrative Works at [employer] in [facility] [employer] REVIEW OF SYSTEMS: 
Review of Systems Constitutional: Negative for chills and fever. HENT: Negative for ear pain and sore throat. Eyes: Negative for blurred vision and pain. Respiratory: Negative for cough and shortness of breath. Cardiovascular: Negative for chest pain. Gastrointestinal: Negative for abdominal pain and blood in stool. Genitourinary: Negative for dysuria and hematuria. Musculoskeletal: Positive for joint pain (see HPI). Negative for myalgias. Skin: Negative for rash. Neurological: Negative for tingling, focal weakness and headaches. Endo/Heme/Allergies: Does not bruise/bleed easily. Psychiatric/Behavioral: Negative for substance abuse. Objective:  
 
Vitals:  
 05/03/19 0813 BP: 123/68 Pulse: (!) 51 Resp: 14 Temp: 98.4 °F (36.9 °C) TempSrc: Oral  
SpO2: 97% Weight: 224 lb 12.8 oz (102 kg) Height: 5' 6.5\" (1.689 m) PainSc:   0 - No pain PainLoc: Knee Physical Exam  
Constitutional: She is oriented to person, place, and time and well-developed, well-nourished, and in no distress. HENT:  
Head: Normocephalic and atraumatic. Right Ear: External ear normal.  
Left Ear: External ear normal.  
Nose: Nose normal.  
Mouth/Throat: Oropharynx is clear and moist. No oropharyngeal exudate. Eyes: Conjunctivae and EOM are normal. Right eye exhibits no discharge. Left eye exhibits no discharge. No scleral icterus. Neck: Neck supple. Cardiovascular: Normal rate, regular rhythm, normal heart sounds and intact distal pulses. Exam reveals no gallop and no friction rub. No murmur heard. Pulmonary/Chest: Effort normal and breath sounds normal. No respiratory distress. She has no wheezes. She has no rales. She exhibits no tenderness. Abdominal: Soft. Bowel sounds are normal. She exhibits no distension. There is no tenderness. There is no rebound and no guarding. Musculoskeletal: She exhibits no edema or tenderness (BUE). Left Knee has limited ROM 2/2 pain Lymphadenopathy:  
  She has no cervical adenopathy. Neurological: She is alert and oriented to person, place, and time. She exhibits normal muscle tone. Gait normal.  
Skin: Skin is warm and dry. No erythema. Psychiatric: Affect normal.  
Nursing note and vitals reviewed. DIAGNOSTICS:  
Lab Results Component Value Date/Time  Sodium 142 05/01/2019 06:19 AM  
 Potassium 4.4 05/01/2019 06:19 AM  
 Chloride 106 05/01/2019 06:19 AM  
 CO2 30 05/01/2019 06:19 AM  
 Anion gap 6 05/01/2019 06:19 AM  
 Glucose 105 (H) 05/01/2019 06:19 AM  
 BUN 13 05/01/2019 06:19 AM  
 Creatinine 0.59 (L) 05/01/2019 06:19 AM  
 BUN/Creatinine ratio 22 (H) 05/01/2019 06:19 AM  
 GFR est AA >60 05/01/2019 06:19 AM  
 GFR est non-AA >60 05/01/2019 06:19 AM  
 Calcium 10.5 (H) 05/01/2019 06:19 AM  
 
Lab Results Component Value Date/Time WBC 4.6 05/01/2019 06:19 AM  
 HGB 14.7 05/01/2019 06:19 AM  
 HCT 43.9 05/01/2019 06:19 AM  
 PLATELET 132 14/46/4995 06:19 AM  
 MCV 90.1 05/01/2019 06:19 AM  
 
Lab Results Component Value Date/Time Hemoglobin A1c 5.4 05/01/2019 06:19 AM  
 
Assessment/Plan: 1. Obesity/HERZOG: 
I encouraged her to reduce her caloric intake to prevent further weight gain. I encouraged her to reduce her calories no more than 1800/day. She is to increase her protein intake as well given #2 and her anticipated surgery. I will recheck her weight at her follow-up appointment. When she recovers from #2, she can consider going back into the medically survived weight loss program. 
 
2. Left Knee Pain: From OA. Preoperative Assessment: No contraindications to planned surgery Orders/studies that need to be obtained prior to surgical clearance: medical clearance has been obtained Pt is to undergo an intermediate risk procedure with a low cardiac risk based on current history. Labs and imaging within acceptable range. No contraindications to planned surgery. Discontinue NSAIDS 1 week prior to surgical procedure. Follow up as scheduled post operatively. I have discussed the plan of care with the patient. The patient has received an after-visit summary and questions were answered concerning future plans. I have discussed medication side effects and warnings with the patient as well. All questions were answered. The patient understands the plan of care. Handouts provided today with the above information.  Pt instructed if symptoms worsen to call the office or report to the ED for continued care. Dr. Christina Alexis [facility] of [address] [place] [address] 207, [address] [facility], [address] Phone: [phone] Fax: [phone] 
 
]

## 2019-05-02 NOTE — PROGRESS NOTES
I will let her know at her follow-up appointment that her PT, PTT, and A1c are unremarkable. Her A1c is 5.4. Her kidney labs are unremarkable. Her CBC is unremarkable. Dr. Jerome Perales [facility] of [address] [place] [address] 207, [address] [facility], [address] [place] [address] Str. Phone: [phone] Fax: [phone]

## 2019-05-03 ENCOUNTER — OFFICE VISIT (OUTPATIENT)
Dept: INTERNAL MEDICINE CLINIC | Age: 64
End: 2019-05-03

## 2019-05-03 VITALS
OXYGEN SATURATION: 97 % | BODY MASS INDEX: 35.28 KG/M2 | HEIGHT: 67 IN | HEART RATE: 51 BPM | TEMPERATURE: 98.4 F | DIASTOLIC BLOOD PRESSURE: 68 MMHG | RESPIRATION RATE: 14 BRPM | WEIGHT: 224.8 LBS | SYSTOLIC BLOOD PRESSURE: 123 MMHG

## 2019-05-03 DIAGNOSIS — K75.81 NASH (NONALCOHOLIC STEATOHEPATITIS): ICD-10-CM

## 2019-05-03 DIAGNOSIS — M25.562 CHRONIC PAIN OF LEFT KNEE: Primary | ICD-10-CM

## 2019-05-03 DIAGNOSIS — E66.01 SEVERE OBESITY (HCC): ICD-10-CM

## 2019-05-03 DIAGNOSIS — G89.29 CHRONIC PAIN OF LEFT KNEE: Primary | ICD-10-CM

## 2019-05-03 NOTE — PROGRESS NOTES
Chief Complaint Patient presents with  Pre-op Exam  
  upcoming Total Left Knee Arthroplasty with [facility] Dr Duyen More scheduled for 5-14-19 1. Have you been to the ER, urgent care clinic since your last visit? Hospitalized since your last visit? No 
 
2. Have you seen or consulted any other health care providers outside of the [address] [place] [address] since your last visit? Include any pap smears or colon screening. No 
 
Patient was given a copy of the Advanced Directive and understands to bring it in once completed. Health Maintenance Due Topic Date Due  Shingrix Vaccine Age 50> (1 of 2) 05/05/2006  PAP AKA CERVICAL CYTOLOGY  07/28/2019  BREAST CANCER SCRN MAMMOGRAM  08/27/2019

## 2019-05-03 NOTE — PATIENT INSTRUCTIONS
Body Mass Index: Care Instructions Your Care Instructions Body mass index (BMI) can help you see if your weight is raising your risk for health problems. It uses a formula to compare how much you weigh with how tall you are. · A BMI lower than 18.5 is considered underweight. · A BMI between 18.5 and 24.9 is considered healthy. · A BMI between 25 and 29.9 is considered overweight. A BMI of 30 or higher is considered obese. If your BMI is in the normal range, it means that you have a lower risk for weight-related health problems. If your BMI is in the overweight or obese range, you may be at increased risk for weight-related health problems, such as high blood pressure, heart disease, stroke, arthritis or joint pain, and diabetes. If your BMI is in the underweight range, you may be at increased risk for health problems such as fatigue, lower protection (immunity) against illness, muscle loss, bone loss, hair loss, and hormone problems. BMI is just one measure of your risk for weight-related health problems. You may be at higher risk for health problems if you are not active, you eat an unhealthy diet, or you drink too much alcohol or use tobacco products. Follow-up care is a key part of your treatment and safety. Be sure to make and go to all appointments, and call your doctor if you are having problems. It's also a good idea to know your test results and keep a list of the medicines you take. How can you care for yourself at home? · Practice healthy eating habits. This includes eating plenty of fruits, vegetables, whole grains, lean protein, and low-fat dairy. · If your doctor recommends it, get more exercise. Walking is a good choice. Bit by bit, increase the amount you walk every day. Try for at least 30 minutes on most days of the week. · Do not smoke. Smoking can increase your risk for health problems.  If you need help quitting, talk to your doctor about stop-smoking programs and medicines. These can increase your chances of quitting for good. · Limit alcohol to 2 drinks a day for men and 1 drink a day for women. Too much alcohol can cause health problems. If you have a BMI higher than 25 · Your doctor may do other tests to check your risk for weight-related health problems. This may include measuring the distance around your waist. A waist measurement of more than 40 inches in men or 35 inches in women can increase the risk of weight-related health problems. · Talk with your doctor about steps you can take to stay healthy or improve your health. You may need to make lifestyle changes to lose weight and stay healthy, such as changing your diet and getting regular exercise. If you have a BMI lower than 18.5 · Your doctor may do other tests to check your risk for health problems. · Talk with your doctor about steps you can take to stay healthy or improve your health. You may need to make lifestyle changes to gain or maintain weight and stay healthy, such as getting more healthy foods in your diet and doing exercises to build muscle. Where can you learn more? Go to http://stanton-gonsalo.info/. Enter S176 in the search box to learn more about \"Body Mass Index: Care Instructions. \" Current as of: June 25, 2018 Content Version: 11.9 © 7247-1791 [employer]. Care instructions adapted under license by [employer] (which disclaims liability or warranty for this information). If you have questions about a medical condition or this instruction, always ask your healthcare professional. [address] any warranty or liability for your use of this information.

## 2019-05-06 ENCOUNTER — OFFICE VISIT (OUTPATIENT)
Dept: ORTHOPEDIC SURGERY | Facility: CLINIC | Age: 64
End: 2019-05-06

## 2019-05-06 VITALS
DIASTOLIC BLOOD PRESSURE: 71 MMHG | OXYGEN SATURATION: 97 % | WEIGHT: 225 LBS | SYSTOLIC BLOOD PRESSURE: 121 MMHG | RESPIRATION RATE: 16 BRPM | BODY MASS INDEX: 35.31 KG/M2 | HEIGHT: 67 IN | TEMPERATURE: 98.4 F | HEART RATE: 50 BPM

## 2019-05-06 DIAGNOSIS — M25.562 CHRONIC PAIN OF LEFT KNEE: ICD-10-CM

## 2019-05-06 DIAGNOSIS — Z96.651 STATUS POST RIGHT KNEE REPLACEMENT: Primary | ICD-10-CM

## 2019-05-06 DIAGNOSIS — M17.12 PRIMARY OSTEOARTHRITIS OF LEFT KNEE: ICD-10-CM

## 2019-05-06 DIAGNOSIS — G89.29 CHRONIC PAIN OF LEFT KNEE: ICD-10-CM

## 2019-05-06 DIAGNOSIS — Z01.818 PREOP GENERAL PHYSICAL EXAM: ICD-10-CM

## 2019-05-06 RX ORDER — ACETAMINOPHEN 325 MG/1
1000 TABLET ORAL
Status: CANCELLED | OUTPATIENT
Start: 2019-05-14 | End: 2019-05-15

## 2019-05-06 NOTE — H&P (VIEW-ONLY)
History and Physical 
 
62 year CC obese female seen in preparation for Left primary total knee arthroplasty Review of Systems Constitutional: Positive for activity change (decreased use left knee secondary to end stage osteoarthritis). Negative for chills and fatigue. HENT: Negative for ear pain and hearing loss. Eyes: Negative for pain. Respiratory: Negative for cough and shortness of breath. Cardiovascular: Negative. Gastrointestinal: Negative for nausea and vomiting. Endocrine: Negative. Genitourinary: Negative for flank pain. Musculoskeletal: Positive for arthralgias, gait problem, joint swelling and myalgias. Secondary to left knee endstage osteo arthritis Skin: Negative. Allergic/Immunologic: Negative. Hematological: Negative. Psychiatric/Behavioral: Negative. Physical Exam  
Nursing note and vitals reviewed. Constitutional: She is oriented to person, place, and time. She appears well-developed and well-nourished. HENT:  
Head: Normocephalic and atraumatic. Eyes: Pupils are equal, round, and reactive to light. Neck: Normal range of motion. Cardiovascular: Normal rate and regular rhythm. Murmur heard. Systolic murmur is present with a grade of 2/6. Musculoskeletal:  
     Left knee: She exhibits decreased range of motion, swelling and effusion. Tenderness found. Medial joint line and lateral joint line tenderness noted. Legs: 
Neurological: She is alert and oriented to person, place, and time. Skin: Skin is warm and dry. Psychiatric: She has a normal mood and affect. Her behavior is normal. Judgment and thought content normal.  
 
S/p Right Total knee arthroplasty Obesity Left knee end stage osteo arthritis Left primary total knee arthroplasty Risk / Benefit: Surgeon will discuss in \"face to face\" encounter on day of surgery.  
 
Family History and Surgical History reviewed, discussed in detail, and deemed Non-Contributory in preparation for this surgical encounter. Orthopaedically, this patient requires admission as predetermined by the attending physicians documented severity of the admitting diagnosis,  and expected need for post surgical and co morbity health management determines length of projected stay.

## 2019-05-06 NOTE — PROGRESS NOTES
Lossie Severs returns for history and physical in preparation for upcoming left total knee replacement. Please see attached forms in EPIC for History and Physical and Review of Systems.

## 2019-05-12 LAB
ABO + RH BLD: NORMAL
BLOOD GROUP ANTIBODIES SERPL: NORMAL
SPECIMEN EXP DATE BLD: NORMAL

## 2019-05-13 ENCOUNTER — ANESTHESIA EVENT (OUTPATIENT)
Dept: SURGERY | Age: 64
DRG: 470 | End: 2019-05-13
Payer: COMMERCIAL

## 2019-05-14 ENCOUNTER — ANESTHESIA (OUTPATIENT)
Dept: SURGERY | Age: 64
DRG: 470 | End: 2019-05-14
Payer: COMMERCIAL

## 2019-05-14 ENCOUNTER — HOSPITAL ENCOUNTER (INPATIENT)
Age: 64
LOS: 1 days | Discharge: HOME HEALTH | DRG: 470 | End: 2019-05-15
Attending: SPECIALIST | Admitting: SPECIALIST
Payer: COMMERCIAL

## 2019-05-14 DIAGNOSIS — G89.18 ACUTE POST-OPERATIVE PAIN: Primary | ICD-10-CM

## 2019-05-14 PROBLEM — Z96.659 S/P TKR (TOTAL KNEE REPLACEMENT): Status: ACTIVE | Noted: 2019-05-14

## 2019-05-14 PROBLEM — M17.12 PRIMARY OSTEOARTHRITIS OF LEFT KNEE: Status: ACTIVE | Noted: 2019-05-14

## 2019-05-14 PROCEDURE — 77030000032 HC CUF TRNQT ZIMM -B: Performed by: SPECIALIST

## 2019-05-14 PROCEDURE — 77030039929 HC BN CEM GENT ZIMM -D: Performed by: SPECIALIST

## 2019-05-14 PROCEDURE — 77030008467 HC STPLR SKN COVD -B: Performed by: SPECIALIST

## 2019-05-14 PROCEDURE — 64447 NJX AA&/STRD FEMORAL NRV IMG: CPT | Performed by: ANESTHESIOLOGY

## 2019-05-14 PROCEDURE — 77030027138 HC INCENT SPIROMETER -A: Performed by: SPECIALIST

## 2019-05-14 PROCEDURE — 77030018719 HC DRSG PTCH ANTIMIC J&J -A: Performed by: SPECIALIST

## 2019-05-14 PROCEDURE — 74011250637 HC RX REV CODE- 250/637: Performed by: ANESTHESIOLOGY

## 2019-05-14 PROCEDURE — 65270000029 HC RM PRIVATE

## 2019-05-14 PROCEDURE — 77030026438 HC STYL ET INTUB CARD -A: Performed by: ANESTHESIOLOGY

## 2019-05-14 PROCEDURE — 74011250637 HC RX REV CODE- 250/637: Performed by: SPECIALIST

## 2019-05-14 PROCEDURE — 77030031139 HC SUT VCRL2 J&J -A: Performed by: SPECIALIST

## 2019-05-14 PROCEDURE — 77030013708 HC HNDPC SUC IRR PULS STRY –B: Performed by: SPECIALIST

## 2019-05-14 PROCEDURE — 74011000258 HC RX REV CODE- 258: Performed by: NURSE ANESTHETIST, CERTIFIED REGISTERED

## 2019-05-14 PROCEDURE — 77030014368: Performed by: SPECIALIST

## 2019-05-14 PROCEDURE — C1713 ANCHOR/SCREW BN/BN,TIS/BN: HCPCS | Performed by: SPECIALIST

## 2019-05-14 PROCEDURE — 0SRD0J9 REPLACEMENT OF LEFT KNEE JOINT WITH SYNTHETIC SUBSTITUTE, CEMENTED, OPEN APPROACH: ICD-10-PCS | Performed by: SPECIALIST

## 2019-05-14 PROCEDURE — C9290 INJ, BUPIVACAINE LIPOSOME: HCPCS | Performed by: PHYSICIAN ASSISTANT

## 2019-05-14 PROCEDURE — 74011000250 HC RX REV CODE- 250

## 2019-05-14 PROCEDURE — 77030018836 HC SOL IRR NACL ICUM -A: Performed by: SPECIALIST

## 2019-05-14 PROCEDURE — 97161 PT EVAL LOW COMPLEX 20 MIN: CPT

## 2019-05-14 PROCEDURE — 77030032490 HC SLV COMPR SCD KNE COVD -B: Performed by: SPECIALIST

## 2019-05-14 PROCEDURE — 97110 THERAPEUTIC EXERCISES: CPT

## 2019-05-14 PROCEDURE — 76060000037 HC ANESTHESIA 3 TO 3.5 HR: Performed by: SPECIALIST

## 2019-05-14 PROCEDURE — 74011250636 HC RX REV CODE- 250/636

## 2019-05-14 PROCEDURE — 97116 GAIT TRAINING THERAPY: CPT

## 2019-05-14 PROCEDURE — 3E0T3BZ INTRODUCTION OF ANESTHETIC AGENT INTO PERIPHERAL NERVES AND PLEXI, PERCUTANEOUS APPROACH: ICD-10-PCS | Performed by: ANESTHESIOLOGY

## 2019-05-14 PROCEDURE — 77030003666 HC NDL SPINAL BD -A: Performed by: SPECIALIST

## 2019-05-14 PROCEDURE — 74011250636 HC RX REV CODE- 250/636: Performed by: NURSE ANESTHETIST, CERTIFIED REGISTERED

## 2019-05-14 PROCEDURE — 77030008683 HC TU ET CUF COVD -A: Performed by: ANESTHESIOLOGY

## 2019-05-14 PROCEDURE — 76210000006 HC OR PH I REC 0.5 TO 1 HR: Performed by: SPECIALIST

## 2019-05-14 PROCEDURE — 74011000258 HC RX REV CODE- 258: Performed by: PHYSICIAN ASSISTANT

## 2019-05-14 PROCEDURE — 74011250637 HC RX REV CODE- 250/637: Performed by: NURSE ANESTHETIST, CERTIFIED REGISTERED

## 2019-05-14 PROCEDURE — 77030016507 HC BIT DRL3 ZIMM -B: Performed by: SPECIALIST

## 2019-05-14 PROCEDURE — 76942 ECHO GUIDE FOR BIOPSY: CPT | Performed by: ANESTHESIOLOGY

## 2019-05-14 PROCEDURE — C1776 JOINT DEVICE (IMPLANTABLE): HCPCS | Performed by: SPECIALIST

## 2019-05-14 PROCEDURE — 74011250636 HC RX REV CODE- 250/636: Performed by: PHYSICIAN ASSISTANT

## 2019-05-14 PROCEDURE — 77030020782 HC GWN BAIR PAWS FLX 3M -B: Performed by: SPECIALIST

## 2019-05-14 PROCEDURE — 76010000173 HC OR TIME 3 TO 3.5 HR INTENSV-TIER 1: Performed by: SPECIALIST

## 2019-05-14 PROCEDURE — 74011250636 HC RX REV CODE- 250/636: Performed by: SPECIALIST

## 2019-05-14 PROCEDURE — 77030006822 HC BLD SAW SAG BRSM -B: Performed by: SPECIALIST

## 2019-05-14 PROCEDURE — 74011250637 HC RX REV CODE- 250/637: Performed by: PHYSICIAN ASSISTANT

## 2019-05-14 PROCEDURE — 77030019605: Performed by: SPECIALIST

## 2019-05-14 PROCEDURE — 74011000250 HC RX REV CODE- 250: Performed by: PHYSICIAN ASSISTANT

## 2019-05-14 PROCEDURE — 77030003029 HC SUT VCRL J&J -B: Performed by: SPECIALIST

## 2019-05-14 DEVICE — CEMENT BNE W/GENTAMICIN R1X40 --: Type: IMPLANTABLE DEVICE | Site: KNEE | Status: FUNCTIONAL

## 2019-05-14 DEVICE — IMPLANTABLE DEVICE: Type: IMPLANTABLE DEVICE | Site: KNEE | Status: FUNCTIONAL

## 2019-05-14 DEVICE — BEARING TIB AP71MM ML75MM THK12MM KNEE E1 POST STBL: Type: IMPLANTABLE DEVICE | Site: KNEE | Status: FUNCTIONAL

## 2019-05-14 DEVICE — STEM TIB L80MM DIA10MM KNEE PRI FIN VANGUARD COMPLT SYS: Type: IMPLANTABLE DEVICE | Site: KNEE | Status: FUNCTIONAL

## 2019-05-14 DEVICE — ANCHOR SUT DIA2.9MM NO2 MAXBRAID DBL LD JUGGERKNOT: Type: IMPLANTABLE DEVICE | Site: KNEE | Status: FUNCTIONAL

## 2019-05-14 DEVICE — TRAY TIB L75MM KNEE COPOLYESTER SIL INTLOK PRI CEM VANGUARD: Type: IMPLANTABLE DEVICE | Site: KNEE | Status: FUNCTIONAL

## 2019-05-14 DEVICE — KNEE CEM FEM/TIB E1 PAT -- VANGUARD K6: Type: IMPLANTABLE DEVICE | Site: KNEE | Status: FUNCTIONAL

## 2019-05-14 DEVICE — COMPONENT PAT DIA37MM THK8.6MM THN KNEE POLY 3 PEG SER A: Type: IMPLANTABLE DEVICE | Site: KNEE | Status: FUNCTIONAL

## 2019-05-14 RX ORDER — ROCURONIUM BROMIDE 10 MG/ML
INJECTION, SOLUTION INTRAVENOUS AS NEEDED
Status: DISCONTINUED | OUTPATIENT
Start: 2019-05-14 | End: 2019-05-14 | Stop reason: HOSPADM

## 2019-05-14 RX ORDER — FERROUS SULFATE, DRIED 160(50) MG
1 TABLET, EXTENDED RELEASE ORAL DAILY
Status: DISCONTINUED | OUTPATIENT
Start: 2019-05-15 | End: 2019-05-15 | Stop reason: HOSPADM

## 2019-05-14 RX ORDER — ENOXAPARIN SODIUM 100 MG/ML
30 INJECTION SUBCUTANEOUS EVERY 24 HOURS
Status: DISCONTINUED | OUTPATIENT
Start: 2019-05-15 | End: 2019-05-15 | Stop reason: HOSPADM

## 2019-05-14 RX ORDER — ONDANSETRON 2 MG/ML
4 INJECTION INTRAMUSCULAR; INTRAVENOUS
Status: DISCONTINUED | OUTPATIENT
Start: 2019-05-14 | End: 2019-05-15 | Stop reason: HOSPADM

## 2019-05-14 RX ORDER — CEFAZOLIN SODIUM 2 G/50ML
2 SOLUTION INTRAVENOUS EVERY 8 HOURS
Status: COMPLETED | OUTPATIENT
Start: 2019-05-14 | End: 2019-05-14

## 2019-05-14 RX ORDER — MIDAZOLAM HYDROCHLORIDE 1 MG/ML
INJECTION, SOLUTION INTRAMUSCULAR; INTRAVENOUS
Status: COMPLETED | OUTPATIENT
Start: 2019-05-14 | End: 2019-05-14

## 2019-05-14 RX ORDER — OXYCODONE AND ACETAMINOPHEN 7.5; 325 MG/1; MG/1
2 TABLET ORAL
Status: DISCONTINUED | OUTPATIENT
Start: 2019-05-14 | End: 2019-05-15 | Stop reason: HOSPADM

## 2019-05-14 RX ORDER — SODIUM CHLORIDE, SODIUM LACTATE, POTASSIUM CHLORIDE, CALCIUM CHLORIDE 600; 310; 30; 20 MG/100ML; MG/100ML; MG/100ML; MG/100ML
75 INJECTION, SOLUTION INTRAVENOUS CONTINUOUS
Status: DISCONTINUED | OUTPATIENT
Start: 2019-05-14 | End: 2019-05-14 | Stop reason: HOSPADM

## 2019-05-14 RX ORDER — MIDAZOLAM HYDROCHLORIDE 1 MG/ML
2 INJECTION, SOLUTION INTRAMUSCULAR; INTRAVENOUS ONCE
Status: COMPLETED | OUTPATIENT
Start: 2019-05-14 | End: 2019-05-14

## 2019-05-14 RX ORDER — NALOXONE HYDROCHLORIDE 0.4 MG/ML
0.4 INJECTION, SOLUTION INTRAMUSCULAR; INTRAVENOUS; SUBCUTANEOUS AS NEEDED
Status: DISCONTINUED | OUTPATIENT
Start: 2019-05-14 | End: 2019-05-15 | Stop reason: HOSPADM

## 2019-05-14 RX ORDER — AMOXICILLIN 250 MG
1 CAPSULE ORAL 2 TIMES DAILY
Status: DISCONTINUED | OUTPATIENT
Start: 2019-05-14 | End: 2019-05-15 | Stop reason: HOSPADM

## 2019-05-14 RX ORDER — CEFAZOLIN SODIUM 2 G/50ML
2 SOLUTION INTRAVENOUS
Status: COMPLETED | OUTPATIENT
Start: 2019-05-14 | End: 2019-05-14

## 2019-05-14 RX ORDER — NEOSTIGMINE METHYLSULFATE 1 MG/ML
INJECTION INTRAVENOUS AS NEEDED
Status: DISCONTINUED | OUTPATIENT
Start: 2019-05-14 | End: 2019-05-14 | Stop reason: HOSPADM

## 2019-05-14 RX ORDER — SODIUM CHLORIDE 0.9 % (FLUSH) 0.9 %
5-40 SYRINGE (ML) INJECTION AS NEEDED
Status: DISCONTINUED | OUTPATIENT
Start: 2019-05-14 | End: 2019-05-14 | Stop reason: HOSPADM

## 2019-05-14 RX ORDER — HYDROMORPHONE HYDROCHLORIDE 2 MG/ML
INJECTION, SOLUTION INTRAMUSCULAR; INTRAVENOUS; SUBCUTANEOUS AS NEEDED
Status: DISCONTINUED | OUTPATIENT
Start: 2019-05-14 | End: 2019-05-14 | Stop reason: HOSPADM

## 2019-05-14 RX ORDER — FENTANYL CITRATE 50 UG/ML
INJECTION, SOLUTION INTRAMUSCULAR; INTRAVENOUS AS NEEDED
Status: DISCONTINUED | OUTPATIENT
Start: 2019-05-14 | End: 2019-05-14 | Stop reason: HOSPADM

## 2019-05-14 RX ORDER — PROPOFOL 10 MG/ML
INJECTION, EMULSION INTRAVENOUS AS NEEDED
Status: DISCONTINUED | OUTPATIENT
Start: 2019-05-14 | End: 2019-05-14 | Stop reason: HOSPADM

## 2019-05-14 RX ORDER — DEXTROSE, SODIUM CHLORIDE, SODIUM LACTATE, POTASSIUM CHLORIDE, AND CALCIUM CHLORIDE 5; .6; .31; .03; .02 G/100ML; G/100ML; G/100ML; G/100ML; G/100ML
75 INJECTION, SOLUTION INTRAVENOUS CONTINUOUS
Status: DISPENSED | OUTPATIENT
Start: 2019-05-14 | End: 2019-05-15

## 2019-05-14 RX ORDER — FAMOTIDINE 20 MG/1
20 TABLET, FILM COATED ORAL ONCE
Status: COMPLETED | OUTPATIENT
Start: 2019-05-14 | End: 2019-05-14

## 2019-05-14 RX ORDER — SUCCINYLCHOLINE CHLORIDE 20 MG/ML
INJECTION INTRAMUSCULAR; INTRAVENOUS AS NEEDED
Status: DISCONTINUED | OUTPATIENT
Start: 2019-05-14 | End: 2019-05-14 | Stop reason: HOSPADM

## 2019-05-14 RX ORDER — SODIUM CHLORIDE 0.9 % (FLUSH) 0.9 %
5-40 SYRINGE (ML) INJECTION EVERY 8 HOURS
Status: DISCONTINUED | OUTPATIENT
Start: 2019-05-14 | End: 2019-05-15 | Stop reason: HOSPADM

## 2019-05-14 RX ORDER — HYDROMORPHONE HYDROCHLORIDE 1 MG/ML
1 INJECTION, SOLUTION INTRAMUSCULAR; INTRAVENOUS; SUBCUTANEOUS
Status: DISCONTINUED | OUTPATIENT
Start: 2019-05-14 | End: 2019-05-15 | Stop reason: HOSPADM

## 2019-05-14 RX ORDER — OXYCODONE AND ACETAMINOPHEN 5; 325 MG/1; MG/1
2 TABLET ORAL
Status: DISCONTINUED | OUTPATIENT
Start: 2019-05-14 | End: 2019-05-15 | Stop reason: HOSPADM

## 2019-05-14 RX ORDER — GLYCOPYRROLATE 0.2 MG/ML
INJECTION INTRAMUSCULAR; INTRAVENOUS AS NEEDED
Status: DISCONTINUED | OUTPATIENT
Start: 2019-05-14 | End: 2019-05-14 | Stop reason: HOSPADM

## 2019-05-14 RX ORDER — FENTANYL CITRATE 50 UG/ML
100 INJECTION, SOLUTION INTRAMUSCULAR; INTRAVENOUS ONCE
Status: COMPLETED | OUTPATIENT
Start: 2019-05-14 | End: 2019-05-14

## 2019-05-14 RX ORDER — ACETAMINOPHEN 325 MG/1
650 TABLET ORAL EVERY 6 HOURS
Status: DISCONTINUED | OUTPATIENT
Start: 2019-05-14 | End: 2019-05-15 | Stop reason: HOSPADM

## 2019-05-14 RX ORDER — SODIUM CHLORIDE 0.9 % (FLUSH) 0.9 %
5-40 SYRINGE (ML) INJECTION EVERY 8 HOURS
Status: DISCONTINUED | OUTPATIENT
Start: 2019-05-14 | End: 2019-05-14 | Stop reason: HOSPADM

## 2019-05-14 RX ORDER — VANCOMYCIN/0.9 % SOD CHLORIDE 1 G/100 ML
1000 PLASTIC BAG, INJECTION (ML) INTRAVENOUS ONCE
Status: COMPLETED | OUTPATIENT
Start: 2019-05-14 | End: 2019-05-14

## 2019-05-14 RX ORDER — ONDANSETRON 2 MG/ML
4 INJECTION INTRAMUSCULAR; INTRAVENOUS ONCE
Status: DISCONTINUED | OUTPATIENT
Start: 2019-05-14 | End: 2019-05-14 | Stop reason: HOSPADM

## 2019-05-14 RX ORDER — ZOLPIDEM TARTRATE 5 MG/1
5 TABLET ORAL
Status: DISCONTINUED | OUTPATIENT
Start: 2019-05-14 | End: 2019-05-15 | Stop reason: HOSPADM

## 2019-05-14 RX ORDER — DEXAMETHASONE SODIUM PHOSPHATE 4 MG/ML
INJECTION, SOLUTION INTRA-ARTICULAR; INTRALESIONAL; INTRAMUSCULAR; INTRAVENOUS; SOFT TISSUE AS NEEDED
Status: DISCONTINUED | OUTPATIENT
Start: 2019-05-14 | End: 2019-05-14 | Stop reason: HOSPADM

## 2019-05-14 RX ORDER — POLYETHYLENE GLYCOL 3350 17 G/17G
17 POWDER, FOR SOLUTION ORAL DAILY
Status: DISCONTINUED | OUTPATIENT
Start: 2019-05-14 | End: 2019-05-15 | Stop reason: HOSPADM

## 2019-05-14 RX ORDER — LIDOCAINE HYDROCHLORIDE 20 MG/ML
INJECTION, SOLUTION EPIDURAL; INFILTRATION; INTRACAUDAL; PERINEURAL AS NEEDED
Status: DISCONTINUED | OUTPATIENT
Start: 2019-05-14 | End: 2019-05-14 | Stop reason: HOSPADM

## 2019-05-14 RX ORDER — ROPIVACAINE HYDROCHLORIDE 2 MG/ML
30 INJECTION, SOLUTION EPIDURAL; INFILTRATION; PERINEURAL
Status: COMPLETED | OUTPATIENT
Start: 2019-05-14 | End: 2019-05-14

## 2019-05-14 RX ORDER — ACETAMINOPHEN 500 MG
1000 TABLET ORAL
Status: COMPLETED | OUTPATIENT
Start: 2019-05-14 | End: 2019-05-14

## 2019-05-14 RX ORDER — ONDANSETRON 2 MG/ML
INJECTION INTRAMUSCULAR; INTRAVENOUS AS NEEDED
Status: DISCONTINUED | OUTPATIENT
Start: 2019-05-14 | End: 2019-05-14 | Stop reason: HOSPADM

## 2019-05-14 RX ORDER — LIDOCAINE HYDROCHLORIDE 10 MG/ML
3 INJECTION, SOLUTION EPIDURAL; INFILTRATION; INTRACAUDAL; PERINEURAL ONCE
Status: COMPLETED | OUTPATIENT
Start: 2019-05-14 | End: 2019-05-14

## 2019-05-14 RX ORDER — SCOLOPAMINE TRANSDERMAL SYSTEM 1 MG/1
1 PATCH, EXTENDED RELEASE TRANSDERMAL ONCE
Status: DISCONTINUED | OUTPATIENT
Start: 2019-05-14 | End: 2019-05-15 | Stop reason: HOSPADM

## 2019-05-14 RX ORDER — ROPIVACAINE HYDROCHLORIDE 2 MG/ML
INJECTION, SOLUTION EPIDURAL; INFILTRATION; PERINEURAL
Status: COMPLETED | OUTPATIENT
Start: 2019-05-14 | End: 2019-05-14

## 2019-05-14 RX ORDER — HYDROMORPHONE HYDROCHLORIDE 2 MG/ML
0.5 INJECTION, SOLUTION INTRAMUSCULAR; INTRAVENOUS; SUBCUTANEOUS
Status: DISCONTINUED | OUTPATIENT
Start: 2019-05-14 | End: 2019-05-14 | Stop reason: HOSPADM

## 2019-05-14 RX ORDER — SODIUM CHLORIDE 0.9 % (FLUSH) 0.9 %
5-40 SYRINGE (ML) INJECTION AS NEEDED
Status: DISCONTINUED | OUTPATIENT
Start: 2019-05-14 | End: 2019-05-15 | Stop reason: HOSPADM

## 2019-05-14 RX ORDER — METOPROLOL TARTRATE 50 MG/1
50 TABLET ORAL 2 TIMES DAILY
Status: DISCONTINUED | OUTPATIENT
Start: 2019-05-14 | End: 2019-05-15 | Stop reason: HOSPADM

## 2019-05-14 RX ORDER — FENTANYL CITRATE 50 UG/ML
INJECTION, SOLUTION INTRAMUSCULAR; INTRAVENOUS
Status: COMPLETED | OUTPATIENT
Start: 2019-05-14 | End: 2019-05-14

## 2019-05-14 RX ADMIN — HYDROMORPHONE HYDROCHLORIDE 0.5 MG: 2 INJECTION, SOLUTION INTRAMUSCULAR; INTRAVENOUS; SUBCUTANEOUS at 08:43

## 2019-05-14 RX ADMIN — FAMOTIDINE 20 MG: 20 TABLET ORAL at 06:45

## 2019-05-14 RX ADMIN — SUCCINYLCHOLINE CHLORIDE 120 MG: 20 INJECTION INTRAMUSCULAR; INTRAVENOUS at 07:32

## 2019-05-14 RX ADMIN — LIDOCAINE HYDROCHLORIDE 2 ML: 10 INJECTION, SOLUTION EPIDURAL; INFILTRATION; INTRACAUDAL; PERINEURAL at 07:00

## 2019-05-14 RX ADMIN — FENTANYL CITRATE 50 MCG: 50 INJECTION INTRAMUSCULAR; INTRAVENOUS at 06:58

## 2019-05-14 RX ADMIN — ROCURONIUM BROMIDE 30 MG: 10 INJECTION, SOLUTION INTRAVENOUS at 07:40

## 2019-05-14 RX ADMIN — SODIUM CHLORIDE, SODIUM LACTATE, POTASSIUM CHLORIDE, CALCIUM CHLORIDE, AND DEXTROSE MONOHYDRATE 75 ML/HR: 600; 310; 30; 20; 5 INJECTION, SOLUTION INTRAVENOUS at 12:13

## 2019-05-14 RX ADMIN — DEXAMETHASONE SODIUM PHOSPHATE 8 MG: 4 INJECTION, SOLUTION INTRA-ARTICULAR; INTRALESIONAL; INTRAMUSCULAR; INTRAVENOUS; SOFT TISSUE at 07:40

## 2019-05-14 RX ADMIN — ONDANSETRON 4 MG: 2 INJECTION INTRAMUSCULAR; INTRAVENOUS at 07:29

## 2019-05-14 RX ADMIN — TRANEXAMIC ACID 1 G: 1 INJECTION, SOLUTION INTRAVENOUS at 07:47

## 2019-05-14 RX ADMIN — PROPOFOL 150 MG: 10 INJECTION, EMULSION INTRAVENOUS at 07:32

## 2019-05-14 RX ADMIN — FENTANYL CITRATE 50 MCG: 50 INJECTION, SOLUTION INTRAMUSCULAR; INTRAVENOUS at 07:31

## 2019-05-14 RX ADMIN — ROCURONIUM BROMIDE 10 MG: 10 INJECTION, SOLUTION INTRAVENOUS at 08:31

## 2019-05-14 RX ADMIN — FENTANYL CITRATE 50 MCG: 50 INJECTION, SOLUTION INTRAMUSCULAR; INTRAVENOUS at 07:03

## 2019-05-14 RX ADMIN — CEFAZOLIN 2 G: 10 INJECTION, POWDER, FOR SOLUTION INTRAVENOUS at 07:37

## 2019-05-14 RX ADMIN — GLYCOPYRROLATE 0.4 MG: 0.2 INJECTION INTRAMUSCULAR; INTRAVENOUS at 10:28

## 2019-05-14 RX ADMIN — ONDANSETRON 4 MG: 2 INJECTION INTRAMUSCULAR; INTRAVENOUS at 10:29

## 2019-05-14 RX ADMIN — SODIUM CHLORIDE, SODIUM LACTATE, POTASSIUM CHLORIDE, AND CALCIUM CHLORIDE 75 ML/HR: 600; 310; 30; 20 INJECTION, SOLUTION INTRAVENOUS at 06:38

## 2019-05-14 RX ADMIN — ACETAMINOPHEN 650 MG: 325 TABLET ORAL at 15:09

## 2019-05-14 RX ADMIN — ROPIVACAINE HYDROCHLORIDE 30 MG: 2 INJECTION, SOLUTION EPIDURAL; INFILTRATION; PERINEURAL at 07:03

## 2019-05-14 RX ADMIN — OXYCODONE AND ACETAMINOPHEN 2 TABLET: 7.5; 325 TABLET ORAL at 16:38

## 2019-05-14 RX ADMIN — CEFAZOLIN 2 G: 10 INJECTION, POWDER, FOR SOLUTION INTRAVENOUS at 15:12

## 2019-05-14 RX ADMIN — FENTANYL CITRATE 50 MCG: 50 INJECTION, SOLUTION INTRAMUSCULAR; INTRAVENOUS at 08:06

## 2019-05-14 RX ADMIN — ACETAMINOPHEN 650 MG: 325 TABLET ORAL at 23:34

## 2019-05-14 RX ADMIN — VANCOMYCIN HYDROCHLORIDE 1000 MG: 10 INJECTION, POWDER, LYOPHILIZED, FOR SOLUTION INTRAVENOUS at 06:40

## 2019-05-14 RX ADMIN — ROPIVACAINE HYDROCHLORIDE 60 MG: 2 INJECTION, SOLUTION EPIDURAL; INFILTRATION at 07:02

## 2019-05-14 RX ADMIN — MIDAZOLAM HYDROCHLORIDE 2 MG: 2 INJECTION, SOLUTION INTRAMUSCULAR; INTRAVENOUS at 06:58

## 2019-05-14 RX ADMIN — NEOSTIGMINE METHYLSULFATE 3 MG: 1 INJECTION INTRAVENOUS at 10:28

## 2019-05-14 RX ADMIN — LIDOCAINE HYDROCHLORIDE 80 MG: 20 INJECTION, SOLUTION EPIDURAL; INFILTRATION; INTRACAUDAL; PERINEURAL at 07:32

## 2019-05-14 RX ADMIN — SENNOSIDES, DOCUSATE SODIUM 1 TABLET: 50; 8.6 TABLET, FILM COATED ORAL at 15:09

## 2019-05-14 RX ADMIN — HYDROMORPHONE HYDROCHLORIDE 0.5 MG: 2 INJECTION INTRAMUSCULAR; INTRAVENOUS; SUBCUTANEOUS at 11:18

## 2019-05-14 RX ADMIN — SENNOSIDES, DOCUSATE SODIUM 1 TABLET: 50; 8.6 TABLET, FILM COATED ORAL at 17:19

## 2019-05-14 RX ADMIN — CEFAZOLIN 2 G: 10 INJECTION, POWDER, FOR SOLUTION INTRAVENOUS at 23:22

## 2019-05-14 RX ADMIN — ACETAMINOPHEN 1000 MG: 500 TABLET ORAL at 06:45

## 2019-05-14 RX ADMIN — Medication 10 ML: at 23:22

## 2019-05-14 RX ADMIN — HYDROMORPHONE HYDROCHLORIDE 0.5 MG: 2 INJECTION, SOLUTION INTRAMUSCULAR; INTRAVENOUS; SUBCUTANEOUS at 09:00

## 2019-05-14 RX ADMIN — MIDAZOLAM HYDROCHLORIDE 2 MG: 1 INJECTION, SOLUTION INTRAMUSCULAR; INTRAVENOUS at 07:03

## 2019-05-14 RX ADMIN — TRANEXAMIC ACID 1 G: 1 INJECTION, SOLUTION INTRAVENOUS at 10:25

## 2019-05-14 RX ADMIN — HYDROMORPHONE HYDROCHLORIDE 0.5 MG: 2 INJECTION, SOLUTION INTRAMUSCULAR; INTRAVENOUS; SUBCUTANEOUS at 08:31

## 2019-05-14 RX ADMIN — PROMETHAZINE HYDROCHLORIDE 12.5 MG: 25 INJECTION INTRAMUSCULAR; INTRAVENOUS at 11:08

## 2019-05-14 NOTE — ANESTHESIA PREPROCEDURE EVALUATION
Relevant Problems No relevant active problems Anesthetic History PONV Review of Systems / Medical History Patient summary reviewed and pertinent labs reviewed Pulmonary Within defined limits Neuro/Psych Within defined limits Cardiovascular Hypertension: well controlled Dysrhythmias : atrial fibrillation Exercise tolerance: <4 METS 
  
GI/Hepatic/Renal 
  
 
 
 
Liver disease Endo/Other Morbid obesity and arthritis Other Findings Physical Exam 
 
Airway Mallampati: III 
TM Distance: 4 - 6 cm Neck ROM: decreased range of motion Mouth opening: Normal 
 
 Cardiovascular Rhythm: regular Rate: normal 
 
 
 
 Dental 
No notable dental hx Pulmonary Breath sounds clear to auscultation Abdominal 
GI exam deferred Other Findings Anesthetic Plan ASA: 3 Anesthesia type: general and regional - femoral single shot Induction: Intravenous Anesthetic plan and risks discussed with: Patient

## 2019-05-14 NOTE — ROUTINE PROCESS
Bedside shift change report given to Alphonso Persaud (oncoming nurse) by [facility] CHANTAL (offgoing nurse). Report included the following information SBAR.

## 2019-05-14 NOTE — PROGRESS NOTES
conducted a pre-surgery visit with Avery Galdamez, who is a 62 y.o.,female. The  provided the following Interventions: 
Initiated a relationship of care and support. Offered prayer and assurance of continued prayers on patient's behalf. Plan: 
Chaplains will continue to follow and will provide pastoral care on an as needed/requested basis.  recommends bedside caregivers page  on duty if patient shows signs of acute spiritual or emotional distress. Joanne Dunlap Board Certified [address] [place] [address] Care  
[phone]

## 2019-05-14 NOTE — BRIEF OP NOTE
BRIEF OPERATIVE NOTE Date of Procedure: 5/14/2019 Preoperative Diagnosis: M17.12 PRIMARY OSTEOARTHRITIS LEFT KNEE Postoperative Diagnosis: M17.12 PRIMARY OSTEOARTHRITIS LEFT KNEE Procedure(s): LEFT TOTAL KNEE ARTHROPLASTY/BIOMET/2 SA'S/ADDUCTOR BLOCK Surgeon(s) and Role: Elias Casarez MD 
 
Surgical Staff: 
Circ-1: Lety Brown RN 
Circ-Relief: Renuka Alaniz Scrub Tech-1: Mark Anthony Mcclain Surg Asst-1: Mohsen Pickup Surg Asst-2: Gadsden Mónica Event Time In Time Out Incision Start 1862 Incision Close Anesthesia: General  
Estimated Blood Loss: 30 cc Specimens: * No specimens in log * Findings: above Complications: none Implants:  
Implant Name Type Inv. Item Serial No.  Lot No. LRB No. Used Action CEMENT BNE W/GENTAMICIN R1X40 --  - [id number]  CEMENT BNE W/GENTAMICIN R1X40 --   [employer] [id number] Left 1 Implanted STEM TIB FIN MITCHEL [id number] --  - [id number]  STEM TIB FIN MITCHEL [id number] --   [employer] [id number] Left 1 Implanted TRAY TIB MITCHEL INTERLOK 75MM --  - [id number]  TRAY TIB MITCHEL INTERLOK 75MM --   [employer] [id number] Left 1 Implanted COMPNT FEM PS OPN LT 67. 5MM -- Kelsie Amen - [id number]  COMPNT FEM PS OPN LT 67. 5MM -- [employer] [id number] Left 1 Implanted PAT SER A THN 37X8. 5 3 PEG -- NO WIRE - [id number]  PAT SER A THN 37X8. 5 3 PEG -- NO WIRE  [employer] [id number] Left 1 Implanted INSERT TIB BRNG PS E1 [id number] -- VANGUARD - [id number]  INSERT TIB BRNG PS E1 71/75X12 -- Isaak [employer] [id number] Left 1 Implanted ANCHOR SUT JUGGERKNOT 2.9MM --  - [id number]  ANCHOR SUT JUGGERKNOT 2.9MM --   [facility] [id number] Left 1 Implanted

## 2019-05-14 NOTE — OP NOTES
Operative Note      Patient: Bethanie Lopez     Date of Surgery: 5/14/2019         YOB: 1955      Age:  62 y.o.        LOS:  LOS: 0 days       Preoperative Diagnosis:  M17.12 PRIMARY OSTEOARTHRITIS LEFT KNEE    Postoperative Diagnosis: M17.12 PRIMARY OSTEOARTHRITIS LEFT KNEE      Surgeon:  Zoe Gimenez MD     Assistant:  Ashok Menendez     Anesthesia:  general anesthesia and adductor block     Procedure:  Procedure(s):  LEFT TOTAL KNEE ARTHROPLASTY/BIOMET/2 SA'S/ADDUCTOR BLOCK    Time out performed: YES    Estimated Blood Loss:  min           Implants:    Implant Name Type Inv. Item Serial No.  Lot No. LRB No. Used Action   CEMENT BNE W/GENTAMICIN R1X40 --  - [id number]  CEMENT BNE W/GENTAMICIN R1X40 --   [employer] [id number] Left 1 Implanted   STEM TIB FIN MITCHEL [id number] --  - [id number]  STEM TIB FIN MITCHEL [id number] --   [employer] [id number] Left 1 Implanted   TRAY TIB MITCHEL INTERLOK 75MM --  - [id number]  TRAY TIB MITCHEL INTERLOK 75MM --   [employer] [id number] Left 1 Implanted   COMPNT FEM PS OPN LT 67. 5MM -- [place] - [id number]  COMPNT FEM PS OPN LT 67. 5MM -- [place] [employer]  [employer] [id number] Left 1 Implanted   PAT SER A THN 37X8. 5 3 PEG -- NO WIRE - [id number]  PAT SER A THN 37X8. 5 3 PEG -- NO WIRE  [employer] [id number] Left 1 Implanted   INSERT TIB BRNG PS E1 71/75X12 -- VANGUARD - [id number]  INSERT TIB BRNG PS E1 71/75X12 -- VANGUARD  [employer] [id number] Left 1 Implanted   ANCHOR SUT JUGGERKNOT 2.9MM --  - [id number]  ANCHOR SUT JUGGERKNOT 2.9MM --   [employer] [id number] Left 1 Implanted       Specimens: * No specimens in log *            Complications:  None    DESCRIPTION OF PROCEDURE: After satisfactory general and adductor block anesthesia, in the supine position, patient's knee was prepped with Betacept and ChloroPrep solution and draped in the usual fashion for knee replacement. The tourniquet was inflated to 350 mmHg after exsanguination and elevation. A longitudinal anterior skin incision was made carried down through the subcutaneus tissue to the underlying extensor mechanism. A medial parapatellar arthrotomy was carried proximally in a subvastus approach sparing the vastus medialis obliqus insertion on the quadriceps tendon. The patella was everted and retracted laterally as the knee was flexed. An anterior joint line debridement was carried out. The proximal tibial cut was made using the extramedullary tibial cutting guide and an oscillating saw. The distal femoral cuts were made using an intramedullary femoral cutting guide and a 4 degree cutting angle based on preoperative x-ray measurements of the femur. The distal femoral resection was set at 9 mm. The notch cut was made for a 67.5 mm posterior stabilized femoral component using the bone conserving cutting slot. Osteophytes were removed from the joint margins including the posterior femoral condyles. The tibial stem hole and fin impressions were made for the 75 mm tibial base plate. The patella was cut to recreate the native patellar thickness of 19 mm. Three drill holes were placed in the patella for a 37 mm thin three pegged all polyethylene patellar component. Trial components were inserted and the knee was found to have full extension and good stability with a 12 mm tibial spacer in place. Trial components were then removed and the knee was irrigated thoroughly. The actual components were then cemented into place with gentamycin bone cement. All excess cement was carefully removed. The knee was held in extension with a clamp on the patella as the cement hardened. Once the cement fully hardened, the knee was again checked for excess cement. The knee was injected with 266 mg/20 cc Exparel mixed with 40 ml saline as the cement hardened.   The actual PS E poly tibial spacer was then attached to the tibial base plate with a locking bar. The knee was then reduced and was found to be stable with full knee extension, good stability, and mid line patellar tracking with no thumb pressure applied. The knee was again irrigated thoroughly, including a dilute betadine lavage. A Surgivac drain was inserted. One Juggerknot suture anchor was used to reinforce the patellar tendon attachment site. Closure was obtained using #2 Vicryl interrupted sutures for the capsular closure, 2-0 Vicryl suture for the subcutaneous tissues and staples for the skin. A sterile dressing was applied and Ms. Vosler was transported to the recovery room in good condition. Sponge and needle count was correct.

## 2019-05-14 NOTE — CONSULTS
Consult    Subjective:     Bobby Hwang is a 62 y.o.  female who is being seen for post operative medical management after left TKR. Patient is somewhat sleepy but able to answer questions. Has some left knee pain. She took her metoprolol this morning but has been off eliquis since 5/4/19 per family member. Patient denies for chest and abdominal pain. No nausea or vomiting. No SOB or cough. Denies for smoking cigarettes  or drinking alcohol. Lives with family. Follows with dr. Emerson  for chronic atrial fibrillation. Past Medical History:   Diagnosis Date    Arthritis     Bilateral Knee, and Bilateral Hand/Thumb    Atrial fibrillation ([address] 1/29/2018    Carpal tunnel syndrome     H/O echocardiogram 01/30/2018    EF 50%, normal left atrial size, no valvular heart disease    Headache     History of ankle fusion 1997    left    Hx of migraine headaches     Hypercholesterolemia     Hypertension     Morbid obesity ([address]     HERZOG (nonalcoholic steatohepatitis) suggested by ultrasound report, 2016 8/11/2017    Nausea & vomiting     Vertigo       Past Surgical History:   Procedure Laterality Date    COLONOSCOPY N/A 6/5/2018    COLONOSCOPY performed by Karen Roberson MD at [address] [place] [address]      age 25 years    HX COLONOSCOPY      Due in 2018:  May 2015    HX HYSTERECTOMY      age 30years old    HX KNEE REPLACEMENT Right 05/22/2018    HX KNEE REPLACEMENT Left 05/14/2019    HX ORTHOPAEDIC Left     ankle fusion    HX OTHER SURGICAL  2014    removal of mole left breast    HX TONSILLECTOMY      at age 29years old     Family History   Problem Relation Age of Onset    Heart Disease Mother     Hypertension Mother     Heart Surgery Mother     Cancer Father         colon    Hypertension Brother     No Known Problems Maternal Grandmother     Heart Disease Maternal Grandfather     MS Paternal Grandmother     Stroke Paternal Grandfather     Heart Disease Paternal Grandfather     No Known Problems Son       Social History     Tobacco Use    Smoking status: Never Smoker    Smokeless tobacco: Never Used   Substance Use Topics    Alcohol use: No     Alcohol/week: 0.0 oz       Current Facility-Administered Medications   Medication Dose Route Frequency Provider Last Rate Last Dose    scopolamine (TRANSDERM-SCOP) 1 mg over 3 days 1 Patch  1 Patch TransDERmal Ramesh Jalloh MD   1 Patch at 05/14/19 0654    [START ON 5/15/2019] calcium-vitamin D (OS-ABRAHAM) 500 mg-200 unit tablet  1 Tab Oral DAILY Sienna Rodriguez MD        polyethylene glycol (MIRALAX) packet 17 g  17 g Oral DAILY Corrie Peck MD        metoprolol tartrate (LOPRESSOR) tablet 50 mg  50 mg Oral BID Corrie Peck MD        dextrose 5% lactated ringers infusion  75 mL/hr IntraVENous CONTINUOUS Corrie Peck MD 75 mL/hr at 05/14/19 1213 75 mL/hr at 05/14/19 1213    sodium chloride (NS) flush 5-40 mL  5-40 mL IntraVENous Q8H Corrie Peck MD        sodium chloride (NS) flush 5-40 mL  5-40 mL IntraVENous PRN Corrie Peck MD        acetaminophen (TYLENOL) tablet 650 mg  650 mg Oral Q6H Corrie Peck MD        oxyCODONE-acetaminophen (PERCOCET) 5-325 mg per tablet 2 Tab  2 Tab Oral Q4H PRN Corrie Peck MD        oxyCODONE-acetaminophen (PERCOCET 7.5) 7.5-325 mg per tablet 2 Tab  2 Tab Oral Q4H SARAH Peck MD        HYDROmorphone (DILAUDID) injection 1 mg  1 mg IntraVENous Q4H SARAH Peck MD        naloxone [facility]) injection 0.4 mg  0.4 mg IntraVENous PRALEXANDRA Peck MD        ceFAZolin (ANCEF) 2g IVPB in 50 mL D5W  2 g IntraVENous Q8H Corrie Peck MD        ondansetron [facility]) injection 4 mg  4 mg IntraVENous Q4H SARAH Peck MD        senna-docusate (PERICOLACE) 8.6-50 mg per tablet 1 Tab  1 Tab Oral BID Corrie Peck MD        zolpidem (AMBIEN) tablet 5 mg  5 mg Oral QHS PRALEXANDRA ePck MD  [START ON 5/15/2019] enoxaparin (LOVENOX) injection 30 mg  30 mg SubCUTAneous Q24H Carol Rodriguez MD        promethazine (PHENERGAN) 12.5 mg in 0.9% sodium chloride 50 mL IVPB  12.5 mg IntraVENous PRN Mee Cooper MD            No Known Allergies     Review of Systems:  A comprehensive review of systems was negative except for that written in the History of Present Illness. Objective: Intake and Output:    05/14 0701 - 05/14 1900  In: 1400 [I.V.:1400]  Out: 180 [Urine:130; Drains:30]  No intake/output data recorded. Physical Exam:   Visit Vitals  /70 (BP 1 Location: Left arm, BP Patient Position: At rest)   Pulse 64   Temp 97 °F (36.1 °C)   Resp 16   Ht 5' 6.5\" (1.689 m)   Wt 101.6 kg (224 lb)   SpO2 100%   BMI 35.61 kg/m²     General appearance: alert, cooperative, no distress, appears stated age  Head: Normocephalic, without obvious abnormality, atraumatic  Nose: no discharge  Neck: supple, symmetrical, trachea midline and no JVD  Lungs: clear to auscultation bilaterally  Heart: S1, S2 normal  Abdomen: soft, non-tender. Bowel sounds normal. No masses,  no organomegaly  Extremities: no edema. Left knee dressing and drain in situ  Neurologic:moves both UEs and RLE well      Data Review:   No results found for this or any previous visit (from the past 24 hour(s)). Assessment:     Active Problems:    Primary osteoarthritis of left knee (5/14/2019)      S/P TKR (total knee replacement) (5/14/2019)    1. S/p left TKR  2. P-Atrial fibrillation, rate controoled  3.  HTN  4. OA    Plan:     Cont current management  Hold eliquis per ortho team  Nothing to add from my point at this time  Thank you for allowing to participate in clinical care of this patient

## 2019-05-14 NOTE — PROGRESS NOTES
Problem: Mobility Impaired (Adult and Pediatric) Goal: *Acute Goals and Plan of Care (Insert Text) Description Physical Therapy Goals Initiated 5/14/2019 and to be accomplished within 7 day(s) 1. Patient will move from supine to sit and sit to supine  in bed with modified independence. 2.  Patient will transfer from bed to chair and chair to bed with modified independence using the least restrictive device. 3.  Patient will perform sit to stand with modified independence. 4.  Patient will ambulate with modified independence for 250 feet with the least restrictive device. 5.  Patient will ascend/descend 2 stairs with 1 handrail(s) with minimal assistance/contact guard assist.  
 
Prior Level of Function: Pt was Independent with all mobility including gait, no AD. Pt lives with  in 1 story home with 2 KYLER. Outcome: Progressing Towards Goal 
 PHYSICAL THERAPY EVALUATION Patient: Norberto Hudson (84 y.o. female) Date: 5/14/2019 Primary Diagnosis: Primary osteoarthritis of left knee [M17.12] Procedure(s) (LRB): LEFT TOTAL KNEE ARTHROPLASTY/BIOMET/2 SA'S/ADDUCTOR BLOCK (Left) Day of Surgery Precautions:  Fall, WBAT(knee immobilizer PRN) Weight Bearing: WBAT 
 
ASSESSMENT : 
PT orders received and patient cleared by nursing to participate with therapy. Patient is a 62 y.o. female admitted to the hospital due to L TKA Dr Lu Palacios 5/14/19. Patient arrived [address] Patient consents to PT evaluation and treatment. Pt educated on safety, mobility, therex, towel under ankle only, and OOB 3-5 times with nursing assistance. Pt started supine in bed with HOB elevated. Supine to sit min assist for L leg. Pt performed sit to stand from bed and chair contact guard. Gait training 5 + 2 ft with RW min/mod assist for L knee buckling. Discussed with pt and nursing, possible knee immobilizer PRN for safety if bucking continues.  Pt was able to increase L knee extension in standing with verbal and tactile cues. Pt ended therapy in recliner with towel roll under L ankle and all needs met. Informed nursing for more ice for pt's knee. Patient will benefit from skilled intervention to address the above impairments. Patient's rehabilitation potential is considered to be Good Factors which may influence rehabilitation potential include:  
? None noted ? Mental ability/status ? Medical condition ? Home/family situation and support systems ? Safety awareness 
? Pain tolerance/management 
? Other: PLAN : 
Recommendations and Planned Interventions:  
?           Bed Mobility Training             ? Neuromuscular Re-Education ? Transfer Training                   ? Orthotic/Prosthetic Training 
? Gait Training                          ? Modalities ? Therapeutic Exercises           ? Edema Management/Control ? Therapeutic Activities            ? Family Training/Education ? Patient Education ? Other (comment): Frequency/Duration: Patient will be followed by physical therapy 1-2 times per day to address goals. Discharge Recommendations: Home Health Further Equipment Recommendations for Discharge: N/A  
 
SUBJECTIVE:  
Patient stated I'm not fully awake yet.  OBJECTIVE DATA SUMMARY:  
 
Past Medical History:  
Diagnosis Date  Arthritis Bilateral Knee, and Bilateral Hand/Thumb  Atrial fibrillation ([address] 1/29/2018  Carpal tunnel syndrome  H/O echocardiogram 01/30/2018 EF 50%, normal left atrial size, no valvular heart disease  Headache  History of ankle fusion 1997  
 left  Hx of migraine headaches  Hypercholesterolemia  Hypertension  Morbid obesity ([address]  HERZOG (nonalcoholic steatohepatitis) suggested by ultrasound report, 2016 8/11/2017  Nausea & vomiting  Vertigo Past Surgical History:  
Procedure Laterality Date  COLONOSCOPY N/A 6/5/2018 COLONOSCOPY performed by Lisette Jiménez MD at [address] HX APPENDECTOMY    
 age 25 years  HX COLONOSCOPY Due in 2018: May 2015  HX HYSTERECTOMY    
 age 30years old  HX KNEE REPLACEMENT Right 05/22/2018  HX KNEE REPLACEMENT Left 05/14/2019  HX ORTHOPAEDIC Left   
 ankle fusion  HX OTHER SURGICAL  2014  
 removal of mole left breast  
 HX TONSILLECTOMY    
 at age 29years old Barriers to Learning/Limitations: yes;  altered mental status (i.e.Sedation, Confusion) Compensate with: Visual Cues, Verbal Cues and Tactile Cues Home Situation: 
Home Situation Home Environment: Private residence # Steps to Enter: 2 Rails to Enter: Yes Hand Rails : Right Wheelchair Ramp: No 
One/Two Story Residence: One story Living Alone: No 
Support Systems: Spouse/Significant Other/Partner Patient Expects to be Discharged to[de-identified] Private residence Current DME Used/Available at Home: Grab bars, Cane, straight, Commode, bedside, Walker, rolling, Shower chair Critical Behavior: 
Neurologic State: Alert;Drowsy; Lethargic Orientation Level: Oriented X4 Cognition: Appropriate safety awareness; Follows commands;Recognition of people/places Safety/Judgement: Awareness of environment; Fall prevention Psychosocial 
Patient Behaviors: Lethargic;Calm; Cooperative Family  Behaviors: Calm; Cooperative Family  Behaviors: Calm; Cooperative Skin Integrity: Incision (comment)(Left hip) Skin Integumentary Skin Integrity: Incision (comment)(Left hip) B LE Strength:   
Strength: (R 5/5, L 3-/5) B LE Tone & Sensation:  
Tone: Normal         
Sensation: Intact B LE Range Of Motion: 
AROM: (WFL except L knee 8-92 deg) Functional Mobility: 
Bed Mobility: 
  
Supine to Sit: Minimum assistance Transfers: 
Sit to Stand: Contact guard assistance Stand to Sit: Contact guard assistance Bed to Chair: Contact guard assistance Balance:  
Sitting: Intact Standing: Impaired Standing - Static: Good Standing - Dynamic : Fair Ambulation/Gait Training: 
Distance (ft): 7 Feet (ft) Assistive Device: Walker, rolling;Gait belt Ambulation - Level of Assistance: Minimal assistance; Moderate assistance(1 large knee buckle with mod assist for recovery ) Gait Description (WDL): Exceptions to [facility] Gait Abnormalities: Antalgic;Decreased step clearance(L knee buckle multiple ) Left Side Weight Bearing: As tolerated Base of Support: Widened Stance: Right increased; Left decreased Speed/Sara: Slow Step Length: Left shortened;Right shortened Therapeutic Exercises:  
Reviewed and performed ankle pumps, SLR (extensor lag needs mod assist), quad sets, and heel slides. Pain: 
Pain level pre-treatment: 4/10, sharp/aching pain L knee Pain level post-treatment: 4/10, same Pain Intervention(s) : Medication (see MAR); Rest, Ice, Repositioning Response to intervention: Nurse notified, See doc flow Activity Tolerance:  
fair Please refer to the flowsheet for vital signs taken during this treatment. After treatment:  
?         Patient left in no apparent distress sitting up in chair ? Patient left in no apparent distress in bed 
? Call bell left within reach ? Personal items in reach ? Nursing notified Dariel Juarez ? Caregiver present ? Bed/chair alarm activated ? SCDs applied COMMUNICATION/EDUCATION:  
?         Role of Physical Therapy in the acute care setting. ?         Fall prevention education was provided and the patient/caregiver indicated understanding. ? Patient/family have participated as able in goal setting and plan of care. ?         Patient/family agree to work toward stated goals and plan of care. ?         Patient understands intent and goals of therapy, but is neutral about his/her participation. ? Patient is unable to participate in goal setting/plan of care: ongoing with therapy staff. ?         Out of bed with nursing assistance 3-5 times a day. ? Other: Thank you for this referral. 
Maine Matias DPT Time Calculation: 47 mins Eval Complexity: History: LOW Complexity : Zero comorbidities / personal factors that will impact the outcome / POCExam:HIGH Complexity : 4+ Standardized tests and measures addressing body structure, function, activity limitation and / or participation in recreation  Presentation: LOW Complexity : Stable, uncomplicated  Clinical Decision Making:Low Complexity    Overall Complexity:LOW A student participated in this treatment session. Per CMS Medicare statements and guidelines I certify that the following was true: 1. I was present and directly observed the entire session. 2. I made all skilled judgments and clinical decisions regarding care. 3. I am the practitioner responsible for assessment, treatment, and documentation.  
Nicole Bianchi, PT, DPT

## 2019-05-14 NOTE — PERIOP NOTES
TRANSFER - OUT REPORT: 
 
Verbal report given to [employer] RN on [employer]  being transferred to  for routine post - op Report consisted of patients Situation, Background, Assessment and  
Recommendations(SBAR). Information from the following report(s) SBAR and MAR was reviewed with the receiving nurse. Lines:  
Peripheral IV 05/14/19 Right Hand (Active) Site Assessment Clean, dry, & intact 5/14/2019 10:46 AM  
Phlebitis Assessment 0 5/14/2019 10:46 AM  
Infiltration Assessment 0 5/14/2019 10:46 AM  
Dressing Status Clean, dry, & intact 5/14/2019 10:46 AM  
Dressing Type Tape;Transparent 5/14/2019 10:46 AM  
Hub Color/Line Status Pink; Infusing 5/14/2019 10:46 AM  
  
 
Opportunity for questions and clarification was provided. Patient transported with: 
 O2 @ 2 liters

## 2019-05-14 NOTE — ANESTHESIA PROCEDURE NOTES
Peripheral Block    Start time: 5/14/2019 6:55 AM  End time: 5/14/2019 7:08 AM  Performed by: Alfonso Currie MD  Authorized by: Alfonso Currie MD       Pre-procedure: Indications: at surgeon's request and post-op pain management    Preanesthetic Checklist: patient identified, risks and benefits discussed, site marked, timeout performed, anesthesia consent given and patient being monitored    Timeout Time: 06:55          Block Type:   Block Type:   Adductor canal  Laterality:  Left  Monitoring:  Standard ASA monitoring, responsive to questions, continuous pulse ox, oxygen and heart rate  Injection Technique:  Single shot  Procedures: ultrasound guided    Patient Position: supine  Prep: chlorhexidine    Needle Type:  Ultraplex  Needle Gauge:  21 G  Needle Localization:  Ultrasound guidance    Assessment:  Number of attempts:  1  Injection Assessment:  Incremental injection every 5 mL, local visualized surrounding nerve on ultrasound, negative aspiration for blood, negative aspiration for CSF, no paresthesia, no intravascular symptoms, low pressure verified by pressure monitor and ultrasound image on chart  Patient tolerance:  Patient tolerated the procedure well with no immediate complications

## 2019-05-14 NOTE — INTERVAL H&P NOTE
H&P Update: 
Malka Green was seen and examined. History and physical has been reviewed. The patient has been examined. There have been no significant clinical changes since the completion of the originally dated History and Physical. 
Patient identified by surgeon; surgical site was confirmed by patient and surgeon.

## 2019-05-14 NOTE — ANESTHESIA POSTPROCEDURE EVALUATION
Procedure(s): LEFT TOTAL KNEE ARTHROPLASTY/BIOMET/2 SA'S/ADDUCTOR BLOCK. 
 
general, regional 
 
Anesthesia Post Evaluation Multimodal analgesia: multimodal analgesia used between 6 hours prior to anesthesia start to PACU discharge Patient location during evaluation: bedside Patient participation: complete - patient participated Level of consciousness: awake Pain management: adequate Airway patency: patent Anesthetic complications: no 
Cardiovascular status: stable Respiratory status: acceptable Hydration status: acceptable Post anesthesia nausea and vomiting:  controlled Vitals Value Taken Time /62 5/14/2019 11:35 AM  
Temp 36.7 °C (98 °F) 5/14/2019 10:47 AM  
Pulse 55 5/14/2019 11:39 AM  
Resp 9 5/14/2019 11:39 AM  
SpO2 98 % 5/14/2019 11:39 AM  
Vitals shown include unvalidated device data.

## 2019-05-15 ENCOUNTER — HOME HEALTH ADMISSION (OUTPATIENT)
Dept: HOME HEALTH SERVICES | Facility: HOME HEALTH | Age: 64
End: 2019-05-15
Payer: COMMERCIAL

## 2019-05-15 VITALS
DIASTOLIC BLOOD PRESSURE: 75 MMHG | BODY MASS INDEX: 35.16 KG/M2 | HEART RATE: 68 BPM | OXYGEN SATURATION: 98 % | WEIGHT: 224 LBS | SYSTOLIC BLOOD PRESSURE: 125 MMHG | TEMPERATURE: 98.2 F | HEIGHT: 67 IN | RESPIRATION RATE: 16 BRPM

## 2019-05-15 LAB
HCT VFR BLD AUTO: 35.6 % (ref 35–45)
HGB BLD-MCNC: 11.6 G/DL (ref 12–16)

## 2019-05-15 PROCEDURE — 97116 GAIT TRAINING THERAPY: CPT

## 2019-05-15 PROCEDURE — 74011250636 HC RX REV CODE- 250/636: Performed by: SPECIALIST

## 2019-05-15 PROCEDURE — 97165 OT EVAL LOW COMPLEX 30 MIN: CPT

## 2019-05-15 PROCEDURE — 74011250637 HC RX REV CODE- 250/637: Performed by: SPECIALIST

## 2019-05-15 PROCEDURE — 36415 COLL VENOUS BLD VENIPUNCTURE: CPT

## 2019-05-15 PROCEDURE — 85018 HEMOGLOBIN: CPT

## 2019-05-15 PROCEDURE — 97535 SELF CARE MNGMENT TRAINING: CPT

## 2019-05-15 RX ORDER — AMOXICILLIN 250 MG
1 CAPSULE ORAL 2 TIMES DAILY
Qty: 30 TAB | Refills: 0 | Status: SHIPPED | OUTPATIENT
Start: 2019-05-15 | End: 2019-08-21 | Stop reason: ALTCHOICE

## 2019-05-15 RX ORDER — OXYCODONE AND ACETAMINOPHEN 7.5; 325 MG/1; MG/1
1-2 TABLET ORAL
Qty: 44 TAB | Refills: 0 | Status: SHIPPED | OUTPATIENT
Start: 2019-05-15 | End: 2019-05-24 | Stop reason: SDUPTHER

## 2019-05-15 RX ORDER — ENOXAPARIN SODIUM 100 MG/ML
30 INJECTION SUBCUTANEOUS DAILY
Qty: 4.2 ML | Refills: 0 | Status: SHIPPED | OUTPATIENT
Start: 2019-05-15 | End: 2019-05-29

## 2019-05-15 RX ADMIN — OXYCODONE HYDROCHLORIDE AND ACETAMINOPHEN 2 TABLET: 5; 325 TABLET ORAL at 01:14

## 2019-05-15 RX ADMIN — ENOXAPARIN SODIUM 30 MG: 30 INJECTION SUBCUTANEOUS at 06:14

## 2019-05-15 RX ADMIN — SENNOSIDES, DOCUSATE SODIUM 1 TABLET: 50; 8.6 TABLET, FILM COATED ORAL at 08:21

## 2019-05-15 RX ADMIN — OXYCODONE AND ACETAMINOPHEN 2 TABLET: 7.5; 325 TABLET ORAL at 11:36

## 2019-05-15 RX ADMIN — SODIUM CHLORIDE, SODIUM LACTATE, POTASSIUM CHLORIDE, CALCIUM CHLORIDE, AND DEXTROSE MONOHYDRATE 75 ML/HR: 600; 310; 30; 20; 5 INJECTION, SOLUTION INTRAVENOUS at 02:04

## 2019-05-15 RX ADMIN — POLYETHYLENE GLYCOL 3350 17 G: 17 POWDER, FOR SOLUTION ORAL at 08:22

## 2019-05-15 RX ADMIN — METOPROLOL TARTRATE 50 MG: 50 TABLET ORAL at 08:21

## 2019-05-15 RX ADMIN — ACETAMINOPHEN 650 MG: 325 TABLET ORAL at 05:40

## 2019-05-15 RX ADMIN — CALCIUM CARBONATE 500 MG (1,250 MG)-VITAMIN D3 200 UNIT TABLET 1 TABLET: at 08:22

## 2019-05-15 NOTE — PROGRESS NOTES
Discharge planning Discharge order noted for today. Referral was sent [facility]. Met with patient and spouse and are agreeable to the transition plan today. Transport has been arranged with spouse. Patient's discharge summary and home health  orders have been forwarded to [facility] home health  agency via [employer]. Updated bedside RN, Troy Harding,  to the transition plan. Discharge information has been documented on the AVS. Benigno Schaumann, BSN, RN Pager # [phone] Care Manager

## 2019-05-15 NOTE — PROGRESS NOTES
Problem: Mobility Impaired (Adult and Pediatric) Goal: *Acute Goals and Plan of Care (Insert Text) Description Physical Therapy Goals Initiated 5/14/2019 and to be accomplished within 7 day(s) 1. Patient will move from supine to sit and sit to supine  in bed with modified independence. 2.  Patient will transfer from bed to chair and chair to bed with modified independence using the least restrictive device. 3.  Patient will perform sit to stand with modified independence. 4.  Patient will ambulate with modified independence for 250 feet with the least restrictive device. 5.  Patient will ascend/descend 2 stairs with 1 handrail(s) with minimal assistance/contact guard assist.  
 
Prior Level of Function: Pt was Independent with all mobility including gait, no AD. Pt lives with  in 1 story home with 2 KYLER. Outcome: Progressing Towards Goal 
 PHYSICAL THERAPY TREATMENT Patient: Diego Soto (73 y.o. female) Date: 5/15/2019 Diagnosis: Primary osteoarthritis of left knee [M17.12] <principal problem not specified> Procedure(s) (LRB): LEFT TOTAL KNEE ARTHROPLASTY/BIOMET/2 SA'S/ADDUCTOR BLOCK (Left) 1 Day Post-Op Precautions: Fall, WBAT 
ASSESSMENT: 
Nursing cleared pt to participate with PT. Pt found seated in recliner chair willing to work with PT. Pt reported that she has not needed knee immobilizer this am. Pt reported walking to and from bathroom with assistance and no evidence of buckling. Pt stood from recliner to  SBA and ambulated 250ft in hallway. Pt demonstrated a decreased garima and antalgic gait pattern. Pt able to increase weight through Left LE with each step. No LOB or knee buckling noted during gait training. Pt was taken by transport chair to practice stair case. Pt given demo on stair training and able to  negotiate 4 steps with B hand rails SBA. Pt was taken back to room in transport chair.  Pt performed seated therex in recliner (see below). Pt declined need for further assistance. Pt was positioned to comfort seated in recliner and was left with all needs in reach, towel roll under Left heel, nursing and  present. Notified nursing that pt is safe to d/c home. Knee immobilizer not necessary for d/c and pt in agreement. This therapist attempted to call PA to give update. Progression toward goals:  
?      Improving appropriately and progressing toward goals ? Improving slowly and progressing toward goals ? Not making progress toward goals and plan of care will be adjusted PLAN: 
Patient continues to benefit from skilled intervention to address the above impairments. Continue treatment per established plan of care. Discharge Recommendations:  Home Health Further Equipment Recommendations for Discharge:  N/A  
 
SUBJECTIVE:  
Patient stated ? The more I walk the easier it gets. ? OBJECTIVE DATA SUMMARY:  
Critical Behavior: 
Neurologic State: Alert Orientation Level: Oriented X4 Cognition: Appropriate decision making, Follows commands Safety/Judgement: Awareness of environment, Fall prevention Functional Mobility Training: 
Transfers: 
Sit to Stand: Supervision Stand to Sit: Supervision Balance: 
Sitting: Intact Standing: With support; Impaired Standing - Static: Good Standing - Dynamic : Fair Ambulation/Gait Training: 
Distance (ft): 250 Feet (ft) Assistive Device: Walker, rolling Ambulation - Level of Assistance: Stand-by assistance Gait Abnormalities: Antalgic;Decreased step clearance Left Side Weight Bearing: As tolerated Base of Support: Center of gravity altered Speed/Sara: Pace decreased (<100 feet/min); Slow Step Length: Right shortened;Left shortened Interventions: Safety awareness training;Verbal cues; Visual/Demos Stairs: 
Number of Stairs Trained: 4 Stairs - Level of Assistance: Stand-by assistance Rail Use: Both Therapeutic Exercises:  
 
 
EXERCISE Sets Reps Active Active Assist  
Passive Self ROM Comments Ankle Pumps    ? ? ? ?   
Quad Sets/Glut Sets    ? ? ? ? Hold for 5 secs Hamstring Sets   ? ? ? ? Short Arc Quads   ? ? ? ? Heel Slides   ? ? ? ? Straight Leg Raises   ? ? ? ? Hip Add   ? ? ? ? Hold for 5 secs, w/ pillow squeeze Long Arc Quads 1 5 ? ? ? ? Left LE Seated knee flexion 1 5 ? ? ? ? Left LE, Hold for 2-3 sec Standing Marching   ? ? ? ?   
   ? ? ? ? Pain: 
Pain level pre-treatment: 4/10 Pain level post-treatment: 4/10 Activity Tolerance:  
good Please refer to the flowsheet for vital signs taken during this treatment. After treatment:  
? Patient left in no apparent distress sitting up in chair ? Patient left in no apparent distress in bed 
? Call bell left within reach ? Nursing present ? Caregiver present ? Bed alarm activated ? SCDs applied COMMUNICATION/EDUCATION:  
?         Role of Physical Therapy in the acute care setting. ?         Fall prevention education was provided and the patient/caregiver indicated understanding. ? Patient/family have participated as able in working toward goals and plan of care. ?         Patient/family agree to work toward stated goals and plan of care. ?         Patient understands intent and goals of therapy, but is neutral about his/her participation. ? Patient is unable to participate in stated goals/plan of care: ongoing with therapy staff. ?         Other: 
 
   
Garret Goldmann, PTA Time Calculation: 24 mins

## 2019-05-15 NOTE — PROGRESS NOTES
Reason for Admission:  Primary osteoarthritis of left knee [M17.12] RRAT Score:    12 Plan for utilizing home health: [facility] Likelihood of Readmission:   LOW Transition of Care Plan:    Home with Home health Initial assessment completed with patient. Cognitive status of patient: oriented to time, place, person and situation. Face sheet information confirmed:  yes. The patient designates Tere Rhodes, spouse to participate in her discharge plan and to receive any needed information. This patient lives in a single family home with spouse. Patient was able to navigate steps as needed. Prior to hospitalization, patient was considered to be independent with ADLs/IADLS : yes Patient has a current ACP document on file: no The patient and spouse will be available to transport patient home upon discharge. The patient already has Chaya Begun, raised toliet seat, and hand rails in shower medical equipment available in the home. Patient is not currently active with home health. Patient has not stayed in a skilled nursing facility or rehab. This patient is on dialysis :no 
 
 
List of available Home Health agencies were provided and reviewed with the patient prior to discharge. Freedom of choice signed: yes, for [facility] and referral sent. Currently, the discharge plan is Home with [address] [place] [address] Quinn Maldonadolauriekaitlin. The patient states that she can obtain her medications from the pharmacy, and take her medications as directed. Patient's current insurance is [employer] Care Management Interventions PCP Verified by CM: Yes(per pt, saw pcp 1 week ago.) Mode of Transport at Discharge: Self Transition of Care Consult (CM Consult): Discharge Planning MyChart Signup: No 
Discharge Durable Medical Equipment: No 
Physical Therapy Consult: Yes Occupational Therapy Consult: Yes Speech Therapy Consult: No 
 Current Support Network: Lives with Spouse Confirm Follow Up Transport: Self Plan discussed with Pt/Family/Caregiver: Yes Freedom of Choice Offered: Yes Discharge Location Discharge Placement: Home with home health TAYLOR Ortega, RN Pager # [phone] Care Manager

## 2019-05-15 NOTE — PROGRESS NOTES
OCCUPATIONAL THERAPY EVALUATION/DISCHARGE Patient: Ca Wilson (46 y.o. female) Date: 5/15/2019 Primary Diagnosis: Primary osteoarthritis of left knee [M17.12] Procedure(s) (LRB): LEFT TOTAL KNEE ARTHROPLASTY/BIOMET/2 SA'S/ADDUCTOR BLOCK (Left) 1 Day Post-Op Precautions:   Fall, WBAT 
PLOF: Pt was independent with basic self care tasks and functional mobility PTA. ASSESSMENT AND RECOMMENDATIONS: 
Based on the objective data described below, the patient is able to perform basic self care tasks without assistance. Supervision given for functional standing and transfers. Will defer to PT for mobility training. Patient has a supportive  at home to assist her prn and all needed DME for bathroom safety. Skilled occupational therapy is not indicated at this time. Discharge Recommendations: None Further Equipment Recommendations for Discharge: N/A   
 
SUBJECTIVE:  
Patient stated ? I'm not sure if the nerve block has worn off.? OBJECTIVE DATA SUMMARY:  
 
Past Medical History:  
Diagnosis Date Arthritis Bilateral Knee, and Bilateral Hand/Thumb Atrial fibrillation ([address] 1/29/2018 Carpal tunnel syndrome H/O echocardiogram 01/30/2018 EF 50%, normal left atrial size, no valvular heart disease Headache History of ankle fusion 1997  
 left Hx of migraine headaches Hypercholesterolemia Hypertension Morbid obesity ([address] HERZOG (nonalcoholic steatohepatitis) suggested by ultrasound report, 2016 8/11/2017 Nausea & vomiting Vertigo Past Surgical History:  
Procedure Laterality Date COLONOSCOPY N/A 6/5/2018 COLONOSCOPY performed by Daly Florence MD at [place] [address]    
 age 25 years HX COLONOSCOPY Due in 2018: May 2015 HX HYSTERECTOMY    
 age 30years old HX KNEE REPLACEMENT Right 05/22/2018 HX KNEE REPLACEMENT Left 05/14/2019 HX ORTHOPAEDIC Left   
 ankle fusion [address] [facility] OTHER SURGICAL  2014 removal of mole left breast  
 HX TONSILLECTOMY    
 at age 29years old Barriers to Learning/Limitations: None Compensate with: visual, verbal, tactile, kinesthetic cues/model Home Situation:  
Home Situation Home Environment: Private residence # Steps to Enter: 2 Rails to Enter: Yes Hand Rails : Right Wheelchair Ramp: No 
One/Two Story Residence: One story Living Alone: No 
Support Systems: Spouse/Significant Other/Partner Patient Expects to be Discharged to[de-identified] Private residence Current DME Used/Available at Home: Grab bars, Cane, straight, Commode, bedside, Walker, rolling, Shower chair Tub or Shower Type: Tub/Shower combination(with seat) ? Right hand dominant   ? Left hand dominant Cognitive/Behavioral Status: 
Neurologic State: Alert Orientation Level: Oriented X4 Cognition: Appropriate decision making; Follows commands Safety/Judgement: Awareness of environment; Fall prevention Skin: Intact on UEs Edema: None noted in UEs Vision/Perceptual:   
Acuity: Within Defined Limits Coordination: BUE Fine Motor Skills-Upper: Left Intact; Right Intact Gross Motor Skills-Upper: Left Intact; Right Intact Balance: 
Sitting: Intact Standing: With support Strength: BUE Strength: Within functional limits Tone & Sensation: BUE Tone: Normal 
Sensation: Intact Range of Motion: BUE 
AROM: Within functional limits Functional Mobility and Transfers for ADLs: 
Bed Mobility: 
Supine to Sit: (Pt up on EOB upon arrival.) Transfers: 
Sit to Stand: Supervision Stand to Sit: Supervision Toilet Transfer : Supervision ADL Assessment: 
Feeding: Independent Oral Facial Hygiene/Grooming: Independent Bathing: Supervision Upper Body Dressing: Independent Lower Body Dressing: Supervision Toileting: Modified independent ADL Intervention: 
Patient maneuvered to the bathroom using a RW with supervision for toileting.   She was able to manage her clothing and hygiene without assistance. Patient also practiced UB/LB dressing while seated and in standing. No assist given after initial VCs for safety/ease of performance. Supervision given for standing balance. No LOB or knee buckling noted in standing. Cognitive Retraining Safety/Judgement: Awareness of environment; Fall prevention Pain: 
Pain level pre-treatment: 0/10 Pain level post-treatment: 0/10 Pain Intervention(s): Rest, Ice Response to intervention: NA Activity Tolerance:  
Good Please refer to the flowsheet for vital signs taken during this treatment. After treatment:  
?  Patient left in no apparent distress sitting up in chair ? Patient left in no apparent distress in bed 
? Call bell left within reach ? Nursing notified ? Caregiver () present ? Bed alarm activated COMMUNICATION/EDUCATION:  
?      Role of Occupational Therapy in the acute care setting 
? Home safety education was provided and the patient/caregiver indicated understanding. ? Patient/family have participated as able and agree with findings and recommendations. ?      Patient is unable to participate in plan of care at this time. Thank you for this referral. 
Sabine Reyes MS OTR/L Time Calculation: 26 mins Eval Complexity: History: LOW Complexity : Brief history review ; Examination: LOW Complexity : 1-3 performance deficits relating to physical, cognitive , or psychosocial skils that result in activity limitations and / or participation restrictions ; Decision Making:LOW Complexity : No comorbidities that affect functional and no verbal or physical assistance needed to complete eval tasks

## 2019-05-15 NOTE — DISCHARGE SUMMARY
DISCHARGE SUMMARY            ADMISSION DIAGNOSIS: Primary osteoarthritis of left knee [M17.12]    DISCHARGE DIAGNOSIS: Status post M17.12 PRIMARY OSTEOARTHRITIS LEFT KNEE        Subjective: 62 y.o., female, 1 Day Post-Op, Procedure(s):  LEFT TOTAL KNEE ARTHROPLASTY/BIOMET/2 SA'S/ADDUCTOR BLOCK     Admitting date:14 May 19    Date of Discharge/Transfer: 15 May 19    Physical Exam (day of transfer / discharge):15 May 19            History:  Past Medical History:   Diagnosis Date    Arthritis     Bilateral Knee, and Bilateral Hand/Thumb    Atrial fibrillation ([address] 1/29/2018    Carpal tunnel syndrome     H/O echocardiogram 01/30/2018    EF 50%, normal left atrial size, no valvular heart disease    Headache     History of ankle fusion 1997    left    Hx of migraine headaches     Hypercholesterolemia     Hypertension     Morbid obesity ([address]     HERZOG (nonalcoholic steatohepatitis) suggested by ultrasound report, 2016 8/11/2017    Nausea & vomiting     Vertigo              History of Present Illness:     Ms. Avani Tay is a pleasant female who suffered a well documented radiographic history of left knee endstage osteoarthritis. Avani Tay had failed all conservative measures as directed by Dr. Hever Zhu, and in light of Bennet Oto Vosler progressive pain and difficultly safely performing her  necessary Activities of Daily Living, Dr. Sacha Huerta recommended a total left knee joint replacement.       PAST MEDICAL HISTORY:   Past Medical History:   Diagnosis Date    Arthritis     Bilateral Knee, and Bilateral Hand/Thumb    Atrial fibrillation ([address] 1/29/2018    Carpal tunnel syndrome     H/O echocardiogram 01/30/2018    EF 50%, normal left atrial size, no valvular heart disease    Headache     History of ankle fusion 1997    left    Hx of migraine headaches     Hypercholesterolemia     Hypertension     Morbid obesity ([address]     HERZOG (nonalcoholic steatohepatitis) suggested by ultrasound report, 2016 8/11/2017    Nausea & vomiting     Vertigo        PAST SURGICAL HISTORY:   Past Surgical History:   Procedure Laterality Date    COLONOSCOPY N/A 6/5/2018    COLONOSCOPY performed by Skye Pennington MD at [address] [place] [address]      age 25 years    HX COLONOSCOPY      Due in 2018: May 2015    HX HYSTERECTOMY      age 30years old    HX KNEE REPLACEMENT Right 05/22/2018    HX KNEE REPLACEMENT Left 05/14/2019    HX ORTHOPAEDIC Left     ankle fusion    HX OTHER SURGICAL  2014    removal of mole left breast    HX TONSILLECTOMY      at age 29years old       ALLERGIES: No Known Allergies     CURRENT MEDICATIONS:  A list of medications prior to the time of admission include:  Prior to Admission medications    Medication Sig Start Date End Date Taking? Authorizing Provider   enoxaparin (LOVENOX) 30 mg/0.3 mL injection 0.3 mL by SubCUTAneous route daily for 14 doses. Indications: Deep Vein Thrombosis Prevention, Treatment to Prevent a Blood Clot in the Lung 5/15/19 5/29/19 Yes Ebenezer Banks PA-C   oxyCODONE-acetaminophen (PERCOCET 7.5) 7.5-325 mg per tablet Take 1-2 Tabs by mouth every four (4) hours as needed for Pain for up to 7 days. Max Daily Amount: 12 Tabs. Indications: post op pain 5/15/19 5/22/19 Yes Ebenezer Banks PA-C   senna-docusate (PERICOLACE) 8.6-50 mg per tablet Take 1 Tab by mouth two (2) times a day. Indications: constipation 5/15/19  Yes Rodolfo Davidson PA-C   metoprolol tartrate (LOPRESSOR) 50 mg tablet Take 1 Tab by mouth two (2) times a day. 2/22/19  Yes Clerance Smoker P, NP   ELIQUIS 5 mg tablet TAKE 1 TABLET BY MOUTH TWICE DAILY 1/9/19  Yes PagtalGrady [facility] P, NP   polyethylene glycol (MIRALAX) 17 gram packet Take 1 Packet by mouth daily. Patient taking differently: Take 1 Packet by mouth daily.  Indications: constipation 5/23/18  Yes Kim Davidson PA-C   potassium 99 mg tablet Take 99 mg by mouth daily as needed (supplement). Yes Provider, Historical   acetaminophen (TYLENOL ARTHRITIS PAIN) 650 mg CR tablet Take 650 mg by mouth every six (6) hours as needed for Pain. Yes Provider, Historical   omega-3 fatty acids-vitamin e 1,000 mg cap Take 1 Cap by mouth two (2) times a day. Indications: supplement   Yes Provider, Historical   calcium-cholecalciferol, d3, 600-125 mg-unit tab Take 1,200 mg by mouth daily. Indications: Vitamin D Deficiency   Yes Provider, Historical   varicella-zoster [facility]) injection 1 Each by IntraMUSCular route every two (2) months. 12/18/18   Jay Harding MD       FAMILY HISTORY:   Family History   Problem Relation Age of Onset    Heart Disease Mother     Hypertension Mother     Heart Surgery Mother     Cancer Father         colon    Hypertension Brother     No Known Problems Maternal Grandmother     Heart Disease Maternal Grandfather     MS Paternal Grandmother     Stroke Paternal Grandfather     Heart Disease Paternal Grandfather     No Known Problems Son        SOCIAL HISTORY:   Social History     Socioeconomic History    Marital status:      Spouse name: Not on file    Number of children: Not on file    Years of education: Not on file    Highest education level: Not on file   Tobacco Use    Smoking status: Never Smoker    Smokeless tobacco: Never Used   Substance and Sexual Activity    Alcohol use: No     Alcohol/week: 0.0 oz    Drug use: No   Social History Narrative    Works at [employer] in the [address] [place] [address] Ave: All review of systems are negative.             Social History     Occupational History    Not on file   Tobacco Use    Smoking status: Never Smoker    Smokeless tobacco: Never Used   Substance and Sexual Activity    Alcohol use: No     Alcohol/week: 0.0 oz    Drug use: No    Sexual activity: Not on file       Hospital Course:     Ms. Omid Diaz was admitted to [facility] on the morning of 14 May 19 under the care of Dr. Winifred Sanford. she was taken to the operating theater under Dr. Nikolay Adame direction, first assisted by trained surgical assistant's where she underwent a left kneetotal joint replacement. she tolerated the procedure well, and there were no intra operative complications. Post operatively she was transferred medically stable to post op holding. After all safety criteria had been met during her immediate post op recovery phase she was transferred medical stable to [address] [facility] to begin comprehensive physical and occupational therapies, restorative nursing and thrombo embolism (DVT/PE) prophylaxis. Ms. Mony Chaudhry post operative course progressed slow but directed. The focus throughout the post op period focused on range of motion and pain control. An acute post operative blood loss anemia was noted post operatively and there was no need to transfuse packed red blood cells. Medically she  remained stable through the remainder of the hospital stay. In light of the slow gains attained by Ms. Mony Chaudhry post operatively she is being recommended for a directed course of comprehensive PT / OT at a  home. DISCHARGE PLAN:      The patient will be discharged to home. DIET:  Low Calorie High Protein Diet    NUTRITION: OTC Nutritional supplements, multivitamins      ACTIVITY: No lifting, Driving, or Strenuous exercise and No heavy lifting, pushing, pulling. Weight Bearing/Range of Motion Restrictions: Per Protocol    WOUND / INCISION CARE: Keep wound clean and dry, Reinforce dressing PRN and Ice to area for comfort Keep the current dressings on and in place. There is no need to change these current dressings. Dressings to please be changed by home nurse or nursing home staff each day. Keep all pets away from any wound present in order to prevent infection. PAIN CONTROL: Prescriptions written:  On chart    PRECAUTIONS: Weight Bearing/Range of Motion per Restrictions: See Below    VTE PROPHYLAXIS: Lovenox 30mg SuBQ Q day x 14    ANTIBIOTICS: None at this time. MEDICATIONS AT DISCHARGE:  Current Discharge Medication List      START taking these medications    Details   enoxaparin (LOVENOX) 30 mg/0.3 mL injection 0.3 mL by SubCUTAneous route daily for 14 doses. Indications: Deep Vein Thrombosis Prevention, Treatment to Prevent a Blood Clot in the Lung  Qty: 4.2 mL, Refills: 0      oxyCODONE-acetaminophen (PERCOCET 7.5) 7.5-325 mg per tablet Take 1-2 Tabs by mouth every four (4) hours as needed for Pain for up to 7 days. Max Daily Amount: 12 Tabs. Indications: post op pain  Qty: 44 Tab, Refills: 0    Associated Diagnoses: Acute post-operative pain      senna-docusate (PERICOLACE) 8.6-50 mg per tablet Take 1 Tab by mouth two (2) times a day. Indications: constipation  Qty: 30 Tab, Refills: 0         CONTINUE these medications which have NOT CHANGED    Details   metoprolol tartrate (LOPRESSOR) 50 mg tablet Take 1 Tab by mouth two (2) times a day. Qty: 180 Tab, Refills: 3      polyethylene glycol (MIRALAX) 17 gram packet Take 1 Packet by mouth daily. Qty: 30 Packet, Refills: 1      potassium 99 mg tablet Take 99 mg by mouth daily as needed (supplement). omega-3 fatty acids-vitamin e 1,000 mg cap Take 1 Cap by mouth two (2) times a day.  Indications: supplement    Associated Diagnoses: Elevated LFTs         STOP taking these medications       ELIQUIS 5 mg tablet Comments:   Reason for Stopping:         acetaminophen (TYLENOL ARTHRITIS PAIN) 650 mg CR tablet Comments:   Reason for Stopping:         calcium-cholecalciferol, d3, 600-125 mg-unit tab Comments:   Reason for Stopping:         varicella-zoster [facility]) injection Comments:   Reason for Stopping:                             Physical and Occupational therapies:    will continue with attention to standard postop precautions / restrictions and below:    [ ] Mohan Bustillo [ ] RLE  [ ] LUE  [XX] LLE    [XX] Full WBAT   [ ] Partial WBAT   [ ] Toetouch WB   [ ] Non Weight Bearing: Follow up:    [XX] 1 week  [ ] 10 days  [ ] Specific Date:       Per [facility] [place] Protocol this  case was discussed with attending surgeon and reflects their orders as confirmed by their co signature.      Very Respectfully,        Leo Venegas APA, APC, MPAS PA-C  Surigical Physician Assistant-C  Department of [facility]   Contact Cell [phone]

## 2019-05-15 NOTE — HOME CARE
Discharge noted for today. Received home health referral for [facility] for SN and PT - post surgical/Abbott knee protocol. Order processed and called to Carmencita in intake. Patient has raised toilet seats, rolling walker, bath chair, and cane.   [place] LPN

## 2019-05-15 NOTE — DISCHARGE INSTRUCTIONS
Patient Education        Total Knee Replacement: What to Expect at Home  Your Recovery    When you leave the hospital, you should be able to move around with a walker or crutches. But you will need someone to help you at home for the next few weeks or until you have more energy and can move around better. If there is no one to help you at home, you may go to a rehabilitation center. You will go home with a bandage and stitches, staples, tissue glue, or tape strips. Change the bandage as your doctor tells you to. If you have stitches or staples, your doctor will remove them 10 to 21 days after your surgery. Glue or tape strips will fall off on their own over time. You may still have some mild pain, and the area may be swollen for 3 to 6 months after surgery. Your knee will continue to improve for 6 to 12 months. You will probably use a walker for 1 to 3 weeks and then use crutches. When you are ready, you can use a cane. You will probably be able to walk on your own in 4 to 8 weeks. You will need to do months of physical rehabilitation (rehab) after a knee replacement. Rehab will help you strengthen the muscles of the knee and help you regain movement. After you recover, your artificial knee will allow you to do normal daily activities with less pain or no pain at all. You may be able to hike, dance, ride a bike, and play golf. Talk to your doctor about whether you can do more strenuous activities. Always tell your caregivers that you have an artificial knee. How long it will take to walk on your own, return to normal activities, and go back to work depends on your health and how well your rehabilitation (rehab) program goes. The better you do with your rehab exercises, the quicker you will get your strength and movement back. This care sheet gives you a general idea about how long it will take for you to recover. But each person recovers at a different pace.  Follow the steps below to get better as quickly as possible. How can you care for yourself at home? Activity    · Rest when you feel tired. You may take a nap, but do not stay in bed all day. When you sit, use a chair with arms. You can use the arms to help you stand up.     · Work with your physical therapist to find the best way to exercise. You may be able to take frequent, short walks using crutches or a walker. What you can do as your knee heals will depend on whether your new knee is cemented or uncemented. You may not be able to do certain things for a while if your new knee is uncemented.     · After your knee has healed enough, you can do more strenuous activities with caution. ? You can golf, but use a golf cart, and do not wear shoes with spikes. ? You can bike on a flat road or on a stationary bike. Avoid biking up hills. ? Your doctor may suggest that you stay away from activities that put stress on your knee. These include tennis or badminton, squash or racquetball, contact sports like football, jumping (such as in basketball), jogging, or running. ? Avoid activities where you might fall. These include horseback riding, skiing, and mountain biking.     · Do not sit for more than 1 hour at a time. Get up and walk around for a while before you sit again. If you must sit for a long time, prop up your leg with a chair or footstool. This will help you avoid swelling.     · Ask your doctor when you can drive again. It may take up to 8 weeks after knee replacement surgery before it is safe for you to drive.     · When you get into a car, sit on the edge of the seat. Then pull in your legs, and turn to face the front.     · You should be able to do many everyday activities 3 to 6 weeks after your surgery. You will probably need to take 4 to 16 weeks off from work.  When you can go back to work depends on the type of work you do and how you feel.     · Ask your doctor when it is okay for you to have sex.     · Do not lift anything heavier than 10 pounds and do not lift weights for 12 weeks. Diet    · By the time you leave the hospital, you should be eating your normal diet. If your stomach is upset, try bland, low-fat foods like plain rice, broiled chicken, toast, and yogurt. Your doctor may suggest that you take iron and vitamin supplements.     · Drink plenty of fluids (unless your doctor tells you not to).   · Eat healthy foods, and watch your portion sizes. Try to stay at your ideal weight. Too much weight puts more stress on your new knee.     · You may notice that your bowel movements are not regular right after your surgery. This is common. Try to avoid constipation and straining with bowel movements. You may want to take a fiber supplement every day. If you have not had a bowel movement after a couple of days, ask your doctor about taking a mild laxative. Medicines    · Your doctor will tell you if and when you can restart your medicines. He or she will also give you instructions about taking any new medicines.     · If you take blood thinners, such as warfarin (Coumadin), clopidogrel (Plavix), or aspirin, be sure to talk to your doctor. He or she will tell you if and when to start taking those medicines again. Make sure that you understand exactly what your doctor wants you to do.     · Your doctor may give you a blood-thinning medicine to prevent blood clots. If you take a blood thinner, be sure you get instructions about how to take your medicine safely. Blood thinners can cause serious bleeding problems. This medicine could be in pill form or as a shot (injection). If a shot is necessary, your doctor will tell you how to do this.     · Be safe with medicines. Take pain medicines exactly as directed. ? If the doctor gave you a prescription medicine for pain, take it as prescribed. ? If you are not taking a prescription pain medicine, ask your doctor if you can take an over-the-counter medicine.   ? Plan to take your pain medicine 30 minutes before exercises. It is easier to prevent pain before it starts than to stop it once it has started.     · If you think your pain medicine is making you sick to your stomach:  ? Take your medicine after meals (unless your doctor has told you not to). ? Ask your doctor for a different pain medicine.     · If your doctor prescribed antibiotics, take them as directed. Do not stop taking them just because you feel better. You need to take the full course of antibiotics. Incision care    · If your doctor told you how to care for your cut (incision), follow your doctor's instructions. You will have a dressing over the cut. A dressing helps the incision heal and protects it. Your doctor will tell you how to take care of this.     · If you did not get instructions, follow this general advice:  ? If you have strips of tape on the cut the doctor made, leave the tape on for a week or until it falls off.  ? If you have stitches or staples, your doctor will tell you when to come back to have them removed. ? If you have skin adhesive on the cut, leave it on until it falls off. Skin adhesive is also called glue or liquid stitches. ? Change the bandage every day. ? Wash the area daily with warm water, and pat it dry. Don't use hydrogen peroxide or alcohol. They can slow healing. ? You may cover the area with a gauze bandage if it oozes fluid or rubs against clothing. ? You may shower 24 to 48 hours after surgery. Pat the incision dry. Don't swim or take a bath for the first 2 weeks, or until your doctor tells you it is okay. Exercise    · Your rehab program will give you a number of exercises to do to help you get back your knee's range of motion and strength. Always do them as your therapist tells you. Ice and elevation    · For pain and swelling, put ice or a cold pack on the area for 10 to 20 minutes at a time. Put a thin cloth between the ice and your skin.    Other instructions    · Continue to wear your support stockings as your doctor says. These help to prevent blood clots. The length of time that you will have to wear them depends on your activity level and the amount of swelling.     · You have metal pieces in your knee. These may set off some airport metal detectors. Carry a medical alert card that says you have an artificial joint, just in case. Follow-up care is a key part of your treatment and safety. Be sure to make and go to all appointments, and call your doctor if you are having problems. It's also a good idea to know your test results and keep a list of the medicines you take. When should you call for help? Call 911 anytime you think you may need emergency care. For example, call if:    · You passed out (lost consciousness).     · You have severe trouble breathing.     · You have sudden chest pain and shortness of breath, or you cough up blood.    Call your doctor now or seek immediate medical care if:    · You have signs of infection, such as:  ? Increased pain, swelling, warmth, or redness. ? Red streaks leading from the incision. ? Pus draining from the incision. ? A fever.     · You have signs of a blood clot, such as:  ? Pain in your calf, back of the knee, thigh, or groin. ? Redness and swelling in your leg or groin.     · Your incision comes open and begins to bleed, or the bleeding increases.     · You have pain that does not get better after you take pain medicine.    Watch closely for changes in your health, and be sure to contact your doctor if:    · You do not have a bowel movement after taking a laxative. Where can you learn more? Go to http://stanton-gonsalo.info/. Enter K187 in the search box to learn more about \"Total Knee Replacement: What to Expect at Home. \"  Current as of: September 20, 2018  Content Version: 11.9  © 9355-5404 [employer].  Care instructions adapted under license by [employer] (which disclaims liability or warranty for this information). If you have questions about a medical condition or this instruction, always ask your healthcare professional. [address] any warranty or liability for your use of this information. [employer] Activation    Thank you for requesting access to [employer]. Please follow the instructions below to securely access and download your online medical record. [employer] allows you to send messages to your doctor, view your test results, renew your prescriptions, schedule appointments, and more. How Do I Sign Up? 1. In your internet browser, go to [email or web address]  2. Click on the First Time User? Click Here link in the Sign In box. You will be redirect to the New Member Sign Up page. 3. Enter your [employer] Access Code exactly as it appears below. You will not need to use this code after youve completed the sign-up process. If you do not sign up before the expiration date, you must request a new code. [employer]ko Access Code: Activation code not generated  Current O[employer]ko Status: Active (This is the date your O[employer]ko access code will )    4. Enter the last four digits of your Social Security Number (xxxx) and Date of Birth (mm/dd/yyyy) as indicated and click Submit. You will be taken to the next sign-up page. 5. Create a Okeyko ID. This will be your [employer] login ID and cannot be changed, so think of one that is secure and easy to remember. 6. Create a [employer] password. You can change your password at any time. 7. Enter your Password Reset Question and Answer. This can be used at a later time if you forget your password. 8. Enter your e-mail address. You will receive e-mail notification when new information is available in [address]. 9. Click Sign Up. You can now view and download portions of your medical record. 10. Click the Download Summary menu link to download a portable copy of your medical information.     Additional Information    If you have questions, please visit the Frequently Asked Questions section of the [employer] website at [email or web address] Remember, MyChart is NOT to be used for urgent needs. For medical emergencies, dial 911. Patient armband removed and shredded    DISCHARGE SUMMARY from Nurse    PATIENT INSTRUCTIONS:    After general anesthesia or intravenous sedation, for 24 hours or while taking prescription Narcotics:  · Limit your activities  · Do not drive and operate hazardous machinery  · Do not make important personal or business decisions  · Do  not drink alcoholic beverages  · If you have not urinated within 8 hours after discharge, please contact your surgeon on call. Report the following to your surgeon:  · Excessive pain, swelling, redness or odor of or around the surgical area  · Temperature over 100.5  · Nausea and vomiting lasting longer than 4 hours or if unable to take medications  · Any signs of decreased circulation or nerve impairment to extremity: change in color, persistent  numbness, tingling, coldness or increase pain  · Any questions    What to do at Home:  Recommended activity: No lifting, Driving, or Strenuous exercise for 2 weeks    If you experience any of the following symptoms Fever greater than 101.5, foul smelling drainage, pain not relieved by medication, please follow up with Surgical team.    *  Please give a list of your current medications to your Primary Care Provider. *  Please update this list whenever your medications are discontinued, doses are      changed, or new medications (including over-the-counter products) are added. *  Please carry medication information at all times in case of emergency situations. These are general instructions for a healthy lifestyle:    No smoking/ No tobacco products/ Avoid exposure to second hand smoke  Surgeon General's Warning:  Quitting smoking now greatly reduces serious risk to your health.     Obesity, smoking, and sedentary lifestyle greatly increases your risk for illness    A healthy diet, regular physical exercise & weight monitoring are important for maintaining a healthy lifestyle    You may be retaining fluid if you have a history of heart failure or if you experience any of the following symptoms:  Weight gain of 3 pounds or more overnight or 5 pounds in a week, increased swelling in our hands or feet or shortness of breath while lying flat in bed. Please call your doctor as soon as you notice any of these symptoms; do not wait until your next office visit. Recognize signs and symptoms of STROKE:    F-face looks uneven    A-arms unable to move or move unevenly    S-speech slurred or non-existent    T-time-call 911 as soon as signs and symptoms begin-DO NOT go       Back to bed or wait to see if you get better-TIME IS BRAIN. Warning Signs of HEART ATTACK     Call 911 if you have these symptoms:   Chest discomfort. Most heart attacks involve discomfort in the center of the chest that lasts more than a few minutes, or that goes away and comes back. It can feel like uncomfortable pressure, squeezing, fullness, or pain.  Discomfort in other areas of the upper body. Symptoms can include pain or discomfort in one or both arms, the back, neck, jaw, or stomach.  Shortness of breath with or without chest discomfort.  Other signs may include breaking out in a cold sweat, nausea, or lightheadedness. Don't wait more than five minutes to call 911 - MINUTES MATTER! Fast action can save your life. Calling 911 is almost always the fastest way to get lifesaving treatment. Emergency Medical Services staff can begin treatment when they arrive -- up to an hour sooner than if someone gets to the hospital by car. The discharge information has been reviewed with the patient. The patient verbalized understanding.   Discharge medications reviewed with the patient and appropriate educational materials and side effects teaching were provided.   ___________________________________________________________________________________________________________________________________

## 2019-05-15 NOTE — PROGRESS NOTES
Mobility Intervention:  
 
  [x] Pt dangled at edge of bed 
  [] Pt assisted OOB to bedside commode 
  [] Pt assisted OOB to chair 
  [x] Pt ambulated to bathroom 
  [] Patient was ambulated in room/hallway Assistive Device Utilized:  
  
 [x] Rolling walker 
 [] Crutches 
 [] Straight Cane 
 [] Knee immobilizer [] IV pole After Mobilization:  
 
[] Patient left in no apparent distress sitting up in chair 
[x] Patient left in no apparent distress in bed 
[x] Call bell left within reach [x] SCDs on & machine turned on 
[] Ice applied 
[x] RN notified 
[] Caregiver present 
[] Bed alarm activated Reason patient not mobilized:  
 
 [] Patient refused 
 [] Nausea/vomiting 
 [] Low blood pressure 
 [] Drowsy/lethargic Pain Rating:  
 
[] 0 [] 1 Assistive Device:       
[] 2 [] 3 [x] 4 [] 5 
[] 6 Assistive Device:       
[] 7 
[] 8 
[] 9 [] 10 Comments:

## 2019-05-16 ENCOUNTER — HOME CARE VISIT (OUTPATIENT)
Dept: SCHEDULING | Facility: HOME HEALTH | Age: 64
End: 2019-05-16
Payer: COMMERCIAL

## 2019-05-16 VITALS
SYSTOLIC BLOOD PRESSURE: 126 MMHG | HEART RATE: 68 BPM | OXYGEN SATURATION: 97 % | RESPIRATION RATE: 20 BRPM | DIASTOLIC BLOOD PRESSURE: 74 MMHG | TEMPERATURE: 93 F

## 2019-05-16 PROCEDURE — 400013 HH SOC

## 2019-05-16 PROCEDURE — G0151 HHCP-SERV OF PT,EA 15 MIN: HCPCS

## 2019-05-16 PROCEDURE — G0299 HHS/HOSPICE OF RN EA 15 MIN: HCPCS

## 2019-05-17 ENCOUNTER — HOME CARE VISIT (OUTPATIENT)
Dept: HOME HEALTH SERVICES | Facility: HOME HEALTH | Age: 64
End: 2019-05-17
Payer: COMMERCIAL

## 2019-05-17 VITALS
HEART RATE: 66 BPM | SYSTOLIC BLOOD PRESSURE: 120 MMHG | DIASTOLIC BLOOD PRESSURE: 68 MMHG | OXYGEN SATURATION: 98 % | TEMPERATURE: 99 F

## 2019-05-17 PROCEDURE — G0157 HHC PT ASSISTANT EA 15: HCPCS

## 2019-05-17 PROCEDURE — A6213 FOAM DRG >16<=48 SQ IN W/BDR: HCPCS

## 2019-05-18 ENCOUNTER — HOME CARE VISIT (OUTPATIENT)
Dept: SCHEDULING | Facility: HOME HEALTH | Age: 64
End: 2019-05-18
Payer: COMMERCIAL

## 2019-05-18 VITALS
SYSTOLIC BLOOD PRESSURE: 140 MMHG | OXYGEN SATURATION: 99 % | HEART RATE: 66 BPM | TEMPERATURE: 99 F | DIASTOLIC BLOOD PRESSURE: 110 MMHG

## 2019-05-18 PROCEDURE — G0157 HHC PT ASSISTANT EA 15: HCPCS

## 2019-05-19 ENCOUNTER — HOME CARE VISIT (OUTPATIENT)
Dept: SCHEDULING | Facility: HOME HEALTH | Age: 64
End: 2019-05-19
Payer: COMMERCIAL

## 2019-05-19 VITALS
SYSTOLIC BLOOD PRESSURE: 126 MMHG | OXYGEN SATURATION: 98 % | DIASTOLIC BLOOD PRESSURE: 78 MMHG | TEMPERATURE: 99.7 F | HEART RATE: 64 BPM

## 2019-05-19 PROCEDURE — G0157 HHC PT ASSISTANT EA 15: HCPCS

## 2019-05-20 ENCOUNTER — HOME CARE VISIT (OUTPATIENT)
Dept: SCHEDULING | Facility: HOME HEALTH | Age: 64
End: 2019-05-20
Payer: COMMERCIAL

## 2019-05-20 PROCEDURE — G0157 HHC PT ASSISTANT EA 15: HCPCS

## 2019-05-20 NOTE — PROGRESS NOTES
Zoya Dickens presents today for No chief complaint on file. Admitted to [facility] on 5-14-19 for Left total knee arthroplasty, and discharged on 5-15-19 Depression Screening: 
3 most recent PHQ Screens 5/6/2019 PHQ Not Done - Little interest or pleasure in doing things Not at all Feeling down, depressed, irritable, or hopeless Not at all Total Score PHQ 2 0 Learning Assessment: 
Learning Assessment 5/3/2019 PRIMARY LEARNER Patient HIGHEST LEVEL OF EDUCATION - PRIMARY LEARNER  SOME COLLEGE  
BARRIERS PRIMARY LEARNER NONE  
CO-LEARNER CAREGIVER No  
PRIMARY LANGUAGE ENGLISH  
LEARNER PREFERENCE PRIMARY DEMONSTRATION  
ANSWERED BY patient RELATIONSHIP SELF Abuse Screening: 
Abuse Screening Questionnaire 5/3/2019 Do you ever feel afraid of your partner? Sindy Loja Are you in a relationship with someone who physically or mentally threatens you? Sindy Loja Is it safe for you to go home? Eliza Diaz Fall Risk No flowsheet data found. Coordination of Care: 1. Have you been to the ER, urgent care clinic since your last visit? Hospitalized since your last visit? yes 2. Have you seen or consulted any other health care providers outside of the [address] [place] [address] since your last visit? Include any pap smears or colon screening. yes Advance Directive: 1. Do you have an advance directive in place? Patient Reply:yes 2. Per patient no changes to their ACP contact no.

## 2019-05-21 ENCOUNTER — HOME CARE VISIT (OUTPATIENT)
Dept: SCHEDULING | Facility: HOME HEALTH | Age: 64
End: 2019-05-21
Payer: COMMERCIAL

## 2019-05-21 ENCOUNTER — HOSPITAL ENCOUNTER (OUTPATIENT)
Dept: LAB | Age: 64
Discharge: HOME | End: 2019-05-21
Payer: COMMERCIAL

## 2019-05-21 ENCOUNTER — OFFICE VISIT (OUTPATIENT)
Dept: INTERNAL MEDICINE CLINIC | Age: 64
End: 2019-05-21

## 2019-05-21 ENCOUNTER — HOSPITAL ENCOUNTER (OUTPATIENT)
Dept: GENERAL RADIOLOGY | Age: 64
Discharge: HOME | End: 2019-05-21
Payer: COMMERCIAL

## 2019-05-21 VITALS
DIASTOLIC BLOOD PRESSURE: 78 MMHG | OXYGEN SATURATION: 98 % | TEMPERATURE: 97.7 F | RESPIRATION RATE: 18 BRPM | HEART RATE: 58 BPM | SYSTOLIC BLOOD PRESSURE: 124 MMHG

## 2019-05-21 VITALS
HEIGHT: 67 IN | WEIGHT: 230.2 LBS | SYSTOLIC BLOOD PRESSURE: 125 MMHG | RESPIRATION RATE: 12 BRPM | HEART RATE: 66 BPM | BODY MASS INDEX: 36.13 KG/M2 | OXYGEN SATURATION: 97 % | DIASTOLIC BLOOD PRESSURE: 72 MMHG | TEMPERATURE: 98.5 F

## 2019-05-21 DIAGNOSIS — R50.9 FEVER, UNSPECIFIED FEVER CAUSE: ICD-10-CM

## 2019-05-21 DIAGNOSIS — R50.9 FEVER, UNSPECIFIED FEVER CAUSE: Primary | ICD-10-CM

## 2019-05-21 DIAGNOSIS — Z12.31 ENCOUNTER FOR SCREENING MAMMOGRAM FOR BREAST CANCER: ICD-10-CM

## 2019-05-21 DIAGNOSIS — M25.562 LEFT KNEE PAIN, UNSPECIFIED CHRONICITY: ICD-10-CM

## 2019-05-21 LAB — CRP SERPL-MCNC: 2.2 MG/DL (ref 0–0.3)

## 2019-05-21 PROCEDURE — G0157 HHC PT ASSISTANT EA 15: HCPCS

## 2019-05-21 PROCEDURE — 86140 C-REACTIVE PROTEIN: CPT

## 2019-05-21 PROCEDURE — 71046 X-RAY EXAM CHEST 2 VIEWS: CPT

## 2019-05-21 RX ORDER — AZITHROMYCIN 250 MG/1
TABLET, FILM COATED ORAL
Qty: 6 TAB | Refills: 0 | Status: SHIPPED | OUTPATIENT
Start: 2019-05-21 | End: 2019-08-21 | Stop reason: ALTCHOICE

## 2019-05-21 RX ORDER — APIXABAN 5 MG/1
5 TABLET, FILM COATED ORAL 2 TIMES DAILY
COMMUNITY
Start: 2019-05-19 | End: 2019-07-19 | Stop reason: SDUPTHER

## 2019-05-21 NOTE — PROGRESS NOTES
INTERNISTS OF [facility]:  5/21/2019, MRN: [id number]      Ambrosio Johnson is a 62 y.o. female and presents to clinic for Surgical Follow-up [facility] admission for Left Total Knee Replacement by Dr Cornelia Thomas)    Subjective:   Pt is a 62yo female with h/o HLD, HTN, HERZOG, AF, and DJD. Left Knee Pain and Fever: Status post left knee replacement surgery. Pain is 3/10. Pain is improving day by day. She is participating in PT services. No falls since hospital discharge. She has a f/u apt with Orthopedics. She had a temperature of 101.4 last wk. She took Tylenol and her fever resolved. She is taking 2-3 pain pills per day. She has some swelling along her left knee. Pain is \"tight\" along the surgical scar. No drainage. No SOB or bleeding. She's been taking showers but covering her incision site with saran wrap to keep it dry. No cough or cold symptoms. She is not using her ICS. Patient Active Problem List    Diagnosis Date Noted    Primary osteoarthritis of left knee 05/14/2019    S/P TKR (total knee replacement) 05/14/2019    Severe obesity ([facility] 03/11/2019    Primary osteoarthritis of right knee 05/22/2018    Atrial fibrillation ([facility] 01/29/2018    HERZOG (nonalcoholic steatohepatitis) suggested by ultrasound report, 2016 08/11/2017    Obesity (BMI 30-39.9) 02/12/2017    Hyperlipidemia 02/07/2017    Essential hypertension 02/07/2017       Current Outpatient Medications   Medication Sig Dispense Refill    azithromycin (ZITHROMAX) 250 mg tablet Take 500mg (2 tabs) on Day 1 and then 250mg (1 tab) daily on Days 2-5. 6 Tab 0    enoxaparin (LOVENOX) 30 mg/0.3 mL injection 0.3 mL by SubCUTAneous route daily for 14 doses. Indications: Deep Vein Thrombosis Prevention, Treatment to Prevent a Blood Clot in the Lung 4.2 mL 0    oxyCODONE-acetaminophen (PERCOCET 7.5) 7.5-325 mg per tablet Take 1-2 Tabs by mouth every four (4) hours as needed for Pain for up to 7 days. Max Daily Amount: 12 Tabs. Indications: post op pain 44 Tab 0    senna-docusate (PERICOLACE) 8.6-50 mg per tablet Take 1 Tab by mouth two (2) times a day. Indications: constipation 30 Tab 0    metoprolol tartrate (LOPRESSOR) 50 mg tablet Take 1 Tab by mouth two (2) times a day. 180 Tab 3    polyethylene glycol (MIRALAX) 17 gram packet Take 1 Packet by mouth daily. (Patient taking differently: Take 1 Packet by mouth daily. Indications: constipation) 30 Packet 1    omega-3 fatty acids-vitamin e 1,000 mg cap Take 1 Cap by mouth two (2) times a day. Indications: supplement      ELIQUIS 5 mg tablet Take 5 mg by mouth two (2) times a day.  potassium 99 mg tablet Take 99 mg by mouth daily as needed (supplement). No Known Allergies    Past Medical History:   Diagnosis Date    Arthritis     Bilateral Knee, and Bilateral Hand/Thumb    Atrial fibrillation ([address] 1/29/2018    Carpal tunnel syndrome     H/O echocardiogram 01/30/2018    EF 50%, normal left atrial size, no valvular heart disease    Headache     History of ankle fusion 1997    left    Hx of migraine headaches     Hypercholesterolemia     Hypertension     Morbid obesity ([address]     HERZOG (nonalcoholic steatohepatitis) suggested by ultrasound report, 2016 8/11/2017    Nausea & vomiting     Vertigo        Past Surgical History:   Procedure Laterality Date    COLONOSCOPY N/A 6/5/2018    COLONOSCOPY performed by Harlene Primrose, MD at [address] [place] [address]      age 25 years    HX COLONOSCOPY      Due in 2018:  May 2015    HX HYSTERECTOMY      age 30years old    HX KNEE REPLACEMENT Right 05/22/2018    HX KNEE REPLACEMENT Left 05/14/2019    HX ORTHOPAEDIC Left     ankle fusion    HX OTHER SURGICAL  2014    removal of mole left breast    HX TONSILLECTOMY      at age 29years old       Family History   Problem Relation Age of Onset    Heart Disease Mother     Hypertension Mother     Heart Surgery Mother     Cancer Father         colon    Hypertension Brother     No Known Problems Maternal Grandmother     Heart Disease Maternal Grandfather     MS Paternal Grandmother     Stroke Paternal Grandfather     Heart Disease Paternal Grandfather     No Known Problems Son        Social History     Tobacco Use    Smoking status: Never Smoker    Smokeless tobacco: Never Used   Substance Use Topics    Alcohol use: No     Alcohol/week: 0.0 oz       ROS   Review of Systems   Constitutional: Negative for chills and fever. HENT: Negative for ear pain and sore throat. Eyes: Negative for blurred vision and pain. Respiratory: Negative for cough and shortness of breath. Cardiovascular: Negative for chest pain. Gastrointestinal: Negative for abdominal pain, blood in stool and melena. Genitourinary: Negative for dysuria and hematuria. Musculoskeletal: Positive for joint pain. Negative for myalgias. Skin: Negative for rash. Neurological: Negative for tingling, focal weakness and headaches. Endo/Heme/Allergies: Does not bruise/bleed easily. Psychiatric/Behavioral: Negative for substance abuse. Objective     Vitals:    05/21/19 1321 05/21/19 1329   BP: 131/74 125/72   Pulse: 68 66   Resp: 18 12   Temp: 98.5 °F (36.9 °C)    TempSrc: Oral    SpO2: 97%    Weight: 230 lb 3.2 oz (104.4 kg)    Height: 5' 6.5\" (1.689 m)    PainSc:   3    PainLoc: Knee        Physical Exam   Constitutional: She is oriented to person, place, and time and well-developed, well-nourished, and in no distress. HENT:   Head: Normocephalic and atraumatic. Right Ear: External ear normal.   Left Ear: External ear normal.   Nose: Nose normal.   Mouth/Throat: Oropharynx is clear and moist. No oropharyngeal exudate. Eyes: Pupils are equal, round, and reactive to light. Conjunctivae and EOM are normal. Right eye exhibits no discharge. Left eye exhibits no discharge. No scleral icterus. Neck: Neck supple.    Cardiovascular: Normal rate, regular rhythm, normal heart sounds and intact distal pulses. Exam reveals no gallop and no friction rub. No murmur heard. Pulmonary/Chest: Effort normal. No respiratory distress. She has no wheezes. She has coarse breath sounds along her left lung fields. Abdominal: Soft. Bowel sounds are normal. She exhibits no distension. There is no tenderness. Musculoskeletal: She exhibits no edema or tenderness. Lymphadenopathy:     She has no cervical adenopathy. Neurological: She is alert and oriented to person, place, and time. She exhibits normal muscle tone. Gait normal.   Skin: Skin is warm and dry. No erythema. There is no drainage along her surgical incision site. There is no erythema along her surgical incision site. Psychiatric: Affect normal.   Nursing note and vitals reviewed. LABS   Data Review:   Lab Results   Component Value Date/Time    WBC 4.6 05/01/2019 06:19 AM    HGB 11.6 (L) 05/15/2019 05:11 AM    HCT 35.6 05/15/2019 05:11 AM    PLATELET 986 63/87/9272 06:19 AM    MCV 90.1 05/01/2019 06:19 AM       Lab Results   Component Value Date/Time    Sodium 142 05/01/2019 06:19 AM    Potassium 4.4 05/01/2019 06:19 AM    Chloride 106 05/01/2019 06:19 AM    CO2 30 05/01/2019 06:19 AM    Anion gap 6 05/01/2019 06:19 AM    Glucose 105 (H) 05/01/2019 06:19 AM    BUN 13 05/01/2019 06:19 AM    Creatinine 0.59 (L) 05/01/2019 06:19 AM    BUN/Creatinine ratio 22 (H) 05/01/2019 06:19 AM    GFR est AA >60 05/01/2019 06:19 AM    GFR est non-AA >60 05/01/2019 06:19 AM    Calcium 10.5 (H) 05/01/2019 06:19 AM       Lab Results   Component Value Date/Time    Cholesterol, total 252 (H) 04/27/2019 07:20 AM    HDL Cholesterol 57 04/27/2019 07:20 AM    LDL, calculated 160.2 (H) 04/27/2019 07:20 AM    VLDL, calculated 34.8 04/27/2019 07:20 AM    Triglyceride 174 (H) 04/27/2019 07:20 AM    CHOL/HDL Ratio 4.4 04/27/2019 07:20 AM       Lab Results   Component Value Date/Time    Hemoglobin A1c 5.4 05/01/2019 06:19 AM       Assessment/Plan:   1.  Fever and Left Knee Pain: S/p left knee replacement surgery.  -Ordering a CRP and a chest x-ray.  -I encouraged her to follow-up with Orthopedics.  -Continue with PT.  - We discussed the importance of keeping the wound dry as it is still healing.  -Prescribing azithromycin to cover for any type of pneumonia given her physical exam findings and history of fever.  -I encouraged her to use her ICS. ORDERS:  - C REACTIVE PROTEIN, QT; Future  - XR CHEST AP LAT; Future  - azithromycin (ZITHROMAX) 250 mg tablet; Take 500mg (2 tabs) on Day 1 and then 250mg (1 tab) daily on Days 2-5. Dispense: 6 Tab; Refill: 0    2. Health Maintenance:  -Ordering a mammogram for her to get in August 2019. ORDERS:  - JEFFERY MAMMO BI SCREENING INCL CAD; Future      Health Maintenance Due   Topic Date Due    Shingrix Vaccine Age 49> (1 of 2) 05/05/2006    BREAST CANCER SCRN MAMMOGRAM  08/27/2019     Lab review: labs are reviewed in the EHR in order as mentioned above. I have discussed the diagnosis with the patient and the intended plan as seen in the above orders. The patient has received an after-visit summary and questions were answered concerning future plans. I have discussed medication side effects and warnings with the patient as well. I have reviewed the plan of care with the patient, accepted their input and they are in agreement with the treatment goals. All questions were answered. The patient understands the plan of care. Handouts provided today with above information. Pt instructed if symptoms worsen to call the office or report to the ED for continued care. Greater than 50% of the visit time was spent in counseling and/or coordination of care. Voice recognition was used to generate this report, which may have resulted in some phonetic based errors in grammar and contents. Even though attempts were made to correct all the mistakes, some may have been missed, and remained in the body of the document.           Darnell Lal MD

## 2019-05-21 NOTE — PROGRESS NOTES
Chief Complaint   Patient presents with    Surgical Follow-up     5-14-19 [facility] admission for Left Total Knee Replacement by Dr Camden Draper     1. Have you been to the ER, urgent care clinic since your last visit? Hospitalized since your last visit? Yes When: 5-14-19 Where: [facility] Reason: Left Total Knee Replacement by Dr Camden Draper. 2. Have you seen or consulted any other health care providers outside of the [address] [place] [address] since your last visit? Include any pap smears or colon screening.  No      Health Maintenance Due   Topic Date Due    Shingrix Vaccine Age 49> (1 of 2) 05/05/2006    PAP AKA CERVICAL CYTOLOGY  07/28/2019    BREAST CANCER SCRN MAMMOGRAM  08/27/2019

## 2019-05-21 NOTE — PATIENT INSTRUCTIONS
Health Maintenance Due   Topic Date Due    Shingrix Vaccine Age 49> (1 of 2) 05/05/2006    PAP AKA CERVICAL CYTOLOGY  07/28/2019    BREAST CANCER SCRN MAMMOGRAM  08/27/2019          Learning About Fever  What is a fever? A fever is a high body temperature. It's one way your body fights being sick. A fever shows that the body is responding to infection or other illnesses, both minor and severe. A fever is a symptom, not an illness by itself. A fever can be a sign that you are ill, but most fevers are not caused by a serious problem. You may have a fever with a minor illness, such as a cold. But sometimes a very serious infection may cause little or no fever. It is important to look at other symptoms, other conditions you have, and how you feel in general. In children, notice how they act and see what symptoms they complain of. What is a normal body temperature? A normal body temperature is about 98. 6ºF. Some people have a normal temperature that is a little higher or a little lower than this. Your temperature may be a little lower in the morning than it is later in the day. It may go up during hot weather or when you exercise, wear heavy clothes, or take a hot bath. Your temperature may also be different depending on how you take it. A temperature taken in the mouth (oral) or under the arm may be a little lower than your core temperature (rectal). What is a fever temperature? A core temperature of 100.4°F or above is considered a fever. What can cause a fever? A fever may be caused by:  · Infections. This is the most common cause of a fever. Examples of infections that can cause a fever include the flu, a kidney infection, or pneumonia. · Some medicines. · Severe trauma or injury, such as a heart attack, stroke, heatstroke, or burns. · Other medical conditions, such as arthritis and some cancers. How can you treat a fever at home?   · Ask your doctor if you can take an over-the-counter pain medicine, such as acetaminophen (Tylenol), ibuprofen (Advil, Motrin), or naproxen (Aleve). Be safe with medicines. Read and follow all instructions on the label. · To prevent dehydration, drink plenty of fluids. Choose water and other caffeine-free clear liquids until you feel better. If you have kidney, heart, or liver disease and have to limit fluids, talk with your doctor before you increase the amount of fluids you drink. Follow-up care is a key part of your treatment and safety. Be sure to make and go to all appointments, and call your doctor if you are having problems. It's also a good idea to know your test results and keep a list of the medicines you take. Where can you learn more? Go to http://stanotn-gonsalo.info/. Enter X757 in the search box to learn more about \"Learning About Fever. \"  Current as of: September 23, 2018  Content Version: 11.9  © 8600-0412 [employer]. Care instructions adapted under license by [employer] (which disclaims liability or warranty for this information). If you have questions about a medical condition or this instruction, always ask your healthcare professional. [address] any warranty or liability for your use of this information.

## 2019-05-21 NOTE — PROGRESS NOTES
Please let her know that her x-ray is normal.  Given her history of fever and physical exam findings from today's appointment, please have her continue the antibiotic course that I prescribed.     Dr. Altaf Munoz  [facility] of [facility], [address] [place] [address], [address] [place], [place] [address] [address]  Phone: [phone]  Fax: [phone]

## 2019-05-22 ENCOUNTER — HOME CARE VISIT (OUTPATIENT)
Dept: SCHEDULING | Facility: HOME HEALTH | Age: 64
End: 2019-05-22
Payer: COMMERCIAL

## 2019-05-22 ENCOUNTER — TELEPHONE (OUTPATIENT)
Dept: INTERNAL MEDICINE CLINIC | Age: 64
End: 2019-05-22

## 2019-05-22 VITALS
DIASTOLIC BLOOD PRESSURE: 82 MMHG | OXYGEN SATURATION: 98 % | RESPIRATION RATE: 17 BRPM | HEART RATE: 68 BPM | TEMPERATURE: 98 F | SYSTOLIC BLOOD PRESSURE: 142 MMHG

## 2019-05-22 PROCEDURE — G0157 HHC PT ASSISTANT EA 15: HCPCS

## 2019-05-22 NOTE — TELEPHONE ENCOUNTER
----- Message from Catalina Coleman MD sent at 5/21/2019  4:38 PM EDT -----  Please let her know that her x-ray is normal.  Given her history of fever and physical exam findings from today's appointment, please have her continue the antibiotic course that I prescribed.     Dr. Radha Bryant  [facility] of [facility], [address] [place] [address], [address] [place] [address] Str.  Phone: [phone]  Fax: [phone]

## 2019-05-23 ENCOUNTER — HOME CARE VISIT (OUTPATIENT)
Dept: SCHEDULING | Facility: HOME HEALTH | Age: 64
End: 2019-05-23
Payer: COMMERCIAL

## 2019-05-23 VITALS
TEMPERATURE: 98.4 F | SYSTOLIC BLOOD PRESSURE: 118 MMHG | DIASTOLIC BLOOD PRESSURE: 78 MMHG | HEART RATE: 78 BPM | OXYGEN SATURATION: 98 % | HEART RATE: 63 BPM | TEMPERATURE: 98.2 F | RESPIRATION RATE: 18 BRPM | SYSTOLIC BLOOD PRESSURE: 122 MMHG | DIASTOLIC BLOOD PRESSURE: 82 MMHG | OXYGEN SATURATION: 98 % | RESPIRATION RATE: 17 BRPM

## 2019-05-23 VITALS
HEART RATE: 70 BPM | OXYGEN SATURATION: 98 % | SYSTOLIC BLOOD PRESSURE: 124 MMHG | DIASTOLIC BLOOD PRESSURE: 80 MMHG | TEMPERATURE: 98 F

## 2019-05-23 PROCEDURE — G0151 HHCP-SERV OF PT,EA 15 MIN: HCPCS

## 2019-05-24 ENCOUNTER — OFFICE VISIT (OUTPATIENT)
Dept: ORTHOPEDIC SURGERY | Facility: CLINIC | Age: 64
End: 2019-05-24

## 2019-05-24 VITALS
OXYGEN SATURATION: 98 % | SYSTOLIC BLOOD PRESSURE: 136 MMHG | DIASTOLIC BLOOD PRESSURE: 76 MMHG | HEART RATE: 59 BPM | WEIGHT: 227.2 LBS | HEIGHT: 67 IN | TEMPERATURE: 97.9 F | BODY MASS INDEX: 35.66 KG/M2

## 2019-05-24 DIAGNOSIS — G89.18 ACUTE POST-OPERATIVE PAIN: ICD-10-CM

## 2019-05-24 DIAGNOSIS — M17.12 PRIMARY OSTEOARTHRITIS OF LEFT KNEE: ICD-10-CM

## 2019-05-24 DIAGNOSIS — Z47.1 AFTERCARE FOLLOWING KNEE JOINT REPLACEMENT SURGERY, UNSPECIFIED LATERALITY: Primary | ICD-10-CM

## 2019-05-24 DIAGNOSIS — Z96.659 AFTERCARE FOLLOWING KNEE JOINT REPLACEMENT SURGERY, UNSPECIFIED LATERALITY: Primary | ICD-10-CM

## 2019-05-24 DIAGNOSIS — Z48.02: ICD-10-CM

## 2019-05-24 RX ORDER — OXYCODONE AND ACETAMINOPHEN 7.5; 325 MG/1; MG/1
1-2 TABLET ORAL
Qty: 28 TAB | Refills: 0 | Status: SHIPPED | OUTPATIENT
Start: 2019-05-24 | End: 2019-05-31

## 2019-05-24 NOTE — PROGRESS NOTES
Ashanti Fonseca returns. She is now 1-day status post left primary total knee replacement under the care of Dr. Gwendolyn Tate. Her surgical wound is maturing nicely. Staples are noted. In a cranial/caudal orientation associated with the parasagittal line of the left knee, there is a 21 cm surgical incision intact. Wounds are well approximated. Skin is soft with no indurations. No erythema. No warmth. The patient has guarded range of motion to 90 degrees - 5 today. Today, using clean technique, all staples were removed with no complications. Steri-Strips were placed with a sterile top cover. The patient is going to continue her Lovenox to complete this Jayden, May 26, 2019. She will begin an 81 mg Aspirin orally per day following. We will start outpatient physical therapy services per protocol. Today, all of her questions answered to her satisfaction.

## 2019-06-03 ENCOUNTER — HOSPITAL ENCOUNTER (OUTPATIENT)
Dept: PHYSICAL THERAPY | Age: 64
Discharge: HOME | End: 2019-06-03
Payer: COMMERCIAL

## 2019-06-03 PROCEDURE — 97110 THERAPEUTIC EXERCISES: CPT

## 2019-06-03 PROCEDURE — 97161 PT EVAL LOW COMPLEX 20 MIN: CPT

## 2019-06-03 NOTE — PROGRESS NOTES
PT DAILY TREATMENT NOTE/KNEE EVAL 10-18    Patient Name: Ca Wilson  Date:6/3/2019  : 1955  [x]  Patient  Verified  Payor: BLUE CROSS / Plan: Dalton Naranjo [id number] PPO / Product Type: PPO /    In time:908  Out time:959  Total Treatment Time (min): 51  Visit #: 1 of 10    Medicare/BCBS Only   Total Timed Codes (min):  51 1:1 Treatment Time:  25     Treatment Area: Pain in left knee [M25.562]  Aftercare following joint replacement surgery [Z47.1]  Unilateral primary osteoarthritis, left knee [M17.12]    SUBJECTIVE  Pain Level (0-10 scale): current =0/10, worst = 3/10 - pain is mostly from swelling and from prolonged sitting with stiffness  []constant [x]intermittent [x]improving []worsening []no change since onset    Any medication changes, allergies to medications, adverse drug reactions, diagnosis change, or new procedure performed?: [x] No    [] Yes (see summary sheet for update)  Subjective functional status/changes:     PLOF: pt has a history of R TKR in 2018, left 19. Pt had been waiting since she was in her 45s.  Pain was degenerating and pain was increasing nad decided to replace    PMHx/Surgical Hx: right TKR in , left ankle fusion , hx of Afib,   Work Hx: works for [employer] Situation: one story home with 2 stairs to enter with right HR  Pt Goals: \"to get better movement in the knee and return to work by 19\"    OBJECTIVE/EXAMINATION    Modality rationale: decrease edema, decrease inflammation and decrease pain to improve the patients ability to negotiate stairs    Min Type Additional Details    [] Estim:  []Unatt       []IFC  []Premod                        []Other:  []w/ice   []w/heat  Position:  Location:    [] Estim: []Att    []TENS instruct  []NMES                    []Other:  []w/US   []w/ice   []w/heat  Position:  Location:    []  Traction: [] Cervical       []Lumbar                       [] Prone          []Supine                       []Intermittent []Continuous Lbs:  [] before manual  [] after manual    []  Ultrasound: []Continuous   [] Pulsed                           []1MHz   []3MHz Location:  W/cm2:    []  Iontophoresis with dexamethasone         Location: [] Take home patch   [] In clinic   10 [x]  Ice     []  heat  []  Ice massage  []  Laser   []  Anodyne Position:long sitting   Location: left knee    []  Laser with stim  []  Other: Position:  Location:    []  Vasopneumatic Device Pressure:       [] lo [] med [] hi   Temperature: [] lo [] med [] hi   [] Skin assessment post-treatment:  [x]intact []redness- no adverse reaction    []redness  adverse reaction:     26 min [x]Eval                  []Re-Eval       25 min Therapeutic Exercise:  [x] See flow sheet : explanation, demonstration, performance and distribution of HEP   Rationale: increase ROM, increase strength and improve coordination to improve the patients ability to ambulate with normalized pattern          With   [x] TE   [] TA   [] neuro   [] other: Patient Education: [x] Review HEP    [] Progressed/Changed HEP based on:   [] positioning   [] body mechanics   [] transfers   [] heat/ice application    [] other:      Physical Therapy Evaluation - Knee    Gait: mildly antalgic without AD, decreased left knee extension in initial contact and reduced flexion in swing phase    5x sit to stand: 12.6seconds with staggered stance right foot posterior to left  10meter walk test: 8.06seconds = 0.74m/sec = limited community ambulator  TUG: 10.46seconds    ROM / Strength                 Strength (1-5)                      AROM                  PROM    RIGHT LEFT RIGHT LEFT RIGHT LEFT   Hip Flexion 4 4        ER 4 4-        IR 5 4        Abduction 4+ 4-        Extension 4 4       Knee Flexion 5 5- 0-114 4-104 0-118 2-108    Extension 5 5-       Ankle Dorsiflexion 5 5           Palpation: TTP throughout left knee complex with most located posteriorly in distal HS and popliteal fossa; patellar mobility WNL    Girth Measurements (in cm):   - 4in above mid patella: right= 60, left = 58.2  - 2in above mid patella: right = 55.5, left = 5  - mid patella: right = 49, left = 51.5  - 2in below mid patella: right = 42.5, left = 45.5  - 4in below mid patella: right = 43.7, left = 46.5    Balance: single leg B = 4seconds    Pain Level (0-10 scale) post treatment: 0    ASSESSMENT/Changes in Function:  Pt presents to PT s/p left TKR on 5/14/19. Pt demonstrates decreased AROM, balance, and strength with increased effusion and pain. Pt scar is healing well with a small, dry scab at the superior portion. Pt deficits impair her ability to walk and perform ADLs at [facility]. Pt will benefit from skilled PT in order to address previously listed deficits and maximize functional potential.     Patient will continue to benefit from skilled PT services to modify and progress therapeutic interventions, address functional mobility deficits, address ROM deficits, address strength deficits, analyze and address soft tissue restrictions, analyze and cue movement patterns, analyze and modify body mechanics/ergonomics, assess and modify postural abnormalities, instruct in home and community integration and balance to attain remaining goals. [x]  See Plan of Care  []  See progress note/recertification  []  See Discharge Summary         Progress towards goals / Updated goals:  Short Term Goals: To be accomplished in 2 weeks:     1. Pt will demonstrate left knee AROM to 120 of flexion in order to improve ability to ascend/descend curbs in the community   Eval = 104  2. Pt will demonstrate left knee AROM 0 extension in order to normalize gait pattern. Eval = lacking 4  3. Pt will be (I) with HEP to supplement PT treatment. Eval = established     Long Term Goals: To be accomplished in 10 treatments:     1. Pt will demonstrate 10meter walk test in 5seconds in order to become a normalized ambulator. Eval = 8.06seconds   2.    Pt will demonstrate left knee and hip strength minimum 5-/5 in order to improve ambulation distances for ADLs and community engagement. Eval = varying 4/5  3. Pt will score at least 67 on FOTO in order to improve overall function, decrease pain, and facilitate return to PLOF.    Eval = 39      PLAN  [x]  Upgrade activities as tolerated     [x]  Continue plan of care  []  Update interventions per flow sheet       []  Discharge due to:_  []  Other:_      Jose Luis Richardson PT 6/3/2019  9:09 AM

## 2019-06-03 NOTE — PROGRESS NOTES
[facility] [place] [address]  [address] [facility] [address], [address] [phone] fax    Plan of Care/ Statement of Necessity for Physical Therapy Services           Patient name: Avery Galdamez Start of Care: 6/3/2019   Referral source: Soraya Londono : 1955    Medical Diagnosis: Pain in left knee [M25.562]  Aftercare following joint replacement surgery [Z47.1]  Unilateral primary osteoarthritis, left knee [M17.12]  Payor: BLUE CROSS / Plan: [facility] PPO / Product Type: PPO /  Onset Date:2019    Treatment Diagnosis: left knee pain   Prior Hospitalization: see medical history Provider#: [id number]   Medications: Verified on Patient summary List    Comorbidities: Afib, HTN, right TKR , left ankle fusion    Prior Level of Function: functionally (I)    The Plan of Care and following information is based on the information from the initial evaluation. Assessment/ key information: Pt presents to PT s/p left TKR on 19. Pt demonstrates decreased AROM, balance, and strength with increased effusion and pain. Pt scar is healing well with a small, dry scab at the superior portion. Pt deficits impair her ability to walk and perform ADLs at [facility].  Pt will benefit from skilled PT in order to address previously listed deficits and maximize functional potential.     Evaluation Complexity History LOW Complexity : Zero comorbidities / personal factors that will impact the outcome / POC; Examination LOW Complexity : 1-2 Standardized tests and measures addressing body structure, function, activity limitation and / or participation in recreation  ;Presentation LOW Complexity : Stable, uncomplicated  ;Clinical Decision Making MEDIUM Complexity : FOTO score of 26-74  Overall Complexity Rating: LOW   Problem List: pain affecting function, decrease ROM, decrease strength, edema affecting function, impaired gait/ balance, decrease ADL/ functional abilitiies, decrease activity tolerance, decrease flexibility/ joint mobility and decrease transfer abilities   Treatment Plan may include any combination of the following: Therapeutic exercise, Therapeutic activities, Neuromuscular re-education, Physical agent/modality, Gait/balance training, Manual therapy, Patient education, Functional mobility training and Stair training  Patient / Family readiness to learn indicated by: asking questions, trying to perform skills and interest  Persons(s) to be included in education: patient (P)  Barriers to Learning/Limitations: None  Patient Goal (s): to get better movement in the knee and return to work by 6/25/19  Patient Self Reported Health Status: excellent  Rehabilitation Potential: excellent  Short Term Goals: To be accomplished in 2 weeks:    1. Pt will demonstrate left knee AROM to 120 of flexion in order to improve ability to ascend/descend curbs in the community  2. Pt will demonstrate left knee AROM 0 extension in order to normalize gait pattern. 3. Pt will be (I) with HEP to supplement PT treatment. Long Term Goals: To be accomplished in 10 treatments:    1. Pt will demonstrate 10meter walk test in 5seconds in order to become a normalized ambulator. 2.   Pt will demonstrate left knee and hip strength minimum 5-/5 in order to improve ambulation distances for ADLs and community engagement. 3.   Pt will score at least 67 on FOTO in order to improve overall function, decrease pain, and facilitate return to PLOF. Frequency / Duration: Patient to be seen 2 times per week for 10 treatments. Patient/ Caregiver education and instruction: Diagnosis, prognosis, self care, activity modification and exercises   [x]  Plan of care has been reviewed with HUMZA Roberts, PT 6/3/2019 9:09 AM    ________________________________________________________________________    I certify that the above Therapy Services are being furnished while the patient is under my care. I agree with the treatment plan and certify that this therapy is necessary.     Physician's Signature:____________Date:_________TIME:________    ** Signature, Date and Time must be completed for valid certification **    Please sign and return to [facility] [place] [address]  [address] [facility] [address], [address] [phone] fax

## 2019-06-06 ENCOUNTER — HOSPITAL ENCOUNTER (OUTPATIENT)
Dept: PHYSICAL THERAPY | Age: 64
Discharge: HOME | End: 2019-06-06
Payer: COMMERCIAL

## 2019-06-06 PROCEDURE — 97112 NEUROMUSCULAR REEDUCATION: CPT

## 2019-06-06 PROCEDURE — 97110 THERAPEUTIC EXERCISES: CPT

## 2019-06-06 NOTE — PROGRESS NOTES
PT DAILY TREATMENT NOTE 10-18    Patient Name: Benson Martini  Date:2019  : 1955  [x]  Patient  Verified  Payor: BLUE CROSS / Plan: [facility] [id number] PPO / Product Type: PPO /    In time:900  Out time:940  Total Treatment Time (min): 40  Visit #: 2 of 10    Medicare/BCBS Only   Total Timed Codes (min):  30 1:1 Treatment Time:  30       Treatment Area: Pain in left knee [M25.562]  Aftercare following joint replacement surgery [Z47.1]  Unilateral primary osteoarthritis, left knee [M17.12]    SUBJECTIVE  Pain Level (0-10 scale): 0  Any medication changes, allergies to medications, adverse drug reactions, diagnosis change, or new procedure performed?: [x] No    [] Yes (see summary sheet for update)  Subjective functional status/changes:   [] No changes reported  \"No pain. \"    OBJECTIVE    Modality rationale: decrease inflammation and decrease pain to improve the patients ability to improve mobility and gait   Min Type Additional Details    [] Estim:  []Unatt       []IFC  []Premod                        []Other:  []w/ice   []w/heat  Position:  Location:    [] Estim: []Att    []TENS instruct  []NMES                    []Other:  []w/US   []w/ice   []w/heat  Position:  Location:    []  Traction: [] Cervical       []Lumbar                       [] Prone          []Supine                       []Intermittent   []Continuous Lbs:  [] before manual  [] after manual    []  Ultrasound: []Continuous   [] Pulsed                           []1MHz   []3MHz W/cm2:  Location:    []  Iontophoresis with dexamethasone         Location: [] Take home patch   [] In clinic   10 [x]  Ice     []  heat  []  Ice massage  []  Laser   []  Anodyne Position: semi-reclined with wedge   Location: left knee    []  Laser with stim  []  Other:  Position:  Location:    []  Vasopneumatic Device Pressure:       [] lo [] med [] hi   Temperature: [] lo [] med [] hi   [x] Skin assessment post-treatment:  [x]intact []redness- no adverse reaction    []redness  adverse reaction:     15 min Therapeutic Exercise:  [x] See flow sheet :   Rationale: increase ROM and increase strength to improve the patients ability to perform ADLs    15 min Neuromuscular Re-education:  [x]  See flow sheet : quad re-ed activities   Rationale: increase ROM, increase strength, improve coordination, improve balance and increase proprioception  to improve the patients ability to improve mobility, stance stability, and gait    With   [x] TE   [] TA   [x] neuro   [] other: Patient Education: [x] Review HEP    [] Progressed/Changed HEP based on:   [x] positioning   [x] body mechanics   [] transfers   [] heat/ice application    [] other:      Other Objective/Functional Measures:      Pain Level (0-10 scale) post treatment: 0    ASSESSMENT/Changes in Function: Initiated POC. Pt became nauseated during TKE with ball on the wall. She states that she hadn't eaten today, discussed having pt eat something prior to therapy in the future. She did well with all other exercises. Noted swelling in posterior knee and reports she feels like the swelling limits her more than pain. Patient will continue to benefit from skilled PT services to modify and progress therapeutic interventions, address functional mobility deficits, address ROM deficits, address strength deficits, analyze and address soft tissue restrictions, analyze and cue movement patterns, analyze and modify body mechanics/ergonomics, assess and modify postural abnormalities, address imbalance/dizziness and instruct in home and community integration to attain remaining goals. [x]  See Plan of Care  []  See progress note/recertification  []  See Discharge Summary         Progress towards goals / Updated goals:  Short Term Goals: To be accomplished in 2 weeks:     1. Pt will demonstrate left knee AROM to 120 of flexion in order to improve ability to ascend/descend curbs in the community              Eval = 104  2.  Pt will demonstrate left knee AROM 0 extension in order to normalize gait pattern. Eval = lacking 4  3. Pt will be (I) with HEP to supplement PT treatment.               Eval = established     Long Term Goals: To be accomplished in 10 treatments:     1.   Pt will demonstrate 10meter walk test in 5seconds in order to become a normalized ambulator.               Eval = 8.06seconds   2.   Pt will demonstrate left knee and hip strength minimum 5-/5 in order to improve ambulation distances for ADLs and community engagement.               Eval = varying 4/5  3.   Pt will score at least 67 on FOTO in order to improve overall function, decrease pain, and facilitate return to [facility].               Eval = 39    PLAN  []  Upgrade activities as tolerated     [x]  Continue plan of care  []  Update interventions per flow sheet       []  Discharge due to:_  []  Other:_      Belgica Redding, PTA, CSCS 6/6/2019  9:44 AM    Future Appointments   Date Time Provider Katharine Mezai   6/7/2019  2:30 PM BHAVANI Mcelroy [address]   6/11/2019  9:00 AM Elisha Flow, PTA [facility]   6/14/2019  3:00 PM Anna Edwin, PT [facility]   6/18/2019  9:00 AM Elisha Flow, PTA [facility]   6/20/2019  2:30 PM Anna Edwin, PT [facility]   6/25/2019  9:30 AM Elisha Flow, PTA [facility]   6/28/2019 10:30 AM Anna Edwin, PT [facility]   9/30/2019  8:20 AM Enma Cifuentes MD [address] [place] [address]   11/1/2019  8:30 AM Dinora Posada MD [facility]

## 2019-06-07 ENCOUNTER — OFFICE VISIT (OUTPATIENT)
Dept: ORTHOPEDIC SURGERY | Facility: CLINIC | Age: 64
End: 2019-06-07

## 2019-06-07 DIAGNOSIS — R20.0 NUMBNESS AND TINGLING IN BOTH HANDS: Primary | ICD-10-CM

## 2019-06-07 DIAGNOSIS — Z96.659 AFTERCARE FOLLOWING KNEE JOINT REPLACEMENT SURGERY, UNSPECIFIED LATERALITY: Primary | ICD-10-CM

## 2019-06-07 DIAGNOSIS — R20.2 NUMBNESS AND TINGLING IN BOTH HANDS: Primary | ICD-10-CM

## 2019-06-07 DIAGNOSIS — Z48.89 ENCOUNTER FOR POSTOPERATIVE WOUND CHECK: ICD-10-CM

## 2019-06-07 DIAGNOSIS — Z47.1 AFTERCARE FOLLOWING KNEE JOINT REPLACEMENT SURGERY, UNSPECIFIED LATERALITY: Primary | ICD-10-CM

## 2019-06-07 NOTE — PROGRESS NOTES
Patient: Diego Soto                MRN: [id number]       SSN: [id number]  YOB: 1955        AGE: 62 y.o. SEX: female    PCP: Talya Moran MD  06/07/19    CC: BILATERAL HAND NUMBNESS, TINGLING, AND PAIN    HISTORY:  Diego Soto is a 62 y.o. female who is seen for bilateral hand numbness, tingling, and pain. She has been experiencing increasing numbness and tingling for the past year. She reports difficulty pinching, grabbing, and holding items. There is no history injury. She reports increased numbness and tingling at night and with use. She was previously seen for bilateral thumb and wrist pain. She notes increased bilateral thumb pain, numbness, and tingling as well. She reports increased leg pain and swelling with increased activity recently. She notes thumb pain with pinch and . She was previously seen for L knee pain. She is s/p left TKR on 5/23/19 & right TKR 5/22/18--doing very well. She was previously seen by Dr. Bong Trujillo who gave her cortisone injections with benefit but she did not wish to be seen again. She states that she is right-leg dominant. She has a h/o atrial fibrillation and was recently been taking Eliquis. Pain Assessment  5/24/2019   Location of Pain Knee   Location Modifiers Left   Severity of Pain 2   Quality of Pain Aching;Dull   Quality of Pain Comment -   Duration of Pain Persistent   Duration of Pain Comment -   Frequency of Pain Intermittent   Aggravating Factors Walking;Standing;Bending   Aggravating Factors Comment -   Limiting Behavior Yes   Relieving Factors Ice;Elevation   Result of Injury No   Work-Related Injury -   Type of Injury -     Occupation, etc:  Ms. 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 She states that her job is primarily sedentary. 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   She previously worked at [employer] in [place], [place] in the [employer]. She previously lived in [place], [place] until 2010 and in [place] until 2014. Her son is in the [place]. She states that she moved to be close to be closer her grandchildren. She has two granddaughters (9 and 15) and one 9 yo grandson. Her mother is a [facility] patient and Ms. Nikkie Cuellar is very pleased with her treatment. She has high blood pressure and high cholesterol for which she takes medication. She reports that she has gained 3 pounds recently. She enjoys staying active with her grandchildren. She is 213 pounds. She reports she has lost 60 pounds over the past 4 months. She is 5'5\". She is not diabetic. She states she was recently diagnosed with AFIB and has been taking Eliquis. Weight Metrics 5/24/2019 5/21/2019 5/14/2019 5/6/2019 5/3/2019 3/11/2019 12/18/2018   Weight 227 lb 3.2 oz 230 lb 3.2 oz 224 lb 225 lb 224 lb 12.8 oz 224 lb 212 lb 12.8 oz   Waist Measure Inches - - - - - - -   Exercise Mins/week - - - - - - -   Body Fat % - - - - - - -   BMI 36.12 kg/m2 36.6 kg/m2 35.61 kg/m2 35.77 kg/m2 35.74 kg/m2 36.15 kg/m2 34.35 kg/m2     Patient Active Problem List   Diagnosis Code    Hyperlipidemia E78.5    Essential hypertension I10    Obesity (BMI 30-39. 9) E66.9    HERZOG (nonalcoholic steatohepatitis) suggested by ultrasound report, 2016 K75.81    Atrial fibrillation (HCC) I48.91    Primary osteoarthritis of right knee M17.11    Severe obesity (HCC) E66.01    Primary osteoarthritis of left knee M17.12    S/P TKR (total knee replacement) Z96.659     REVIEW OF SYSTEMS: All Below are Negative except: See HPI   Constitutional: negative for fever, chills, and weight loss. Cardiovascular: negative for chest pain, claudication, leg swelling, SOB, BETHEA   Gastrointestinal: Negative for pain, N/V/C/D, Blood in stool or urine, dysuria,  hematuria, incontinence, pelvic pain. Musculoskeletal: See HPI   Neurological: Negative for dizziness and weakness.    Negative for headaches, Visual changes, confusion, seizures   Phychiatric/Behavioral: Negative for depression, memory loss, substance  abuse. Extremities: Negative for hair changes, rash, or skin lesion changes. Hematologic: Negative for bleeding problems, bruising, pallor or swollen lymph  nodes   Peripheral Vascular: No calf pain, no circulation deficits. Social History     Socioeconomic History    Marital status:      Spouse name: Not on file    Number of children: Not on file    Years of education: Not on file    Highest education level: Not on file   Occupational History    Not on file   Social Needs    Financial resource strain: Not on file    Food insecurity:     Worry: Not on file     Inability: Not on file    Transportation needs:     Medical: Not on file     Non-medical: Not on file   Tobacco Use    Smoking status: Never Smoker    Smokeless tobacco: Never Used   Substance and Sexual Activity    Alcohol use: No     Alcohol/week: 0.0 oz    Drug use: No    Sexual activity: Not on file   Lifestyle    Physical activity:     Days per week: Not on file     Minutes per session: Not on file    Stress: Not on file   Relationships    Social connections:     Talks on phone: Not on file     Gets together: Not on file     Attends Mu-ism service: Not on file     Active member of club or organization: Not on file     Attends meetings of clubs or organizations: Not on file     Relationship status: Not on file    Intimate partner violence:     Fear of current or ex partner: Not on file     Emotionally abused: Not on file     Physically abused: Not on file     Forced sexual activity: Not on file   Other Topics Concern    Not on file   Social History Narrative    Works at [employer] in the [employer] department      No Known Allergies   Current Outpatient Medications   Medication Sig    calcium-cholecalciferol, D3, (CALCIUM 600 + D) tablet Take 2 Tabs by mouth daily.     omega-3 fatty acids-fish oil (FISH OIL) 360-1,200 mg cap Take 2 Caps by mouth daily.  cholecalciferol (VITAMIN D3) 2,000 unit cap capsule Take 2,000 Units by mouth daily.  ELIQUIS 5 mg tablet Take 5 mg by mouth two (2) times a day.  azithromycin (ZITHROMAX) 250 mg tablet Take 500mg (2 tabs) on Day 1 and then 250mg (1 tab) daily on Days 2-5.  senna-docusate (PERICOLACE) 8.6-50 mg per tablet Take 1 Tab by mouth two (2) times a day. Indications: constipation    metoprolol tartrate (LOPRESSOR) 50 mg tablet Take 1 Tab by mouth two (2) times a day.  polyethylene glycol (MIRALAX) 17 gram packet Take 1 Packet by mouth daily. (Patient taking differently: Take 1 Packet by mouth daily. Indications: constipation)    potassium 99 mg tablet Take 99 mg by mouth daily as needed (supplement).  omega-3 fatty acids-vitamin e 1,000 mg cap Take 1 Cap by mouth two (2) times a day. Indications: supplement     No current facility-administered medications for this visit.     ORTHO EXAMINATION:  Examination Right knee Left knee   Skin Well healed incision scars Intact   Range of motion 115-0 115-0   Effusion - 1+   Medial joint line tenderness - +   Lateral joint line tenderness - -   Popliteal tenderness - -   Osteophytes palpable - +   Fredericks - -   Patella crepitus - +   Anterior drawer - -   Lateral laxity - -   Medial laxity - -   Varus deformity - -   Valgus deformity - +   Pretibial edema - +   Calf tenderness - -     Examination Lumbar Thoracic   Skin Intact Intact   Tenderness + -   Tightness - -   Lordosis Normal N/A   Kyphosis N/A Increased   Scoliosis - -   Flexion Fingertips to mid shin N/A   Extension 10 N/A   Knee reflexes Normal N/A   Ankle reflexes Normal N/A   Straight leg raise - N/A   Calf tenderness - N/A     Examination Right Wrist Left Wrist   Skin Intact Intact   Tenderness +, basal joint +, basal joint   Flexion 60 60   Extension 60 60   Deformity - -   Effusion - -   Finger flexion Full Full   Finger extension Full Full Tinnel's sign + +   Phalen's test - -   Finklestein maneuver - -   Pain with thumb abduction - -   Capillary refill - -     MRI RIGHT KNEE WO CONT 1/16/18  IMPRESSION:  1. Severe tricompartmental degenerative joint disease with no fracture or significant joint effusion. 2. Maceration of the menisci. 3. No significant joint effusion. Nonspecific edema in the gastrocnemius, incompletely imaged. RADIOGRAPHS:  XR LEFT KNEE LONG FILMS 11/29/18 ABBY  IMPRESSION:  Three views - No fractures, no effusion, near complete lateral joint space narrowing with deformity, large osteophytes present. IKDC Grade D, valgus deformity. Femoral valgus angle: 4 degrees. Well-fixed prosthetic components in satisfactory position and alignment of the right knee. XR RIGHT KNEE 1/12/18   -I have independently reviewed these images during this office visit. -Dr. Destiny Handyught:  Three views - No fractures, no effusion, severe joint space narrowing R>L, + osteophytes present. Severe CMP. Mild Valgus deformity L,     XR WILLEM KNEES 5/16/16  IMPRESSION:  Grade 3 osteoarthritis of both knees. IMPRESSION:      ICD-10-CM ICD-9-CM    1. Numbness and tingling in both hands R20.0 782. 0 EMG TWO EXTREMITIES UPPER    R20.2  NCV/LAT SENSORY PER NERVE UP/RT      NCV/LAT SENSORY PER NERVE UP/LT     PLAN:  BUE EMG/NCV ordered. She will follow in 5 weeks.      Scribed by Mary Hwang ([address]) as dictated by Gabriela Woodruff MD

## 2019-06-07 NOTE — PROGRESS NOTES
The patient returns just now over 3 weeks' status post her left total knee replacement under the care of Dr. Jayy Mclean. She is doing very well. She has discontinued the use of her narcotic analgesic for pain management and instead she is using over-the-counter Tylenol Arthritis. Her left knee range of motion was recently measured by the outpatient physical therapy center at   114 degrees of flexion and near full extension, lacking about 6 degrees. She has had some hamstring tenderness on the left that she did not have on the right last year when she had her right replaced, but accepts the fact that there is a variability between sides, understandably, and she feels comfortable with the slight discomfort about the hamstring area. The surgical site has continued to mature nicely with wound borders well approximated. The skin is soft surrounding, no indurations. No erythema. The patient was addressed personally by Dr. Neeta Hernandez today. She will continue outpatient physical therapy services per protocol. X-rays today, 2-view of the left knee revealed total joint prosthesis intact with no evidence of periprosthetic fracturing or dislocation. Alignment is anatomical.      Today her x-rays were reviewed and all of her questions were answered to her satisfaction.

## 2019-06-07 NOTE — LETTER
NOTIFICATION RETURN TO WORK / SCHOOL 
 
6/7/2019 3:01 PM 
 
Ms. Tommy Poncesanti [address] [place], [place] [address] [facility] To Whom It May Concern: 
 
Maria G Rios is currently under the care of [address] [place] [address]. She will return to work full duty with no restrictions on June 25, 2019. If there are questions or concerns please have the patient contact our office.  
 
 
 
Sincerely, 
 
 
Donta Bass PA-C

## 2019-06-11 ENCOUNTER — HOSPITAL ENCOUNTER (OUTPATIENT)
Dept: PHYSICAL THERAPY | Age: 64
Discharge: HOME | End: 2019-06-11
Payer: COMMERCIAL

## 2019-06-11 ENCOUNTER — DOCUMENTATION ONLY (OUTPATIENT)
Dept: ORTHOPEDIC SURGERY | Facility: CLINIC | Age: 64
End: 2019-06-11

## 2019-06-11 PROCEDURE — 97112 NEUROMUSCULAR REEDUCATION: CPT

## 2019-06-11 NOTE — PROGRESS NOTES
[employer] (return to work inquiry) was received via fax and placed in the forms bin at [employer].

## 2019-06-11 NOTE — PROGRESS NOTES
PT DAILY TREATMENT NOTE 10-18    Patient Name: Bobby Hwang  Date:2019  : 1955  [x]  Patient  Verified  Payor: BLUE CROSS / Plan: Dalton Naranjo [id number] PPO / Product Type: PPO /    In time:900  Out time:943  Total Treatment Time (min): 43  Visit #: 3 of 10    Medicare/BCBS Only   Total Timed Codes (min):  33 1:1 Treatment Time:  18       Treatment Area: Pain in left knee [M25.562]  Aftercare following joint replacement surgery [Z47.1]  Unilateral primary osteoarthritis, left knee [M17.12]    SUBJECTIVE  Pain Level (0-10 scale): 0  Any medication changes, allergies to medications, adverse drug reactions, diagnosis change, or new procedure performed?: [x] No    [] Yes (see summary sheet for update)  Subjective functional status/changes:   [] No changes reported  \"No pain. \"    OBJECTIVE    Modality rationale: decrease pain to improve the patients ability to improve mobility and gait   Min Type Additional Details    [] Estim:  []Unatt       []IFC  []Premod                        []Other:  []w/ice   []w/heat  Position:  Location:    [] Estim: []Att    []TENS instruct  []NMES                    []Other:  []w/US   []w/ice   []w/heat  Position:  Location:    []  Traction: [] Cervical       []Lumbar                       [] Prone          []Supine                       []Intermittent   []Continuous Lbs:  [] before manual  [] after manual    []  Ultrasound: []Continuous   [] Pulsed                           []1MHz   []3MHz W/cm2:  Location:    []  Iontophoresis with dexamethasone         Location: [] Take home patch   [] In clinic   10 [x]  Ice     []  heat  []  Ice massage  []  Laser   []  Anodyne Position: semi-reclined with wedge   Location: left knee    []  Laser with stim  []  Other:  Position:  Location:    []  Vasopneumatic Device Pressure:       [] lo [] med [] hi   Temperature: [] lo [] med [] hi   [x] Skin assessment post-treatment:  [x]intact []redness- no adverse reaction    []redness  adverse reaction:     10 min Therapeutic Exercise:  [x] See flow sheet :   Rationale: increase ROM and increase strength to improve the patients ability to perform ADLs    23 min Neuromuscular Re-education:  [x]  See flow sheet : quad re-ed activities   Rationale: increase ROM, increase strength, improve coordination, improve balance and increase proprioception  to improve the patients ability to improve mobility, stance stability, and gait        With   [x] TE   [] TA   [x] neuro   [] other: Patient Education: [x] Review HEP    [] Progressed/Changed HEP based on:   [x] positioning   [x] body mechanics   [] transfers   [] heat/ice application    [] other:      Other Objective/Functional Measures:      Pain Level (0-10 scale) post treatment: 0    ASSESSMENT/Changes in Function: Pt is making great progress with her ROM and functional mobility. Improved fluidity with step ups in the clinic today. Able to perform sit to stands without UE assist.     Patient will continue to benefit from skilled PT services to modify and progress therapeutic interventions, address functional mobility deficits, address ROM deficits, address strength deficits, analyze and address soft tissue restrictions, analyze and cue movement patterns, analyze and modify body mechanics/ergonomics, assess and modify postural abnormalities, address imbalance/dizziness and instruct in home and community integration to attain remaining goals. [x]  See Plan of Care  []  See progress note/recertification  []  See Discharge Summary         Progress towards goals / Updated goals:  Short Term Goals: To be accomplished in 2 weeks:     1. Pt will demonstrate left knee AROM to 120 of flexion in order to improve ability to ascend/descend curbs in the community              Eval = 104    PROGRESSING; 115 deg  2. Pt will demonstrate left knee AROM 0 extension in order to normalize gait pattern.               Eval = lacking 4    MET; 0 deg  3.  Pt will be (I) with HEP to supplement PT treatment.               Eval = established    MET     Long Term Goals: To be accomplished in 10 treatments:     1.   Pt will demonstrate 10meter walk test in 5seconds in order to become a normalized ambulator.               Eval = 8.06seconds     Assess at 30 day tricia   2.   Pt will demonstrate left knee and hip strength minimum 5-/5 in order to improve ambulation distances for ADLs and community engagement.               Eval = varying 4/5    Making progress with strength  3.   Pt will score at least 67 on FOTO in order to improve overall function, decrease pain, and facilitate return to [facility].               Eval = 39    Assess at 30 day tricia    PLAN  []  Upgrade activities as tolerated     [x]  Continue plan of care  []  Update interventions per flow sheet       []  Discharge due to:_  []  Other:_      Len Carmen PTA, CSCS 6/11/2019  9:46 AM    Future Appointments   Date Time Provider Katharine Cerna   6/11/2019  9:00 AM [facility]   6/14/2019  3:00 PM Nobie Cover [facility]   6/18/2019  9:00 AM [facility]   6/20/2019  2:30 PM [facility]   6/26/2019  3:00 PM Deidra Chance, PT [facility]   7/10/2019  2:30 PM Lisa Davidson PA-C [facility]   7/17/2019  8:55 AM Carol Shell [place] [id number] [facility]   9/30/2019  8:20 AM Mike Pena MD [address] [place] [address]   11/1/2019  8:30 AM Surinder Mays MD [facility]

## 2019-06-14 ENCOUNTER — HOSPITAL ENCOUNTER (OUTPATIENT)
Dept: PHYSICAL THERAPY | Age: 64
Discharge: HOME | End: 2019-06-14
Payer: COMMERCIAL

## 2019-06-14 PROCEDURE — 97110 THERAPEUTIC EXERCISES: CPT

## 2019-06-14 PROCEDURE — 97530 THERAPEUTIC ACTIVITIES: CPT

## 2019-06-14 NOTE — PROGRESS NOTES
PT DAILY TREATMENT NOTE 10-18    Patient Name: Bobby Hwang  Date:2019  : 1955  [x]  Patient  Verified  Payor: BLUE CROSS / Plan: Dalton Naranjo [id number] PPO / Product Type: PPO /    In time:300  Out time:346  Total Treatment Time (min): 55  Visit #: 4 of 10    Medicare/BCBS Only   Total Timed Codes (min):  41 1:1 Treatment Time:  30       Treatment Area: Pain in left knee [M25.562]  Aftercare following joint replacement surgery [Z47.1]  Unilateral primary osteoarthritis, left knee [M17.12]    SUBJECTIVE  Pain Level (0-10 scale): 0  Any medication changes, allergies to medications, adverse drug reactions, diagnosis change, or new procedure performed?: [x] No    [] Yes (see summary sheet for update)  Subjective functional status/changes:   [] No changes reported  Pt reports some tightness in left knee today, but no pain.      OBJECTIVE    Modality rationale: decrease edema, decrease inflammation and decrease pain to improve the patients ability to tolerate prolonged postures   Min Type Additional Details    [] Estim:  []Unatt       []IFC  []Premod                        []Other:  []w/ice   []w/heat  Position:  Location:    [] Estim: []Att    []TENS instruct  []NMES                    []Other:  []w/US   []w/ice   []w/heat  Position:  Location:    []  Traction: [] Cervical       []Lumbar                       [] Prone          []Supine                       []Intermittent   []Continuous Lbs:  [] before manual  [] after manual    []  Ultrasound: []Continuous   [] Pulsed                           []1MHz   []3MHz W/cm2:  Location:    []  Iontophoresis with dexamethasone         Location: [] Take home patch   [] In clinic   10 [x]  Ice     []  heat  []  Ice massage  []  Laser   []  Anodyne Position: long sitting   Location: left knee    []  Laser with stim  []  Other:  Position:  Location:    []  Vasopneumatic Device Pressure:       [] lo [] med [] hi   Temperature: [] lo [] med [] hi   [] Skin assessment post-treatment:  [x]intact []redness- no adverse reaction    []redness  adverse reaction:     10 min Therapeutic Exercise:  [x] See flow sheet :   Rationale: increase ROM and increase strength to improve the patients ability to perform transfers    20 min Therapeutic Activity:  [x]  See flow sheet : including gait training with magenta TB with anteior to posterior resistance to increased gait velocity, stride length   Rationale: increase ROM, increase strength, improve coordination, improve balance and increase proprioception  to improve the patients ability to normalize gait pattern    With   [] TE   [] TA   [] neuro   [] other: Patient Education: [x] Review HEP    [] Progressed/Changed HEP based on:   [] positioning   [] body mechanics   [] transfers   [] heat/ice application    [] other:      Other Objective/Functional Measures: gait after training = reduced antalgia, increased reciprocal UE swing and equal step length     Pain Level (0-10 scale) post treatment: 0.5    ASSESSMENT/Changes in Function: Pt is progressing with ROM and strengthening. Pt responded positively to gait training with normalization of gait with moderate carry over from training to end of treatment. Patient will continue to benefit from skilled PT services to modify and progress therapeutic interventions, address functional mobility deficits, address ROM deficits, address strength deficits, analyze and address soft tissue restrictions, analyze and cue movement patterns, analyze and modify body mechanics/ergonomics and assess and modify postural abnormalities to attain remaining goals. []  See Plan of Care  []  See progress note/recertification  []  See Discharge Summary         Progress towards goals / Updated goals:  1. Pt will demonstrate left knee AROM to 120 of flexion in order to improve ability to ascend/descend curbs in the community              Eval = 104              PROGRESSING; 115 deg  2.  Pt will demonstrate left knee AROM 0 extension in order to normalize gait pattern.               Eval = lacking 4              MET; 0 deg  3. Pt will be (I) with HEP to supplement PT treatment.               Eval = established              MET     Long Term Goals: To be accomplished in 10 treatments:     1.   Pt will demonstrate 10meter walk test in 5seconds in order to become a normalized ambulator.               Eval = 8.06seconds               Assess at 30 day tricia   2.   Pt will demonstrate left knee and hip strength minimum 5-/5 in order to improve ambulation distances for ADLs and community engagement.               Eval = varying 4/5              Making progress with strength  3.   Pt will score at least 67 on FOTO in order to improve overall function, decrease pain, and facilitate return to [facility].               Eval = 39              Assess at 30 day tricia    PLAN  [x]  Upgrade activities as tolerated     [x]  Continue plan of care  []  Update interventions per flow sheet       []  Discharge due to:_  []  Other:_      Mateo Power, PT 6/14/2019  3:14 PM    Future Appointments   Date Time Provider Katharine Cerna   6/18/2019  9:00 AM Anibal Lopez PTA [facility]   6/20/2019  2:30 PM [facility]   6/26/2019  3:00 PM [facility]   7/10/2019  2:30 PM Misbah Davidson PA-C [facility]   7/17/2019  8:55 AM Sienna Rosa MD [facility]   9/30/2019  8:20 AM Mihir Jamison MD [address] [place] [address]   11/1/2019  8:30 AM Reji Banda MD [facility]

## 2019-06-18 ENCOUNTER — HOSPITAL ENCOUNTER (OUTPATIENT)
Dept: PHYSICAL THERAPY | Age: 64
Discharge: HOME | End: 2019-06-18
Payer: COMMERCIAL

## 2019-06-18 PROCEDURE — 97110 THERAPEUTIC EXERCISES: CPT

## 2019-06-18 PROCEDURE — 97530 THERAPEUTIC ACTIVITIES: CPT

## 2019-06-18 NOTE — PROGRESS NOTES
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 \"    OBJECTIVE    Modality rationale: decrease inflammation and decrease pain to improve the patients ability to improve mobility and gait   Min Type Additional Details    [] Estim:  []Unatt       []IFC  []Premod                        []Other:  []w/ice   []w/heat  Position:  Location:    [] Estim: []Att    []TENS instruct  []NMES                    []Other:  []w/US   []w/ice   []w/heat  Position:  Location:    []  Traction: [] Cervical       []Lumbar                       [] Prone          []Supine                       []Intermittent   []Continuous Lbs:  [] before manual  [] after manual    []  Ultrasound: []Continuous   [] Pulsed                           []1MHz   []3MHz W/cm2:  Location:    []  Iontophoresis with dexamethasone         Location: [] Take home patch   [] In clinic   10 [x]  Ice     []  heat  []  Ice massage  []  Laser   []  Anodyne Position: supine with wedge   Location: left knee    []  Laser with stim  []  Other:  Position:  Location:    []  Vasopneumatic Device Pressure:       [] lo [] med [] hi   Temperature: [] lo [] med [] hi   [x] Skin assessment post-treatment:  [x]intact []redness- no adverse reaction []redness  adverse reaction:     10 min Therapeutic Exercise:  [x] See flow sheet :   Rationale: increase ROM and increase strength to improve the patients ability to perform ADLs    22 min Therapeutic Activity:  [x]  See flow sheet : step up mechanics, sit to stands, squatting   Rationale: increase ROM, increase strength, improve coordination, improve balance and increase proprioception  to improve the patients ability to improve mobility, stance stability, and gait         With   [x] TE   [x] TA   [] neuro   [] other: Patient Education: [x] Review HEP    [] Progressed/Changed HEP based on:   [x] positioning   [x] body mechanics   [] transfers   [] heat/ice application    [] other:      Other Objective/Functional Measures:      Pain Level (0-10 scale) post treatment: 0    ASSESSMENT/Changes in Function: Pt is making progress with her ROM and functional mobility. Needs some verbal cuing to improve eccentric control during step ups forward and lateral.     Patient will continue to benefit from skilled PT services to modify and progress therapeutic interventions, address functional mobility deficits, address ROM deficits, address strength deficits, analyze and address soft tissue restrictions, analyze and cue movement patterns, analyze and modify body mechanics/ergonomics, assess and modify postural abnormalities, address imbalance/dizziness and instruct in home and community integration to attain remaining goals. [x]  See Plan of Care  []  See progress note/recertification  []  See Discharge Summary         Progress towards goals / Updated goals:  1. Pt will demonstrate left knee AROM to 120 of flexion in order to improve ability to ascend/descend curbs in the community              Eval = 104              PROGRESSING; 115 deg  2. Pt will demonstrate left knee AROM 0 extension in order to normalize gait pattern.               Eval = lacking 4              MET; 0 deg  3.  Pt will be (I) with HEP to supplement PT treatment.               Eval = established              MET     Long Term Goals: To be accomplished in 10 treatments:     1.   Pt will demonstrate 10meter walk test in 5seconds in order to become a normalized ambulator.               Eval = 8.06seconds               Assess at 30 day tricia   2.   Pt will demonstrate left knee and hip strength minimum 5-/5 in order to improve ambulation distances for ADLs and community engagement.               Eval = varying 4/5              Making progress with strength  3.   Pt will score at least 67 on FOTO in order to improve overall function, decrease pain, and facilitate return to [facility].               Eval = 39              Assess at 30 day tricia      PLAN  []  Upgrade activities as tolerated     [x]  Continue plan of care  []  Update interventions per flow sheet       []  Discharge due to:_  []  Other:_      Chito Mariee PTA, CSCS 6/18/2019  9:40 AM    Future Appointments   Date Time Provider Katharine Eryn   6/18/2019  9:00 AM Cam Melchor PTA [facility]   6/20/2019  2:30 PM Mello Walsh PT [facility]   6/26/2019  3:00 PM Mello Walsh PT [facility]   7/10/2019  2:30 PM Alvaro Davidson PA-C [facility]   7/17/2019  8:55 AM Alyx Rodriguez MD [place] [address]   9/30/2019  8:20 AM Thanh Palomino MD [address] [place] [address]   11/1/2019  8:30 AM Kanu Boyle MD [facility]

## 2019-06-20 ENCOUNTER — HOSPITAL ENCOUNTER (OUTPATIENT)
Dept: PHYSICAL THERAPY | Age: 64
Discharge: HOME | End: 2019-06-20
Payer: COMMERCIAL

## 2019-06-20 PROCEDURE — 97110 THERAPEUTIC EXERCISES: CPT

## 2019-06-20 PROCEDURE — 97530 THERAPEUTIC ACTIVITIES: CPT

## 2019-06-20 NOTE — PROGRESS NOTES
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 \"  OBJECTIVE    Modality rationale: decrease inflammation and decrease pain to improve the patients ability to perform ADLs   Min Type Additional Details    [] Estim:  []Unatt       []IFC  []Premod                        []Other:  []w/ice   []w/heat  Position:  Location:    [] Estim: []Att    []TENS instruct  []NMES                    []Other:  []w/US   []w/ice   []w/heat  Position:  Location:    []  Traction: [] Cervical       []Lumbar                       [] Prone          []Supine                       []Intermittent   []Continuous Lbs:  [] before manual  [] after manual    []  Ultrasound: []Continuous   [] Pulsed                           []1MHz   []3MHz W/cm2:  Location:    []  Iontophoresis with dexamethasone         Location: [] Take home patch   [] In clinic   10 [x]  Ice     []  heat  []  Ice massage  []  Laser   []  Anodyne Position:reclined long sitting  Location:left knee    []  Laser with stim  []  Other:  Position:  Location:    []  Vasopneumatic Device Pressure:       [] lo [] med [] hi   Temperature: [] lo [] med [] hi   [] Skin assessment post-treatment:  [x]intact []redness- no adverse reaction    []redness  adverse reaction:     15 min Therapeutic Exercise:  [x] See flow sheet :   Rationale: increase ROM and increase strength to improve the patients ability to navigate stairs     26 min Therapeutic Activity:  [x]  See flow sheet : including gait training with anterior to posterior resistance to increase terminal stance hip extension   Rationale: increase ROM, increase strength, improve coordination, improve balance and increase proprioception  to improve the patients ability to ambulate community distances    With   [] TE   [] TA   [] neuro   [] other: Patient Education: [x] Review HEP    [] Progressed/Changed HEP based on:   [] positioning   [] body mechanics   [] transfers   [] heat/ice application    [] other:      Other Objective/Functional Measures: 10meter walk test = 5.45seconds = = 1.1meters/second = community ambulator     Pain Level (0-10 scale) post treatment: 0    ASSESSMENT/Changes in Function: Pt is progressing well in AROM and is (I) with HEP, provided no change in status anticipate DC next visit. Patient will continue to benefit from skilled PT services to modify and progress therapeutic interventions, address functional mobility deficits, address ROM deficits, address strength deficits, analyze and address soft tissue restrictions, analyze and cue movement patterns, analyze and modify body mechanics/ergonomics and assess and modify postural abnormalities to attain remaining goals. []  See Plan of Care  []  See progress note/recertification  []  See Discharge Summary         Progress towards goals / Updated goals:  1. Pt will demonstrate left knee AROM to 120 of flexion in order to improve ability to ascend/descend curbs in the community              Eval = 104              PROGRESSING; 115 deg  2.  Pt will demonstrate left knee AROM 0 extension in order to normalize gait pattern.               Eval = lacking 4              MET; 0 deg  3. Pt will be (I) with HEP to supplement PT treatment.               Eval = established              MET     Long Term Goals: To be accomplished in 10 treatments:     1.   Pt will demonstrate 10meter walk test in 5seconds in order to become a normalized ambulator.               Eval = 8.06seconds                PROGRESSING =  5.45seconds = = 1.1meters/second = community ambulator  2.   Pt will demonstrate left knee and hip strength minimum 5-/5 in order to improve ambulation distances for ADLs and community engagement.               Eval = varying 4/5              Making progress with strength  3.   Pt will score at least 67 on FOTO in order to improve overall function, decrease pain, and facilitate return to [facility].               Eval = 39              Assess at 30 day tricia        PLAN  []  Upgrade activities as tolerated     []  Continue plan of care  []  Update interventions per flow sheet       []  Discharge due to:_  []  Other:_      Rin Waters, PT 6/20/2019  2:17 PM    Future Appointments   Date Time Provider Katharine Cerna   6/20/2019  2:30 PM Mariposa Thompson PT [facility]   6/26/2019  4:00 PM Heidi Santos [facility]   7/10/2019  2:30 PM Candice Davidson PA-C [facility]   7/17/2019  8:55 AM Segundo Booker MD [place] [id number]   9/30/2019  8:20 AM Connor Fofana MD [address] [place] [address]   11/1/2019  8:30 AM Nohelia Johnston MD [facility]

## 2019-06-21 DIAGNOSIS — E66.9 OBESITY (BMI 30-39.9): Primary | ICD-10-CM

## 2019-06-25 ENCOUNTER — APPOINTMENT (OUTPATIENT)
Dept: PHYSICAL THERAPY | Age: 64
End: 2019-06-25
Payer: COMMERCIAL

## 2019-06-26 ENCOUNTER — HOSPITAL ENCOUNTER (OUTPATIENT)
Dept: PHYSICAL THERAPY | Age: 64
Discharge: HOME | End: 2019-06-26
Payer: COMMERCIAL

## 2019-06-26 PROCEDURE — 97110 THERAPEUTIC EXERCISES: CPT

## 2019-06-26 PROCEDURE — 97530 THERAPEUTIC ACTIVITIES: CPT

## 2019-06-26 NOTE — PROGRESS NOTES
PT DAILY TREATMENT NOTE 10-18    Patient Name: Louis Zaman  Date:2019  : 1955  [x]  Patient  Verified  Payor: BLUE CROSS / Plan: Dalton Naranjo [id number] PPO / Product Type: PPO /    In time:330  Out time:413  Total Treatment Time (min): 43  Visit #: 7 of 10    Medicare/BCBS Only   Total Timed Codes (min):  43 1:1 Treatment Time:  23       Treatment Area: Pain in left knee [M25.562]  Aftercare following joint replacement surgery [Z47.1]  Unilateral primary osteoarthritis, left knee [M17.12]    SUBJECTIVE  Pain Level (0-10 scale): 0  Any medication changes, allergies to medications, adverse drug reactions, diagnosis change, or new procedure performed?: [x] No    [] Yes (see summary sheet for update)  Subjective functional status/changes:   [] No changes reported  \"No pain. \"    OBJECTIVE    Modality rationale: patient declined   Min Type Additional Details    [] Estim:  []Unatt       []IFC  []Premod                        []Other:  []w/ice   []w/heat  Position:  Location:    [] Estim: []Att    []TENS instruct  []NMES                    []Other:  []w/US   []w/ice   []w/heat  Position:  Location:    []  Traction: [] Cervical       []Lumbar                       [] Prone          []Supine                       []Intermittent   []Continuous Lbs:  [] before manual  [] after manual    []  Ultrasound: []Continuous   [] Pulsed                           []1MHz   []3MHz W/cm2:  Location:    []  Iontophoresis with dexamethasone         Location: [] Take home patch   [] In clinic    []  Ice     []  heat  []  Ice massage  []  Laser   []  Anodyne Position:  Location:    []  Laser with stim  []  Other:  Position:  Location:    []  Vasopneumatic Device Pressure:       [] lo [] med [] hi   Temperature: [] lo [] med [] hi   [] Skin assessment post-treatment:  []intact []redness- no adverse reaction    []redness  adverse reaction:     10 min Therapeutic Exercise:  [x] See flow sheet :   Rationale: increase ROM and increase strength to improve the patients ability to perform ADLs    33 min Therapeutic Activity:  [x]  See flow sheet : step up mechanics, sit to stands, squatting   Rationale: increase ROM, increase strength, improve coordination, improve balance and increase proprioception  to improve the patients ability to improve mobility, stance stability, and gait         With   [x] TE   [x] TA   [] neuro   [] other: Patient Education: [x] Review HEP    [] Progressed/Changed HEP based on:   [] positioning   [] body mechanics   [] transfers   [] heat/ice application    [] other:      Other Objective/Functional Measures:   AROM left knee 0-115 deg  FOTO 61    10 meter walk test  4.2 seconds      Pain Level (0-10 scale) post treatment: 0    ASSESSMENT/Changes in Function: Ms. Miquel Costello has been a pleasure to treat and reports 100% improvement since beginning therapy. Pt reports ease with most ADLs, her ROM, and pain. She reports that she has recently returned to work and reports no functional deficits at this time. . We are discharging to an updated [facility] at this time. []  See Plan of Care  []  See progress note/recertification  [x]  See Discharge Summary         Progress towards goals / Updated goals:  1. Pt will demonstrate left knee AROM to 120 of flexion in order to improve ability to ascend/descend curbs in the community              Eval = 104              NOT MET; 115 deg  2. Pt will demonstrate left knee AROM 0 extension in order to normalize gait pattern.               Eval = lacking 4              MET; 0 deg  3.  Pt will be (I) with HEP to supplement PT treatment.               Eval = established              MET     Long Term Goals: To be accomplished in 10 treatments:     1.   Pt will demonstrate 10meter walk test in 5seconds in order to become a normalized ambulator.               Eval = 8.06seconds               MET =  4.2 seconds = 1.43 meters/second = normal  2.   Pt will demonstrate left knee and hip strength minimum 5-/5 in order to improve ambulation distances for ADLs and community engagement.               Eval = varying 4/5              MET; 5/5  3.   Pt will score at least 67 on FOTO in order to improve overall function, decrease pain, and facilitate return to PLOF.             Eval = 39              NOT MET; 61    Functional Gains: walking/standing tolerance, ROM, lifting/squatting, pain  Functional Deficits: none to report  % improvement: 100%  Pain   Average: 0/10       Best: 0/10     Worst: 0/10  Patient Goal: \"be able to ride my exercise bike without pain. \"      PLAN  []  Upgrade activities as tolerated     []  Continue plan of care  []  Update interventions per flow sheet       [x]  Discharge due to: PROGRAM COMPLETE  []  Other:_      Elle Tao PTA, CSCS 6/26/2019  4:18 PM    Future Appointments   Date Time Provider Katharine Cerna   6/26/2019  4:00 PM Barrett Hatfield [facility]   7/17/2019  9:15 AM Ketty Davidson PA-C [facility]   9/30/2019  8:20 AM Claudetta Ebbs, MD [address] [place] [address]   11/1/2019  8:30 AM Sara Hopkins MD [facility]

## 2019-06-26 NOTE — PROGRESS NOTES
Physical Therapy Discharge Instructions      [facility] [place] [address]  [address], [address]  [phone] fax      Patient: Leonardo Cunningham  : 1955      Continue Home Exercise Program 1-2 times per day for 3-5 weeks, then decrease to 3 times per week      Continue with    [x] Ice  as needed 2-3 times per day     [] Heat           Follow up with MD:     [] Upon completion of therapy     [x] As needed      Recommendations:     [x]   Return to activity with home program    []   Return to activity with the following modifications:       []Post Rehab Program    []Join Independent aquatic program     []Return to/join local gym      Melony Kaufman PTA, CSCS 2019 4:10 PM

## 2019-06-27 NOTE — PROGRESS NOTES
[facility] [address]  [address] [facility] [address], [address] [phone] fax    Discharge Summary  Patient name: April Jones Start of Care: 6/3/2019   Referral source: Ramy Fragoso : 1955                Medical Diagnosis: Pain in left knee [M25.562]  Aftercare following joint replacement surgery [Z47.1]  Unilateral primary osteoarthritis, left knee [M17.12]  Payor: BLUE CROSS / Plan: [facility] PPO / Product Type: PPO /  Onset Date:2019                Treatment Diagnosis: left knee pain   Prior Hospitalization: see medical history Provider#: [id number]   Medications: Verified on Patient summary List    Comorbidities: Afib, HTN, right TKR , left ankle fusion    Prior Level of Function: functionally (I)    Visits from Start of Care: 7    Missed Visits: 0    Reporting Period : 6/3/19 to 19    Progress towards goals / Updated goals:  1. Pt will demonstrate left knee AROM to 120 of flexion in order to improve ability to ascend/descend curbs in the community              Eval = 104              NOT MET; 115 deg  2. Pt will demonstrate left knee AROM 0 extension in order to normalize gait pattern.               Eval = lacking 4              MET; 0 deg  3.  Pt will be (I) with HEP to supplement PT treatment.               Eval = established              MET     Long Term Goals: To be accomplished in 10 treatments:     1.   Pt will demonstrate 10meter walk test in 5seconds in order to become a normalized ambulator.               Eval = 8.06seconds               MET =  4.2 seconds = 1.43 meters/second = normal  2.   Pt will demonstrate left knee and hip strength minimum 5-/5 in order to improve ambulation distances for ADLs and community engagement.               Eval = varying 4/5              MET; 5/5  3.   Pt will score at least 67 on FOTO in order to improve overall function, decrease pain, and facilitate return to PLOF.              Eval = 39              NOT MET; 61     Functional Gains: walking/standing tolerance, ROM, lifting/squatting, pain  Functional Deficits: none to report  % improvement: 100%  Pain   Average: 0/10                  Best: 0/10                Worst: 0/10  Patient Goal: \"be able to ride my exercise bike without pain. \"         Assessment/Summary of care: Ms. Tj Bell has been a pleasure to treat and reports 100% improvement since beginning therapy. Pt reports ease with most ADLs, her ROM, and pain. She reports that she has recently returned to work and reports no functional deficits at this time. We are discharging to an updated HEP at this time.         RECOMMENDATIONS:  [x]Discontinue therapy: [x]Patient has reached or is progressing toward set goals      []Patient is non-compliant or has abdicated      []Due to lack of appreciable progress towards set [address], PT 6/27/2019 11:00 AM

## 2019-06-28 ENCOUNTER — APPOINTMENT (OUTPATIENT)
Dept: PHYSICAL THERAPY | Age: 64
End: 2019-06-28
Payer: COMMERCIAL

## 2019-07-17 ENCOUNTER — OFFICE VISIT (OUTPATIENT)
Dept: ORTHOPEDIC SURGERY | Facility: CLINIC | Age: 64
End: 2019-07-17

## 2019-07-17 VITALS
BODY MASS INDEX: 36.26 KG/M2 | HEART RATE: 55 BPM | TEMPERATURE: 96.7 F | SYSTOLIC BLOOD PRESSURE: 141 MMHG | DIASTOLIC BLOOD PRESSURE: 79 MMHG | OXYGEN SATURATION: 95 % | HEIGHT: 67 IN | RESPIRATION RATE: 16 BRPM | WEIGHT: 231 LBS

## 2019-07-17 DIAGNOSIS — Z96.659 AFTERCARE FOLLOWING KNEE JOINT REPLACEMENT SURGERY, UNSPECIFIED LATERALITY: Primary | ICD-10-CM

## 2019-07-17 DIAGNOSIS — Z47.1 AFTERCARE FOLLOWING KNEE JOINT REPLACEMENT SURGERY, UNSPECIFIED LATERALITY: Primary | ICD-10-CM

## 2019-07-17 NOTE — PROGRESS NOTES
1. Have you been to the ER, urgent care clinic since your last visit? Hospitalized since your last visit? NO    2. Have you seen or consulted any other health care providers outside of the [address] [place] [address] since your last visit? Include any pap smears or colon screening.  NO

## 2019-07-17 NOTE — PROGRESS NOTES
HISTORY OF PRESENT ILLNESS:  Joseline Rosales returns two months status post her primary, left total knee replacement. She is doing very well today. She has continued to complete all outpatient physical therapy services. She is essentially active in her daily lifestyle like she had hoped to be prior to this recent knee replacement. She has no pain to the knee. She is still plagued with some swelling intermittently of the left leg but has a problem generally with swelling in her whole body. Family Medicine is following that concern. REVIEW OF SYSTEMS:  Today, she denies any chest pain or shortness of breath. No fever, chills, or night sweats. No rash, no itching. No nausea and no vomiting. PHYSICAL EXAM:  Associated with the left knee over the midline in a cranial/caudal orientation, the surgical site is well-healed associated with the left knee replacement. There is no evidence of wound dehiscence or infection. Her active range of motion is easily at 110-0ø today. There is no instability on varus or valgus stressing. The patella does track midline. PLAN:   We are going to see her back in one year. Precautions were discussed concerning dental work. She is going to continue her self-guided home exercise program.  Today, all her questions were answered to her satisfaction. A copy of her most recent discharge from physical therapy was reviewed. We agree with the plan of care.

## 2019-07-19 RX ORDER — APIXABAN 5 MG/1
5 TABLET, FILM COATED ORAL 2 TIMES DAILY
Qty: 60 TAB | Refills: 6 | Status: SHIPPED | OUTPATIENT
Start: 2019-07-19 | End: 2019-09-30 | Stop reason: SDUPTHER

## 2019-07-24 ENCOUNTER — OFFICE VISIT (OUTPATIENT)
Dept: NEUROLOGY | Age: 64
End: 2019-07-24

## 2019-07-24 DIAGNOSIS — R20.2 NUMBNESS AND TINGLING IN BOTH HANDS: Primary | ICD-10-CM

## 2019-07-24 DIAGNOSIS — R20.0 NUMBNESS AND TINGLING IN BOTH HANDS: Primary | ICD-10-CM

## 2019-07-24 NOTE — PROGRESS NOTES
Hermes Whitlock is a 62 y.o., right handed female, with an established history of deep vein thrombosis, comes in with complaints of pain and tingling in both wrists. She says that the right is worse than the left. She also has forearm discomfort. This is been going on greater than 5 years. Social History;  lives with her . No alcohol no tobacco.  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 Current Outpatient Medications   Medication Sig Dispense Refill    ELIQUIS 5 mg tablet Take 1 Tab by mouth two (2) times a day. 60 Tab 6    calcium-cholecalciferol, D3, (CALCIUM 600 + D) tablet Take 2 Tabs by mouth daily.  omega-3 fatty acids-fish oil (FISH OIL) 360-1,200 mg cap Take 2 Caps by mouth daily.  cholecalciferol (VITAMIN D3) 2,000 unit cap capsule Take 2,000 Units by mouth daily.  azithromycin (ZITHROMAX) 250 mg tablet Take 500mg (2 tabs) on Day 1 and then 250mg (1 tab) daily on Days 2-5. 6 Tab 0    senna-docusate (PERICOLACE) 8.6-50 mg per tablet Take 1 Tab by mouth two (2) times a day. Indications: constipation 30 Tab 0    metoprolol tartrate (LOPRESSOR) 50 mg tablet Take 1 Tab by mouth two (2) times a day. 180 Tab 3    polyethylene glycol (MIRALAX) 17 gram packet Take 1 Packet by mouth daily. (Patient taking differently: Take 1 Packet by mouth daily. Indications: constipation) 30 Packet 1    potassium 99 mg tablet Take 99 mg by mouth daily as needed (supplement).  omega-3 fatty acids-vitamin e 1,000 mg cap Take 1 Cap by mouth two (2) times a day.  Indications: supplement         Past Medical History:   Diagnosis Date    Arthritis     Bilateral Knee, and Bilateral Hand/Thumb    Atrial fibrillation ([facility] 1/29/2018    Carpal tunnel syndrome     H/O echocardiogram 01/30/2018    EF 50%, normal left atrial size, no valvular heart disease    Headache     History of ankle fusion 1997    left    Hx of migraine headaches     Hypercholesterolemia     Hypertension     Morbid obesity ([address]     HERZOG (nonalcoholic steatohepatitis) suggested by ultrasound report, 2016 8/11/2017    Nausea & vomiting     Vertigo        Past Surgical History:   Procedure Laterality Date    COLONOSCOPY N/A 6/5/2018    COLONOSCOPY performed by Jesus Bain MD at [facility]      age 25 years    HX COLONOSCOPY      Due in 2018: May 2015    HX HYSTERECTOMY      age 30years old    HX KNEE REPLACEMENT Right 05/22/2018    HX KNEE REPLACEMENT Left 05/14/2019    HX ORTHOPAEDIC Left     ankle fusion    HX OTHER SURGICAL  2014    removal of mole left breast    HX TONSILLECTOMY      at age 29years old       No Known Allergies    Patient Active Problem List   Diagnosis Code    Hyperlipidemia E78.5    Essential hypertension I10    Obesity (BMI 30-39. 9) E66.9    HERZOG (nonalcoholic steatohepatitis) suggested by ultrasound report, 2016 K75.81    Atrial fibrillation (HCC) I48.91    Primary osteoarthritis of right knee M17.11    Severe obesity (HCC) E66.01    Primary osteoarthritis of left knee M17.12    S/P TKR (total knee replacement) Z96.659         Review of Systems:   As above otherwise 11 point review of systems negative including;   Constitutional no fever or chills  Skin denies rash or itching  HENT  Denies tinnitus, hearing lose  Eyes denies diplopia vision lose  Respiratory denies shortness of breath  Cardiovascular denies chest pain, dyspnea on exertion  Gastrointestinal denies nausea, vomiting, diarrhea, constipation  Genitourinary denies incontinence  Musculoskeletal denies joint pain or swelling  Endocrine denies weight change  Hematology denies easy bruising or bleeding   Neurological as above in HPI      PHYSICAL EXAMINATION:      VITAL SIGNS:  There were no vitals taken for this visit. GENERAL: The patient is well developed, well nourished, and in no apparent distress. EXTREMITIES: No clubbing, cyanosis, or edema is identified.   Pulses 2+ and symmetrical.  Muscle tone is normal.  HEAD:   Ear, nose, and throat appear to be without trauma. The patient is normocephalic. NEUROLOGIC EXAMINATION    MENTAL STATUS: The patient is awake, alert, and oriented x 4. Fund of knowledge is adequate. Speech is fluent and memory appears to be intact, both long and short term. CRANIAL NERVES: II - Visual fields are full to confrontation. Funduscopic examination reveals flat disks bilaterally. Pupils are both 3 mm and briskly reactive to light and accommodation. III, IV, VI - Extraocular movements are intact and there is no nystagmus. V - Facial sensation is intact to pinprick and light touch. VII - Face is symmetrical.   VIII - Hearing is present. IX, X, XII- Palate rises symmetrically. Gag is present. Tongue is in the midline. XI - Shoulder shrugging and head turning intact  MOTOR:  The patient is 5/5 in all four limbs without any drift. Fine finger movements are symmetrical.  Isolated motor group testing reveals no focal abnormalities. Tone is normal.  Sensory examination is intact to pinprick, light touch and position sense testing. Reflexes are 2+ and symmetrical. Plantars are down going. Cerebellar examination reveals no gross ataxia or dysmetria.  Gait is normal.       CBC:   Lab Results   Component Value Date/Time    WBC 4.6 05/01/2019 06:19 AM    RBC 4.87 05/01/2019 06:19 AM    HGB 11.6 (L) 05/15/2019 05:11 AM    HCT 35.6 05/15/2019 05:11 AM    PLATELET 326 75/82/3845 06:19 AM     BMP:   Lab Results   Component Value Date/Time    Glucose 105 (H) 05/01/2019 06:19 AM    Sodium 142 05/01/2019 06:19 AM    Potassium 4.4 05/01/2019 06:19 AM    Chloride 106 05/01/2019 06:19 AM    CO2 30 05/01/2019 06:19 AM    BUN 13 05/01/2019 06:19 AM    Creatinine 0.59 (L) 05/01/2019 06:19 AM    Calcium 10.5 (H) 05/01/2019 06:19 AM     CMP:   Lab Results   Component Value Date/Time    Glucose 105 (H) 05/01/2019 06:19 AM    Sodium 142 05/01/2019 06:19 AM    Potassium 4.4 05/01/2019 06:19 AM    Chloride 106 05/01/2019 06:19 AM    CO2 30 05/01/2019 06:19 AM    BUN 13 05/01/2019 06:19 AM    Creatinine 0.59 (L) 05/01/2019 06:19 AM    Calcium 10.5 (H) 05/01/2019 06:19 AM    Anion gap 6 05/01/2019 06:19 AM    BUN/Creatinine ratio 22 (H) 05/01/2019 06:19 AM    Alk. phosphatase 96 04/27/2019 07:20 AM    Protein, total 7.1 04/27/2019 07:20 AM    Albumin 3.9 04/27/2019 07:20 AM    Globulin 3.2 04/27/2019 07:20 AM    A-G Ratio 1.2 04/27/2019 07:20 AM     Coagulation:   Lab Results   Component Value Date/Time    Prothrombin time 12.5 05/01/2019 06:19 AM    INR 1.0 05/01/2019 06:19 AM    aPTT 33.6 05/01/2019 06:19 AM     Cardiac markers: No results found for: CPK, CKND1, YURI     [facility]  OFFICE PROCEDURE PROGRESS NOTE        Chart reviewed for the following:   Luz Maria Scherer MD, have reviewed the History, Physical and updated the Allergic reactions for Juan F Swanuise Agustínsler     TIME OUT performed immediately prior to start of procedure:   Luz Maria Scherer MD, have performed the following reviews on [address] [place] [address] prior to the start of the procedure:            * Patient was identified by name and date of birth   * Agreement on procedure being performed was verified  * Risks and Benefits explained to the patient  * Procedure site verified and marked as necessary  * Patient was positioned for comfort  * Consent was signed and verified     Time: 3:02 PM    Date of procedure: 7/24/2019    Procedure performed by:  Josef Jarrell MD    Provider assisted by: Rossana Ball MA    Patient assisted by: self    How tolerated by patient: tolerated the procedure well with no complications    Post Procedural Pain Scale: 0 - No Hurt    Comments: none    Patient: [facility]     ID: [id number] Physician: Rima Klein.  Fleeta Blizzard MD   Gender: Female Ref Phys: Kieran Caban MD   Handedness: Rossana Ball     Study Date: July 24, 2019         Nerve Conduction Studies  Anti Sensory Summary Table     Stim Site NR Peak (ms) Norm Peak (ms) O-P Amp (µV) Norm O-P Amp Dist (cm) Jeff (m/s)   Left Median 2nd Digit Anti Sensory (2nd Digit)   Wrist    3.2 <3.5 31.0 >20 13.0 50.0   Site 2    3.2  29.9      Right Median 2nd Digit Anti Sensory (2nd Digit)   Wrist    3.0 <3.5 30.8 >20 13.0 52.0   Site 2    3.1  27.5      Left Ulnar Anti Sensory (5th Digit )   Wrist    2.6 <3.1 27.6 >17.0 11.0 55.0   Site 2    2.7  27.7      Right Ulnar Anti Sensory (5th Digit )   Wrist    2.6 <3.1 19.7 >17.0 11.0 57.9   Site 2    2.6  20.6        Motor Summary Table     Stim Site NR Onset (ms) Norm Onset (ms) O-P Amp (mV) Norm O-P Amp Dist (cm) Jeff (m/s) Norm Jeff (m/s)   Left Median Motor (Abd Poll Brev)   Wrist    3.0 <4.4 4.5 >4.0 22.0 61.1 >49   Elbow    6.6  4.7       Right Median Motor (Abd Poll Brev)   Wrist    3.0 <4.4 5.8 >4.0 23.0 60.5 >49   Elbow    6.8  6.8       Left Ulnar Motor (Abd Dig Minimi )   Wrist    2.7 <3.3 8.0 >6.0 23.0 82.1 >49   B Elbow    5.5  7.2  13.0 65.0 >50   A Elbow    7.5  7.8       Right Ulnar Motor (Abd Dig Minimi )   Wrist    2.9 <3.3 6.5 >6.0 22.0 81.5 >49   B Elbow    5.6  5.5  13.0 61.9 >50   A Elbow    7.7  5.7           EMG     Side Muscle Nerve Root Ins Act Fibs Psw Fasc Amp Dur Poly Recrt Int Reina Landsman Comment   Right Deltoid Axillary C5-6 Nml Nml Nml None Nml Nml 0 Nml Nml    Right Biceps Musculocut C5-6 Nml Nml Nml None Nml Nml 0 Nml Nml    Right Triceps Radial C6-7-8 Nml Nml Nml None Nml Nml 0 Nml Nml    Right FlexCarRad Median C6-7 Nml Nml Nml None Nml Nml 0 Nml Nml    Right 1stDorInt Ulnar C8-T1 Nml Nml Nml None Nml Nml 0 Nml Nml    Right Abd Poll Brev Median C8-T1 Nml Nml Nml None Nml Nml 0 Nml Nml    Right Cervical Parasp Up Rami C1-3 Nml Nml Nml          Right Cervical Parasp Mid Rami C4-6 Nml Nml Nml          Right Cervical Parasp Low Rami C7-8 Nml Nml Nml            NCS/EMG FINDINGS:     Evaluation of the Left median motor, the Right median motor, the Left ulnar motor, the Right ulnar motor, the Left Median 2nd Digit sensory, the Right Median 2nd Digit sensory, the Left ulnar sensory, and the Right ulnar sensory nerves were unremarkable. Waveforms:                        Impression: Was a normal nerve conduction and EMG study showing no signs of neuropathy myopathy or radiculopathy in the nerves and muscles tested. PLEASE NOTE:   This document has been produced using voice recognition software. Unrecognized errors in transcription may be present.

## 2019-07-24 NOTE — LETTER
7/24/2019 3:05 PM 
 
Patient:  Jones Hagan YOB: 1955 Date of Visit: 7/24/2019 Dear Porsha Monet MD 
[address] [facility] 
[address] [facility] [address] VIA In Basket 
 : Thank you for referring Ms. Marianela Carrillo to me for evaluation/treatment. Below are the relevant portions of my assessment and plan of care. If you have questions, please do not hesitate to call me. I look forward to following Ms. Vosler along with you.  
 
 
 
Sincerely, 
 
 
Yvette Curry MD

## 2019-08-01 ENCOUNTER — DOCUMENTATION ONLY (OUTPATIENT)
Dept: SURGERY | Age: 64
End: 2019-08-01

## 2019-08-01 DIAGNOSIS — E66.9 OBESITY (BMI 30-39.9): Primary | ICD-10-CM

## 2019-08-01 DIAGNOSIS — Z71.3 ENCOUNTER FOR WEIGHT LOSS COUNSELING: ICD-10-CM

## 2019-08-01 NOTE — PROGRESS NOTES
Patient attended a Medically Supervised Weight Loss New Patient Orientation today where we discussed:  - New Direction Very Low Calorie Diet details  - Medical Supervision  - Nutrition education  - Cost of Meal Replacements  - Policies and compliance required for program enrollment.      Patients initial consultation with provider is tentatively scheduled for:  Future Appointments   Date Time Provider Katharine Cerna   8/21/2019 11:00 AM Isaura Jones [place] [facility] SCHED   9/30/2019  8:20 AM Chayo Nguyen MD [address] [place] [address]   11/1/2019  8:30 AM Bruno Monsivais MD [facility]

## 2019-08-09 ENCOUNTER — HOSPITAL ENCOUNTER (OUTPATIENT)
Dept: LAB | Age: 64
Discharge: HOME | End: 2019-08-09
Payer: COMMERCIAL

## 2019-08-09 DIAGNOSIS — E66.9 OBESITY (BMI 30-39.9): ICD-10-CM

## 2019-08-09 DIAGNOSIS — Z71.3 ENCOUNTER FOR WEIGHT LOSS COUNSELING: ICD-10-CM

## 2019-08-09 LAB
ALBUMIN SERPL-MCNC: 3.8 G/DL (ref 3.4–5)
ALBUMIN/GLOB SERPL: 1.3 {RATIO} (ref 0.8–1.7)
ALP SERPL-CCNC: 118 U/L (ref 45–117)
ALT SERPL-CCNC: 112 U/L (ref 13–56)
ANION GAP SERPL CALC-SCNC: 5 MMOL/L (ref 3–18)
APPEARANCE UR: CLEAR
AST SERPL-CCNC: 35 U/L (ref 10–38)
BACTERIA URNS QL MICRO: ABNORMAL /HPF
BASOPHILS # BLD: 0 K/UL (ref 0–0.1)
BASOPHILS NFR BLD: 1 % (ref 0–2)
BILIRUB SERPL-MCNC: 0.3 MG/DL (ref 0.2–1)
BILIRUB UR QL: NEGATIVE
BUN SERPL-MCNC: 13 MG/DL (ref 7–18)
BUN/CREAT SERPL: 25 (ref 12–20)
CALCIUM SERPL-MCNC: 9 MG/DL (ref 8.5–10.1)
CAOX CRY URNS QL MICRO: ABNORMAL
CHLORIDE SERPL-SCNC: 110 MMOL/L (ref 100–111)
CHOLEST SERPL-MCNC: 258 MG/DL
CO2 SERPL-SCNC: 28 MMOL/L (ref 21–32)
COLOR UR: YELLOW
CREAT SERPL-MCNC: 0.51 MG/DL (ref 0.6–1.3)
DIFFERENTIAL METHOD BLD: NORMAL
EOSINOPHIL # BLD: 0.1 K/UL (ref 0–0.4)
EOSINOPHIL NFR BLD: 3 % (ref 0–5)
EPITH CASTS URNS QL MICRO: ABNORMAL /LPF (ref 0–5)
ERYTHROCYTE [DISTWIDTH] IN BLOOD BY AUTOMATED COUNT: 12.1 % (ref 11.6–14.5)
GLOBULIN SER CALC-MCNC: 3 G/DL (ref 2–4)
GLUCOSE SERPL-MCNC: 119 MG/DL (ref 74–99)
GLUCOSE UR STRIP.AUTO-MCNC: NEGATIVE MG/DL
HCT VFR BLD AUTO: 42.7 % (ref 35–45)
HDLC SERPL-MCNC: 60 MG/DL (ref 40–60)
HDLC SERPL: 4.3 {RATIO} (ref 0–5)
HGB BLD-MCNC: 13.9 G/DL (ref 12–16)
HGB UR QL STRIP: NEGATIVE
KETONES UR QL STRIP.AUTO: NEGATIVE MG/DL
LDLC SERPL CALC-MCNC: 151.6 MG/DL (ref 0–100)
LEUKOCYTE ESTERASE UR QL STRIP.AUTO: NEGATIVE
LIPID PROFILE,FLP: ABNORMAL
LYMPHOCYTES # BLD: 1.8 K/UL (ref 0.9–3.6)
LYMPHOCYTES NFR BLD: 38 % (ref 21–52)
MAGNESIUM SERPL-MCNC: 2.2 MG/DL (ref 1.6–2.6)
MCH RBC QN AUTO: 29.4 PG (ref 24–34)
MCHC RBC AUTO-ENTMCNC: 32.6 G/DL (ref 31–37)
MCV RBC AUTO: 90.5 FL (ref 74–97)
MONOCYTES # BLD: 0.5 K/UL (ref 0.05–1.2)
MONOCYTES NFR BLD: 10 % (ref 3–10)
NEUTS SEG # BLD: 2.3 K/UL (ref 1.8–8)
NEUTS SEG NFR BLD: 48 % (ref 40–73)
NITRITE UR QL STRIP.AUTO: NEGATIVE
PH UR STRIP: 5 [PH] (ref 5–8)
PLATELET # BLD AUTO: 274 K/UL (ref 135–420)
PMV BLD AUTO: 10.2 FL (ref 9.2–11.8)
POTASSIUM SERPL-SCNC: 4.2 MMOL/L (ref 3.5–5.5)
PROT SERPL-MCNC: 6.8 G/DL (ref 6.4–8.2)
PROT UR STRIP-MCNC: NEGATIVE MG/DL
RBC # BLD AUTO: 4.72 M/UL (ref 4.2–5.3)
SODIUM SERPL-SCNC: 143 MMOL/L (ref 136–145)
SP GR UR REFRACTOMETRY: >1.03 (ref 1–1.03)
TRIGL SERPL-MCNC: 232 MG/DL (ref ?–150)
TSH SERPL DL<=0.05 MIU/L-ACNC: 3.03 UIU/ML (ref 0.36–3.74)
URATE SERPL-MCNC: 4.5 MG/DL (ref 2.6–7.2)
UROBILINOGEN UR QL STRIP.AUTO: 0.2 EU/DL (ref 0.2–1)
VLDLC SERPL CALC-MCNC: 46.4 MG/DL
WBC # BLD AUTO: 4.7 K/UL (ref 4.6–13.2)

## 2019-08-09 PROCEDURE — 80061 LIPID PANEL: CPT

## 2019-08-09 PROCEDURE — 81001 URINALYSIS AUTO W/SCOPE: CPT

## 2019-08-09 PROCEDURE — 83735 ASSAY OF MAGNESIUM: CPT

## 2019-08-09 PROCEDURE — 93005 ELECTROCARDIOGRAM TRACING: CPT

## 2019-08-09 PROCEDURE — 80053 COMPREHEN METABOLIC PANEL: CPT

## 2019-08-09 PROCEDURE — 84443 ASSAY THYROID STIM HORMONE: CPT

## 2019-08-09 PROCEDURE — 84550 ASSAY OF BLOOD/URIC ACID: CPT

## 2019-08-09 PROCEDURE — 85025 COMPLETE CBC W/AUTO DIFF WBC: CPT

## 2019-08-09 PROCEDURE — 36415 COLL VENOUS BLD VENIPUNCTURE: CPT

## 2019-08-10 LAB
ATRIAL RATE: 46 BPM
CALCULATED P AXIS, ECG09: 20 DEGREES
CALCULATED R AXIS, ECG10: 7 DEGREES
CALCULATED T AXIS, ECG11: 20 DEGREES
DIAGNOSIS, 93000: NORMAL
P-R INTERVAL, ECG05: 200 MS
Q-T INTERVAL, ECG07: 492 MS
QRS DURATION, ECG06: 68 MS
QTC CALCULATION (BEZET), ECG08: 430 MS
VENTRICULAR RATE, ECG03: 46 BPM

## 2019-08-20 NOTE — PROGRESS NOTES
Initial Consultation for Weight Loss    Denise Guerrero is a 62 y.o. female who comes into the office today for initial consultation for the options for the treatment of obesity. Pt was involved in program previously until about 1 year ago. Pt was losing weight but had elevated LFT's. The patient initially identified obesity at the age of 37 and at age 26 weighed 140'slbs. He has tried a variety of unsupervised weight-loss attempts including Weight Watchers and this program, but has yet to meet with lasting success. Maximum weight lost on a diet is about 80 lbs, on this diet,  but that the weight loss always seems to return. Today, the patient is  Height: 5' 6\" (167.6 cm) tall, Weight: 106.5 kg (234 lb 12.8 oz) lbs for a Body mass index is 37.9 kg/m². It is due to the patient's obesity, which is further complicated by those listed below. that the patient is now seeking out medically supervised VLCD.       Ideal body weight: 59.3 kg (130 lb 11.7 oz)  Adjusted ideal body weight: 78.2 kg (172 lb 5.8 oz) (Based on [employer] Tables)      Wt Readings from Last 10 Encounters:   08/21/19 106.5 kg (234 lb 12.8 oz)   07/17/19 104.8 kg (231 lb)   05/24/19 103.1 kg (227 lb 3.2 oz)   05/21/19 104.4 kg (230 lb 3.2 oz)   05/14/19 101.6 kg (224 lb)   05/06/19 102.1 kg (225 lb)   05/03/19 102 kg (224 lb 12.8 oz)   03/11/19 101.6 kg (224 lb)   12/18/18 96.5 kg (212 lb 12.8 oz)   11/29/18 94.8 kg (209 lb)       Weight Metrics 8/21/2019 7/17/2019 5/24/2019 5/21/2019 5/14/2019 5/6/2019 5/3/2019   Weight 234 lb 12.8 oz 231 lb 227 lb 3.2 oz 230 lb 3.2 oz 224 lb 225 lb 224 lb 12.8 oz   Waist Measure Inches 44.5 - - - - - -   Exercise Mins/week - - - - - - -   Body Fat % - - - - - - -   BMI 37.9 kg/m2 36.73 kg/m2 36.12 kg/m2 36.6 kg/m2 35.61 kg/m2 35.77 kg/m2 35.74 kg/m2           History of binge eating: yes    History of purging: no    Major lifestyle changes: yes  Knee surgeries  Other commitments: no   Any potential unsupportive: no     Has Alice Schuler ever been told by a physician not to exercise: swimming/biking ok due to knee surgery    Does Alice Schuler know of any reason they shouldn't exercise: yes  If yes, why?  B/l knee pain. Does Alice Schuler have any food allergies or sensitivities: no      MWL questionnaire reviewed. If female:  No LMP recorded. Patient has had a hysterectomy. Past Medical History:   Diagnosis Date    Arthritis     Bilateral Knee, and Bilateral Hand/Thumb    Atrial fibrillation ([address] 1/29/2018    Carpal tunnel syndrome     H/O echocardiogram 01/30/2018    EF 50%, normal left atrial size, no valvular heart disease    Headache     History of ankle fusion 1997    left    Hx of migraine headaches     Hypercholesterolemia     Hypertension     Morbid obesity ([address]     HERZOG (nonalcoholic steatohepatitis) suggested by ultrasound report, 2016 8/11/2017    Nausea & vomiting     Vertigo        Past Surgical History:   Procedure Laterality Date    COLONOSCOPY N/A 6/5/2018    COLONOSCOPY performed by Elnoria Blizzard, MD at [address] [place] [address]      age 25 years    HX COLONOSCOPY      Due in 2018: May 2015    HX HYSTERECTOMY      age 30years old    HX KNEE REPLACEMENT Right 05/22/2018    HX KNEE REPLACEMENT Left 05/14/2019    HX ORTHOPAEDIC Left     ankle fusion    HX OTHER SURGICAL  2014    removal of mole left breast    HX TONSILLECTOMY      at age 29years old       Current Outpatient Medications   Medication Sig Dispense Refill    ELIQUIS 5 mg tablet Take 1 Tab by mouth two (2) times a day. 60 Tab 6    calcium-cholecalciferol, D3, (CALCIUM 600 + D) tablet Take 2 Tabs by mouth daily.  omega-3 fatty acids-fish oil (FISH OIL) 360-1,200 mg cap Take 2 Caps by mouth daily.  cholecalciferol (VITAMIN D3) 2,000 unit cap capsule Take 2,000 Units by mouth daily.  metoprolol tartrate (LOPRESSOR) 50 mg tablet Take 1 Tab by mouth two (2) times a day.  180 Tab 3    polyethylene glycol (MIRALAX) 17 gram packet Take 1 Packet by mouth daily. (Patient taking differently: Take 1 Packet by mouth daily. Indications: constipation) 30 Packet 1    potassium 99 mg tablet Take 99 mg by mouth daily as needed (supplement).  omega-3 fatty acids-vitamin e 1,000 mg cap Take 1 Cap by mouth two (2) times a day.  Indications: supplement         No Known Allergies    Social History     Tobacco Use    Smoking status: Never Smoker    Smokeless tobacco: Never Used   Substance Use Topics    Alcohol use: No     Alcohol/week: 0.0 standard drinks    Drug use: No       Family History   Problem Relation Age of Onset    Heart Disease Mother     Hypertension Mother     Heart Surgery Mother     Cancer Father         colon    Hypertension Brother     No Known Problems Maternal Grandmother     Heart Disease Maternal Grandfather     MS Paternal Grandmother     Stroke Paternal Grandfather     Heart Disease Paternal Grandfather     No Known Problems Son        Family Status   Relation Name Status    Mother  Alive        HTN, Atrial fibrillation    Father          Sepsis, colon cancer    Brother 1 Alive    MGM      MGF      PGM      PGF      Son 1 Alive       Review of Systems:   ROS    Positive in BOLD  CONST: Fever, weight loss, fatigue or chills  GI: Nausea, vomiting, abdominal pain, change in bowel habits, hematochezia, melena, and GERD   INTEG: Dermatitis, abnormal moles  HEENT: Recent changes in vision, vertigo, epistaxis, dysphagia and hoarseness  CV: Chest pain, palpitations, HTN, edema and varicosities  RESP: Cough, shortness of breath, wheezing, hemoptysis, snoring and reactive airway disease  : Hematuria, dysuria, frequency, urgency, nocturia and stress urinary incontinence   MS: Weakness, joint pain and arthritis  ENDO: Diabetes, thyroid disease, polyuria, polydipsia, polyphagia, poor wound healing, heat intolerance, cold intolerance  LYMPH/HEME: Anemia, bruising and history of blood transfusions  NEURO: Dizziness, headache, fainting, seizures and stroke  PSYCH: Anxiety and depression      Physical Exam    Visit Vitals  /77   Pulse (!) 59   Temp 98.8 °F (37.1 °C) (Oral)   Resp 18   Ht 5' 6\" (1.676 m)   Wt 106.5 kg (234 lb 12.8 oz)   SpO2 98%   BMI 37.90 kg/m²               General: AAOX3, pleasant and cooperative to exam. Appropriately groomed. NAD. Non-toxic in appearance. Appears stated age. HENT: NC/AT. PERRLA. Extraocular motions are intact. Sclera anicteric, Conjunctiva Clear. Nares clear. Oropharynx pink, moist without exudate or erythema. Uvula Midline. Neck:  Supple, trachea is midline. No JVD, Lymphadenopathy. No bruits. Chest: Good equal bilateral expansion  Lungs: Clear to auscultation bilaterally without e/o crackles, wheezes or rhales. Heart: RRR, S1 and S2 noted. No c/r/m/g/vpmi. Abdomen: obese, soft and non-tender without distension. Good bowel sounds. No vis/palp masses or pulsations. No organo-splenomegaly. No hernias to my exam. No e/o acute abdomen or peritoneal signs. Pelvis: Stable. :  Deferred  Rectal: Deferred  Extremities: Positive pulses in all 4 extremities. Baseline range of motion in all 4 extremities. Strength, sensation and reflexes intact, appropriate and equal in b/l upper and lower extremities. No C/C/E  Neuro: CN II-XII grossly intact without focal deficit. Ambulatory. Skin: Clean, warm and dry. Workup  Labs: Reviewed, mildly elevated LFT's, hx HERZOG, w/u'd in past will follow closely. EKG:Reviewed ok to proceed. Assessment/Plan  Alyx Del Angel is a 62 y.o. female who is suffering from obesity with a BMI of Body mass index is 37.9 kg/m². and comorbidities including those listed above who would benefit from weight loss. ICD-10-CM ICD-9-CM    1. Morbid obesity ([facility] [address] B88.85 042.38 METABOLIC PANEL, COMPREHENSIVE      URIC ACID   2.  Primary osteoarthritis of left knee W91.23 418.29 METABOLIC PANEL, COMPREHENSIVE      URIC ACID   3. Chronic pain of left knee F82.154 806.38 METABOLIC PANEL, COMPREHENSIVE    G89.29 338.29 URIC ACID   4. Obesity (BMI 30-39. 9) I89.4 505.95 METABOLIC PANEL, COMPREHENSIVE      URIC ACID   5. Encounter for weight loss counseling A88.1 N05.0 METABOLIC PANEL, COMPREHENSIVE      URIC ACID   6. History of atrial fibrillation Z86.79 V12.59    7. Chronic anticoagulation Z79.01 V58.61    8. HERZOG (nonalcoholic steatohepatitis) K75.81 571.8    9. Elevated LFTs R94.5 790.6        Diet regimen   # of meal replacements prescribed: 4 MR + FF meals   If modified LCD-nutritional guidelines:     Monthly Goal   10-15#   Get into ketosis   Only eat when really hungry. Good mistakes. Medical monitoring schedule:   Weekly BP/Weight checks   Monthly provider appointments              Monthly CMP, uric acid checks      I have reviewed/discussed the above normal BMI with the patient. I have recommended the following interventions: dietary management education, guidance, and counseling . . Ms. Horace Pineda has a reminder for a \"due or due soon\" health maintenance. I have asked that she contact her primary care provider for follow-up on this health maintenance.     Yessi Matthews, MS, PA-C

## 2019-08-21 ENCOUNTER — TELEPHONE (OUTPATIENT)
Dept: INTERNAL MEDICINE CLINIC | Age: 64
End: 2019-08-21

## 2019-08-21 ENCOUNTER — OFFICE VISIT (OUTPATIENT)
Dept: SURGERY | Age: 64
End: 2019-08-21

## 2019-08-21 ENCOUNTER — DOCUMENTATION ONLY (OUTPATIENT)
Dept: SURGERY | Age: 64
End: 2019-08-21

## 2019-08-21 VITALS
HEIGHT: 66 IN | WEIGHT: 234.8 LBS | HEART RATE: 59 BPM | RESPIRATION RATE: 18 BRPM | BODY MASS INDEX: 37.73 KG/M2 | TEMPERATURE: 98.8 F | DIASTOLIC BLOOD PRESSURE: 77 MMHG | OXYGEN SATURATION: 98 % | SYSTOLIC BLOOD PRESSURE: 144 MMHG

## 2019-08-21 DIAGNOSIS — Z86.79 HISTORY OF ATRIAL FIBRILLATION: ICD-10-CM

## 2019-08-21 DIAGNOSIS — R79.89 ELEVATED LFTS: ICD-10-CM

## 2019-08-21 DIAGNOSIS — M17.12 PRIMARY OSTEOARTHRITIS OF LEFT KNEE: ICD-10-CM

## 2019-08-21 DIAGNOSIS — E66.01 MORBID OBESITY (HCC): Primary | ICD-10-CM

## 2019-08-21 DIAGNOSIS — K75.81 NASH (NONALCOHOLIC STEATOHEPATITIS): ICD-10-CM

## 2019-08-21 DIAGNOSIS — G89.29 CHRONIC PAIN OF LEFT KNEE: ICD-10-CM

## 2019-08-21 DIAGNOSIS — Z71.3 ENCOUNTER FOR WEIGHT LOSS COUNSELING: ICD-10-CM

## 2019-08-21 DIAGNOSIS — M25.562 CHRONIC PAIN OF LEFT KNEE: ICD-10-CM

## 2019-08-21 DIAGNOSIS — Z79.01 CHRONIC ANTICOAGULATION: ICD-10-CM

## 2019-08-21 DIAGNOSIS — E66.9 OBESITY (BMI 30-39.9): ICD-10-CM

## 2019-08-21 NOTE — TELEPHONE ENCOUNTER
Pt saw Dr Trinidad Horne today and he needs a brief message sent to him from Dr Yon Zhang stating that it is ok for pt to start the weight loss program, that's all it needs to say. She requests that message be sent today so she can begin please.

## 2019-08-21 NOTE — TELEPHONE ENCOUNTER
[id number] [address] for pt to restart the nonsurgical weight loss program.    Dr. Sreekanth Warner  [facility] [address], [address] [place] [address] Str.  Phone: [phone]  Fax: [phone]

## 2019-08-21 NOTE — PATIENT INSTRUCTIONS
Monthly goal:      4 meal replacements daily, no other food. First one within about 1 hour of waking up to get metabolism started. Don't go more than 6 hours between meals. No more than 1 soup a day, this is too much salt. No more than 1 bar a day, this not enough protein. You are allotted 10 carbs extra a day. Recommendations       - Consume your 4 daily meal replacements equally spaced over the day. Dont go more than 6 hours between each meal. Breakfast is especially important!      - Get the support of family and friends. - Snack-proof your home. - Have strategies for social situations, meetings that run over or vacation.       - If you fall off the plan; just start right back. Reflect on those days into examples of what to change or avoid next time. Program Compliance      We do not expect perfection. However, we do ask for your persistence and your willingness to do the work of growing yourself. You will hit plateaus in your weight loss. You will run into situations that test your will power. You will encounter times when you feel frustrated. How your respond to your slip ups and, the adjustments you make to prevent future slip ups will determine you long-term success. If you find yourself temporarily slipping in the program we will gently nudge you forward and encourage you to do the work of identifying and move beyond your stuck areas. However, if you find yourself wavering in the program for a prolonged time (more than 3 months) we will ask that you take a break from the program. At that time you will have 3 options:       1. Consult with one of our weight loss specialists to explore additional weight loss options. 2. Work with a counselor to do some focused work in order to identify and break the patterns that are holding you back. 3. A combination of both these.       Once you, your counselor and/or your weight loss specialist feel that you have moved past those patterns and want to give the program another chance, we will gladly work with you to determine if you're ready to start back in the program.      When returning after a break, if it has been over 3 months you will required to repeat an orientation and, if greater than 6 months, you will be required to repeat an orientation, labs and an EKG. Homework for [employer]        Exercise:   - Daily starting slow, gradually increase your time by 10- 20 % per week     - To prevent injury, take a recovery week every 4 weeks (reduce your exercise time and intensity by 1/2 during this time)     - Your goal is to work up to 150 min a week; hard enough that you can't whistle or sing. It may take 6 months to work up to this. - Call the Health  at [facility] labs for exercise ideas ([phone])          Diet:   VLCD    See brochure for emergency meal instructions. Free Foods    Objective: Increase antioxidants to assist immune system in the healing process. Plan: Eat 3-5 servings of low carbohydrate vegetables listed below daily. You may season with any of the condiments and seasonings listed below. A serving size is a ½ cup steamed or 1 cup raw.     Vegetables  o Artichoke  or artichoke hearts  o Asparagus  o Beans (green, waxed, or [place])  o Bean Sprouts  o Beets  o Broccoli  o Brussel Sprouts  o Cabbage  o Cauliflower  o Celery  o Cucumbers  o Eggplant  o Green onions or scallions  o Greens (xander, kale, mustard, or turnip)  o Jicama  o Kohlrabi  o Leeks   o Mixed Vegetables (without corn, peas, or pasta)  o Mushrooms  o Okra  o Onions  o Pepper (all varieties)  o Radishes  o [employer] (Endive, [place], Lettuce, [place], Spinach)  o Sauerkraut or borscht  o Salsa (without corn or beans)  o Hot Peppers  o Spinach      o Summer squash or chayote  o Tomato  o Tomato, canned  o Tomato, sauce  o Tomato/vegetable juice  o Turnips  o Water chestnuts  o Watercress (unlimited)  o Zucchini    Condiments  o Horseradish  o Lemon juice  o Lime juice  o Mustard  o Soy sauce  o Vinegar  o Flavoring extracts  o Garlic  o Herbs, fresh or dried  o [address] or hot pepper sauce

## 2019-08-21 NOTE — TELEPHONE ENCOUNTER
See message below from DR. Garcia Check that patient is okay to restart the nonsurgical weight loss program.

## 2019-08-21 NOTE — PROGRESS NOTES
Patient was previously enrolled medically managed weight loss program.  She had withdraw due to elevated liver function testing. She states that she was worked up by her primary care provider and diagnosed with nonalcoholic steatohepatitis. She returns today to reenter the program.  I have asked her to provide a note from her primary care provider stating that it is okay from a medical standpoint to proceed with a very low calorie diet. I have received this today.      [id number] [address] for pt to restart the nonsurgical weight loss program.     Dr. Danae Patel  [facility] [address], [address] [place] [address] Str.  Phone: [phone]  Fax: [phone]             Caterina Gaxiola MS, PA-C

## 2019-08-28 ENCOUNTER — CLINICAL SUPPORT (OUTPATIENT)
Dept: SURGERY | Age: 64
End: 2019-08-28

## 2019-08-28 VITALS
HEART RATE: 80 BPM | WEIGHT: 228.9 LBS | DIASTOLIC BLOOD PRESSURE: 84 MMHG | HEIGHT: 66 IN | BODY MASS INDEX: 36.79 KG/M2 | SYSTOLIC BLOOD PRESSURE: 140 MMHG

## 2019-08-28 DIAGNOSIS — E66.9 OBESITY, UNSPECIFIED CLASSIFICATION, UNSPECIFIED OBESITY TYPE, UNSPECIFIED WHETHER SERIOUS COMORBIDITY PRESENT: Primary | ICD-10-CM

## 2019-08-28 NOTE — PROGRESS NOTES
Patient attended a weekly class as part of the Medically Supervised Weight Loss Program.  This class was facilitated by a Registered Dietitian. Topics taught at class focused on diet, particularly carbohydrate counting and portion control. Classes also focused on behavior changes and the importance of establishing a daily exercise routine. Progress Note: Weekly Medical Monitoring in the [facility] Program    Is there anything that you or the patient needs to let the supervising provider know about? no    Over the past week, have you experienced any side-effects? no    Alyx Del Angel is a 62 y.o. female who is enrolled in [facility]    Alyx Del Angel was prescribed the VLCD / LCD. Visit Vitals  /84   Pulse 80   Ht 5' 6\" (1.676 m)   Wt 103.8 kg (228 lb 14.4 oz)   BMI 36.95 kg/m²     Weight Metrics 8/28/2019 8/21/2019 8/21/2019 7/17/2019 5/24/2019 5/21/2019 5/14/2019   Weight 228 lb 14.4 oz - 234 lb 12.8 oz 231 lb 227 lb 3.2 oz 230 lb 3.2 oz 224 lb   Waist Measure Inches 42.75 44.5 - - - - -   Exercise Mins/week - - - - - - -   Body Fat % - - - - - - -   BMI 36.95 kg/m2 - 37.9 kg/m2 36.73 kg/m2 36.12 kg/m2 36.6 kg/m2 35.61 kg/m2         Have you received any other medical care this week? no  If yes, where and for what? Have you had any change in your medications since your last visit? no  If yes what? Did you have any problems adhering to the program last week? no  If yes, please explain:       Eating Habits Over Last Week:  Did you take in 64 oz of non-caloric fluids? yes     Did you consume your prescribed meal replacement regimen each day?  yes       Physical Activity Over the Past Week:    Aerobic exercise: 0 min  Resistance exercise: 0 workouts / week

## 2019-08-30 ENCOUNTER — HOSPITAL ENCOUNTER (OUTPATIENT)
Dept: MAMMOGRAPHY | Age: 64
Discharge: HOME | End: 2019-08-30
Attending: INTERNAL MEDICINE
Payer: COMMERCIAL

## 2019-08-30 DIAGNOSIS — Z12.31 ENCOUNTER FOR SCREENING MAMMOGRAM FOR BREAST CANCER: ICD-10-CM

## 2019-08-30 PROCEDURE — 77067 SCR MAMMO BI INCL CAD: CPT

## 2019-09-04 ENCOUNTER — CLINICAL SUPPORT (OUTPATIENT)
Dept: SURGERY | Age: 64
End: 2019-09-04

## 2019-09-04 ENCOUNTER — TELEPHONE (OUTPATIENT)
Dept: INTERNAL MEDICINE CLINIC | Age: 64
End: 2019-09-04

## 2019-09-04 DIAGNOSIS — E66.9 OBESITY, UNSPECIFIED CLASSIFICATION, UNSPECIFIED OBESITY TYPE, UNSPECIFIED WHETHER SERIOUS COMORBIDITY PRESENT: Primary | ICD-10-CM

## 2019-09-04 DIAGNOSIS — R92.8 ABNORMAL MAMMOGRAM: Primary | ICD-10-CM

## 2019-09-04 NOTE — TELEPHONE ENCOUNTER
Patient stating she got a letter advising her some additional views were needed on her right breast and that she needed to obtain the appropriate orders. Please advised once those have been entered.

## 2019-09-05 VITALS
SYSTOLIC BLOOD PRESSURE: 144 MMHG | HEART RATE: 80 BPM | BODY MASS INDEX: 36.37 KG/M2 | WEIGHT: 226.3 LBS | DIASTOLIC BLOOD PRESSURE: 80 MMHG | HEIGHT: 66 IN

## 2019-09-05 NOTE — PROGRESS NOTES
Patient attended a weekly class as part of the Medically Supervised Weight Loss Program.  This class was facilitated by a Registered Dietitian. Topics taught at class focused on diet, particularly carbohydrate counting and portion control. Classes also focused on behavior changes and the importance of establishing a daily exercise routine. Progress Note: Weekly Medical Monitoring in the [facility] Loss Program    Is there anything that you or the patient needs to let the supervising provider know about? no    Over the past week, have you experienced any side-effects? no    Cali Carmichael is a 62 y.o. female who is enrolled in [facility]    Caligabriel Carmichael was prescribed the VLCD / LCD. Visit Vitals  /80   Pulse 80   Ht 5' 6\" (1.676 m)   Wt 102.6 kg (226 lb 4.8 oz)   BMI 36.53 kg/m²     Weight Metrics 9/5/2019 9/4/2019 8/28/2019 8/21/2019 8/21/2019 7/17/2019 5/24/2019   Weight - 226 lb 4.8 oz 228 lb 14.4 oz - 234 lb 12.8 oz 231 lb 227 lb 3.2 oz   Waist Measure Inches 43 - 42.75 44.5 - - -   Exercise Mins/week - - - - - - -   Body Fat % - - - - - - -   BMI - 36.53 kg/m2 36.95 kg/m2 - 37.9 kg/m2 36.73 kg/m2 36.12 kg/m2         Have you received any other medical care this week? no  If yes, where and for what? Have you had any change in your medications since your last visit? no  If yes what? Did you have any problems adhering to the program last week? no  If yes, please explain:       Eating Habits Over Last Week:  Did you take in 64 oz of non-caloric fluids? yes     Did you consume your prescribed meal replacement regimen each day?  yes       Physical Activity Over the Past Week:    Aerobic exercise: 0 min  Resistance exercise: 0 workouts / week

## 2019-09-10 ENCOUNTER — HOSPITAL ENCOUNTER (OUTPATIENT)
Dept: ULTRASOUND IMAGING | Age: 64
Discharge: HOME | End: 2019-09-10
Attending: INTERNAL MEDICINE
Payer: COMMERCIAL

## 2019-09-10 ENCOUNTER — HOSPITAL ENCOUNTER (OUTPATIENT)
Dept: MAMMOGRAPHY | Age: 64
Discharge: HOME | End: 2019-09-10
Attending: INTERNAL MEDICINE
Payer: COMMERCIAL

## 2019-09-10 DIAGNOSIS — N64.89 BREAST ASYMMETRY: ICD-10-CM

## 2019-09-10 DIAGNOSIS — R92.8 ABNORMAL MAMMOGRAM: ICD-10-CM

## 2019-09-10 PROCEDURE — 77061 BREAST TOMOSYNTHESIS UNI: CPT

## 2019-09-10 PROCEDURE — 76642 ULTRASOUND BREAST LIMITED: CPT

## 2019-09-11 ENCOUNTER — CLINICAL SUPPORT (OUTPATIENT)
Dept: SURGERY | Age: 64
End: 2019-09-11

## 2019-09-11 VITALS
SYSTOLIC BLOOD PRESSURE: 130 MMHG | WEIGHT: 224.9 LBS | BODY MASS INDEX: 36.14 KG/M2 | DIASTOLIC BLOOD PRESSURE: 88 MMHG | HEART RATE: 74 BPM | HEIGHT: 66 IN

## 2019-09-11 DIAGNOSIS — E66.9 OBESITY, UNSPECIFIED CLASSIFICATION, UNSPECIFIED OBESITY TYPE, UNSPECIFIED WHETHER SERIOUS COMORBIDITY PRESENT: Primary | ICD-10-CM

## 2019-09-11 NOTE — PROGRESS NOTES
Patient attended a weekly class as part of the Medically Supervised Weight Loss Program.  This class was facilitated by a Registered Dietitian. Topics taught at class focused on diet, particularly carbohydrate counting and portion control. Classes also focused on behavior changes and the importance of establishing a daily exercise routine. Progress Note: Weekly Medical Monitoring in the [facility]    Is there anything that you or the patient needs to let the supervising provider know about? no    Over the past week, have you experienced any side-effects? no    Alyx Del Angel is a 62 y.o. female who is enrolled in [facility]    Alyx Del Angel was prescribed the VLCD / LCD. Visit Vitals  /88   Pulse 74   Ht 5' 6\" (1.676 m)   Wt 102 kg (224 lb 14.4 oz)   BMI 36.30 kg/m²     Weight Metrics 9/11/2019 9/5/2019 9/4/2019 8/28/2019 8/21/2019 8/21/2019 7/17/2019   Weight 224 lb 14.4 oz - 226 lb 4.8 oz 228 lb 14.4 oz - 234 lb 12.8 oz 231 lb   Waist Measure Inches 43 43 - 42.75 44.5 - -   Exercise Mins/week - - - - - - -   Body Fat % - - - - - - -   BMI 36.3 kg/m2 - 36.53 kg/m2 36.95 kg/m2 - 37.9 kg/m2 36.73 kg/m2         Have you received any other medical care this week? no  If yes, where and for what? Have you had any change in your medications since your last visit? no  If yes what? Did you have any problems adhering to the program last week? no  If yes, please explain:       Eating Habits Over Last Week:  Did you take in 64 oz of non-caloric fluids? yes     Did you consume your prescribed meal replacement regimen each day?  yes       Physical Activity Over the Past Week:    Aerobic exercise: 0 min  Resistance exercise: 0 workouts / week

## 2019-09-14 ENCOUNTER — HOSPITAL ENCOUNTER (OUTPATIENT)
Dept: LAB | Age: 64
Discharge: HOME | End: 2019-09-14
Payer: COMMERCIAL

## 2019-09-14 DIAGNOSIS — G89.29 CHRONIC PAIN OF LEFT KNEE: ICD-10-CM

## 2019-09-14 DIAGNOSIS — M17.12 PRIMARY OSTEOARTHRITIS OF LEFT KNEE: ICD-10-CM

## 2019-09-14 DIAGNOSIS — Z71.3 ENCOUNTER FOR WEIGHT LOSS COUNSELING: ICD-10-CM

## 2019-09-14 DIAGNOSIS — M25.562 CHRONIC PAIN OF LEFT KNEE: ICD-10-CM

## 2019-09-14 DIAGNOSIS — E66.01 MORBID OBESITY (HCC): ICD-10-CM

## 2019-09-14 DIAGNOSIS — E66.9 OBESITY (BMI 30-39.9): ICD-10-CM

## 2019-09-14 LAB
ALBUMIN SERPL-MCNC: 4.1 G/DL (ref 3.4–5)
ALBUMIN/GLOB SERPL: 1.4 {RATIO} (ref 0.8–1.7)
ALP SERPL-CCNC: 69 U/L (ref 45–117)
ALT SERPL-CCNC: 30 U/L (ref 13–56)
ANION GAP SERPL CALC-SCNC: 8 MMOL/L (ref 3–18)
AST SERPL-CCNC: 15 U/L (ref 10–38)
BILIRUB SERPL-MCNC: 0.4 MG/DL (ref 0.2–1)
BUN SERPL-MCNC: 15 MG/DL (ref 7–18)
BUN/CREAT SERPL: 30 (ref 12–20)
CALCIUM SERPL-MCNC: 9.5 MG/DL (ref 8.5–10.1)
CHLORIDE SERPL-SCNC: 109 MMOL/L (ref 100–111)
CO2 SERPL-SCNC: 25 MMOL/L (ref 21–32)
CREAT SERPL-MCNC: 0.5 MG/DL (ref 0.6–1.3)
GLOBULIN SER CALC-MCNC: 3 G/DL (ref 2–4)
GLUCOSE SERPL-MCNC: 111 MG/DL (ref 74–99)
POTASSIUM SERPL-SCNC: 4.4 MMOL/L (ref 3.5–5.5)
PROT SERPL-MCNC: 7.1 G/DL (ref 6.4–8.2)
SODIUM SERPL-SCNC: 142 MMOL/L (ref 136–145)
URATE SERPL-MCNC: 4 MG/DL (ref 2.6–7.2)

## 2019-09-14 PROCEDURE — 80053 COMPREHEN METABOLIC PANEL: CPT

## 2019-09-14 PROCEDURE — 36415 COLL VENOUS BLD VENIPUNCTURE: CPT

## 2019-09-14 PROCEDURE — 84550 ASSAY OF BLOOD/URIC ACID: CPT

## 2019-09-17 NOTE — PROGRESS NOTES
[facility] Progress Note:   F/up Provider Visit     CC: Weight Management      Garnette Burkitt is a 62 y.o. female who is here for her  f/up medical provider visit for the VLCD  Program. Down 12 lbs since last visit. Weight History    Ideal body weight: 59.3 kg (130 lb 11.7 oz)  Adjusted ideal body weight: 75.7 kg (166 lb 13.4 oz) (Based on [employer] Tables)      Wt Readings from Last 10 Encounters:   09/18/19 100.2 kg (221 lb)   09/11/19 102 kg (224 lb 14.4 oz)   09/05/19 102.6 kg (226 lb 4.8 oz)   08/28/19 103.8 kg (228 lb 14.4 oz)   08/21/19 106.5 kg (234 lb 12.8 oz)   07/17/19 104.8 kg (231 lb)   05/24/19 103.1 kg (227 lb 3.2 oz)   05/21/19 104.4 kg (230 lb 3.2 oz)   05/14/19 101.6 kg (224 lb)   05/06/19 102.1 kg (225 lb)       Weight Metrics 9/18/2019 9/11/2019 9/5/2019 9/4/2019 8/28/2019 8/21/2019 8/21/2019   Weight 221 lb 224 lb 14.4 oz - 226 lb 4.8 oz 228 lb 14.4 oz - 234 lb 12.8 oz   Waist Measure Inches 42 43 43 - 42.75 44.5 -   Exercise Mins/week - - - - - - -   Body Fat % - - - - - - -   BMI 35.67 kg/m2 36.3 kg/m2 - 36.53 kg/m2 36.95 kg/m2 - 37.9 kg/m2             History    Past Medical History:   Diagnosis Date    Arthritis     Bilateral Knee, and Bilateral Hand/Thumb    Atrial fibrillation ([address] 1/29/2018    Carpal tunnel syndrome     H/O echocardiogram 01/30/2018    EF 50%, normal left atrial size, no valvular heart disease    Headache     History of ankle fusion 1997    left    Hx of migraine headaches     Hypercholesterolemia     Hypertension     Morbid obesity ([address]     HERZOG (nonalcoholic steatohepatitis) suggested by ultrasound report, 2016 8/11/2017    Nausea & vomiting     Vertigo        Past Surgical History:   Procedure Laterality Date    COLONOSCOPY N/A 6/5/2018    COLONOSCOPY performed by Maru Espitia MD at [address] [place] [address] HX APPENDECTOMY      age 25 years    HX COLONOSCOPY      Due in 2018:  May 2015    HX HYSTERECTOMY      age 30 years old    HX KNEE REPLACEMENT Right 2018    HX KNEE REPLACEMENT Left 2019    HX ORTHOPAEDIC Left     ankle fusion    HX OTHER SURGICAL  2014    removal of mole left breast    HX TONSILLECTOMY      at age 29years old       Current Outpatient Medications   Medication Sig Dispense Refill    ELIQUIS 5 mg tablet Take 1 Tab by mouth two (2) times a day. 60 Tab 6    calcium-cholecalciferol, D3, (CALCIUM 600 + D) tablet Take 2 Tabs by mouth daily.  omega-3 fatty acids-fish oil (FISH OIL) 360-1,200 mg cap Take 2 Caps by mouth daily.  cholecalciferol (VITAMIN D3) 2,000 unit cap capsule Take 2,000 Units by mouth daily.  metoprolol tartrate (LOPRESSOR) 50 mg tablet Take 1 Tab by mouth two (2) times a day. 180 Tab 3    polyethylene glycol (MIRALAX) 17 gram packet Take 1 Packet by mouth daily. (Patient taking differently: Take 1 Packet by mouth daily. Indications: constipation) 30 Packet 1    potassium 99 mg tablet Take 99 mg by mouth daily as needed (supplement).  omega-3 fatty acids-vitamin e 1,000 mg cap Take 1 Cap by mouth two (2) times a day.  Indications: supplement         No Known Allergies    Social History     Tobacco Use    Smoking status: Never Smoker    Smokeless tobacco: Never Used   Substance Use Topics    Alcohol use: No     Alcohol/week: 0.0 standard drinks    Drug use: No       Family History   Problem Relation Age of Onset    Heart Disease Mother     Hypertension Mother     Heart Surgery Mother     Cancer Father         colon    Hypertension Brother     No Known Problems Maternal Grandmother     Heart Disease Maternal Grandfather     MS Paternal Grandmother     Stroke Paternal Grandfather     Heart Disease Paternal Grandfather     No Known Problems Son        Family Status   Relation Name Status    Mother  Alive        HTN, Atrial fibrillation    Father          Sepsis, colon cancer    Brother 1 Alive    MGM      MGF      PGM      PGF      Son 1 Alive           Review of Systems:   ROS     Positive in BOLD  CONST: Fever, weight loss, fatigue or chills  GI: Nausea, vomiting, abdominal pain, change in bowel habits, hematochezia, melena, and GERD   INTEG: Dermatitis, abnormal moles  HEENT: Recent changes in vision, vertigo, epistaxis, dysphagia and hoarseness  CV: Chest pain, palpitations, HTN, edema and varicosities  RESP: Cough, shortness of breath, wheezing, hemoptysis, snoring and reactive airway disease  : Hematuria, dysuria, frequency, urgency, nocturia and stress urinary incontinence   MS: Weakness, joint pain and arthritis  ENDO: Diabetes, thyroid disease, polyuria, polydipsia, polyphagia, poor wound healing, heat intolerance, cold intolerance  LYMPH/HEME: Anemia, bruising and history of blood transfusions  NEURO: Dizziness, headache, fainting, seizures and stroke  PSYCH: Anxiety and depression    Remainder of ROS as per HPI. Since your last visit, have you experienced any complications? no  If yes, please list:     Are you taking an appetite suppressant? no  If so:  Do you need a refill? no  Are you experiencine any Chest Pain, Palpitations or Dizziness? no    BP Readings from Last 3 Encounters:   19 134/83   19 130/88   19 144/80           Have you received any other medical care this week? no  If yes, where and for what? Have you discontinued or changed any medicine or dose of your medicine(s) since your last visit with Supervised Weight Loss provider? no    If yes, where and for what? Diet  How many ounces of calorie-free liquids did you consume each day? 64 oz    How many meal replacements did you take each day? 4 MR's    Did you have any problems adhering to the program?  no   If yes, please explain:      Exercise  Minimal exercise    Recommend now starting to advance as tolerated to 150min/wk at rate where blowing into a whistle is difficult. Advance slowly.  Listen to your body. Objective  Visit Vitals  /83   Pulse (!) 53   Temp 98.1 °F (36.7 °C) (Oral)   Resp 18   Ht 5' 6\" (1.676 m)   Wt 100.2 kg (221 lb)   SpO2 97%   BMI 35.67 kg/m²     No LMP recorded. Patient has had a hysterectomy. Physical Exam      General: AAOX3, pleasant and cooperative to exam. Appropriately groomed. NAD. Non-toxic in appearance. Appears stated age. HENT: NC/AT. PERRLA. Extraocular motions are intact. Sclera anicteric, Conjunctiva Clear. Nares clear. Oropharynx pink, moist without exudate or erythema. Uvula Midline. Neck:  Trachea is midline. No visible JVD, Lymphadenopathy. Chest: Good equal bilateral expansion  Lungs: Clear to auscultation bilaterally without e/o crackles, wheezes or rhales. Heart: RRR, S1 and S2 noted. No c/r/m/g/vpmi. Abdomen: obese, soft and non-tender without distension. Good bowel sounds. No vis/palp masses or pulsations. No organo-splenomegaly. No hernias to my exam. No e/o acute abdomen or peritoneal signs. :  Deferred  Rectal: Deferred  Extremities:  No C/C/E  Neuro: CN II-XII appear to be grossly intact without focal deficit. Ambulatory. Skin: Clean, warm and dry. Assessment / Plan    Encounter Diagnoses   Name Primary?  Obesity, unspecified classification, unspecified obesity type, unspecified whether serious comorbidity present Yes    Primary osteoarthritis of left knee     Chronic pain of left knee     Obesity (BMI 30-39. 9)     Encounter for weight loss counseling     History of atrial fibrillation     Chronic anticoagulation     HERZOG (nonalcoholic steatohepatitis)     Elevated LFTs        1. Weight management    Degree of control: Excellent. Progress was reviewed with patient after 4 weeks in the program.    Goal(s) for next appointment: Keep it up/advance exercise as braulio towards goal.    Number of meal replacements per day: 4 MR's. Goal was 224-219lbs.   Wt today 221 lbs!! Met goal.       2.  Labs    Latest results reviewed with patient   Routine monitoring labs ordered  Orders Placed This Encounter    METABOLIC PANEL, COMPREHENSIVE    URIC ACID       3. Goals   5-10 # weight loss while adhering to plan for next visit. I have reviewed/discussed the above normal BMI with the patient. I have recommended the following interventions: dietary management education, guidance, and counseling, encourage exercise, monitor weight and prescribed dietary intake .             >50% of 30 min visit spent counseling     Manohar Fragoso MS, PA-C

## 2019-09-18 ENCOUNTER — OFFICE VISIT (OUTPATIENT)
Dept: SURGERY | Age: 64
End: 2019-09-18

## 2019-09-18 VITALS
SYSTOLIC BLOOD PRESSURE: 134 MMHG | HEART RATE: 53 BPM | RESPIRATION RATE: 18 BRPM | OXYGEN SATURATION: 97 % | DIASTOLIC BLOOD PRESSURE: 83 MMHG | BODY MASS INDEX: 35.52 KG/M2 | TEMPERATURE: 98.1 F | WEIGHT: 221 LBS | HEIGHT: 66 IN

## 2019-09-18 DIAGNOSIS — Z79.01 CHRONIC ANTICOAGULATION: ICD-10-CM

## 2019-09-18 DIAGNOSIS — Z86.79 HISTORY OF ATRIAL FIBRILLATION: ICD-10-CM

## 2019-09-18 DIAGNOSIS — K75.81 NASH (NONALCOHOLIC STEATOHEPATITIS): ICD-10-CM

## 2019-09-18 DIAGNOSIS — R79.89 ELEVATED LFTS: ICD-10-CM

## 2019-09-18 DIAGNOSIS — Z71.3 ENCOUNTER FOR WEIGHT LOSS COUNSELING: ICD-10-CM

## 2019-09-18 DIAGNOSIS — E66.9 OBESITY (BMI 30-39.9): ICD-10-CM

## 2019-09-18 DIAGNOSIS — M17.12 PRIMARY OSTEOARTHRITIS OF LEFT KNEE: ICD-10-CM

## 2019-09-18 DIAGNOSIS — E66.9 OBESITY, UNSPECIFIED CLASSIFICATION, UNSPECIFIED OBESITY TYPE, UNSPECIFIED WHETHER SERIOUS COMORBIDITY PRESENT: Primary | ICD-10-CM

## 2019-09-18 DIAGNOSIS — G89.29 CHRONIC PAIN OF LEFT KNEE: ICD-10-CM

## 2019-09-18 DIAGNOSIS — M25.562 CHRONIC PAIN OF LEFT KNEE: ICD-10-CM

## 2019-09-18 NOTE — PROGRESS NOTES
Chief Complaint   Patient presents with    Weight Management     monthly f/u     Nursing Note for Medical Monitoring in the [facility]      Tiarra Hall is a 62 y.o. female who is enrolled in [facility]    Tiarra Hall was prescribed the VLCD / LCD. Did you have any problems adhering to the program last week? no  If yes, please explain:     Since your last visit, have you experienced any complications? no    Have you received any other medical care this week? no  If yes, where and for what? Have you had any change in your medications since your last visit? no  If yes what? Are you taking an appetite suppressant? no   If yes:  Do you need a refill? BP Readings from Last 3 Encounters:   09/18/19 134/83   09/11/19 130/88   09/05/19 144/80        Eating Habits Over Last Week:  Did you take in 64 oz of non-caloric fluids? yes     Did you consume your prescribed meal replacement regimen each day?  yes       Physical Activity Over the Past Week:    Aerobic exercise: 20 min  Resistance exercise: no workouts / week

## 2019-09-25 ENCOUNTER — CLINICAL SUPPORT (OUTPATIENT)
Dept: SURGERY | Age: 64
End: 2019-09-25

## 2019-09-25 VITALS
HEART RATE: 88 BPM | SYSTOLIC BLOOD PRESSURE: 136 MMHG | DIASTOLIC BLOOD PRESSURE: 84 MMHG | HEIGHT: 66 IN | BODY MASS INDEX: 35.36 KG/M2 | WEIGHT: 220 LBS

## 2019-09-25 DIAGNOSIS — E66.9 OBESITY, UNSPECIFIED CLASSIFICATION, UNSPECIFIED OBESITY TYPE, UNSPECIFIED WHETHER SERIOUS COMORBIDITY PRESENT: Primary | ICD-10-CM

## 2019-09-25 NOTE — PROGRESS NOTES
Patient attended a weekly class as part of the [facility].  This class was facilitated by a Registered Dietitian. Topics taught at class focused on diet, particularly carbohydrate counting and portion control. Classes also focused on behavior changes and the importance of establishing a daily exercise routine. Progress Note: Weekly Medical Monitoring in the [facility]    Is there anything that you or the patient needs to let the supervising provider know about? no    Over the past week, have you experienced any side-effects? no    Erasmo Villegas is a 62 y.o. female who is enrolled in [facility]    Erasmo Villegas was prescribed the VLCD / LCD. Visit Vitals  /84   Pulse 88   Ht 5' 6\" (1.676 m)   Wt 99.8 kg (220 lb)   BMI 35.51 kg/m²     Weight Metrics 9/25/2019 9/18/2019 9/11/2019 9/5/2019 9/4/2019 8/28/2019 8/21/2019   Weight 220 lb 221 lb 224 lb 14.4 oz - 226 lb 4.8 oz 228 lb 14.4 oz -   Waist Measure Inches 42.25 42 43 43 - 42.75 44.5   Exercise Mins/week - - - - - - -   Body Fat % - - - - - - -   BMI 35.51 kg/m2 35.67 kg/m2 36.3 kg/m2 - 36.53 kg/m2 36.95 kg/m2 -         Have you received any other medical care this week? no  If yes, where and for what? Have you had any change in your medications since your last visit? no  If yes what? Did you have any problems adhering to the program last week? no  If yes, please explain:       Eating Habits Over Last Week:  Did you take in 64 oz of non-caloric fluids? yes     Did you consume your prescribed meal replacement regimen each day?  yes       Physical Activity Over the Past Week:    Aerobic exercise: bike min  Resistance exercise: 2 workouts / week

## 2019-09-30 ENCOUNTER — OFFICE VISIT (OUTPATIENT)
Dept: CARDIOLOGY CLINIC | Age: 64
End: 2019-09-30

## 2019-09-30 VITALS
WEIGHT: 223 LBS | SYSTOLIC BLOOD PRESSURE: 126 MMHG | DIASTOLIC BLOOD PRESSURE: 84 MMHG | HEIGHT: 66 IN | HEART RATE: 101 BPM | BODY MASS INDEX: 35.84 KG/M2 | OXYGEN SATURATION: 98 %

## 2019-09-30 DIAGNOSIS — I48.0 PAROXYSMAL ATRIAL FIBRILLATION (HCC): Primary | ICD-10-CM

## 2019-09-30 DIAGNOSIS — E78.2 MIXED HYPERLIPIDEMIA: ICD-10-CM

## 2019-09-30 DIAGNOSIS — I10 ESSENTIAL HYPERTENSION: ICD-10-CM

## 2019-09-30 RX ORDER — APIXABAN 5 MG/1
5 TABLET, FILM COATED ORAL 2 TIMES DAILY
Qty: 180 TAB | Refills: 3 | Status: SHIPPED | OUTPATIENT
Start: 2019-09-30 | End: 2020-09-21

## 2019-09-30 NOTE — PROGRESS NOTES
Carol Jimenes presents today for   Chief Complaint   Patient presents with    Irregular Heart Beat     6 month follow up - no cardiac complaints        Lova Noon Vosler preferred language for health care discussion is english/other. Is someone accompanying this pt? no    Is the patient using any DME equipment during [address]? no    Depression Screening:  3 most recent PHQ Screens 7/17/2019   PHQ Not Done -   Little interest or pleasure in doing things Not at all   Feeling down, depressed, irritable, or hopeless Not at all   Total Score PHQ 2 0       Learning Assessment:  Learning Assessment 5/3/2019   PRIMARY LEARNER Patient   HIGHEST LEVEL OF EDUCATION - PRIMARY LEARNER  SOME COLLEGE   BARRIERS PRIMARY LEARNER NONE   CO-LEARNER CAREGIVER No   PRIMARY LANGUAGE ENGLISH   LEARNER PREFERENCE PRIMARY DEMONSTRATION   ANSWERED BY patient   RELATIONSHIP SELF       Abuse Screening:  Abuse Screening Questionnaire 5/3/2019   Do you ever feel afraid of your partner? N   Are you in a relationship with someone who physically or mentally threatens you? N   Is it safe for you to go home? Y       Fall Risk  No flowsheet data found. Pt currently taking Anticoagulant therapy? Eliquis     Coordination of Care:  1. Have you been to the ER, urgent care clinic since your last visit? Hospitalized since your last visit? no    2. Have you seen or consulted any other health care providers outside of the [address] [place] [address] since your last visit? Include any pap smears or colon screening.  no

## 2019-09-30 NOTE — PROGRESS NOTES
HISTORY OF PRESENT ILLNESS  Jayna Solitario is a 62 y.o. female. Follow-up   Pertinent negatives include no chest pain, no abdominal pain, no headaches and no shortness of breath. Patient presents for a follow-up office visit. Patient was diagnosed with atrial fibrillation with a rapid ventricular response by her PCP in January 2018. She was started on metoprolol for rate control. I started her on Xarelto for anticoagulation at last visit. She was then scheduled for a IGLESIA and cardioversion, however, when she showed up for her procedure, she had already converted back to sinus rhythm. Her anticoagulation was stopped at that time. She underwent an echocardiogram which showed low normal LV systolic function, EF 04% with no valvular heart disease and a normal left atrial size. She has remained on metoprolol for rate control. The patient was again found to be in atrial fibrillation at last visit in June 2018. Her heart rate was around 120 bpm.  She converted back to sinus rhythm the following day. She was started on Eliquis for long-term anticoagulation. The patient was last seen in our office approximately 6 months ago. Since that time she has been feeling relatively well. She denies any heart palpitations, no increased fatigue or shortness of breath, no chest pain or pressure. She underwent a left total knee replacement in May of this year without any complications.     Past Medical History:   Diagnosis Date    Arthritis     Bilateral Knee, and Bilateral Hand/Thumb    Atrial fibrillation ([address] 1/29/2018    Carpal tunnel syndrome     H/O echocardiogram 01/30/2018    EF 50%, normal left atrial size, no valvular heart disease    Headache     History of ankle fusion 1997    left    Hx of migraine headaches     Hypercholesterolemia     Hypertension     Morbid obesity ([address]     HERZOG (nonalcoholic steatohepatitis) suggested by ultrasound report, 2016 8/11/2017    Nausea & vomiting     Vertigo      Current Outpatient Medications   Medication Sig Dispense Refill    ELIQUIS 5 mg tablet Take 1 Tab by mouth two (2) times a day. 180 Tab 3    calcium-cholecalciferol, D3, (CALCIUM 600 + D) tablet Take 2 Tabs by mouth daily.  omega-3 fatty acids-fish oil (FISH OIL) 360-1,200 mg cap Take 2 Caps by mouth daily.  cholecalciferol (VITAMIN D3) 2,000 unit cap capsule Take 2,000 Units by mouth daily.  metoprolol tartrate (LOPRESSOR) 50 mg tablet Take 1 Tab by mouth two (2) times a day. 180 Tab 3    polyethylene glycol (MIRALAX) 17 gram packet Take 1 Packet by mouth daily. (Patient taking differently: Take 1 Packet by mouth daily. Indications: constipation) 30 Packet 1    potassium 99 mg tablet Take 99 mg by mouth daily as needed (supplement).  omega-3 fatty acids-vitamin e 1,000 mg cap Take 1 Cap by mouth two (2) times a day. Indications: supplement       No Known Allergies     Social History     Tobacco Use    Smoking status: Never Smoker    Smokeless tobacco: Never Used   Substance Use Topics    Alcohol use: No     Alcohol/week: 0.0 standard drinks    Drug use: No           Review of Systems   Constitutional: Negative for chills, fever and weight loss. HENT: Negative for nosebleeds. Eyes: Negative for blurred vision and double vision. Respiratory: Negative for cough, shortness of breath and wheezing. Cardiovascular: Negative for chest pain, palpitations, orthopnea, claudication, leg swelling and PND. Gastrointestinal: Negative for abdominal pain, heartburn, nausea and vomiting. Genitourinary: Negative for dysuria and hematuria. Musculoskeletal: Negative for falls, joint pain and myalgias. Skin: Negative for rash. Neurological: Negative for dizziness, focal weakness and headaches. Endo/Heme/Allergies: Does not bruise/bleed easily. Psychiatric/Behavioral: Negative for substance abuse.      Visit Vitals  /84 (BP 1 Location: Left arm, BP Patient Position: Sitting)   Pulse (!) 101   Ht 5' 6\" (1.676 m)   Wt 101.2 kg (223 lb)   SpO2 98%   BMI 35.99 kg/m²       Physical Exam   Constitutional: She is oriented to person, place, and time. She appears well-developed and well-nourished. HENT:   Head: Normocephalic and atraumatic. Eyes: Conjunctivae are normal.   Neck: Neck supple. No JVD present. Carotid bruit is not present. Cardiovascular: S1 normal, S2 normal and normal pulses. An irregularly irregular rhythm present. Bradycardia present. Exam reveals no gallop and no distant heart sounds. No murmur heard. Pulmonary/Chest: Effort normal and breath sounds normal. She has no wheezes. She has no rales. Abdominal: Soft. Bowel sounds are normal. There is no tenderness. Musculoskeletal: She exhibits no edema, tenderness or deformity. Neurological: She is alert and oriented to person, place, and time. Skin: Skin is warm and dry. Psychiatric: She has a normal mood and affect. Her behavior is normal. Thought content normal.     EKG: Atrial fibrillation, heart rate around 100, normal axis, low voltage, no ST or T-wave abnormalities. Borderline low voltage. Compared to the previous EKG, atrial fibrillation is now present. ASSESSMENT and PLAN    Paroxysmal atrial fibrillation. Initially diagnosed in January 2018. Patient converted on her own prior to her scheduled cardioversion. Her CHADs-Vasc score is 2. The patient has been in and out of atrial fibrillation over the past year according to her EKGs. She remains asymptomatic to the arrhythmia. She is back in atrial fibrillation today and does not feel any different. I would continue long-term oral anticoagulation until the risk outweighs its benefit. I would continue her current metoprolol dosage for rate control. Essential hypertension. This now appears well-controlled on her current medical regimen. Dyslipidemia.   Patient was previously taking simvastatin, however that was stopped last month because of elevated transaminases. This is being managed by her PCP.     Follow-up in 6 months, sooner as needed

## 2019-10-02 ENCOUNTER — CLINICAL SUPPORT (OUTPATIENT)
Dept: SURGERY | Age: 64
End: 2019-10-02

## 2019-10-02 VITALS
DIASTOLIC BLOOD PRESSURE: 90 MMHG | HEIGHT: 66 IN | HEART RATE: 106 BPM | WEIGHT: 221.1 LBS | SYSTOLIC BLOOD PRESSURE: 122 MMHG | BODY MASS INDEX: 35.53 KG/M2

## 2019-10-02 DIAGNOSIS — E66.9 OBESITY, UNSPECIFIED CLASSIFICATION, UNSPECIFIED OBESITY TYPE, UNSPECIFIED WHETHER SERIOUS COMORBIDITY PRESENT: Primary | ICD-10-CM

## 2019-10-02 NOTE — PROGRESS NOTES
Chief Complaint   Patient presents with    Weight Management     Patient attended a weekly class as part of the Medically Supervised Weight Loss Program.  This class was facilitated by a Registered Dietitian. Topics taught at class focused on diet, particularly carbohydrate counting and portion control. Classes also focused on behavior changes and the importance of establishing a daily exercise routine. Progress Note: Weekly Medical Monitoring in the [facility]    Is there anything that you or the patient needs to let the supervising provider know about? no    Over the past week, have you experienced any side-effects? no    Mello Luong is a 62 y.o. female who is enrolled in [facility]    Mello Luong was prescribed the VLCD / LCD. Visit Vitals  /90   Pulse (!) 106   Ht 5' 6\" (1.676 m)   Wt 221 lb 1.6 oz (100.3 kg)   BMI 35.69 kg/m²     Weight Metrics 10/2/2019 9/30/2019 9/25/2019 9/18/2019 9/11/2019 9/5/2019 9/4/2019   Weight 221 lb 1.6 oz 223 lb 220 lb 221 lb 224 lb 14.4 oz - 226 lb 4.8 oz   Waist Measure Inches 41.25 - 42.25 42 43 43 -   Exercise Mins/week - - - - - - -   Body Fat % - - - - - - -   BMI 35.69 kg/m2 35.99 kg/m2 35.51 kg/m2 35.67 kg/m2 36.3 kg/m2 - 36.53 kg/m2         Have you received any other medical care this week? yes  If yes, where and for what? \"cardiology\"    Have you had any change in your medications since your last visit? no  If yes what? Did you have any problems adhering to the program last week? no  If yes, please explain:       Eating Habits Over Last Week:  Did you take in 64 oz of non-caloric fluids? yes     Did you consume your prescribed meal replacement regimen each day?  yes      Physical Activity Over the Past Week:    Aerobic exercise: 0 min  Resistance exercise: no workouts / week

## 2019-10-09 ENCOUNTER — CLINICAL SUPPORT (OUTPATIENT)
Dept: SURGERY | Age: 64
End: 2019-10-09

## 2019-10-09 VITALS
BODY MASS INDEX: 35.63 KG/M2 | DIASTOLIC BLOOD PRESSURE: 88 MMHG | WEIGHT: 221.7 LBS | HEIGHT: 66 IN | HEART RATE: 64 BPM | SYSTOLIC BLOOD PRESSURE: 132 MMHG

## 2019-10-09 DIAGNOSIS — E66.9 OBESITY, UNSPECIFIED CLASSIFICATION, UNSPECIFIED OBESITY TYPE, UNSPECIFIED WHETHER SERIOUS COMORBIDITY PRESENT: Primary | ICD-10-CM

## 2019-10-09 NOTE — PROGRESS NOTES
Patient attended a weekly class as part of the Medically Supervised Weight Loss Program.  This class was facilitated by a Registered Dietitian. Topics taught at class focused on diet, particularly carbohydrate counting and portion control. Classes also focused on behavior changes and the importance of establishing a daily exercise routine. Progress Note: Weekly Medical Monitoring in the [facility]    Is there anything that you or the patient needs to let the supervising provider know about? no    Over the past week, have you experienced any side-effects? no    Tawana Oneill is a 62 y.o. female who is enrolled in [facility]    Tawana Oneill was prescribed the VLCD / LCD. Visit Vitals  /88   Pulse 64   Ht 5' 6\" (1.676 m)   Wt 100.6 kg (221 lb 11.2 oz)   BMI 35.78 kg/m²     Weight Metrics 10/9/2019 10/2/2019 9/30/2019 9/25/2019 9/18/2019 9/11/2019 9/5/2019   Weight 221 lb 11.2 oz 221 lb 1.6 oz 223 lb 220 lb 221 lb 224 lb 14.4 oz -   Waist Measure Inches 40 41.25 - 42.25 42 43 43   Exercise Mins/week - - - - - - -   Body Fat % - - - - - - -   BMI 35.78 kg/m2 35.69 kg/m2 35.99 kg/m2 35.51 kg/m2 35.67 kg/m2 36.3 kg/m2 -         Have you received any other medical care this week? no  If yes, where and for what? Have you had any change in your medications since your last visit? no  If yes what? Did you have any problems adhering to the program last week? no  If yes, please explain:       Eating Habits Over Last Week:  Did you take in 64 oz of non-caloric fluids? yes     Did you consume your prescribed meal replacement regimen each day?  yes       Physical Activity Over the Past Week:    Aerobic exercise: 0 min  Resistance exercise: 0 workouts / week

## 2019-10-11 RX ORDER — CEFADROXIL 500 MG/1
CAPSULE ORAL
Qty: 5 CAP | Refills: 2 | Status: CANCELLED | OUTPATIENT
Start: 2019-10-11

## 2019-10-16 ENCOUNTER — CLINICAL SUPPORT (OUTPATIENT)
Dept: SURGERY | Age: 64
End: 2019-10-16

## 2019-10-16 VITALS
HEART RATE: 60 BPM | BODY MASS INDEX: 35.52 KG/M2 | DIASTOLIC BLOOD PRESSURE: 76 MMHG | SYSTOLIC BLOOD PRESSURE: 126 MMHG | HEIGHT: 66 IN | WEIGHT: 221 LBS

## 2019-10-16 DIAGNOSIS — E66.9 OBESITY, UNSPECIFIED CLASSIFICATION, UNSPECIFIED OBESITY TYPE, UNSPECIFIED WHETHER SERIOUS COMORBIDITY PRESENT: Primary | ICD-10-CM

## 2019-10-16 NOTE — PROGRESS NOTES
Patient attended a weekly class as part of the Medically Supervised Weight Loss Program.  This class was facilitated by a Registered Dietitian. Topics taught at class focused on diet, particularly carbohydrate counting and portion control. Classes also focused on behavior changes and the importance of establishing a daily exercise routine. Progress Note: Weekly Medical Monitoring in the [facility] Weight Loss Program    Is there anything that you or the patient needs to let the supervising provider know about? no    Over the past week, have you experienced any side-effects? no    Sean Ash is a 62 y.o. female who is enrolled in [facility] Loss Program    Sean Ash was prescribed the VLCD / LCD. Visit Vitals  /76   Pulse 60   Ht 5' 6\" (1.676 m)   Wt 100.2 kg (221 lb)   BMI 35.67 kg/m²     Weight Metrics 10/16/2019 10/9/2019 10/2/2019 9/30/2019 9/25/2019 9/18/2019 9/11/2019   Weight 221 lb 221 lb 11.2 oz 221 lb 1.6 oz 223 lb 220 lb 221 lb 224 lb 14.4 oz   Waist Measure Inches 39.5 40 41.25 - 42.25 42 43   Exercise Mins/week - - - - - - -   Body Fat % - - - - - - -   BMI 35.67 kg/m2 35.78 kg/m2 35.69 kg/m2 35.99 kg/m2 35.51 kg/m2 35.67 kg/m2 36.3 kg/m2         Have you received any other medical care this week? yes  If yes, where and for what? Flu shot    Have you had any change in your medications since your last visit? yes  If yes what? Had to take cefadroxil 500 milligrams in prep for dental visit    Did you have any problems adhering to the program last week? no  If yes, please explain:       Eating Habits Over Last Week:  Did you take in 64 oz of non-caloric fluids? yes     Did you consume your prescribed meal replacement regimen each day?  yes       Physical Activity Over the Past Week:    Aerobic exercise: 0 min  Resistance exercise: 0 workouts / week

## 2019-10-18 ENCOUNTER — HOSPITAL ENCOUNTER (OUTPATIENT)
Dept: LAB | Age: 64
Discharge: HOME | End: 2019-10-18
Payer: COMMERCIAL

## 2019-10-18 DIAGNOSIS — M25.562 CHRONIC PAIN OF LEFT KNEE: ICD-10-CM

## 2019-10-18 DIAGNOSIS — M17.12 PRIMARY OSTEOARTHRITIS OF LEFT KNEE: ICD-10-CM

## 2019-10-18 DIAGNOSIS — R79.89 ELEVATED LFTS: ICD-10-CM

## 2019-10-18 DIAGNOSIS — K75.81 NASH (NONALCOHOLIC STEATOHEPATITIS): ICD-10-CM

## 2019-10-18 DIAGNOSIS — Z86.79 HISTORY OF ATRIAL FIBRILLATION: ICD-10-CM

## 2019-10-18 DIAGNOSIS — E66.9 OBESITY (BMI 30-39.9): ICD-10-CM

## 2019-10-18 DIAGNOSIS — Z79.01 CHRONIC ANTICOAGULATION: ICD-10-CM

## 2019-10-18 DIAGNOSIS — E66.9 OBESITY, UNSPECIFIED CLASSIFICATION, UNSPECIFIED OBESITY TYPE, UNSPECIFIED WHETHER SERIOUS COMORBIDITY PRESENT: ICD-10-CM

## 2019-10-18 DIAGNOSIS — Z71.3 ENCOUNTER FOR WEIGHT LOSS COUNSELING: ICD-10-CM

## 2019-10-18 DIAGNOSIS — G89.29 CHRONIC PAIN OF LEFT KNEE: ICD-10-CM

## 2019-10-18 LAB
ALBUMIN SERPL-MCNC: 3.9 G/DL (ref 3.4–5)
ALBUMIN/GLOB SERPL: 1.2 {RATIO} (ref 0.8–1.7)
ALP SERPL-CCNC: 117 U/L (ref 45–117)
ALT SERPL-CCNC: 118 U/L (ref 13–56)
ANION GAP SERPL CALC-SCNC: 5 MMOL/L (ref 3–18)
AST SERPL-CCNC: 70 U/L (ref 10–38)
BILIRUB SERPL-MCNC: 0.5 MG/DL (ref 0.2–1)
BUN SERPL-MCNC: 22 MG/DL (ref 7–18)
BUN/CREAT SERPL: 42 (ref 12–20)
CALCIUM SERPL-MCNC: 9 MG/DL (ref 8.5–10.1)
CHLORIDE SERPL-SCNC: 107 MMOL/L (ref 100–111)
CO2 SERPL-SCNC: 28 MMOL/L (ref 21–32)
CREAT SERPL-MCNC: 0.53 MG/DL (ref 0.6–1.3)
GLOBULIN SER CALC-MCNC: 3.3 G/DL (ref 2–4)
GLUCOSE SERPL-MCNC: 101 MG/DL (ref 74–99)
POTASSIUM SERPL-SCNC: 4.2 MMOL/L (ref 3.5–5.5)
PROT SERPL-MCNC: 7.2 G/DL (ref 6.4–8.2)
SODIUM SERPL-SCNC: 140 MMOL/L (ref 136–145)
URATE SERPL-MCNC: 2.9 MG/DL (ref 2.6–7.2)

## 2019-10-18 PROCEDURE — 84550 ASSAY OF BLOOD/URIC ACID: CPT

## 2019-10-18 PROCEDURE — 36415 COLL VENOUS BLD VENIPUNCTURE: CPT

## 2019-10-18 PROCEDURE — 80053 COMPREHEN METABOLIC PANEL: CPT

## 2019-10-23 ENCOUNTER — CLINICAL SUPPORT (OUTPATIENT)
Dept: SURGERY | Age: 64
End: 2019-10-23

## 2019-10-23 VITALS
HEIGHT: 66 IN | BODY MASS INDEX: 35.31 KG/M2 | HEART RATE: 54 BPM | SYSTOLIC BLOOD PRESSURE: 134 MMHG | WEIGHT: 219.7 LBS | DIASTOLIC BLOOD PRESSURE: 76 MMHG

## 2019-10-23 DIAGNOSIS — E66.9 OBESITY, UNSPECIFIED CLASSIFICATION, UNSPECIFIED OBESITY TYPE, UNSPECIFIED WHETHER SERIOUS COMORBIDITY PRESENT: Primary | ICD-10-CM

## 2019-10-23 NOTE — PROGRESS NOTES
Patient attended a weekly class as part of the Medically Supervised Weight Loss Program.  This class was facilitated by a Registered Dietitian. Topics taught at class focused on diet, particularly carbohydrate counting and portion control. Classes also focused on behavior changes and the importance of establishing a daily exercise routine. Progress Note: Weekly Medical Monitoring in the [facility] Weight Loss Program    Is there anything that you or the patient needs to let the supervising provider know about? no    Over the past week, have you experienced any side-effects? no    Shawn Rawls is a 62 y.o. female who is enrolled in [facility] Loss Program    Shawn Rawls was prescribed the VLCD / LCD. Visit Vitals  /76   Pulse (!) 54   Ht 5' 6\" (1.676 m)   Wt 99.7 kg (219 lb 11.2 oz)   BMI 35.46 kg/m²     Weight Metrics 10/23/2019 10/16/2019 10/9/2019 10/2/2019 9/30/2019 9/25/2019 9/18/2019   Weight 219 lb 11.2 oz 221 lb 221 lb 11.2 oz 221 lb 1.6 oz 223 lb 220 lb 221 lb   Waist Measure Inches 40 39.5 40 41.25 - 42.25 42   Exercise Mins/week - - - - - - -   Body Fat % - - - - - - -   BMI 35.46 kg/m2 35.67 kg/m2 35.78 kg/m2 35.69 kg/m2 35.99 kg/m2 35.51 kg/m2 35.67 kg/m2         Have you received any other medical care this week? no  If yes, where and for what? Have you had any change in your medications since your last visit? no  If yes what? Did you have any problems adhering to the program last week? yes  If yes, please explain: bedtime feeling need to snack even if not hungry      Eating Habits Over Last Week:  Did you take in 64 oz of non-caloric fluids? yes     Did you consume your prescribed meal replacement regimen each day?  yes       Physical Activity Over the Past Week:    Aerobic exercise: 0 min  Resistance exercise: 0 workouts / week

## 2019-10-30 ENCOUNTER — OFFICE VISIT (OUTPATIENT)
Dept: SURGERY | Age: 64
End: 2019-10-30

## 2019-10-30 VITALS
BODY MASS INDEX: 35.13 KG/M2 | TEMPERATURE: 98 F | RESPIRATION RATE: 18 BRPM | WEIGHT: 218.6 LBS | OXYGEN SATURATION: 97 % | HEIGHT: 66 IN | SYSTOLIC BLOOD PRESSURE: 120 MMHG | HEART RATE: 68 BPM | DIASTOLIC BLOOD PRESSURE: 72 MMHG

## 2019-10-30 DIAGNOSIS — E66.01 SEVERE OBESITY (HCC): Primary | ICD-10-CM

## 2019-10-30 DIAGNOSIS — E66.9 OBESITY (BMI 30-39.9): ICD-10-CM

## 2019-10-30 DIAGNOSIS — Z71.3 ENCOUNTER FOR WEIGHT LOSS COUNSELING: ICD-10-CM

## 2019-10-30 DIAGNOSIS — R79.89 ELEVATED LFTS: ICD-10-CM

## 2019-10-30 DIAGNOSIS — Z71.3 WEIGHT LOSS COUNSELING, ENCOUNTER FOR: ICD-10-CM

## 2019-10-30 DIAGNOSIS — K75.81 NASH (NONALCOHOLIC STEATOHEPATITIS): ICD-10-CM

## 2019-10-30 DIAGNOSIS — E66.9 OBESITY, UNSPECIFIED CLASSIFICATION, UNSPECIFIED OBESITY TYPE, UNSPECIFIED WHETHER SERIOUS COMORBIDITY PRESENT: Primary | ICD-10-CM

## 2019-10-30 DIAGNOSIS — R74.8 ELEVATED LIVER ENZYMES: ICD-10-CM

## 2019-10-30 NOTE — PROGRESS NOTES
Chief Complaint   Patient presents with    Weight Management     monthly follow up     Nursing Note for Medical Monitoring in the [facility] Weight Loss Program      Ira Fairchild is a 62 y.o. female who is enrolled in [facility] Loss Program    Ira Fairchild was prescribed the VLCD / LCD. Did you have any problems adhering to the program last week? yes  If yes, please explain: Steve Ren a problem at night time. Wakes up and wants to snack\". Since your last visit, have you experienced any complications? no    Have you received any other medical care this week? no  If yes, where and for what? Have you had any change in your medications since your last visit? no  If yes what? Are you taking an appetite suppressant? no   If yes:  Do you need a refill? BP Readings from Last 3 Encounters:   10/30/19 120/72   10/23/19 134/76   10/16/19 126/76        Eating Habits Over Last Week:  Did you take in 64 oz of non-caloric fluids? yes     Did you consume your prescribed meal replacement regimen each day?  yes       Physical Activity Over the Past Week:    Aerobic exercise: 0 min  Resistance exercise: no workouts / week

## 2019-11-01 ENCOUNTER — OFFICE VISIT (OUTPATIENT)
Dept: INTERNAL MEDICINE CLINIC | Age: 64
End: 2019-11-01

## 2019-11-01 VITALS
OXYGEN SATURATION: 96 % | HEIGHT: 66 IN | DIASTOLIC BLOOD PRESSURE: 68 MMHG | TEMPERATURE: 97.2 F | SYSTOLIC BLOOD PRESSURE: 131 MMHG | RESPIRATION RATE: 12 BRPM | WEIGHT: 221.8 LBS | BODY MASS INDEX: 35.65 KG/M2 | HEART RATE: 53 BPM

## 2019-11-01 DIAGNOSIS — E78.2 MIXED HYPERLIPIDEMIA: ICD-10-CM

## 2019-11-01 DIAGNOSIS — E66.01 SEVERE OBESITY (HCC): ICD-10-CM

## 2019-11-01 DIAGNOSIS — K75.81 NASH (NONALCOHOLIC STEATOHEPATITIS): ICD-10-CM

## 2019-11-01 DIAGNOSIS — I10 ESSENTIAL HYPERTENSION: ICD-10-CM

## 2019-11-01 DIAGNOSIS — I48.0 PAROXYSMAL ATRIAL FIBRILLATION (HCC): Primary | ICD-10-CM

## 2019-11-01 RX ORDER — CEFADROXIL 500 MG/1
CAPSULE ORAL
Refills: 2 | COMMUNITY
Start: 2019-10-11 | End: 2019-11-17

## 2019-11-01 NOTE — PROGRESS NOTES
Chief Complaint   Patient presents with    Hypertension     follow up ROOM 2    Cholesterol Problem     follow up       1. Have you been to the ER, urgent care clinic since your last visit? Hospitalized since your last visit? No    2. Have you seen or consulted any other health care providers outside of the [address] [place] [address] since your last visit? Include any pap smears or colon screening. No      There are no preventive care reminders to display for this patient.

## 2019-11-01 NOTE — PROGRESS NOTES
INTERNISTS OF [facility]:  11/1/2019, MRN: [id number]      Arian Kearney is a 62 y.o. female and presents to clinic for Hypertension (follow up ROOM 2) and Cholesterol Problem (follow up)    Subjective:   Pt is a 62yo female with h/o HLD, HTN, HERZOG, AF, and DJD. 1.  AF/HTN: Present for over 6 months. On Eliquis. No bleeding. She is also taking metoprolol. No adverse side effects or take this medication. BP is 131/68 today. She just saw Dr. Durga Ayala. No medication changes were made. 2.  Obesity/HLD/HERZOG: He has a history of transaminitis thought to be from HERZOG. No excessive alcohol beverage consumption. She is back in the medically supervised weight loss program. Her weight is 221lbs today. Wt Readings from Last 3 Encounters:   11/01/19 221 lb 12.8 oz (100.6 kg)   10/30/19 218 lb 9.6 oz (99.2 kg)   10/23/19 219 lb 11.2 oz (99.7 kg)       Patient Active Problem List    Diagnosis Date Noted    Primary osteoarthritis of left knee 05/14/2019    S/P TKR (total knee replacement) 05/14/2019    Severe obesity ([address] 03/11/2019    Primary osteoarthritis of right knee 05/22/2018    Atrial fibrillation ([facility] [address] 01/29/2018    HERZOG (nonalcoholic steatohepatitis) suggested by ultrasound report, 2016 08/11/2017    Obesity (BMI 30-39.9) 02/12/2017    Hyperlipidemia 02/07/2017    Essential hypertension 02/07/2017       Current Outpatient Medications   Medication Sig Dispense Refill    ELIQUIS 5 mg tablet Take 1 Tab by mouth two (2) times a day. 180 Tab 3    calcium-cholecalciferol, D3, (CALCIUM 600 + D) tablet Take 2 Tabs by mouth daily.  omega-3 fatty acids-fish oil (FISH OIL) 360-1,200 mg cap Take 2 Caps by mouth daily.  cholecalciferol (VITAMIN D3) 2,000 unit cap capsule Take 2,000 Units by mouth daily.  metoprolol tartrate (LOPRESSOR) 50 mg tablet Take 1 Tab by mouth two (2) times a day. 180 Tab 3    polyethylene glycol (MIRALAX) 17 gram packet Take 1 Packet by mouth daily.  (Patient taking differently: Take 1 Packet by mouth daily. Indications: constipation) 30 Packet 1    potassium 99 mg tablet Take 99 mg by mouth daily as needed (supplement).  cefadroxil (DURICEF) 500 mg capsule TK 3 CS PO THE NIGHT BEFORE THE APPOINTMENT AND 2 CS 1 HOUR BEFORE THE APPOINTMENT  2       No Known Allergies    Past Medical History:   Diagnosis Date    Arthritis     Bilateral Knee, and Bilateral Hand/Thumb    Atrial fibrillation ([address] 1/29/2018    Carpal tunnel syndrome     H/O echocardiogram 01/30/2018    EF 50%, normal left atrial size, no valvular heart disease    Headache     History of ankle fusion 1997    left    Hx of migraine headaches     Hypercholesterolemia     Hypertension     Morbid obesity ([address]     HERZOG (nonalcoholic steatohepatitis) suggested by ultrasound report, 2016 8/11/2017    Nausea & vomiting     Vertigo        Past Surgical History:   Procedure Laterality Date    COLONOSCOPY N/A 6/5/2018    COLONOSCOPY performed by Be Dodge MD at [address] HX APPENDECTOMY      age 25 years    HX COLONOSCOPY      Due in 2018:  May 2015    HX HYSTERECTOMY      age 30years old   Hansa Talbot HX KNEE REPLACEMENT Right 05/22/2018    HX KNEE REPLACEMENT Left 05/14/2019    HX ORTHOPAEDIC Left     ankle fusion    HX OTHER SURGICAL  2014    removal of mole left breast    HX TONSILLECTOMY      at age 29years old       Family History   Problem Relation Age of Onset    Heart Disease Mother     Hypertension Mother     Heart Surgery Mother     Cancer Father         colon    Hypertension Brother     No Known Problems Maternal Grandmother     Heart Disease Maternal Grandfather     MS Paternal Grandmother     Stroke Paternal Grandfather     Heart Disease Paternal Grandfather     No Known Problems Son        Social History     Tobacco Use    Smoking status: Never Smoker    Smokeless tobacco: Never Used   Substance Use Topics    Alcohol use: No     Alcohol/week: 0.0 standard drinks ROS   Review of Systems   Constitutional: Negative for chills and fever. HENT: Negative for ear pain and sore throat. Eyes: Negative for blurred vision and pain. Respiratory: Negative for cough and shortness of breath. Cardiovascular: Negative for chest pain. Gastrointestinal: Negative for abdominal pain, blood in stool and melena. Genitourinary: Negative for dysuria and hematuria. Musculoskeletal: Negative for joint pain and myalgias. Skin: Negative for rash. Neurological: Negative for tingling, focal weakness and headaches. Endo/Heme/Allergies: Does not bruise/bleed easily. Psychiatric/Behavioral: Negative for substance abuse. Objective     Vitals:    11/01/19 0743   BP: 131/68   Pulse: (!) 53   Resp: 12   Temp: 97.2 °F (36.2 °C)   TempSrc: Oral   SpO2: 96%   Weight: 221 lb 12.8 oz (100.6 kg)   Height: 5' 6\" (1.676 m)   PainSc:   0 - No pain       Physical Exam   Constitutional: She is oriented to person, place, and time and well-developed, well-nourished, and in no distress. HENT:   Head: Normocephalic and atraumatic. Right Ear: External ear normal.   Left Ear: External ear normal.   Nose: Nose normal.   Mouth/Throat: Oropharynx is clear and moist. No oropharyngeal exudate. Eyes: Pupils are equal, round, and reactive to light. Conjunctivae and EOM are normal. Right eye exhibits no discharge. Left eye exhibits no discharge. No scleral icterus. Neck: Neck supple. Cardiovascular: Normal rate, regular rhythm, normal heart sounds and intact distal pulses. Exam reveals no gallop and no friction rub. No murmur heard. Pulmonary/Chest: Effort normal and breath sounds normal. No respiratory distress. She has no wheezes. She has no rales. Abdominal: Soft. Bowel sounds are normal. She exhibits no distension. There is no tenderness. There is no rebound and no guarding. Musculoskeletal: She exhibits no edema or tenderness (BUE).    Lymphadenopathy:     She has no cervical adenopathy. Neurological: She is alert and oriented to person, place, and time. She exhibits normal muscle tone. Gait normal.   Skin: Skin is warm and dry. No erythema. Psychiatric: Affect normal.   Nursing note and vitals reviewed. LABS   Data Review:   Lab Results   Component Value Date/Time    WBC 4.7 08/09/2019 07:06 AM    HGB 13.9 08/09/2019 07:06 AM    HCT 42.7 08/09/2019 07:06 AM    PLATELET 906 35/93/8696 07:06 AM    MCV 90.5 08/09/2019 07:06 AM       Lab Results   Component Value Date/Time    Sodium 140 10/18/2019 07:22 AM    Potassium 4.2 10/18/2019 07:22 AM    Chloride 107 10/18/2019 07:22 AM    CO2 28 10/18/2019 07:22 AM    Anion gap 5 10/18/2019 07:22 AM    Glucose 101 (H) 10/18/2019 07:22 AM    BUN 22 (H) 10/18/2019 07:22 AM    Creatinine 0.53 (L) 10/18/2019 07:22 AM    BUN/Creatinine ratio 42 (H) 10/18/2019 07:22 AM    GFR est AA >60 10/18/2019 07:22 AM    GFR est non-AA >60 10/18/2019 07:22 AM    Calcium 9.0 10/18/2019 07:22 AM       Lab Results   Component Value Date/Time    Cholesterol, total 258 (H) 08/09/2019 07:06 AM    HDL Cholesterol 60 08/09/2019 07:06 AM    LDL, calculated 151.6 (H) 08/09/2019 07:06 AM    VLDL, calculated 46.4 08/09/2019 07:06 AM    Triglyceride 232 (H) 08/09/2019 07:06 AM    CHOL/HDL Ratio 4.3 08/09/2019 07:06 AM       Lab Results   Component Value Date/Time    Hemoglobin A1c 5.4 05/01/2019 06:19 AM       Assessment/Plan:    1. AF/HTN: Stable. - C/w rx as prescribed. 2. Obesity, HLD, and HERZOG:  - C/w participation in the medically supervised weight loss program  - She is cleared to continue participation in the weight loss program. We reviewed her most recent liver test results. She had a transaminitis that is persistent. This is from HERZOG. She was negative for hepatitis C. There is no high risk behavior. Her liver ultrasound from 2018 showed evidence of hepatic steatosis.   The mainstay of treatment for underlying fatty liver disease is aggressive weight loss.  From my perspective, she is cleared to participate in the medically supervised weight loss program but should have follow-up LFTs checked in 3 months. If they do not order these test, I will. As long as her LFTs are not trending up into the 200s range, I am okay with her participating in this program.  This was discussed with her today. All questions were answered. There are no preventive care reminders to display for this patient. Lab review: labs are reviewed in the EHR    I have discussed the diagnosis with the patient and the intended plan as seen in the above orders. The patient has received an after-visit summary and questions were answered concerning future plans. I have discussed medication side effects and warnings with the patient as well. I have reviewed the plan of care with the patient, accepted their input and they are in agreement with the treatment goals. All questions were answered. The patient understands the plan of care. Handouts provided today with above information. Pt instructed if symptoms worsen to call the office or report to the ED for continued care. Greater than 50% of the visit time was spent in counseling and/or coordination of care. Voice recognition was used to generate this report, which may have resulted in some phonetic based errors in grammar and contents. Even though attempts were made to correct all the mistakes, some may have been missed, and remained in the body of the document. Follow-up and Dispositions    · Return in about 6 months (around 5/1/2020) for BP check.          Deshaun Lao MD

## 2019-11-01 NOTE — PATIENT INSTRUCTIONS
There are no preventive care reminders to display for this patient. Body Mass Index: Care Instructions Your Care Instructions Body mass index (BMI) can help you see if your weight is raising your risk for health problems. It uses a formula to compare how much you weigh with how tall you are. · A BMI lower than 18.5 is considered underweight. · A BMI between 18.5 and 24.9 is considered healthy. · A BMI between 25 and 29.9 is considered overweight. A BMI of 30 or higher is considered obese. If your BMI is in the normal range, it means that you have a lower risk for weight-related health problems. If your BMI is in the overweight or obese range, you may be at increased risk for weight-related health problems, such as high blood pressure, heart disease, stroke, arthritis or joint pain, and diabetes. If your BMI is in the underweight range, you may be at increased risk for health problems such as fatigue, lower protection (immunity) against illness, muscle loss, bone loss, hair loss, and hormone problems. BMI is just one measure of your risk for weight-related health problems. You may be at higher risk for health problems if you are not active, you eat an unhealthy diet, or you drink too much alcohol or use tobacco products. Follow-up care is a key part of your treatment and safety. Be sure to make and go to all appointments, and call your doctor if you are having problems. It's also a good idea to know your test results and keep a list of the medicines you take. How can you care for yourself at home? · Practice healthy eating habits. This includes eating plenty of fruits, vegetables, whole grains, lean protein, and low-fat dairy. · If your doctor recommends it, get more exercise. Walking is a good choice. Bit by bit, increase the amount you walk every day. Try for at least 30 minutes on most days of the week. · Do not smoke. Smoking can increase your risk for health problems.  If you need help quitting, talk to your doctor about stop-smoking programs and medicines. These can increase your chances of quitting for good. · Limit alcohol to 2 drinks a day for men and 1 drink a day for women. Too much alcohol can cause health problems. If you have a BMI higher than 25 · Your doctor may do other tests to check your risk for weight-related health problems. This may include measuring the distance around your waist. A waist measurement of more than 40 inches in men or 35 inches in women can increase the risk of weight-related health problems. · Talk with your doctor about steps you can take to stay healthy or improve your health. You may need to make lifestyle changes to lose weight and stay healthy, such as changing your diet and getting regular exercise. If you have a BMI lower than 18.5 · Your doctor may do other tests to check your risk for health problems. · Talk with your doctor about steps you can take to stay healthy or improve your health. You may need to make lifestyle changes to gain or maintain weight and stay healthy, such as getting more healthy foods in your diet and doing exercises to build muscle. Where can you learn more? Go to http://stanton-gonsalo.info/. Enter S176 in the search box to learn more about \"Body Mass Index: Care Instructions. \" Current as of: March 28, 2019 Content Version: 12.2 © 7051-1062 [employer]. Care instructions adapted under license by [employer] (which disclaims liability or warranty for this information). If you have questions about a medical condition or this instruction, always ask your healthcare professional. [place] [address] any warranty or liability for your use of this information.

## 2019-11-02 NOTE — PROGRESS NOTES
[facility] Progress Note:  
F/up Provider Visit CC: Weight Management Jm Esquivel is a 62 y.o. female who is here for her  f/up medical provider visit for the VLCD Program. she did attend class last week. Maintained wt since last visit. Snacking at night. Weight History Weight Metrics 11/1/2019 10/30/2019 10/30/2019 10/23/2019 10/16/2019 10/9/2019 10/2/2019 Weight 221 lb 12.8 oz - 218 lb 9.6 oz 219 lb 11.2 oz 221 lb 221 lb 11.2 oz 221 lb 1.6 oz Waist Measure Inches - 40 - 40 39.5 40 41.25 Exercise Mins/week - - - - - - - Body Fat % - - - - - - -  
BMI 35.8 kg/m2 - 35.28 kg/m2 35.46 kg/m2 35.67 kg/m2 35.78 kg/m2 35.69 kg/m2 Ideal body weight: 59.3 kg (130 lb 11.7 oz) Adjusted ideal body weight: 75.8 kg (167 lb 2.6 oz) There is no height or weight on file to calculate BMI. History Past Medical History:  
Diagnosis Date  Arthritis Bilateral Knee, and Bilateral Hand/Thumb  Atrial fibrillation ([address] 1/29/2018  Carpal tunnel syndrome  H/O echocardiogram 01/30/2018 EF 50%, normal left atrial size, no valvular heart disease  Headache  History of ankle fusion 1997  
 left  Hx of migraine headaches  Hypercholesterolemia  Hypertension  Morbid obesity ([address]  HERZOG (nonalcoholic steatohepatitis) suggested by ultrasound report, 2016 8/11/2017  Nausea & vomiting  Vertigo Past Surgical History:  
Procedure Laterality Date  COLONOSCOPY N/A 6/5/2018 COLONOSCOPY performed by Kyle Mcnair MD at [address] HX APPENDECTOMY    
 age 25 years  HX COLONOSCOPY Due in 2018: May 2015  HX HYSTERECTOMY    
 age 30years old  HX KNEE REPLACEMENT Right 05/22/2018  HX KNEE REPLACEMENT Left 05/14/2019  HX ORTHOPAEDIC Left   
 ankle fusion  HX OTHER SURGICAL  2014  
 removal of mole left breast  
 HX TONSILLECTOMY    
 at age 29years old Current Outpatient Medications Was the patient seen in the last year in this department? Yes    Does patient have an active prescription for medications requested? No     Received Request Via: Pharmacy   Medication Sig Dispense Refill  cefadroxil (DURICEF) 500 mg capsule TK 3 CS PO THE NIGHT BEFORE THE APPOINTMENT AND 2 CS 1 HOUR BEFORE THE APPOINTMENT  2  
 ELIQUIS 5 mg tablet Take 1 Tab by mouth two (2) times a day. 180 Tab 3  
 calcium-cholecalciferol, D3, (CALCIUM 600 + D) tablet Take 2 Tabs by mouth daily.  omega-3 fatty acids-fish oil (FISH OIL) 360-1,200 mg cap Take 2 Caps by mouth daily.  cholecalciferol (VITAMIN D3) 2,000 unit cap capsule Take 2,000 Units by mouth daily.  metoprolol tartrate (LOPRESSOR) 50 mg tablet Take 1 Tab by mouth two (2) times a day. 180 Tab 3  polyethylene glycol (MIRALAX) 17 gram packet Take 1 Packet by mouth daily. (Patient taking differently: Take 1 Packet by mouth daily. Indications: constipation) 30 Packet 1  
 potassium 99 mg tablet Take 99 mg by mouth daily as needed (supplement). No Known Allergies Social History Tobacco Use  Smoking status: Never Smoker  Smokeless tobacco: Never Used Substance Use Topics  Alcohol use: No  
  Alcohol/week: 0.0 standard drinks  Drug use: No  
 
 
Family History Problem Relation Age of Onset  Heart Disease Mother  Hypertension Mother  Heart Surgery Mother  Cancer Father   
     colon  Hypertension Brother  No Known Problems Maternal Grandmother  Heart Disease Maternal Grandfather  MS Paternal Grandmother  Stroke Paternal Grandfather  Heart Disease Paternal Grandfather  No Known Problems Son Family Status Relation Name Status  Mother  Alive HTN, Atrial fibrillation  Father   Sepsis, colon cancer  Brother 1 Alive  MGM    MGF    PGM    PGF    Son 1 Alive Review of Systems Review of Systems All other systems reviewed and are negative. Assessment / Plan Encounter Diagnoses Name Primary?  Severe obesity ([address] Yes  HERZOG (nonalcoholic steatohepatitis) suggested by ultrasound report, 2016  Obesity (BMI 30-39. 9)  Elevated liver enzymes  Weight loss counseling, encounter for 1. Weight management Today, the patient is    tall,   lbs for a There is no height or weight on file to calculate BMI.  is suffering from {obesity :47353::\"obesity \"} which is further complicated by {KPRDVHMPHFCPM:13455}. Degree of control: *** Progress was reviewed with patient. Goal(s) for next appointment: 
 *** 
 
 
2.  Labs Latest results reviewed with patient Routine monitoring labs ordered No orders of the defined types were placed in this encounter. {MU BMI REASON:[id number]}. There were no encounter diagnoses. Ms. Ramona Fuentes has a reminder for a \"due or due soon\" health maintenance. I have asked that she contact her primary care provider for follow-up on this health maintenance. >50% of *** min visit spent counseling Jemima Ram, ZINA-BC

## 2019-11-04 NOTE — PROGRESS NOTES
[facility] Progress Note:   F/up Provider Visit    CC: Weight Management    Kameron Nazario is a 62 y.o. female who is here for her  f/up medical provider visit for the VLCD Program. she did attend class last week. Maintained wt since last visit. Snacking at night. Weight History  Weight Metrics 11/1/2019 10/30/2019 10/30/2019 10/23/2019 10/16/2019 10/9/2019 10/2/2019   Weight 221 lb 12.8 oz - 218 lb 9.6 oz 219 lb 11.2 oz 221 lb 221 lb 11.2 oz 221 lb 1.6 oz   Waist Measure Inches - 40 - 40 39.5 40 41.25   Exercise Mins/week - - - - - - -   Body Fat % - - - - - - -   BMI 35.8 kg/m2 - 35.28 kg/m2 35.46 kg/m2 35.67 kg/m2 35.78 kg/m2 35.69 kg/m2       Ideal body weight: 59.3 kg (130 lb 11.7 oz)  Adjusted ideal body weight: 75.8 kg (167 lb 2.6 oz)  BMI 35.80      History    Past Medical History:   Diagnosis Date    Arthritis     Bilateral Knee, and Bilateral Hand/Thumb    Atrial fibrillation ([address] 1/29/2018    Carpal tunnel syndrome     H/O echocardiogram 01/30/2018    EF 50%, normal left atrial size, no valvular heart disease    Headache     History of ankle fusion 1997    left    Hx of migraine headaches     Hypercholesterolemia     Hypertension     Morbid obesity ([address]     HERZOG (nonalcoholic steatohepatitis) suggested by ultrasound report, 2016 8/11/2017    Nausea & vomiting     Vertigo        Past Surgical History:   Procedure Laterality Date    COLONOSCOPY N/A 6/5/2018    COLONOSCOPY performed by Justen Porter MD at [address] HX APPENDECTOMY      age 25 years    HX COLONOSCOPY      Due in 2018:  May 2015    HX HYSTERECTOMY      age 30years old    HX KNEE REPLACEMENT Right 05/22/2018    HX KNEE REPLACEMENT Left 05/14/2019    HX ORTHOPAEDIC Left     ankle fusion    HX OTHER SURGICAL  2014    removal of mole left breast    HX TONSILLECTOMY      at age 29years old       Current Outpatient Medications   Medication Sig Dispense Refill    cefadroxil (DURICEF) 500 mg capsule TK 3 CS PO THE NIGHT BEFORE THE APPOINTMENT AND 2 CS 1 HOUR BEFORE THE APPOINTMENT  2    ELIQUIS 5 mg tablet Take 1 Tab by mouth two (2) times a day. 180 Tab 3    calcium-cholecalciferol, D3, (CALCIUM 600 + D) tablet Take 2 Tabs by mouth daily.  omega-3 fatty acids-fish oil (FISH OIL) 360-1,200 mg cap Take 2 Caps by mouth daily.  cholecalciferol (VITAMIN D3) 2,000 unit cap capsule Take 2,000 Units by mouth daily.  metoprolol tartrate (LOPRESSOR) 50 mg tablet Take 1 Tab by mouth two (2) times a day. 180 Tab 3    polyethylene glycol (MIRALAX) 17 gram packet Take 1 Packet by mouth daily. (Patient taking differently: Take 1 Packet by mouth daily. Indications: constipation) 30 Packet 1    potassium 99 mg tablet Take 99 mg by mouth daily as needed (supplement). No Known Allergies    Social History     Tobacco Use    Smoking status: Never Smoker    Smokeless tobacco: Never Used   Substance Use Topics    Alcohol use: No     Alcohol/week: 0.0 standard drinks    Drug use: No       Family History   Problem Relation Age of Onset    Heart Disease Mother     Hypertension Mother     Heart Surgery Mother     Cancer Father         colon    Hypertension Brother     No Known Problems Maternal Grandmother     Heart Disease Maternal Grandfather     MS Paternal Grandmother     Stroke Paternal Grandfather     Heart Disease Paternal Grandfather     No Known Problems Son        Family Status   Relation Name Status    Mother  Alive        HTN, Atrial fibrillation    Father          Sepsis, colon cancer    Brother 1 Alive    MGM      MGF      PGM      PGF      Son 1 Alive         Review of Systems  Review of Systems   All other systems reviewed and are negative. Assessment / Plan    Encounter Diagnoses   Name Primary?     Severe obesity ([address] Yes    HERZOG (nonalcoholic steatohepatitis) suggested by ultrasound report, 2016     Obesity (BMI 30-39. 9)     Elevated liver enzymes     Weight loss counseling, encounter for        1. Weight management    Suffering from sever obesity, BMI 35.8      Degree of control: fair, maintained    Progress was reviewed with patient. Goal(s) for next appointment:   Back to weight loss! -6 lbs       2.   Labs    Latest results reviewed    Routine monitoring labs ordered  Inc in LFTs will continue to monitor   Make sure staying hydrated     Cleve Farmer, FNP-BC

## 2019-11-06 ENCOUNTER — CLINICAL SUPPORT (OUTPATIENT)
Dept: SURGERY | Age: 64
End: 2019-11-06

## 2019-11-06 VITALS
SYSTOLIC BLOOD PRESSURE: 126 MMHG | DIASTOLIC BLOOD PRESSURE: 80 MMHG | WEIGHT: 218 LBS | BODY MASS INDEX: 35.03 KG/M2 | HEART RATE: 40 BPM | HEIGHT: 66 IN

## 2019-11-06 DIAGNOSIS — E66.9 OBESITY, UNSPECIFIED CLASSIFICATION, UNSPECIFIED OBESITY TYPE, UNSPECIFIED WHETHER SERIOUS COMORBIDITY PRESENT: Primary | ICD-10-CM

## 2019-11-06 NOTE — PROGRESS NOTES
Patient attended a weekly class as part of the [facility] Weight Loss Program.  This class was facilitated by a Registered Dietitian. Topics taught at class focused on diet, particularly carbohydrate counting and portion control. Classes also focused on behavior changes and the importance of establishing a daily exercise routine. Progress Note: Weekly Medical Monitoring in the [facility]    Is there anything that you or the patient needs to let the supervising provider know about? no    Over the past week, have you experienced any side-effects? no    Aundria Scheuermann is a 62 y.o. female who is enrolled in [facility]    Aundria Scheuermann was prescribed the VLCD / LCD. Visit Vitals  /80   Pulse (!) 40   Ht 5' 6\" (1.676 m)   Wt 98.9 kg (218 lb)   BMI 35.19 kg/m²     Weight Metrics 11/6/2019 11/1/2019 10/30/2019 10/30/2019 10/23/2019 10/16/2019 10/9/2019   Weight 218 lb 221 lb 12.8 oz - 218 lb 9.6 oz 219 lb 11.2 oz 221 lb 221 lb 11.2 oz   Waist Measure Inches 40.25 - 40 - 40 39.5 40   Exercise Mins/week - - - - - - -   Body Fat % - - - - - - -   BMI 35.19 kg/m2 35.8 kg/m2 - 35.28 kg/m2 35.46 kg/m2 35.67 kg/m2 35.78 kg/m2         Have you received any other medical care this week? no  If yes, where and for what? Have you had any change in your medications since your last visit? no  If yes what? Did you have any problems adhering to the program last week? no  If yes, please explain:       Eating Habits Over Last Week:  Did you take in 64 oz of non-caloric fluids? yes     Did you consume your prescribed meal replacement regimen each day?  yes       Physical Activity Over the Past Week:    Aerobic exercise: 0 min  Resistance exercise: 0 workouts / week

## 2019-11-16 ENCOUNTER — HOSPITAL ENCOUNTER (OUTPATIENT)
Dept: LAB | Age: 64
Discharge: HOME | End: 2019-11-16
Payer: COMMERCIAL

## 2019-11-16 DIAGNOSIS — E66.9 OBESITY, UNSPECIFIED CLASSIFICATION, UNSPECIFIED OBESITY TYPE, UNSPECIFIED WHETHER SERIOUS COMORBIDITY PRESENT: ICD-10-CM

## 2019-11-16 DIAGNOSIS — Z71.3 ENCOUNTER FOR WEIGHT LOSS COUNSELING: ICD-10-CM

## 2019-11-16 DIAGNOSIS — E66.9 OBESITY (BMI 30-39.9): ICD-10-CM

## 2019-11-16 DIAGNOSIS — R79.89 ELEVATED LFTS: ICD-10-CM

## 2019-11-16 LAB
ALBUMIN SERPL-MCNC: 4.1 G/DL (ref 3.4–5)
ALBUMIN/GLOB SERPL: 1.4 {RATIO} (ref 0.8–1.7)
ALP SERPL-CCNC: 71 U/L (ref 45–117)
ALT SERPL-CCNC: 50 U/L (ref 13–56)
ANION GAP SERPL CALC-SCNC: 4 MMOL/L (ref 3–18)
AST SERPL-CCNC: 20 U/L (ref 10–38)
BILIRUB SERPL-MCNC: 0.6 MG/DL (ref 0.2–1)
BUN SERPL-MCNC: 14 MG/DL (ref 7–18)
BUN/CREAT SERPL: 27 (ref 12–20)
CALCIUM SERPL-MCNC: 9.5 MG/DL (ref 8.5–10.1)
CHLORIDE SERPL-SCNC: 108 MMOL/L (ref 100–111)
CO2 SERPL-SCNC: 31 MMOL/L (ref 21–32)
CREAT SERPL-MCNC: 0.51 MG/DL (ref 0.6–1.3)
GLOBULIN SER CALC-MCNC: 3 G/DL (ref 2–4)
GLUCOSE SERPL-MCNC: 104 MG/DL (ref 74–99)
POTASSIUM SERPL-SCNC: 4.3 MMOL/L (ref 3.5–5.5)
PROT SERPL-MCNC: 7.1 G/DL (ref 6.4–8.2)
SODIUM SERPL-SCNC: 143 MMOL/L (ref 136–145)
URATE SERPL-MCNC: 3.2 MG/DL (ref 2.6–7.2)

## 2019-11-16 PROCEDURE — 80053 COMPREHEN METABOLIC PANEL: CPT

## 2019-11-16 PROCEDURE — 84550 ASSAY OF BLOOD/URIC ACID: CPT

## 2019-11-16 PROCEDURE — 36415 COLL VENOUS BLD VENIPUNCTURE: CPT

## 2019-11-17 ENCOUNTER — HOSPITAL ENCOUNTER (EMERGENCY)
Age: 64
Discharge: HOME | End: 2019-11-17
Attending: EMERGENCY MEDICINE
Payer: COMMERCIAL

## 2019-11-17 VITALS
BODY MASS INDEX: 33.74 KG/M2 | WEIGHT: 215 LBS | RESPIRATION RATE: 20 BRPM | HEART RATE: 50 BPM | HEIGHT: 67 IN | SYSTOLIC BLOOD PRESSURE: 137 MMHG | DIASTOLIC BLOOD PRESSURE: 77 MMHG | TEMPERATURE: 98.5 F | OXYGEN SATURATION: 98 %

## 2019-11-17 DIAGNOSIS — S61.212A LACERATION OF RIGHT MIDDLE FINGER WITHOUT FOREIGN BODY WITHOUT DAMAGE TO NAIL, INITIAL ENCOUNTER: Primary | ICD-10-CM

## 2019-11-17 PROCEDURE — 74011000250 HC RX REV CODE- 250: Performed by: PHYSICIAN ASSISTANT

## 2019-11-17 PROCEDURE — 75810000293 HC SIMP/SUPERF WND  RPR

## 2019-11-17 PROCEDURE — 99282 EMERGENCY DEPT VISIT SF MDM: CPT

## 2019-11-17 RX ORDER — BACITRACIN ZINC 500 UNIT/G
14.17 OINTMENT (GRAM) TOPICAL
Status: COMPLETED | OUTPATIENT
Start: 2019-11-17 | End: 2019-11-17

## 2019-11-17 RX ADMIN — BACITRACIN ZINC 14.17 G: 500 OINTMENT TOPICAL at 16:40

## 2019-11-17 NOTE — DISCHARGE INSTRUCTIONS

## 2019-11-17 NOTE — ED PROVIDER NOTES
EMERGENCY DEPARTMENT HISTORY AND PHYSICAL EXAM    Date: 11/17/2019  Patient Name: Celena Pickett    History of Presenting Illness     Chief Complaint   Patient presents with    Laceration         History Provided By: Patient    Chief Complaint: Laceration  Duration: Prior to arrival  Timing: Acute  Location: Right middle digit  Quality: Bleeding  Severity: Moderate   Modifying Factors: Worse after hitting her hand on a knife  Associated Symptoms: none       Additional History (Context): Celena Pickett is a 62 y.o. female with a history of hypertension and A. fib who is currently on Eliquis who presents today for laceration to the right middle finger. Patient reports she tried to stop the bleeding herself but was not able to. Patient states she was cleaning out her purse and there was a knife nearby and she hit her finger on the knife. Patient reports tetanus is up-to-date. PCP: Lester Huntley MD    Current Facility-Administered Medications   Medication Dose Route Frequency Provider Last Rate Last Dose    bacitracin zinc (BACITRACIN) 500 unit/gram ointment 14.17 g  14.17 g Topical NOW [place], [place]         Current Outpatient Medications   Medication Sig Dispense Refill    ELIQUIS 5 mg tablet Take 1 Tab by mouth two (2) times a day. 180 Tab 3    calcium-cholecalciferol, D3, (CALCIUM 600 + D) tablet Take 2 Tabs by mouth daily.  omega-3 fatty acids-fish oil (FISH OIL) 360-1,200 mg cap Take 2 Caps by mouth daily.  cholecalciferol (VITAMIN D3) 2,000 unit cap capsule Take 2,000 Units by mouth daily.  metoprolol tartrate (LOPRESSOR) 50 mg tablet Take 1 Tab by mouth two (2) times a day. 180 Tab 3    polyethylene glycol (MIRALAX) 17 gram packet Take 1 Packet by mouth daily. (Patient taking differently: Take 1 Packet by mouth daily. Indications: constipation) 30 Packet 1    potassium 99 mg tablet Take 99 mg by mouth daily as needed (supplement).          Past History     Past Medical History:  Past Medical History:   Diagnosis Date    Arthritis     Bilateral Knee, and Bilateral Hand/Thumb    Atrial fibrillation ([address] 1/29/2018    Carpal tunnel syndrome     H/O echocardiogram 01/30/2018    EF 50%, normal left atrial size, no valvular heart disease    Headache     History of ankle fusion 1997    left    Hx of migraine headaches     Hypercholesterolemia     Hypertension     Morbid obesity ([address]     HERZOG (nonalcoholic steatohepatitis) suggested by ultrasound report, 2016 8/11/2017    Nausea & vomiting     Vertigo        Past Surgical History:  Past Surgical History:   Procedure Laterality Date    COLONOSCOPY N/A 6/5/2018    COLONOSCOPY performed by Augie White MD at [address] [place] [address]      age 25 years    HX COLONOSCOPY      Due in 2018: May 2015    HX HYSTERECTOMY      age 30years old   [de-identified] HX KNEE REPLACEMENT Right 05/22/2018    HX KNEE REPLACEMENT Left 05/14/2019    HX ORTHOPAEDIC Left     ankle fusion    HX OTHER SURGICAL  2014    removal of mole left breast    HX TONSILLECTOMY      at age 29years old       Family History:  Family History   Problem Relation Age of Onset    Heart Disease Mother     Hypertension Mother     Heart Surgery Mother     Cancer Father         colon    Hypertension Brother     No Known Problems Maternal Grandmother     Heart Disease Maternal Grandfather     MS Paternal Grandmother     Stroke Paternal Grandfather     Heart Disease Paternal Grandfather     No Known Problems Son        Social History:  Social History     Tobacco Use    Smoking status: Never Smoker    Smokeless tobacco: Never Used   Substance Use Topics    Alcohol use: No     Alcohol/week: 0.0 standard drinks    Drug use: No       Allergies:  No Known Allergies      Review of Systems   Review of Systems   Constitutional: Negative for chills and fever. HENT: Negative for congestion, rhinorrhea and sore throat.     Respiratory: Negative for cough and shortness of breath. Cardiovascular: Negative for chest pain. Gastrointestinal: Negative for abdominal pain, blood in stool, constipation, diarrhea, nausea and vomiting. Genitourinary: Negative for dysuria, frequency and hematuria. Musculoskeletal: Negative for back pain and myalgias. Skin: Positive for wound. Negative for rash. Neurological: Negative for dizziness and headaches. All other systems reviewed and are negative. All Other Systems Negative  Physical Exam     Vitals:    11/17/19 1549   BP: 137/77   Pulse: (!) 50   Resp: 20   Temp: 98.5 °F (36.9 °C)   SpO2: 98%   Weight: 97.5 kg (215 lb)   Height: 5' 6.5\" (1.689 m)     Physical Exam   Constitutional: She is oriented to person, place, and time. She appears well-developed and well-nourished. No distress. HENT:   Head: Normocephalic and atraumatic. Eyes: Conjunctivae are normal.   Neck: Normal range of motion. Neck supple. Cardiovascular: Normal rate, regular rhythm and normal heart sounds. Pulmonary/Chest: Effort normal and breath sounds normal. No respiratory distress. She exhibits no tenderness. Abdominal: Soft. Bowel sounds are normal. She exhibits no distension. There is no tenderness. There is no rebound and no guarding. Musculoskeletal: She exhibits no edema or deformity. Neurological: She is alert and oriented to person, place, and time. She has normal reflexes. Skin: Skin is warm and dry. Laceration noted. She is not diaphoretic. 2 cm laceration noted to the right middle digit. Psychiatric: She has a normal mood and affect. Nursing note and vitals reviewed. Diagnostic Study Results     Labs -   No results found for this or any previous visit (from the past 12 hour(s)). Radiologic Studies -   No orders to display     CT Results  (Last 48 hours)    None        CXR Results  (Last 48 hours)    None            Medical Decision Making   I am the first provider for this patient.     I reviewed the vital signs, available nursing notes, past medical history, past surgical history, family history and social history. Vital Signs-Reviewed the patient's vital signs. Records Reviewed: Nursing Notes and Old Medical Records     Procedures: None   Wound Repair  Date/Time: 11/17/2019 4:54 PM  Performed by: PAPreparation: skin prepped with Shur-Clens  Location: Right middle digit. Wound length:2.5 cm or less  Anesthesia: local infiltration    Anesthesia:  Local Anesthetic: lidocaine 1% without epinephrine  Anesthetic total: 2 mL  Foreign bodies: no foreign bodies  Irrigation solution: saline  Debridement: none  Skin closure: 5-0 nylon  Number of sutures: 4  Technique: simple  Approximation: close  Dressing: antibiotic ointment and non-adhesive packing strip  Patient tolerance: Patient tolerated the procedure well with no immediate complications  My total time at bedside, performing this procedure was 46-60 minutes. Provider Notes (Medical Decision Making):    differential diagnosis: Abrasion, laceration, avulsion    Plan: We will control bleeding and repair wound. 4:56 PM  Bleeding is well controlled at this time. Will discharge home. Have advised patient to return as needed for wound check and in 7 to 10 days for suture removal.  Have discussed proper wound care with patient for at home. Patient agrees with the plan and management and states all questions have been thoroughly answered and there are no more remaining questions. MED RECONCILIATION:  Current Facility-Administered Medications   Medication Dose Route Frequency    bacitracin zinc (BACITRACIN) 500 unit/gram ointment 14.17 g  14.17 g Topical NOW     Current Outpatient Medications   Medication Sig    ELIQUIS 5 mg tablet Take 1 Tab by mouth two (2) times a day.  calcium-cholecalciferol, D3, (CALCIUM 600 + D) tablet Take 2 Tabs by mouth daily.  omega-3 fatty acids-fish oil (FISH OIL) 360-1,200 mg cap Take 2 Caps by mouth daily.     cholecalciferol (VITAMIN D3) 2,000 unit cap capsule Take 2,000 Units by mouth daily.  metoprolol tartrate (LOPRESSOR) 50 mg tablet Take 1 Tab by mouth two (2) times a day.  polyethylene glycol (MIRALAX) 17 gram packet Take 1 Packet by mouth daily. (Patient taking differently: Take 1 Packet by mouth daily. Indications: constipation)    potassium 99 mg tablet Take 99 mg by mouth daily as needed (supplement). Disposition:  Home     DISCHARGE NOTE:   Pt has been reexamined. Patient has no new complaints, changes, or physical findings. Care plan outlined and precautions discussed. Results of workup were reviewed with the patient. All medications were reviewed with the patient. All of pt's questions and concerns were addressed. Patient was instructed and agrees to follow up with PCP as well as to return to the ED upon further deterioration. Patient is ready to go home. Follow-up Information     Follow up With Specialties Details Why Contact Info    [facility] EMERGENCY DEPT Emergency Medicine  As needed, For wound re-check [address] [place] [address], [address] [place] [address] Road  [address] [place] [address]  [phone] [address] [facility] EMERGENCY DEPT Emergency Medicine  7-10 days for suture removal  [address] [place] [address]  [phone]          Current Discharge Medication List              Diagnosis     Clinical Impression:   1.  Laceration of right middle finger without foreign body without damage to nail, initial encounter

## 2019-11-18 NOTE — PROGRESS NOTES
[facility] Progress Note:   F/up Provider Visit    CC: Weight Management    Niel Bumpers is a 62 y.o. female who is here for her  f/up medical provider visit for the VLCD Program. she did not attend class last week. LFT's back to normal, hx HERZOG, will continue to monitor     Weight History  Weight Metrics 11/20/2019 11/17/2019 11/6/2019 11/1/2019 10/30/2019 10/30/2019 10/23/2019   Weight 215 lb 1.6 oz 215 lb 218 lb 221 lb 12.8 oz - 218 lb 9.6 oz 219 lb 11.2 oz   Waist Measure Inches 40.5 - 40.25 - 40 - 40   Exercise Mins/week - - - - - - -   Body Fat % - - - - - - -   BMI 34.72 kg/m2 34.18 kg/m2 35.19 kg/m2 35.8 kg/m2 - 35.28 kg/m2 35.46 kg/m2       Starting wt: 234  Goal wt: 165  EKG due: 180  Ideal body weight: 59.3 kg (130 lb 11.7 oz)  Adjusted ideal body weight: 74.6 kg (164 lb 7.7 oz)  Body mass index is 34.72 kg/m². History    Past Medical History:   Diagnosis Date    Arthritis     Bilateral Knee, and Bilateral Hand/Thumb    Atrial fibrillation ([address] 1/29/2018    Carpal tunnel syndrome     H/O echocardiogram 01/30/2018    EF 50%, normal left atrial size, no valvular heart disease    Headache     History of ankle fusion 1997    left    Hx of migraine headaches     Hypercholesterolemia     Hypertension     Morbid obesity ([address]     HERZOG (nonalcoholic steatohepatitis) suggested by ultrasound report, 2016 8/11/2017    Nausea & vomiting     Vertigo        Past Surgical History:   Procedure Laterality Date    COLONOSCOPY N/A 6/5/2018    COLONOSCOPY performed by Ronda Henderson MD at [facility]      age 25 years    HX COLONOSCOPY      Due in 2018:  May 2015    HX HYSTERECTOMY      age 30years old    HX KNEE REPLACEMENT Right 05/22/2018    HX KNEE REPLACEMENT Left 05/14/2019    HX ORTHOPAEDIC Left     ankle fusion    HX OTHER SURGICAL  2014    removal of mole left breast    HX TONSILLECTOMY      at age 29years old       Current Outpatient Medications   Medication Sig Dispense Refill    ELIQUIS 5 mg tablet Take 1 Tab by mouth two (2) times a day. 180 Tab 3    calcium-cholecalciferol, D3, (CALCIUM 600 + D) tablet Take 2 Tabs by mouth daily.  omega-3 fatty acids-fish oil (FISH OIL) 360-1,200 mg cap Take 2 Caps by mouth daily.  cholecalciferol (VITAMIN D3) 2,000 unit cap capsule Take 2,000 Units by mouth daily.  metoprolol tartrate (LOPRESSOR) 50 mg tablet Take 1 Tab by mouth two (2) times a day. 180 Tab 3    polyethylene glycol (MIRALAX) 17 gram packet Take 1 Packet by mouth daily. (Patient taking differently: Take 1 Packet by mouth daily. Indications: constipation) 30 Packet 1    potassium 99 mg tablet Take 99 mg by mouth daily as needed (supplement). No Known Allergies    Social History     Tobacco Use    Smoking status: Never Smoker    Smokeless tobacco: Never Used   Substance Use Topics    Alcohol use: No     Alcohol/week: 0.0 standard drinks    Drug use: No       Family History   Problem Relation Age of Onset    Heart Disease Mother     Hypertension Mother     Heart Surgery Mother     Cancer Father         colon    Hypertension Brother     No Known Problems Maternal Grandmother     Heart Disease Maternal Grandfather     MS Paternal Grandmother     Stroke Paternal Grandfather     Heart Disease Paternal Grandfather     No Known Problems Son        Family Status   Relation Name Status    Mother  Alive        HTN, Atrial fibrillation    Father          Sepsis, colon cancer    Brother 1 Alive    MGM      MGF      PGM      PGF      Son 1 Alive       Review of Systems  Review of Systems   All other systems reviewed and are negative. Objective  Visit Vitals  /74   Pulse (!) 54   Temp 98.1 °F (36.7 °C) (Oral)   Resp 18   Ht 5' 6\" (1.676 m)   Wt 97.6 kg (215 lb 1.6 oz)   SpO2 97%   BMI 34.72 kg/m²     No LMP recorded.  Patient has had a hysterectomy. Physical Exam  Physical Exam  Vitals signs and nursing note reviewed. Constitutional:       Appearance: She is well-developed. HENT:      Head: Normocephalic. Cardiovascular:      Rate and Rhythm: Normal rate. Pulmonary:      Effort: Pulmonary effort is normal.   Musculoskeletal: Normal range of motion. Skin:     General: Skin is warm and dry. Neurological:      Mental Status: She is alert and oriented to person, place, and time. Assessment / Plan    Encounter Diagnoses   Name Primary?  Essential hypertension Yes    HERZOG (nonalcoholic steatohepatitis) suggested by ultrasound report, 2016     Elevated liver enzymes     Weight loss counseling, encounter for     Encounter for weight loss counseling     Elevated LFTs            1.  Weight management      Today, the patient is  Height: 5' 6\" (167.6 cm) tall, Weight: 97.6 kg (215 lb 1.6 oz) lbs for a Body mass index is 34.72 kg/m². is suffering from obesity  which is further complicated by hypertension, hypercholesterolemia and HERZOG. Degree of control: doing well    Progress was reviewed with patient. Goal(s) for next appointment:   -8 to 10   Start some resistance training, -1lb of muscle mass -- goal no more loss here     2. Labs    Latest results reviewed with patient   Routine monitoring labs ordered  Orders Placed This Encounter    METABOLIC PANEL, COMPREHENSIVE             I have reviewed/discussed the above normal BMI with the patient. I have recommended the following interventions: dietary management education, guidance, and counseling, encourage exercise, monitor weight and prescribed dietary intake . Karma Gomez Ms. Andrews Client has a reminder for a \"due or due soon\" health maintenance. I have asked that she contact her primary care provider for follow-up on this health maintenance.          >50% of 30 min visit spent counseling     SABINA Rios

## 2019-11-20 ENCOUNTER — OFFICE VISIT (OUTPATIENT)
Dept: SURGERY | Age: 64
End: 2019-11-20

## 2019-11-20 VITALS
BODY MASS INDEX: 34.57 KG/M2 | HEART RATE: 54 BPM | WEIGHT: 215.1 LBS | RESPIRATION RATE: 18 BRPM | TEMPERATURE: 98.1 F | DIASTOLIC BLOOD PRESSURE: 74 MMHG | HEIGHT: 66 IN | OXYGEN SATURATION: 97 % | SYSTOLIC BLOOD PRESSURE: 128 MMHG

## 2019-11-20 DIAGNOSIS — R79.89 ELEVATED LFTS: ICD-10-CM

## 2019-11-20 DIAGNOSIS — R74.8 ELEVATED LIVER ENZYMES: ICD-10-CM

## 2019-11-20 DIAGNOSIS — Z71.3 ENCOUNTER FOR WEIGHT LOSS COUNSELING: ICD-10-CM

## 2019-11-20 DIAGNOSIS — Z71.3 WEIGHT LOSS COUNSELING, ENCOUNTER FOR: ICD-10-CM

## 2019-11-20 DIAGNOSIS — E66.01 MORBID OBESITY (HCC): Primary | ICD-10-CM

## 2019-11-20 DIAGNOSIS — I10 ESSENTIAL HYPERTENSION: ICD-10-CM

## 2019-11-20 DIAGNOSIS — K75.81 NASH (NONALCOHOLIC STEATOHEPATITIS): ICD-10-CM

## 2019-11-20 NOTE — PROGRESS NOTES
Chief Complaint   Patient presents with    Weight Management     monthly follow up. Nursing Note for Medical Monitoring in the [facility] Program      Xavier Orta is a 62 y.o. female who is enrolled in [facility]    Xavier Orta was prescribed the VLCD / LCD. Did you have any problems adhering to the program last week? no  If yes, please explain:     Since your last visit, have you experienced any complications? no    Have you received any other medical care this week? yes  If yes, where and for what? Cut mid finger right hand. Have you had any change in your medications since your last visit? no  If yes what? Are you taking an appetite suppressant? no   If yes:  Do you need a refill? BP Readings from Last 3 Encounters:   11/20/19 128/74   11/17/19 137/77   11/06/19 126/80        Eating Habits Over Last Week:  Did you take in 64 oz of non-caloric fluids? yes     Did you consume your prescribed meal replacement regimen each day?  yes       Physical Activity Over the Past Week:    Aerobic exercise: 80 min  Resistance exercise: no workouts / week

## 2019-11-25 ENCOUNTER — CLINICAL SUPPORT (OUTPATIENT)
Dept: INTERNAL MEDICINE CLINIC | Age: 64
End: 2019-11-25

## 2019-11-25 DIAGNOSIS — Z48.02 VISIT FOR SUTURE REMOVAL: Primary | ICD-10-CM

## 2019-11-25 NOTE — PATIENT INSTRUCTIONS

## 2019-11-25 NOTE — PROGRESS NOTES
Patient presents for suture removal. The wound is well healed without signs of infection per DR. Fatuma Verduzco. The sutures were removed from Right 3rd finger. Wound care and activity instructions given. Return prn.

## 2019-12-04 ENCOUNTER — CLINICAL SUPPORT (OUTPATIENT)
Dept: SURGERY | Age: 64
End: 2019-12-04

## 2019-12-04 VITALS
WEIGHT: 216.6 LBS | DIASTOLIC BLOOD PRESSURE: 84 MMHG | HEART RATE: 78 BPM | BODY MASS INDEX: 34.81 KG/M2 | SYSTOLIC BLOOD PRESSURE: 126 MMHG | HEIGHT: 66 IN

## 2019-12-04 DIAGNOSIS — E66.9 OBESITY, UNSPECIFIED CLASSIFICATION, UNSPECIFIED OBESITY TYPE, UNSPECIFIED WHETHER SERIOUS COMORBIDITY PRESENT: Primary | ICD-10-CM

## 2019-12-04 NOTE — PROGRESS NOTES
Chief Complaint   Patient presents with    Weight Management     Patient attended a weekly class as part of the Medically Supervised Weight Loss Program.  This class was facilitated by a Registered Dietitian. Topics taught at class focused on diet, particularly carbohydrate counting and portion control. Classes also focused on behavior changes and the importance of establishing a daily exercise routine. Progress Note: Weekly Medical Monitoring in the [facility]    Is there anything that you or the patient needs to let the supervising provider know about? no    Over the past week, have you experienced any side-effects? no    Agueda Hughes is a 62 y.o. female who is enrolled in [facility] Program    Agueda Hughes was prescribed the VLCD / LCD. Visit Vitals  /84   Pulse 78   Ht 5' 6\" (1.676 m)   Wt 216 lb 9.6 oz (98.2 kg)   BMI 34.96 kg/m²     Weight Metrics 12/4/2019 11/20/2019 11/17/2019 11/6/2019 11/1/2019 10/30/2019 10/30/2019   Weight 216 lb 9.6 oz 215 lb 1.6 oz 215 lb 218 lb 221 lb 12.8 oz - 218 lb 9.6 oz   Waist Measure Inches 41.5 40.5 - 40.25 - 40 -   Exercise Mins/week - - - - - - -   Body Fat % - - - - - - -   BMI 34.96 kg/m2 34.72 kg/m2 34.18 kg/m2 35.19 kg/m2 35.8 kg/m2 - 35.28 kg/m2         Have you received any other medical care this week? no  If yes, where and for what? Have you had any change in your medications since your last visit? no  If yes what? Did you have any problems adhering to the program last week? no  If yes, please explain:       Eating Habits Over Last Week:  Did you take in 64 oz of non-caloric fluids? yes     Did you consume your prescribed meal replacement regimen each day?  yes       Physical Activity Over the Past Week:    Aerobic exercise: 0 min  Resistance exercise: no workouts / week

## 2019-12-11 ENCOUNTER — CLINICAL SUPPORT (OUTPATIENT)
Dept: SURGERY | Age: 64
End: 2019-12-11

## 2019-12-11 VITALS
DIASTOLIC BLOOD PRESSURE: 78 MMHG | BODY MASS INDEX: 34.65 KG/M2 | WEIGHT: 215.6 LBS | SYSTOLIC BLOOD PRESSURE: 132 MMHG | HEART RATE: 64 BPM | HEIGHT: 66 IN

## 2019-12-11 DIAGNOSIS — E66.9 OBESITY, UNSPECIFIED CLASSIFICATION, UNSPECIFIED OBESITY TYPE, UNSPECIFIED WHETHER SERIOUS COMORBIDITY PRESENT: Primary | ICD-10-CM

## 2019-12-11 NOTE — PROGRESS NOTES
Patient attended a weekly class as part of the Medically Supervised Weight Loss Program.  This class was facilitated by a Registered Dietitian. Topics taught at class focused on diet, particularly carbohydrate counting and portion control. Classes also focused on behavior changes and the importance of establishing a daily exercise routine. Progress Note: Weekly Medical Monitoring in the [facility] Loss Program    Is there anything that you or the patient needs to let the supervising provider know about? no    Over the past week, have you experienced any side-effects? no    Agueda Hughes is a 62 y.o. female who is enrolled in [facility] Program    Agueda Hughes was prescribed the VLCD / LCD. Visit Vitals  /78   Pulse 64   Ht 5' 6\" (1.676 m)   Wt 97.8 kg (215 lb 9.6 oz)   BMI 34.80 kg/m²     Weight Metrics 12/11/2019 12/4/2019 11/20/2019 11/17/2019 11/6/2019 11/1/2019 10/30/2019   Weight 215 lb 9.6 oz 216 lb 9.6 oz 215 lb 1.6 oz 215 lb 218 lb 221 lb 12.8 oz -   Waist Measure Inches 40 41.5 40.5 - 40.25 - 40   Exercise Mins/week - - - - - - -   Body Fat % - - - - - - -   BMI 34.8 kg/m2 34.96 kg/m2 34.72 kg/m2 34.18 kg/m2 35.19 kg/m2 35.8 kg/m2 -         Have you received any other medical care this week? no  If yes, where and for what? Have you had any change in your medications since your last visit? no  If yes what? Did you have any problems adhering to the program last week? no  If yes, please explain:       Eating Habits Over Last Week:  Did you take in 64 oz of non-caloric fluids? yes     Did you consume your prescribed meal replacement regimen each day?  yes       Physical Activity Over the Past Week:    Aerobic exercise: 0 min  Resistance exercise: 0 workouts / week

## 2019-12-23 ENCOUNTER — HOSPITAL ENCOUNTER (OUTPATIENT)
Dept: LAB | Age: 64
Discharge: HOME | End: 2019-12-23
Payer: COMMERCIAL

## 2019-12-23 DIAGNOSIS — Z71.3 WEIGHT LOSS COUNSELING, ENCOUNTER FOR: ICD-10-CM

## 2019-12-23 DIAGNOSIS — K75.81 NASH (NONALCOHOLIC STEATOHEPATITIS): ICD-10-CM

## 2019-12-23 DIAGNOSIS — R74.8 ELEVATED LIVER ENZYMES: ICD-10-CM

## 2019-12-23 DIAGNOSIS — Z71.3 ENCOUNTER FOR WEIGHT LOSS COUNSELING: ICD-10-CM

## 2019-12-23 DIAGNOSIS — I10 ESSENTIAL HYPERTENSION: ICD-10-CM

## 2019-12-23 DIAGNOSIS — R79.89 ELEVATED LFTS: ICD-10-CM

## 2019-12-23 LAB
ALBUMIN SERPL-MCNC: 3.8 G/DL (ref 3.4–5)
ALBUMIN/GLOB SERPL: 1.2 {RATIO} (ref 0.8–1.7)
ALP SERPL-CCNC: 102 U/L (ref 45–117)
ALT SERPL-CCNC: 184 U/L (ref 13–56)
ANION GAP SERPL CALC-SCNC: 5 MMOL/L (ref 3–18)
AST SERPL-CCNC: 74 U/L (ref 10–38)
BILIRUB SERPL-MCNC: 0.4 MG/DL (ref 0.2–1)
BUN SERPL-MCNC: 14 MG/DL (ref 7–18)
BUN/CREAT SERPL: 25 (ref 12–20)
CALCIUM SERPL-MCNC: 9.3 MG/DL (ref 8.5–10.1)
CHLORIDE SERPL-SCNC: 106 MMOL/L (ref 100–111)
CO2 SERPL-SCNC: 28 MMOL/L (ref 21–32)
CREAT SERPL-MCNC: 0.56 MG/DL (ref 0.6–1.3)
GLOBULIN SER CALC-MCNC: 3.2 G/DL (ref 2–4)
GLUCOSE SERPL-MCNC: 109 MG/DL (ref 74–99)
POTASSIUM SERPL-SCNC: 4.3 MMOL/L (ref 3.5–5.5)
PROT SERPL-MCNC: 7 G/DL (ref 6.4–8.2)
SODIUM SERPL-SCNC: 139 MMOL/L (ref 136–145)

## 2019-12-23 PROCEDURE — 80053 COMPREHEN METABOLIC PANEL: CPT

## 2019-12-23 PROCEDURE — 36415 COLL VENOUS BLD VENIPUNCTURE: CPT

## 2019-12-27 ENCOUNTER — OFFICE VISIT (OUTPATIENT)
Dept: SURGERY | Age: 64
End: 2019-12-27

## 2019-12-27 VITALS
BODY MASS INDEX: 34.47 KG/M2 | HEART RATE: 56 BPM | RESPIRATION RATE: 18 BRPM | SYSTOLIC BLOOD PRESSURE: 130 MMHG | TEMPERATURE: 98.2 F | OXYGEN SATURATION: 97 % | DIASTOLIC BLOOD PRESSURE: 70 MMHG | WEIGHT: 214.5 LBS | HEIGHT: 66 IN

## 2019-12-27 DIAGNOSIS — K75.81 NASH (NONALCOHOLIC STEATOHEPATITIS): ICD-10-CM

## 2019-12-27 DIAGNOSIS — R74.8 ELEVATED LIVER ENZYMES: ICD-10-CM

## 2019-12-27 DIAGNOSIS — M25.562 CHRONIC PAIN OF LEFT KNEE: ICD-10-CM

## 2019-12-27 DIAGNOSIS — Z71.3 WEIGHT LOSS COUNSELING, ENCOUNTER FOR: ICD-10-CM

## 2019-12-27 DIAGNOSIS — I10 ESSENTIAL HYPERTENSION: ICD-10-CM

## 2019-12-27 DIAGNOSIS — E66.9 OBESITY, UNSPECIFIED CLASSIFICATION, UNSPECIFIED OBESITY TYPE, UNSPECIFIED WHETHER SERIOUS COMORBIDITY PRESENT: Primary | ICD-10-CM

## 2019-12-27 DIAGNOSIS — G89.29 CHRONIC PAIN OF LEFT KNEE: ICD-10-CM

## 2019-12-27 DIAGNOSIS — R79.89 ELEVATED LFTS: ICD-10-CM

## 2019-12-27 NOTE — PROGRESS NOTES
Cc  Nursing Note for Medical Monitoring in the [facility] Weight Loss Program      Maria Fernanda Momin is a 62 y.o. female who is enrolled in [facility]    Maria Fernanda Momin was prescribed the VLCD / LCD. Did you have any problems adhering to the program last week? yes  If yes, please explain: \" hard to stick to program\"    Since your last visit, have you experienced any complications? no    Have you received any other medical care this week? no  If yes, where and for what? Have you had any change in your medications since your last visit? no  If yes what? Are you taking an appetite suppressant? no   If yes:  Do you need a refill? BP Readings from Last 3 Encounters:   12/27/19 130/70   12/11/19 132/78   12/04/19 126/84        Eating Habits Over Last Week:  Did you take in 64 oz of non-caloric fluids? yes     Did you consume your prescribed meal replacement regimen each day?  yes       Physical Activity Over the Past Week:    Aerobic exercise: 0 min  Resistance exercise: no workouts / week

## 2020-01-01 DIAGNOSIS — R74.01 TRANSAMINITIS: Primary | ICD-10-CM

## 2020-01-02 ENCOUNTER — TELEPHONE (OUTPATIENT)
Dept: INTERNAL MEDICINE CLINIC | Age: 65
End: 2020-01-02

## 2020-01-02 NOTE — PROGRESS NOTES
Please let her know that her LFTs need to be rechecked in 4 weeks. Her LFTs when recently checked were much more elevated than her baseline.      Dr. Simin Mcclelland  [facility], [address] [place] [address], [address] [place] [address] Str.  Phone: [phone]  Fax: [phone]

## 2020-01-02 NOTE — TELEPHONE ENCOUNTER
----- Message from Lucille Marshall MD sent at 1/1/2020  9:01 PM EST -----  Please let her know that her LFTs need to be rechecked in 4 weeks. Her LFTs when recently checked were much more elevated than her baseline.      Dr. Lopez Ohs  [facility], [address] [place] [address], [address] [place] [address] Str.  Phone: [phone]  Fax: [phone]

## 2020-01-02 NOTE — PROGRESS NOTES
[facility] Progress Note:   F/up Provider Visit    CC: Weight Management    Omid Diaz is a 62 y.o. female who is here for her  f/up medical provider visit for the VLCD Program. she did attend class last week. Struggling with the holidays, -1 lb, LFTs elevated again, hx herzog, VERY unlikely from [place] program as pt has been off the program for several weeks with celebratory eating. Weight History  Weight Metrics 12/27/2019 12/11/2019 12/4/2019 11/20/2019 11/17/2019 11/6/2019 11/1/2019   Weight 214 lb 8 oz 215 lb 9.6 oz 216 lb 9.6 oz 215 lb 1.6 oz 215 lb 218 lb 221 lb 12.8 oz   Waist Measure Inches - 40 41.5 40.5 - 40.25 -   Exercise Mins/week - - - - - - -   Body Fat % - - - - - - -   BMI 34.62 kg/m2 34.8 kg/m2 34.96 kg/m2 34.72 kg/m2 34.18 kg/m2 35.19 kg/m2 35.8 kg/m2          Starting wt: 234  Goal wt: 165  EKG due: 180  Ideal body weight: 59.3 kg (130 lb 11.7 oz)  Adjusted ideal body weight: 74.5 kg (164 lb 3.8 oz)  Body mass index is 34.62 kg/m². History    Past Medical History:   Diagnosis Date    Arthritis     Bilateral Knee, and Bilateral Hand/Thumb    Atrial fibrillation ([address] 1/29/2018    Carpal tunnel syndrome     H/O echocardiogram 01/30/2018    EF 50%, normal left atrial size, no valvular heart disease    Headache     History of ankle fusion 1997    left    Hx of migraine headaches     Hypercholesterolemia     Hypertension     Morbid obesity ([address]     HERZOG (nonalcoholic steatohepatitis) suggested by ultrasound report, 2016 8/11/2017    Nausea & vomiting     Vertigo        Past Surgical History:   Procedure Laterality Date    COLONOSCOPY N/A 6/5/2018    COLONOSCOPY performed by Rosa M Perera MD at [address]      age 25 years    HX COLONOSCOPY      Due in 2018:  May 2015    HX HYSTERECTOMY      age 30years old    HX KNEE REPLACEMENT Right 05/22/2018    HX KNEE REPLACEMENT Left 05/14/2019    HX ORTHOPAEDIC Left     ankle fusion  HX OTHER SURGICAL  2014    removal of mole left breast    HX TONSILLECTOMY      at age 29years old       Current Outpatient Medications   Medication Sig Dispense Refill    ELIQUIS 5 mg tablet Take 1 Tab by mouth two (2) times a day. 180 Tab 3    calcium-cholecalciferol, D3, (CALCIUM 600 + D) tablet Take 2 Tabs by mouth daily.  omega-3 fatty acids-fish oil (FISH OIL) 360-1,200 mg cap Take 2 Caps by mouth daily.  cholecalciferol (VITAMIN D3) 2,000 unit cap capsule Take 2,000 Units by mouth daily.  metoprolol tartrate (LOPRESSOR) 50 mg tablet Take 1 Tab by mouth two (2) times a day. 180 Tab 3    polyethylene glycol (MIRALAX) 17 gram packet Take 1 Packet by mouth daily. (Patient taking differently: Take 1 Packet by mouth daily. Indications: constipation) 30 Packet 1    potassium 99 mg tablet Take 99 mg by mouth daily as needed (supplement). No Known Allergies    Social History     Tobacco Use    Smoking status: Never Smoker    Smokeless tobacco: Never Used   Substance Use Topics    Alcohol use: No     Alcohol/week: 0.0 standard drinks    Drug use: No       Family History   Problem Relation Age of Onset    Heart Disease Mother     Hypertension Mother     Heart Surgery Mother     Cancer Father         colon    Hypertension Brother     No Known Problems Maternal Grandmother     Heart Disease Maternal Grandfather     MS Paternal Grandmother     Stroke Paternal Grandfather     Heart Disease Paternal Grandfather     No Known Problems Son        Family Status   Relation Name Status    Mother  Alive        HTN, Atrial fibrillation    Father          Sepsis, colon cancer    Brother 1 Alive    MGM      MGF      PGM      PGF      Son 1 Alive         Review of Systems  Review of Systems   Musculoskeletal: Positive for joint pain. All other systems reviewed and are negative.         Objective  Visit Vitals  /70   Pulse (!) 56 Temp 98.2 °F (36.8 °C) (Oral)   Resp 18   Ht 5' 6\" (1.676 m)   Wt 97.3 kg (214 lb 8 oz)   SpO2 97%   BMI 34.62 kg/m²     No LMP recorded. Patient has had a hysterectomy. Physical Exam  Appears well     Assessment / Plan    Encounter Diagnoses   Name Primary?  Obesity, unspecified classification, unspecified obesity type, unspecified whether serious comorbidity present Yes    Essential hypertension     HERZOG (nonalcoholic steatohepatitis) suggested by ultrasound report, 2016     Elevated liver enzymes     Weight loss counseling, encounter for     Elevated LFTs     Chronic pain of left knee            1. Weight management      Today, the patient is  Height: 5' 6\" (167.6 cm) tall, Weight: 97.3 kg (214 lb 8 oz) lbs for a Body mass index is 34.62 kg/m². is suffering from obesity    Degree of control: poor compliance this month    Progress was reviewed with patient. Goal(s) for next appointment:   -10lbs   Get back on track or transition to maintenance will be advised       2. Labs    Latest results reviewed with patient   Routine monitoring labs ordered  Orders Placed This Encounter    METABOLIC PANEL, COMPREHENSIVE    URIC ACID             I have reviewed/discussed the above normal BMI with the patient. I have recommended the following interventions: dietary management education, guidance, and counseling, encourage exercise, monitor weight and prescribed dietary intake . Kell Li The primary encounter diagnosis was Obesity, unspecified classification, unspecified obesity type, unspecified whether serious comorbidity present. Diagnoses of Essential hypertension, HERZOG (nonalcoholic steatohepatitis) suggested by ultrasound report, 2016, Elevated liver enzymes, Weight loss counseling, encounter for, Elevated LFTs, and Chronic pain of left knee were also pertinent to this visit. Ms. Racquel Rivers has a reminder for a \"due or due soon\" health maintenance.  I have asked that she contact her primary care provider for follow-up on this health maintenance. >50% of 25 min visit spent counseling     SABINA Angeles     Dragon medical dictation software was used for portions of this report. Unintended transcription errors may occur.

## 2020-01-08 ENCOUNTER — CLINICAL SUPPORT (OUTPATIENT)
Dept: SURGERY | Age: 65
End: 2020-01-08

## 2020-01-08 DIAGNOSIS — E66.9 OBESITY, UNSPECIFIED CLASSIFICATION, UNSPECIFIED OBESITY TYPE, UNSPECIFIED WHETHER SERIOUS COMORBIDITY PRESENT: Primary | ICD-10-CM

## 2020-01-09 VITALS
BODY MASS INDEX: 34.76 KG/M2 | HEIGHT: 66 IN | WEIGHT: 216.3 LBS | DIASTOLIC BLOOD PRESSURE: 84 MMHG | SYSTOLIC BLOOD PRESSURE: 146 MMHG | HEART RATE: 64 BPM

## 2020-01-09 NOTE — PROGRESS NOTES
Patient attended a weekly class as part of the Medically Supervised Weight Loss Program.  This class was facilitated by a Registered Dietitian. Topics taught at class focused on diet, particularly carbohydrate counting and portion control. Classes also focused on behavior changes and the importance of establishing a daily exercise routine. Progress Note: Weekly Medical Monitoring in the [facility]    Is there anything that you or the patient needs to let the supervising provider know about? no    Over the past week, have you experienced any side-effects? no    Benson Martini is a 62 y.o. female who is enrolled in [facility]    Benson Martini was prescribed the VLCD / LCD. Visit Vitals  /84   Pulse 64   Ht 5' 6\" (1.676 m)   Wt 98.1 kg (216 lb 4.8 oz)   BMI 34.91 kg/m²     Weight Metrics 1/9/2020 1/8/2020 12/27/2019 12/11/2019 12/4/2019 11/20/2019 11/17/2019   Weight - 216 lb 4.8 oz 214 lb 8 oz 215 lb 9.6 oz 216 lb 9.6 oz 215 lb 1.6 oz 215 lb   Waist Measure Inches 42.5 - - 40 41.5 40.5 -   Exercise Mins/week - - - - - - -   Body Fat % - - - - - - -   BMI - 34.91 kg/m2 34.62 kg/m2 34.8 kg/m2 34.96 kg/m2 34.72 kg/m2 34.18 kg/m2         Have you received any other medical care this week? no  If yes, where and for what? Have you had any change in your medications since your last visit? no  If yes what? Did you have any problems adhering to the program last week? no  If yes, please explain:       Eating Habits Over Last Week:  Did you take in 64 oz of non-caloric fluids? yes     Did you consume your prescribed meal replacement regimen each day?  yes       Physical Activity Over the Past Week:    Aerobic exercise: 0 min  Resistance exercise: 0 workouts / week

## 2020-01-15 ENCOUNTER — TELEPHONE (OUTPATIENT)
Dept: SURGERY | Age: 65
End: 2020-01-15

## 2020-01-15 ENCOUNTER — CLINICAL SUPPORT (OUTPATIENT)
Dept: SURGERY | Age: 65
End: 2020-01-15

## 2020-01-15 VITALS
SYSTOLIC BLOOD PRESSURE: 147 MMHG | HEIGHT: 66 IN | WEIGHT: 216.6 LBS | BODY MASS INDEX: 34.81 KG/M2 | DIASTOLIC BLOOD PRESSURE: 89 MMHG | HEART RATE: 66 BPM

## 2020-01-15 DIAGNOSIS — E66.9 OBESITY, UNSPECIFIED CLASSIFICATION, UNSPECIFIED OBESITY TYPE, UNSPECIFIED WHETHER SERIOUS COMORBIDITY PRESENT: Primary | ICD-10-CM

## 2020-01-15 NOTE — TELEPHONE ENCOUNTER
Ms. Favian Fabian came out of class after talking to Naif GURROLA and realized that the reason her liver enzymes are off could be the Tylenol PM she takes to sleep. She will stop taking them immediately and maybe that will fix her problem with the enzymes.

## 2020-01-22 ENCOUNTER — CLINICAL SUPPORT (OUTPATIENT)
Dept: SURGERY | Age: 65
End: 2020-01-22

## 2020-01-22 VITALS
WEIGHT: 214.5 LBS | DIASTOLIC BLOOD PRESSURE: 86 MMHG | HEART RATE: 62 BPM | HEIGHT: 66 IN | BODY MASS INDEX: 34.47 KG/M2 | SYSTOLIC BLOOD PRESSURE: 154 MMHG

## 2020-01-22 DIAGNOSIS — E66.9 OBESITY, UNSPECIFIED CLASSIFICATION, UNSPECIFIED OBESITY TYPE, UNSPECIFIED WHETHER SERIOUS COMORBIDITY PRESENT: Primary | ICD-10-CM

## 2020-01-22 NOTE — PROGRESS NOTES
Nursing Note for Medical Monitoring in the [facility] Weight Loss Program      Candy Dent is a 62 y.o. female who is enrolled in [facility] Program    Candy Dent was prescribed the VLCD / LCD. Did you have any problems adhering to the program last week? no  If yes, please explain:     Since your last visit, have you experienced any complications? no    Have you received any other medical care this week? no  If yes, where and for what? Have you had any change in your medications since your last visit? no  If yes what? Are you taking an appetite suppressant? no   If yes:  Do you need a refill? BP Readings from Last 3 Encounters:   01/22/20 154/86   01/15/20 147/89   01/09/20 146/84        Eating Habits Over Last Week:  Did you take in 64 oz of non-caloric fluids? yes     Did you consume your prescribed meal replacement regimen each day?  yes       Physical Activity Over the Past Week:    Aerobic exercise: 0 min  Resistance exercise: 0 workouts / week

## 2020-01-24 ENCOUNTER — HOSPITAL ENCOUNTER (OUTPATIENT)
Dept: LAB | Age: 65
Discharge: HOME | End: 2020-01-24
Payer: COMMERCIAL

## 2020-01-24 DIAGNOSIS — R74.8 ELEVATED LIVER ENZYMES: ICD-10-CM

## 2020-01-24 DIAGNOSIS — I10 ESSENTIAL HYPERTENSION: ICD-10-CM

## 2020-01-24 DIAGNOSIS — M25.562 CHRONIC PAIN OF LEFT KNEE: ICD-10-CM

## 2020-01-24 DIAGNOSIS — G89.29 CHRONIC PAIN OF LEFT KNEE: ICD-10-CM

## 2020-01-24 DIAGNOSIS — E66.9 OBESITY, UNSPECIFIED CLASSIFICATION, UNSPECIFIED OBESITY TYPE, UNSPECIFIED WHETHER SERIOUS COMORBIDITY PRESENT: ICD-10-CM

## 2020-01-24 DIAGNOSIS — Z71.3 WEIGHT LOSS COUNSELING, ENCOUNTER FOR: ICD-10-CM

## 2020-01-24 DIAGNOSIS — K75.81 NASH (NONALCOHOLIC STEATOHEPATITIS): ICD-10-CM

## 2020-01-24 DIAGNOSIS — R79.89 ELEVATED LFTS: ICD-10-CM

## 2020-01-24 LAB
ALBUMIN SERPL-MCNC: 3.9 G/DL (ref 3.4–5)
ALBUMIN/GLOB SERPL: 1.1 {RATIO} (ref 0.8–1.7)
ALP SERPL-CCNC: 94 U/L (ref 45–117)
ALT SERPL-CCNC: 79 U/L (ref 13–56)
ANION GAP SERPL CALC-SCNC: 4 MMOL/L (ref 3–18)
AST SERPL-CCNC: 29 U/L (ref 10–38)
BILIRUB SERPL-MCNC: 0.7 MG/DL (ref 0.2–1)
BUN SERPL-MCNC: 17 MG/DL (ref 7–18)
BUN/CREAT SERPL: 33 (ref 12–20)
CALCIUM SERPL-MCNC: 9.2 MG/DL (ref 8.5–10.1)
CHLORIDE SERPL-SCNC: 106 MMOL/L (ref 100–111)
CO2 SERPL-SCNC: 29 MMOL/L (ref 21–32)
CREAT SERPL-MCNC: 0.52 MG/DL (ref 0.6–1.3)
GLOBULIN SER CALC-MCNC: 3.4 G/DL (ref 2–4)
GLUCOSE SERPL-MCNC: 88 MG/DL (ref 74–99)
POTASSIUM SERPL-SCNC: 4.2 MMOL/L (ref 3.5–5.5)
PROT SERPL-MCNC: 7.3 G/DL (ref 6.4–8.2)
SODIUM SERPL-SCNC: 139 MMOL/L (ref 136–145)
URATE SERPL-MCNC: 3.1 MG/DL (ref 2.6–7.2)

## 2020-01-24 PROCEDURE — 36415 COLL VENOUS BLD VENIPUNCTURE: CPT

## 2020-01-24 PROCEDURE — 80053 COMPREHEN METABOLIC PANEL: CPT

## 2020-01-24 PROCEDURE — 84550 ASSAY OF BLOOD/URIC ACID: CPT

## 2020-01-27 ENCOUNTER — TELEPHONE (OUTPATIENT)
Dept: INTERNAL MEDICINE CLINIC | Age: 65
End: 2020-01-27

## 2020-01-27 NOTE — TELEPHONE ENCOUNTER
Please schedule her for a f/u apt tomorrow at 3pm if she can come in at that time.     Dr. Mack Sirjose j  [facility], [facility], [facility] [address]  Phone: [phone]  Fax: [phone]

## 2020-01-27 NOTE — TELEPHONE ENCOUNTER
Please let patient know that her labs do not show a new abnormalities. Her liver test is mildly elevated but much better than it was when previously checked. Her uric acid level is normal.  Renal function labs and electrolytes are unremarkable. Are her sx improving?     Dr. Roy [facility]  [facility] of [facility], [address] [place] [address], [address] [place] [address] Str.  Phone: [phone]  Fax: [phone]

## 2020-01-27 NOTE — TELEPHONE ENCOUNTER
Patient called and stated that she was told to call in if she is experiencing any of the following symptoms. Patient reports having fatigue, minor abdominal pain, and intermittent dizziness. Patient states that she has been off work since Tuesday of last week. Patient states that she had some labs done last week for review.

## 2020-01-28 ENCOUNTER — OFFICE VISIT (OUTPATIENT)
Dept: INTERNAL MEDICINE CLINIC | Age: 65
End: 2020-01-28

## 2020-01-28 ENCOUNTER — HOSPITAL ENCOUNTER (OUTPATIENT)
Dept: LAB | Age: 65
Discharge: HOME | End: 2020-01-28
Payer: COMMERCIAL

## 2020-01-28 VITALS
WEIGHT: 218 LBS | SYSTOLIC BLOOD PRESSURE: 124 MMHG | DIASTOLIC BLOOD PRESSURE: 71 MMHG | RESPIRATION RATE: 12 BRPM | BODY MASS INDEX: 35.03 KG/M2 | TEMPERATURE: 99.4 F | HEIGHT: 66 IN | HEART RATE: 59 BPM | OXYGEN SATURATION: 97 %

## 2020-01-28 DIAGNOSIS — R53.83 FATIGUE, UNSPECIFIED TYPE: ICD-10-CM

## 2020-01-28 DIAGNOSIS — R06.83 SNORING: ICD-10-CM

## 2020-01-28 DIAGNOSIS — R74.01 TRANSAMINITIS: ICD-10-CM

## 2020-01-28 DIAGNOSIS — R10.84 GENERALIZED ABDOMINAL PAIN: ICD-10-CM

## 2020-01-28 DIAGNOSIS — R10.84 GENERALIZED ABDOMINAL PAIN: Primary | ICD-10-CM

## 2020-01-28 LAB
BASOPHILS # BLD: 0 K/UL (ref 0–0.1)
BASOPHILS NFR BLD: 0 % (ref 0–2)
CRP SERPL-MCNC: <0.3 MG/DL (ref 0–0.3)
D DIMER PPP FEU-MCNC: 0.33 UG/ML(FEU)
DIFFERENTIAL METHOD BLD: ABNORMAL
EOSINOPHIL # BLD: 0.1 K/UL (ref 0–0.4)
EOSINOPHIL NFR BLD: 1 % (ref 0–5)
ERYTHROCYTE [DISTWIDTH] IN BLOOD BY AUTOMATED COUNT: 11.9 % (ref 11.6–14.5)
FERRITIN SERPL-MCNC: 150 NG/ML (ref 8–388)
HCT VFR BLD AUTO: 45.8 % (ref 35–45)
HGB BLD-MCNC: 15.1 G/DL (ref 12–16)
LIPASE SERPL-CCNC: 235 U/L (ref 73–393)
LYMPHOCYTES # BLD: 2 K/UL (ref 0.9–3.6)
LYMPHOCYTES NFR BLD: 31 % (ref 21–52)
MCH RBC QN AUTO: 29.5 PG (ref 24–34)
MCHC RBC AUTO-ENTMCNC: 33 G/DL (ref 31–37)
MCV RBC AUTO: 89.6 FL (ref 74–97)
MONOCYTES # BLD: 0.4 K/UL (ref 0.05–1.2)
MONOCYTES NFR BLD: 7 % (ref 3–10)
NEUTS SEG # BLD: 4.1 K/UL (ref 1.8–8)
NEUTS SEG NFR BLD: 61 % (ref 40–73)
PLATELET # BLD AUTO: 254 K/UL (ref 135–420)
PMV BLD AUTO: 11.2 FL (ref 9.2–11.8)
RBC # BLD AUTO: 5.11 M/UL (ref 4.2–5.3)
T4 FREE SERPL-MCNC: 1 NG/DL (ref 0.7–1.5)
TSH SERPL DL<=0.05 MIU/L-ACNC: 2.25 UIU/ML (ref 0.36–3.74)
WBC # BLD AUTO: 6.6 K/UL (ref 4.6–13.2)

## 2020-01-28 PROCEDURE — 85379 FIBRIN DEGRADATION QUANT: CPT

## 2020-01-28 PROCEDURE — 82728 ASSAY OF FERRITIN: CPT

## 2020-01-28 PROCEDURE — 83690 ASSAY OF LIPASE: CPT

## 2020-01-28 PROCEDURE — 84439 ASSAY OF FREE THYROXINE: CPT

## 2020-01-28 PROCEDURE — 86140 C-REACTIVE PROTEIN: CPT

## 2020-01-28 PROCEDURE — 87340 HEPATITIS B SURFACE AG IA: CPT

## 2020-01-28 PROCEDURE — 86225 DNA ANTIBODY NATIVE: CPT

## 2020-01-28 PROCEDURE — 85025 COMPLETE CBC W/AUTO DIFF WBC: CPT

## 2020-01-28 PROCEDURE — 36415 COLL VENOUS BLD VENIPUNCTURE: CPT

## 2020-01-28 NOTE — LETTER
NOTIFICATION OF RETURN TO WORK / SCHOOL 
 
1/28/2020 Ms. Merl Ivy Vosler [address] [place] [address] Dr 
[address] [facility] To Whom It May Concern: 
 
Zoya Dickens is my patient and is under my medical care. If there are questions or concerns please have the patient contact our office. We have ordered additional testing.   
 
 
 
Respectfully, 
 
 
 
 
 
Aicha Pedro MD

## 2020-01-28 NOTE — PATIENT INSTRUCTIONS
There are no preventive care reminders to display for this patient. Abdominal Pain: Care Instructions Your Care Instructions Abdominal pain has many possible causes. Some aren't serious and get better on their own in a few days. Others need more testing and treatment. If your pain continues or gets worse, you need to be rechecked and may need more tests to find out what is wrong. You may need surgery to correct the problem. Don't ignore new symptoms, such as fever, nausea and vomiting, urination problems, pain that gets worse, and dizziness. These may be signs of a more serious problem. Your doctor may have recommended a follow-up visit in the next 8 to 12 hours. If you are not getting better, you may need more tests or treatment. The doctor has checked you carefully, but problems can develop later. If you notice any problems or new symptoms, get medical treatment right away. Follow-up care is a key part of your treatment and safety. Be sure to make and go to all appointments, and call your doctor if you are having problems. It's also a good idea to know your test results and keep a list of the medicines you take. How can you care for yourself at home? · Rest until you feel better. · To prevent dehydration, drink plenty of fluids, enough so that your urine is light yellow or clear like water. Choose water and other caffeine-free clear liquids until you feel better. If you have kidney, heart, or liver disease and have to limit fluids, talk with your doctor before you increase the amount of fluids you drink. · If your stomach is upset, eat mild foods, such as rice, dry toast or crackers, bananas, and applesauce. Try eating several small meals instead of two or three large ones. · Wait until 48 hours after all symptoms have gone away before you have spicy foods, alcohol, and drinks that contain caffeine. · Do not eat foods that are high in fat. · Avoid anti-inflammatory medicines such as aspirin, ibuprofen (Advil, Motrin), and naproxen (Aleve). These can cause stomach upset. Talk to your doctor if you take daily aspirin for another health problem. When should you call for help? Call 911 anytime you think you may need emergency care. For example, call if: 
  · You passed out (lost consciousness).  
  · You pass maroon or very bloody stools.  
  · You vomit blood or what looks like coffee grounds.  
  · You have new, severe belly pain.  
 Call your doctor now or seek immediate medical care if: 
  · Your pain gets worse, especially if it becomes focused in one area of your belly.  
  · You have a new or higher fever.  
  · Your stools are black and look like tar, or they have streaks of blood.  
  · You have unexpected vaginal bleeding.  
  · You have symptoms of a urinary tract infection. These may include: 
? Pain when you urinate. ? Urinating more often than usual. 
? Blood in your urine.  
  · You are dizzy or lightheaded, or you feel like you may faint.  
 Watch closely for changes in your health, and be sure to contact your doctor if: 
  · You are not getting better after 1 day (24 hours). Where can you learn more? Go to http://stanton-gonsalo.info/. Enter D334 in the search box to learn more about \"Abdominal Pain: Care Instructions. \" Current as of: June 26, 2019 Content Version: 12.2 © 5368-8434 [employer]. Care instructions adapted under license by [employer] (which disclaims liability or warranty for this information). If you have questions about a medical condition or this instruction, always ask your healthcare professional. [place] [address] any warranty or liability for your use of this information. Fatigue: Care Instructions Your Care Instructions Fatigue is a feeling of tiredness, exhaustion, or lack of energy.  You may feel fatigue because of too much or not enough activity. It can also come from stress, lack of sleep, boredom, and poor diet. Many medical problems, such as viral infections, can cause fatigue. Emotional problems, especially depression, are often the cause of fatigue. Fatigue is most often a symptom of another problem. Treatment for fatigue depends on the cause. For example, if you have fatigue because you have a certain health problem, treating this problem also treats your fatigue. If depression or anxiety is the cause, treatment may help. Follow-up care is a key part of your treatment and safety. Be sure to make and go to all appointments, and call your doctor if you are having problems. It's also a good idea to know your test results and keep a list of the medicines you take. How can you care for yourself at home? · Get regular exercise. But don't overdo it. Go back and forth between rest and exercise. · Get plenty of rest. 
· Eat a healthy diet. Do not skip meals, especially breakfast. 
· Reduce your use of caffeine, tobacco, and alcohol. Caffeine is most often found in coffee, tea, cola drinks, and chocolate. · Limit medicines that can cause fatigue. This includes tranquilizers and cold and allergy medicines. When should you call for help? Watch closely for changes in your health, and be sure to contact your doctor if: 
  · You have new symptoms such as fever or a rash.  
  · Your fatigue gets worse.  
  · You have been feeling down, depressed, or hopeless. Or you may have lost interest in things that you usually enjoy.  
  · You are not getting better as expected. Where can you learn more? Go to http://stanton-gonsalo.info/. Enter S351 in the search box to learn more about \"Fatigue: Care Instructions. \" Current as of: June 26, 2019 Content Version: 12.2 © 6581-6035 [employer].  Care instructions adapted under license by [address] (which disclaims liability or warranty for this information). If you have questions about a medical condition or this instruction, always ask your healthcare professional. [address] any warranty or liability for your use of this information.

## 2020-01-28 NOTE — PROGRESS NOTES
Lilibeth Larry presents today for   Chief Complaint   Patient presents with    Fatigue     x 7 days           ROOM   12    Abdominal Pain     slight abdominal pain that comes, and goes for the past 4 days    Dizziness     had a couple of episodes of dizziness this week       Is someone accompanying this pt? no    Is the patient using any DME equipment during OV? no    Depression Screening:  3 most recent PHQ Screens 1/28/2020   PHQ Not Done -   Little interest or pleasure in doing things Not at all   Feeling down, depressed, irritable, or hopeless Not at all   Total Score PHQ 2 0       Learning Assessment:  Learning Assessment 1/28/2020   PRIMARY LEARNER Patient   HIGHEST LEVEL OF EDUCATION - PRIMARY LEARNER  SOME COLLEGE   BARRIERS PRIMARY LEARNER NONE   CO-LEARNER CAREGIVER No   PRIMARY LANGUAGE ENGLISH   LEARNER PREFERENCE PRIMARY DEMONSTRATION   ANSWERED BY patient   RELATIONSHIP SELF       Abuse Screening:  Abuse Screening Questionnaire 1/28/2020   Do you ever feel afraid of your partner? N   Are you in a relationship with someone who physically or mentally threatens you? N   Is it safe for you to go home? Y       Fall Risk  No flowsheet data found. Health Maintenance reviewed and discussed and ordered per Provider. There are no preventive care reminders to display for this patient. .      Coordination of Care:  1. Have you been to the ER, urgent care clinic since your last visit? Hospitalized since your last visit? no    2. Have you seen or consulted any other health care providers outside of the [address] [place] [address] since your last visit? Include any pap smears or colon screening.  no

## 2020-01-28 NOTE — PROGRESS NOTES
INTERNISTS OF [facility]:  1/28/2020, MRN: [id number]      Arabella Martinez is a 62 y.o. female and presents to clinic for Fatigue (x 7 days           ROOM   12); Abdominal Pain (slight abdominal pain that comes, and goes for the past 4 days); and Dizziness (had a couple of episodes of dizziness this week)    Subjective:   Pt is Kathya female with h/o HLD, HTN, HERZOG, AF, and DJD. Abdominal Pain: She's been out of work since Tuesday. \"I'm lifeless. I have no energy. \" She feels the same as when she was diagnosed with AF. No CP, SOB, or palpitations. She had some dizzy spells this past wk while in the shower - lasting 2-3 minutes. No triggers are known. No alleviating factors are known. No cough/cold sx. No fever/chills. No emesis. She had some nausea \"the other day. \" No vision changes or hearing changes. She has a HA today - bifrontal. She has not taken anything for HA pain. She reports LUQ abdominal pain off/on over the past 4 days. Pain is not \"sharp\" but \"feels like how it feels when your arm is bruised. \" No triggers are known. No change in sx with po intake, defecation, or urination. No bleeding. No vaginal or urinary sx. Sx are not worsening. She has a history of transaminitis. Hepatitis C was ruled out. She has not been evaluated for hepatitis B. Patient Active Problem List    Diagnosis Date Noted    Primary osteoarthritis of left knee 05/14/2019    S/P TKR (total knee replacement) 05/14/2019    Severe obesity ([facility] [address] 03/11/2019    Primary osteoarthritis of right knee 05/22/2018    Atrial fibrillation ([address] 01/29/2018    HERZOG (nonalcoholic steatohepatitis) suggested by ultrasound report, 2016 08/11/2017    Obesity (BMI 30-39.9) 02/12/2017    Hyperlipidemia 02/07/2017    Essential hypertension 02/07/2017       Current Outpatient Medications   Medication Sig Dispense Refill    ELIQUIS 5 mg tablet Take 1 Tab by mouth two (2) times a day.  180 Tab 3    calcium-cholecalciferol, D3, (CALCIUM 600 + D) tablet Take 2 Tabs by mouth daily.  omega-3 fatty acids-fish oil (FISH OIL) 360-1,200 mg cap Take 2 Caps by mouth daily.  cholecalciferol (VITAMIN D3) 2,000 unit cap capsule Take 2,000 Units by mouth daily.  metoprolol tartrate (LOPRESSOR) 50 mg tablet Take 1 Tab by mouth two (2) times a day. 180 Tab 3    polyethylene glycol (MIRALAX) 17 gram packet Take 1 Packet by mouth daily. (Patient taking differently: Take 1 Packet by mouth daily. Indications: constipation) 30 Packet 1    potassium 99 mg tablet Take 99 mg by mouth daily as needed (supplement). No Known Allergies    Past Medical History:   Diagnosis Date    Arthritis     Bilateral Knee, and Bilateral Hand/Thumb    Atrial fibrillation ([address] 1/29/2018    Carpal tunnel syndrome     H/O echocardiogram 01/30/2018    EF 50%, normal left atrial size, no valvular heart disease    Headache     History of ankle fusion 1997    left    Hx of migraine headaches     Hypercholesterolemia     Hypertension     Morbid obesity ([address]     HERZOG (nonalcoholic steatohepatitis) suggested by ultrasound report, 2016 8/11/2017    Nausea & vomiting     Vertigo        Past Surgical History:   Procedure Laterality Date    COLONOSCOPY N/A 6/5/2018    COLONOSCOPY performed by Lisette Jim[facility]nez MD at [facility] [address]      age 25 years    HX COLONOSCOPY      Due in 2018:  May 2015    HX HYSTERECTOMY      age 30years old    HX KNEE REPLACEMENT Right 05/22/2018    HX KNEE REPLACEMENT Left 05/14/2019    HX ORTHOPAEDIC Left     ankle fusion    HX OTHER SURGICAL  2014    removal of mole left breast    HX TONSILLECTOMY      at age 29years old       Family History   Problem Relation Age of Onset    Heart Disease Mother     Hypertension Mother     Heart Surgery Mother     Cancer Father         colon    Hypertension Brother     No Known Problems Maternal Grandmother     Heart Disease Maternal Grandfather     MS Paternal Grandmother    [address] [facility] [place] Stroke Paternal Grandfather     Heart Disease Paternal Grandfather     No Known Problems Son        Social History     Tobacco Use    Smoking status: Never Smoker    Smokeless tobacco: Never Used   Substance Use Topics    Alcohol use: No     Alcohol/week: 0.0 standard drinks       ROS   Review of Systems   Constitutional: Negative for chills and fever. HENT: Negative for ear pain and sore throat. Eyes: Negative for blurred vision and pain. Respiratory: Negative for cough and shortness of breath. Cardiovascular: Negative for chest pain. Gastrointestinal: Positive for abdominal pain and nausea. Negative for blood in stool and melena. Genitourinary: Negative for dysuria and hematuria. Musculoskeletal: Positive for joint pain (off/on). Negative for myalgias. Skin: Negative for rash. Neurological: Negative for tingling, focal weakness and headaches. Endo/Heme/Allergies: Does not bruise/bleed easily. Psychiatric/Behavioral: Negative for substance abuse. Objective     Vitals:    01/28/20 1452   BP: 124/71   Pulse: (!) 59   Resp: 12   Temp: 99.4 °F (37.4 °C)   TempSrc: Oral   SpO2: 97%   Weight: 218 lb (98.9 kg)   Height: 5' 6\" (1.676 m)   PainSc:   2   PainLoc: Abdomen       Physical Exam  Vitals signs and nursing note reviewed. HENT:      Head: Normocephalic and atraumatic. Right Ear: External ear normal.      Left Ear: External ear normal.      Nose: Nose normal.      Mouth/Throat:      Pharynx: No oropharyngeal exudate. Eyes:      General: No scleral icterus. Right eye: No discharge. Left eye: No discharge. Conjunctiva/sclera: Conjunctivae normal.   Neck:      Musculoskeletal: Neck supple. Cardiovascular:      Rate and Rhythm: Normal rate and regular rhythm. Heart sounds: Normal heart sounds. No murmur. No friction rub. No gallop. Pulmonary:      Effort: Pulmonary effort is normal. No respiratory distress. Breath sounds: Normal breath sounds. No wheezing or rales. Chest:      Chest wall: No tenderness. Abdominal:      General: Bowel sounds are normal. There is no distension. Palpations: Abdomen is soft. There is no mass. Tenderness: There is abdominal tenderness (along her entire abdomen but mostly along her LUQ). There is no guarding or rebound. Musculoskeletal:         General: No swelling (Bue) or tenderness (Bue). Lymphadenopathy:      Cervical: No cervical adenopathy. Skin:     General: Skin is warm and dry. Findings: No erythema. Neurological:      Mental Status: She is alert and oriented to person, place, and time. Motor: No abnormal muscle tone. Gait: Gait is intact. Gait normal.   Psychiatric:         Mood and Affect: Mood and affect normal.         LABS   Data Review:   Lab Results   Component Value Date/Time    WBC 4.7 08/09/2019 07:06 AM    HGB 13.9 08/09/2019 07:06 AM    HCT 42.7 08/09/2019 07:06 AM    PLATELET 438 66/30/6505 07:06 AM    MCV 90.5 08/09/2019 07:06 AM       Lab Results   Component Value Date/Time    Sodium 139 01/24/2020 11:26 AM    Potassium 4.2 01/24/2020 11:26 AM    Chloride 106 01/24/2020 11:26 AM    CO2 29 01/24/2020 11:26 AM    Anion gap 4 01/24/2020 11:26 AM    Glucose 88 01/24/2020 11:26 AM    BUN 17 01/24/2020 11:26 AM    Creatinine 0.52 (L) 01/24/2020 11:26 AM    BUN/Creatinine ratio 33 (H) 01/24/2020 11:26 AM    GFR est AA >60 01/24/2020 11:26 AM    GFR est non-AA >60 01/24/2020 11:26 AM    Calcium 9.2 01/24/2020 11:26 AM       Lab Results   Component Value Date/Time    Cholesterol, total 258 (H) 08/09/2019 07:06 AM    HDL Cholesterol 60 08/09/2019 07:06 AM    LDL, calculated 151.6 (H) 08/09/2019 07:06 AM    VLDL, calculated 46.4 08/09/2019 07:06 AM    Triglyceride 232 (H) 08/09/2019 07:06 AM    CHOL/HDL Ratio 4.3 08/09/2019 07:06 AM       Lab Results   Component Value Date/Time    Hemoglobin A1c 5.4 05/01/2019 06:19 AM       Assessment/Plan:   1.  Generalized abdominal pain: +Transaminitis. +Fatigue. Etiology is unknown. [address] labs. Abdominal ultrasound ordered. ORDERS:  - LIPASE; Future  - CBC WITH AUTOMATED DIFF; Future  - TSH AND FREE T4; Future  - FERRITIN; Future  - HEP B SURFACE AG; Future  - GUERLINE COMPREHENSIVE PANEL; Future  - US ABD COMP; Future  - C REACTIVE PROTEIN, QT; Future  - US ABD COMP; Future  - D DIMER; Future    2. Snoring: +Fatigue  - Referral to Sleep Medicine to r/o JESSIKA    ORDERS:  - SLEEP MEDICINE REFERRAL      There are no preventive care reminders to display for this patient. Lab review: labs are reviewed in the EHR and ordered as mentioned above    I have discussed the diagnosis with the patient and the intended plan as seen in the above orders. The patient has received an after-visit summary and questions were answered concerning future plans. I have discussed medication side effects and warnings with the patient as well. I have reviewed the plan of care with the patient, accepted their input and they are in agreement with the treatment goals. All questions were answered. The patient understands the plan of care. Handouts provided today with above information. Pt instructed if symptoms worsen to call the office or report to the ED for continued care. Greater than 50% of the visit time was spent in counseling and/or coordination of care. Voice recognition was used to generate this report, which may have resulted in some phonetic based errors in grammar and contents. Even though attempts were made to correct all the mistakes, some may have been missed, and remained in the body of the document.           Mayela Hager MD

## 2020-01-29 ENCOUNTER — TELEPHONE (OUTPATIENT)
Dept: INTERNAL MEDICINE CLINIC | Age: 65
End: 2020-01-29

## 2020-01-29 LAB
HBV SURFACE AG SER QL: <0.1 INDEX
HBV SURFACE AG SER QL: NEGATIVE

## 2020-01-29 NOTE — TELEPHONE ENCOUNTER
----- Message from Jesus Skinner MD sent at 1/29/2020  2:44 PM EST -----  Please let her know that her labs are unremarkable. Her pancreatic function is normal.  Her thyroid function labs are normal.  There is no evidence of hepatitis B.   Her white blood cell count is normal.    Dr. Leann Wiggins  Internists of [address], [address] [place] [address] Str.  Phone: [phone]  Fax: [phone]

## 2020-01-29 NOTE — PROGRESS NOTES
Please let her know that her labs are unremarkable. Her pancreatic function is normal.  Her thyroid function labs are normal.  There is no evidence of hepatitis B.   Her white blood cell count is normal.    Dr. Christina Alexis  Internists of [address]  Phone: [phone]  Fax: [phone]

## 2020-01-30 LAB
CENTROMERE B AB SER-ACNC: <0.2 AI (ref 0–0.9)
CHROMATIN AB SERPL-ACNC: <0.2 AI (ref 0–0.9)
DSDNA AB SER-ACNC: <1 IU/ML (ref 0–9)
ENA JO1 AB SER-ACNC: <0.2 AI (ref 0–0.9)
ENA RNP AB SER-ACNC: <0.2 AI (ref 0–0.9)
ENA SCL70 AB SER-ACNC: <0.2 AI (ref 0–0.9)
ENA SM AB SER-ACNC: <0.2 AI (ref 0–0.9)
ENA SS-A AB SER-ACNC: <0.2 AI (ref 0–0.9)
ENA SS-B AB SER-ACNC: <0.2 AI (ref 0–0.9)
SEE BELOW, 164869: NORMAL

## 2020-02-03 ENCOUNTER — HOSPITAL ENCOUNTER (OUTPATIENT)
Dept: ULTRASOUND IMAGING | Age: 65
Discharge: HOME | End: 2020-02-03
Attending: INTERNAL MEDICINE
Payer: COMMERCIAL

## 2020-02-03 DIAGNOSIS — R10.84 GENERALIZED ABDOMINAL PAIN: ICD-10-CM

## 2020-02-03 DIAGNOSIS — R74.01 TRANSAMINITIS: ICD-10-CM

## 2020-02-03 PROCEDURE — 76700 US EXAM ABDOM COMPLETE: CPT

## 2020-02-05 ENCOUNTER — OFFICE VISIT (OUTPATIENT)
Dept: SURGERY | Age: 65
End: 2020-02-05

## 2020-02-05 ENCOUNTER — TELEPHONE (OUTPATIENT)
Dept: INTERNAL MEDICINE CLINIC | Age: 65
End: 2020-02-05

## 2020-02-05 ENCOUNTER — HOSPITAL ENCOUNTER (OUTPATIENT)
Dept: LAB | Age: 65
Discharge: HOME | End: 2020-02-05
Payer: COMMERCIAL

## 2020-02-05 VITALS
SYSTOLIC BLOOD PRESSURE: 124 MMHG | WEIGHT: 215.6 LBS | DIASTOLIC BLOOD PRESSURE: 78 MMHG | HEIGHT: 66 IN | BODY MASS INDEX: 34.65 KG/M2 | OXYGEN SATURATION: 98 % | TEMPERATURE: 98.5 F | HEART RATE: 72 BPM | RESPIRATION RATE: 18 BRPM

## 2020-02-05 DIAGNOSIS — G47.39 SLEEP APNEA-LIKE BEHAVIOR: ICD-10-CM

## 2020-02-05 DIAGNOSIS — R53.82 CHRONIC FATIGUE: ICD-10-CM

## 2020-02-05 DIAGNOSIS — R74.8 ELEVATED LIVER ENZYMES: ICD-10-CM

## 2020-02-05 DIAGNOSIS — R40.0 DAYTIME SLEEPINESS: ICD-10-CM

## 2020-02-05 DIAGNOSIS — I10 ESSENTIAL HYPERTENSION: Primary | ICD-10-CM

## 2020-02-05 DIAGNOSIS — Z71.3 ENCOUNTER FOR WEIGHT LOSS COUNSELING: ICD-10-CM

## 2020-02-05 DIAGNOSIS — E66.9 OBESITY (BMI 30-39.9): ICD-10-CM

## 2020-02-05 DIAGNOSIS — I10 ESSENTIAL HYPERTENSION: ICD-10-CM

## 2020-02-05 LAB
25(OH)D3 SERPL-MCNC: 46.9 NG/ML (ref 30–100)
FOLATE SERPL-MCNC: >20 NG/ML (ref 3.1–17.5)
PREALB SERPL-MCNC: 35.4 MG/DL (ref 20–40)
VIT B12 SERPL-MCNC: 1485 PG/ML (ref 211–911)

## 2020-02-05 PROCEDURE — 82306 VITAMIN D 25 HYDROXY: CPT

## 2020-02-05 PROCEDURE — 84425 ASSAY OF VITAMIN B-1: CPT

## 2020-02-05 PROCEDURE — 36415 COLL VENOUS BLD VENIPUNCTURE: CPT

## 2020-02-05 PROCEDURE — 82607 VITAMIN B-12: CPT

## 2020-02-05 PROCEDURE — 84134 ASSAY OF PREALBUMIN: CPT

## 2020-02-05 NOTE — TELEPHONE ENCOUNTER
----- Message from Emeka Dalton MD sent at 2/5/2020  2:12 PM EST -----  Please let her abdominal ultrasound does not show any new findings. There are no significant abnormalities.     Dr. Altaf Munoz  Internists of [address]  Phone: [phone]  Fax: [phone]

## 2020-02-05 NOTE — PROGRESS NOTES
Chief Complaint   Patient presents with    Weight Management     monthly follow up     Nursing Note for Medical Monitoring in the [facility] Weight Loss Program      Robert Mcghee is a 62 y.o. female who is enrolled in [facility] Program    Robert Mcghee was prescribed the VLCD / LCD. Did you have any problems adhering to the program last week? yes  If yes, please explain: \"sometimes eat celery and hard boiled egg\"    Since your last visit, have you experienced any complications? no    Have you received any other medical care this week? yes  If yes, where and for what? Been sick saw PCP, had blood work and an [address] abdominal has consult for sleep study\"    Have you had any change in your medications since your last visit? no  If yes what? Are you taking an appetite suppressant? no   If yes:  Do you need a refill? BP Readings from Last 3 Encounters:   02/05/20 124/78   01/28/20 124/71   01/22/20 154/86        Eating Habits Over Last Week:  Did you take in 64 oz of non-caloric fluids? yes     Did you consume your prescribed meal replacement regimen each day?  yes       Physical Activity Over the Past Week:    Aerobic exercise: 0 min  Resistance exercise: no workouts / week

## 2020-02-05 NOTE — PROGRESS NOTES
Please let her abdominal ultrasound does not show any new findings. There are no significant abnormalities.     Dr. John Kitchen  Internists of [facility], [address] [place] [address], [address] [place] [address] Str.  Phone: [phone]  Fax: [phone]

## 2020-02-05 NOTE — PROGRESS NOTES
[facility] Progress Note:   F/up Provider Visit    CC: Weight Management    Arabella Martinez is a 62 y.o. female who is here for her  f/up medical provider visit for the VLCD Program. she did attend class last week. No weight loss since 11/2020, would like to get back in VLCD    Weight History  Weight Metrics 2/5/2020 1/28/2020 1/22/2020 1/15/2020 1/15/2020 1/9/2020 1/8/2020   Weight 215 lb 9.6 oz 218 lb 214 lb 8 oz - 216 lb 9.6 oz - 216 lb 4.8 oz   Waist Measure Inches - - 40.5 42 - 42.5 -   Exercise Mins/week - - - - - - -   Body Fat % - - - - - - -   BMI 34.8 kg/m2 35.19 kg/m2 34.62 kg/m2 - 34.96 kg/m2 - 34.91 kg/m2     Starting wt: 234  Goal wt: 165  EKG due: 180  Ideal body weight: 59.3 kg (130 lb 11.7 oz)  Adjusted ideal body weight: 74.7 kg (164 lb 10.9 oz)  Body mass index is 34.8 kg/m². History    Past Medical History:   Diagnosis Date    Arthritis     Bilateral Knee, and Bilateral Hand/Thumb    Atrial fibrillation ([address] 1/29/2018    Carpal tunnel syndrome     H/O echocardiogram 01/30/2018    EF 50%, normal left atrial size, no valvular heart disease    Headache     History of ankle fusion 1997    left    Hx of migraine headaches     Hypercholesterolemia     Hypertension     Morbid obesity ([address]     HERZOG (nonalcoholic steatohepatitis) suggested by ultrasound report, 2016 8/11/2017    Nausea & vomiting     Vertigo        Past Surgical History:   Procedure Laterality Date    COLONOSCOPY N/A 6/5/2018    COLONOSCOPY performed by Mirian Leo MD at [facility]      age 25 years    HX COLONOSCOPY      Due in 2018:  May 2015    HX HYSTERECTOMY      age 30years old    HX KNEE REPLACEMENT Right 05/22/2018    HX KNEE REPLACEMENT Left 05/14/2019    HX ORTHOPAEDIC Left     ankle fusion    HX OTHER SURGICAL  2014    removal of mole left breast    HX TONSILLECTOMY      at age 29years old       Current Outpatient Medications   Medication Sig Dispense Refill    ELIQUIS 5 mg tablet Take 1 Tab by mouth two (2) times a day. 180 Tab 3    calcium-cholecalciferol, D3, (CALCIUM 600 + D) tablet Take 2 Tabs by mouth daily.  omega-3 fatty acids-fish oil (FISH OIL) 360-1,200 mg cap Take 2 Caps by mouth daily.  cholecalciferol (VITAMIN D3) (2,000 UNITS /50 MCG) cap capsule Take 2,000 Units by mouth daily.  metoprolol tartrate (LOPRESSOR) 50 mg tablet Take 1 Tab by mouth two (2) times a day. 180 Tab 3    polyethylene glycol (MIRALAX) 17 gram packet Take 1 Packet by mouth daily. (Patient taking differently: Take 1 Packet by mouth daily. Indications: constipation) 30 Packet 1    potassium 99 mg tablet Take 99 mg by mouth daily as needed (supplement). No Known Allergies    Social History     Tobacco Use    Smoking status: Never Smoker    Smokeless tobacco: Never Used   Substance Use Topics    Alcohol use: No     Alcohol/week: 0.0 standard drinks    Drug use: No       Family History   Problem Relation Age of Onset    Heart Disease Mother     Hypertension Mother     Heart Surgery Mother     Cancer Father         colon    Hypertension Brother     No Known Problems Maternal Grandmother     Heart Disease Maternal Grandfather     MS Paternal Grandmother     Stroke Paternal Grandfather     Heart Disease Paternal Grandfather     No Known Problems Son        Family Status   Relation Name Status    Mother  Alive        HTN, Atrial fibrillation    Father          Sepsis, colon cancer    Brother 1 Alive    MGM      MGF      PGM      PGF      Son 1 Alive         Medications:  Outpatient Medications Marked as Taking for the 20 encounter (Office Visit) with Juwan Noguera NP   Medication Sig Dispense Refill    ELIQUIS 5 mg tablet Take 1 Tab by mouth two (2) times a day. 180 Tab 3    calcium-cholecalciferol, D3, (CALCIUM 600 + D) tablet Take 2 Tabs by mouth daily.       omega-3 fatty acids-fish oil (FISH OIL) 360-1,200 mg cap Take 2 Caps by mouth daily.  cholecalciferol (VITAMIN D3) (2,000 UNITS /50 MCG) cap capsule Take 2,000 Units by mouth daily.  metoprolol tartrate (LOPRESSOR) 50 mg tablet Take 1 Tab by mouth two (2) times a day. 180 Tab 3    polyethylene glycol (MIRALAX) 17 gram packet Take 1 Packet by mouth daily. (Patient taking differently: Take 1 Packet by mouth daily. Indications: constipation) 30 Packet 1    potassium 99 mg tablet Take 99 mg by mouth daily as needed (supplement). Review of Systems  Review of Systems   Constitutional: Positive for malaise/fatigue. Daytime sleepiness   Snoring   Apnea like episodes    All other systems reviewed and are negative. Objective  Visit Vitals  /78   Pulse 72   Temp 98.5 °F (36.9 °C) (Oral)   Resp 18   Ht 5' 6\" (1.676 m)   Wt 97.8 kg (215 lb 9.6 oz)   SpO2 98%   BMI 34.80 kg/m²     No LMP recorded. Patient has had a hysterectomy. Physical Exam  Physical Exam  Vitals signs and nursing note reviewed. Constitutional:       Appearance: She is well-developed. HENT:      Head: Normocephalic. Cardiovascular:      Rate and Rhythm: Normal rate. Heart sounds: Normal heart sounds. Pulmonary:      Effort: Pulmonary effort is normal.      Breath sounds: Normal breath sounds. Abdominal:      General: Bowel sounds are normal. There is no distension. Palpations: Abdomen is soft. Tenderness: There is no abdominal tenderness. Musculoskeletal: Normal range of motion. Skin:     General: Skin is warm and dry. Neurological:      Mental Status: She is alert and oriented to person, place, and time. Assessment / Plan    Encounter Diagnoses   Name Primary?  Essential hypertension Yes    Obesity (BMI 30-39. 9)     Elevated liver enzymes     Encounter for weight loss counseling     Chronic fatigue     Sleep apnea-like behavior     Daytime sleepiness            1.   Weight management      Today, the patient is  Height: 5' 6\" (167.6 cm) tall, Weight: 97.8 kg (215 lb 9.6 oz) lbs for a Body mass index is 34.8 kg/m². is suffering from obesity    Degree of control: poor, back to CD this month or consider break from program    Progress was reviewed with patient. Goal(s) for next appointment:   -10       2. Labs    Latest results reviewed with patient   Routine monitoring labs ordered  Orders Placed This Encounter    VITAMIN B1, WHOLE BLOOD    VITAMIN B12 & FOLATE    VITAMIN D, 25 HYDROXY    PREALBUMIN         I have reviewed/discussed the above normal BMI with the patient. I have recommended the following interventions: dietary management education, guidance, and counseling, encourage exercise, monitor weight and prescribed dietary intake . .         >50% of 15 min visit spent counseling     SABINA Meyer     Dragon medical dictation software was used for portions of this report. Unintended transcription errors may occur.

## 2020-02-10 DIAGNOSIS — R10.84 GENERALIZED ABDOMINAL PAIN: Primary | ICD-10-CM

## 2020-02-10 LAB — VIT B1 BLD-SCNC: 146.2 NMOL/L (ref 66.5–200)

## 2020-02-10 NOTE — LETTER
2/18/2020 Ms. Cleone Almond Vosler [address] [place] [address]  
[address] [facility] Dear Ms. Vosler, We have been unable to reach you by phone to notify you of a new order. Please let her know that I am ordering a PVL study of her abdomen to further investigate her persistent symptoms. Meanwhile, please have her f/u with GI. Our scheduling department will be in touch with you to schedule the PVL study. The number is [phone] Sincerely, 
 
 
Lucille Marshall MD

## 2020-02-10 NOTE — PROGRESS NOTES
Please let her know that I am ordering a PVL study of her abdomen to further investigate her persistent symptoms. Meanwhile, please have her f/u with GI.      Dr. Kody Macedo  Internists of [facility], [address] [place] [address], [address] [place] [address] Str.  Phone: [phone]  Fax: [phone]

## 2020-02-12 ENCOUNTER — CLINICAL SUPPORT (OUTPATIENT)
Dept: SURGERY | Age: 65
End: 2020-02-12

## 2020-02-12 VITALS
SYSTOLIC BLOOD PRESSURE: 130 MMHG | HEIGHT: 66 IN | BODY MASS INDEX: 34.55 KG/M2 | HEART RATE: 64 BPM | WEIGHT: 215 LBS | DIASTOLIC BLOOD PRESSURE: 80 MMHG

## 2020-02-12 DIAGNOSIS — E66.9 OBESITY, UNSPECIFIED CLASSIFICATION, UNSPECIFIED OBESITY TYPE, UNSPECIFIED WHETHER SERIOUS COMORBIDITY PRESENT: Primary | ICD-10-CM

## 2020-02-12 NOTE — PROGRESS NOTES
Patient attended a weekly class as part of the Medically Supervised Weight Loss Program.  This class was facilitated by a Registered Dietitian. Topics taught at class focused on diet, particularly carbohydrate counting and portion control. Classes also focused on behavior changes and the importance of establishing a daily exercise routine. Progress Note: Weekly Medical Monitoring in the [facility] Weight Loss Program    Is there anything that you or the patient needs to let the supervising provider know about? no    Over the past week, have you experienced any side-effects? no    Candy Dent is a 62 y.o. female who is enrolled in [facility] Loss Program    Candy Dent was prescribed the VLCD / LCD. Visit Vitals  /80   Pulse 64   Ht 5' 6\" (1.676 m)   Wt 97.5 kg (215 lb)   BMI 34.70 kg/m²     Weight Metrics 2/12/2020 2/5/2020 1/28/2020 1/22/2020 1/15/2020 1/15/2020 1/9/2020   Weight 215 lb 215 lb 9.6 oz 218 lb 214 lb 8 oz - 216 lb 9.6 oz -   Waist Measure Inches 42 - - 40.5 42 - 42.5   Exercise Mins/week - - - - - - -   Body Fat % - - - - - - -   BMI 34.7 kg/m2 34.8 kg/m2 35.19 kg/m2 34.62 kg/m2 - 34.96 kg/m2 -         Have you received any other medical care this week? no  If yes, where and for what? Have you had any change in your medications since your last visit? yes  If yes what? Sleep study    Did you have any problems adhering to the program last week? no  If yes, please explain:       Eating Habits Over Last Week:  Did you take in 64 oz of non-caloric fluids? yes     Did you consume your prescribed meal replacement regimen each day?  yes       Physical Activity Over the Past Week:    Aerobic exercise: 0 min  Resistance exercise: 0 workouts / week

## 2020-02-13 RX ORDER — METOPROLOL TARTRATE 50 MG/1
TABLET ORAL
Qty: 180 TAB | Refills: 3 | Status: SHIPPED | OUTPATIENT
Start: 2020-02-13 | End: 2021-02-08

## 2020-02-18 ENCOUNTER — TELEPHONE (OUTPATIENT)
Dept: INTERNAL MEDICINE CLINIC | Age: 65
End: 2020-02-18

## 2020-02-25 ENCOUNTER — HOSPITAL ENCOUNTER (OUTPATIENT)
Dept: VASCULAR SURGERY | Age: 65
Discharge: HOME | End: 2020-02-25
Attending: INTERNAL MEDICINE
Payer: COMMERCIAL

## 2020-02-25 DIAGNOSIS — R10.84 GENERALIZED ABDOMINAL PAIN: ICD-10-CM

## 2020-02-25 PROCEDURE — 93975 VASCULAR STUDY: CPT

## 2020-02-26 LAB
ABDOMINAL PROX AORTA VEL: 55.7 CM/S
CELIAC EDV: 22.8 CM/S
CELIAC PSV: 101.1 CM/S
COMMON HEPATIC EDV: 15.8 CM/S
COMMON HEPATIC PSV: 99.2 CM/S
DIST SMA EDV: 8.7 CM/S
DIST SMA PSV: 82.3 CM/S
IMA EDV: 15.8 CM/S
IMA PSV: 105.8 CM/S
MID SMA EDV: 20.2 CM/S
MID SMA PSV: 94.8 CM/S
ORIGIN SMA EDV: 12.9 CM/S
ORIGIN SMA PSV: 74.3 CM/S
PROX AORTIC AP: 1.7 CM
PROX AORTIC TRANS: 1.71 CM
PROX SMA EDV: 21.3 CM/S
PROX SMA PSV: 108.7 CM/S
SPLENIC EDV: 9.9 CM/S
SPLENIC PSV: 95.5 CM/S

## 2020-02-26 NOTE — PROGRESS NOTES
Please let her know that her PVL study does not show any evidence of stenosis along her mesenteric abdominal arteries.     Dr. Radha Bryant  Internists of [facility], [facility], [address] [place] [address] Str.  Phone: [phone]  Fax: [phone]

## 2020-02-27 ENCOUNTER — TELEPHONE (OUTPATIENT)
Dept: INTERNAL MEDICINE CLINIC | Age: 65
End: 2020-02-27

## 2020-02-27 NOTE — TELEPHONE ENCOUNTER
Patient contacted, patient identified with two identifiers (Name & ). Patient aware of results per DR. Santos and verbalizes understanding.

## 2020-02-27 NOTE — TELEPHONE ENCOUNTER
----- Message from Alondra Heredia MD sent at 2/26/2020  3:32 PM EST -----  Please let her know that her PVL study does not show any evidence of stenosis along her mesenteric abdominal arteries.     Dr. Urszula Lynne  Internists of [address], [address] [place] [address] Str.  Phone: [phone]  Fax: [phone]

## 2020-03-06 NOTE — ED NOTES
Right 3rd finger lac dressed with bacitracin, Vaseline gauze and tube gauze Serum ziprasidone level was received, the level was 170.  This would support that the patient was actually taking his medication correctly.    The typical maximum serum level of ziprasidone is 139 , with a dosage of 120 mg daily. This would also support the change to Saphris which is non-formulary since patient was not getting good clinical results from ziprasidone.

## 2020-03-30 ENCOUNTER — OFFICE VISIT (OUTPATIENT)
Dept: CARDIOLOGY CLINIC | Age: 65
End: 2020-03-30

## 2020-03-30 VITALS
SYSTOLIC BLOOD PRESSURE: 126 MMHG | WEIGHT: 222 LBS | OXYGEN SATURATION: 98 % | DIASTOLIC BLOOD PRESSURE: 80 MMHG | BODY MASS INDEX: 35.68 KG/M2 | HEART RATE: 50 BPM | HEIGHT: 66 IN

## 2020-03-30 DIAGNOSIS — E78.2 MIXED HYPERLIPIDEMIA: ICD-10-CM

## 2020-03-30 DIAGNOSIS — I10 ESSENTIAL HYPERTENSION: ICD-10-CM

## 2020-03-30 DIAGNOSIS — I48.0 PAROXYSMAL ATRIAL FIBRILLATION (HCC): Primary | ICD-10-CM

## 2020-03-30 DIAGNOSIS — E66.01 SEVERE OBESITY (HCC): ICD-10-CM

## 2020-03-30 NOTE — PROGRESS NOTES
HISTORY OF PRESENT ILLNESS  Avril Partida is a 62 y.o. female. Follow-up   Pertinent negatives include no chest pain, no abdominal pain, no headaches and no shortness of breath. Patient presents for a follow-up office visit. Patient was diagnosed with atrial fibrillation with a rapid ventricular response by her PCP in January 2018. She was started on metoprolol for rate control. I started her on Xarelto for anticoagulation at last visit. She was then scheduled for a IGLESIA and cardioversion, however, when she showed up for her procedure, she had already converted back to sinus rhythm. Her anticoagulation was stopped at that time. She underwent an echocardiogram which showed low normal LV systolic function, EF 56% with no valvular heart disease and a normal left atrial size. She has remained on metoprolol for rate control. The patient was again found to be in atrial fibrillation at last visit in June 2018. Her heart rate was around 120 bpm.  She converted back to sinus rhythm the following day. She was started on Eliquis for long-term anticoagulation. The patient was last seen in our office approximately 6 months ago. At last visit, she was found to be in atrial fibrillation with an average heart rate around 100. Over the past 6 months, she states she is been feeling fairly well. She denies any prolonged heart palpitations, no worsening exertional dyspnea, no major change in her activity level. No chest pain, orthopnea, PND or leg swelling.     Past Medical History:   Diagnosis Date    Arthritis     Bilateral Knee, and Bilateral Hand/Thumb    Atrial fibrillation ([address] 1/29/2018    Carpal tunnel syndrome     H/O echocardiogram 01/30/2018    EF 50%, normal left atrial size, no valvular heart disease    Headache     History of ankle fusion 1997    left    Hx of migraine headaches     Hypercholesterolemia     Hypertension     Morbid obesity ([address]     HERZOG (nonalcoholic steatohepatitis) suggested by ultrasound report, 2016 8/11/2017    Nausea & vomiting     Vertigo      Current Outpatient Medications   Medication Sig Dispense Refill    metoprolol tartrate (LOPRESSOR) 50 mg tablet TAKE 1 TABLET BY MOUTH TWICE DAILY 180 Tab 3    ELIQUIS 5 mg tablet Take 1 Tab by mouth two (2) times a day. 180 Tab 3    calcium-cholecalciferol, D3, (CALCIUM 600 + D) tablet Take 2 Tabs by mouth daily.  omega-3 fatty acids-fish oil (FISH OIL) 360-1,200 mg cap Take 2 Caps by mouth daily.  cholecalciferol (VITAMIN D3) (2,000 UNITS /50 MCG) cap capsule Take 2,000 Units by mouth daily.  polyethylene glycol (MIRALAX) 17 gram packet Take 1 Packet by mouth daily. (Patient taking differently: Take 1 Packet by mouth daily. Indications: constipation) 30 Packet 1    potassium 99 mg tablet Take 99 mg by mouth daily as needed (supplement). No Known Allergies     Social History     Tobacco Use    Smoking status: Never Smoker    Smokeless tobacco: Never Used   Substance Use Topics    Alcohol use: No     Alcohol/week: 0.0 standard drinks    Drug use: No           Review of Systems   Constitutional: Negative for chills, fever and weight loss. HENT: Negative for nosebleeds. Eyes: Negative for blurred vision and double vision. Respiratory: Negative for cough, shortness of breath and wheezing. Cardiovascular: Negative for chest pain, palpitations, orthopnea, claudication, leg swelling and PND. Gastrointestinal: Negative for abdominal pain, heartburn, nausea and vomiting. Genitourinary: Negative for dysuria and hematuria. Musculoskeletal: Negative for falls, joint pain and myalgias. Skin: Negative for rash. Neurological: Negative for dizziness, focal weakness and headaches. Endo/Heme/Allergies: Does not bruise/bleed easily. Psychiatric/Behavioral: Negative for substance abuse.      Visit Vitals  /80 (BP 1 Location: Left arm, BP Patient Position: Sitting)   Pulse (!) 50   Ht 5' 6\" (1.676 m)   Wt 100.7 kg (222 lb)   SpO2 98%   BMI 35.83 kg/m²       Physical Exam   Constitutional: She is oriented to person, place, and time. She appears well-developed and well-nourished. HENT:   Head: Normocephalic and atraumatic. Eyes: Conjunctivae are normal.   Neck: Neck supple. No JVD present. Carotid bruit is not present. Cardiovascular: Regular rhythm, S1 normal, S2 normal and normal pulses. Bradycardia present. Exam reveals distant heart sounds. Exam reveals no gallop. No murmur heard. Pulmonary/Chest: Effort normal and breath sounds normal. She has no wheezes. She has no rales. Abdominal: Soft. Bowel sounds are normal. There is no abdominal tenderness. Musculoskeletal:         General: No tenderness, deformity or edema. Neurological: She is alert and oriented to person, place, and time. Skin: Skin is warm and dry. Psychiatric: She has a normal mood and affect. Her behavior is normal. Thought content normal.     EKG: Sinus bradycardia, normal axis, normal QTc interval, poor R wave progression, borderline low voltage, no ST or T wave abnormalities concerning for ischemia. Compared to the previous EKG, sinus rhythm has replaced atrial fibrillation. ASSESSMENT and PLAN    Paroxysmal atrial fibrillation. Newly diagnosed in January 2018. Patient converted on her own prior to her scheduled cardioversion. Her CHADs-Vasc score is 2. The patient has been in and out of atrial fibrillation in the past according to her EKGs. She has remained fairly asymptomatic to the arrhythmia. She is currently in a sinus rhythm. I would continue her current regimen which includes metoprolol for rate control and Eliquis for oral anticoagulation. Essential hypertension. This now appears well-controlled on her current medical regimen. Dyslipidemia. Patient was previously taking simvastatin, however that was stopped last month because of elevated transaminases.   This is being managed by her PCP.    Obesity. Patient reports fairly stable weight over the past 6 months. She was encouraged to try lose much weight as possible lifestyle modification.     Follow-up in 12 months, sooner as needed

## 2020-03-30 NOTE — PROGRESS NOTES
Zoya Dickens presents today for No chief complaint on file. Merl Ivy Vosler preferred language for health care discussion is english/other. Is someone accompanying this pt? no      s the patient using any DME equipment during [address]? no    Depression Screening:  3 most recent PHQ Screens 3/30/2020   PHQ Not Done -   Little interest or pleasure in doing things Not at all   Feeling down, depressed, irritable, or hopeless Not at all   Total Score PHQ 2 0       Learning Assessment:  Learning Assessment 1/28/2020   PRIMARY LEARNER Patient   HIGHEST LEVEL OF EDUCATION - PRIMARY LEARNER  SOME COLLEGE   BARRIERS PRIMARY LEARNER NONE   CO-LEARNER CAREGIVER No   PRIMARY LANGUAGE ENGLISH   LEARNER PREFERENCE PRIMARY DEMONSTRATION   ANSWERED BY patient   RELATIONSHIP SELF       Abuse Screening:  Abuse Screening Questionnaire 3/30/2020   Do you ever feel afraid of your partner? N   Are you in a relationship with someone who physically or mentally threatens you? N   Is it safe for you to go home? Y       Fall Risk  Fall Risk Assessment, last 12 mths 3/30/2020   Able to walk? Yes   Fall in past 12 months? No       Pt currently taking Anticoagulant therapy? Eliquis 5 mg    Coordination of Care:  1. Have you been to the ER, urgent care clinic since your last visit? Hospitalized since your last visit? Yes for her finger    2. Have you seen or consulted any other health care providers outside of the [address] [place] [address] since your last visit? Include any pap smears or colon screening.  no

## 2020-08-25 RX ORDER — CEFADROXIL 500 MG/1
CAPSULE ORAL
Qty: 5 CAP | Refills: 1 | Status: SHIPPED | OUTPATIENT
Start: 2020-08-25 | End: 2020-09-14

## 2020-09-14 RX ORDER — CEFADROXIL 500 MG/1
CAPSULE ORAL
Qty: 5 CAP | Refills: 1 | Status: SHIPPED | OUTPATIENT
Start: 2020-09-14 | End: 2020-09-25

## 2020-09-21 RX ORDER — APIXABAN 5 MG/1
TABLET, FILM COATED ORAL
Qty: 180 TAB | Refills: 3 | Status: SHIPPED | OUTPATIENT
Start: 2020-09-21 | End: 2021-09-10

## 2020-09-25 RX ORDER — CEFADROXIL 500 MG/1
CAPSULE ORAL
Qty: 5 CAP | Refills: 1 | Status: SHIPPED | OUTPATIENT
Start: 2020-09-25 | End: 2020-10-05

## 2020-10-01 ENCOUNTER — HOSPITAL ENCOUNTER (OUTPATIENT)
Dept: MAMMOGRAPHY | Age: 65
Discharge: HOME | End: 2020-10-01
Attending: INTERNAL MEDICINE
Payer: COMMERCIAL

## 2020-10-01 DIAGNOSIS — Z12.31 VISIT FOR SCREENING MAMMOGRAM: ICD-10-CM

## 2020-10-01 PROCEDURE — 77063 BREAST TOMOSYNTHESIS BI: CPT

## 2020-10-05 RX ORDER — CEFADROXIL 500 MG/1
CAPSULE ORAL
Qty: 5 CAP | Refills: 1 | Status: SHIPPED | OUTPATIENT
Start: 2020-10-05 | End: 2021-10-28

## 2021-02-01 NOTE — PROGRESS NOTES
Nurse note from patient's weekly VLCD / LCD / Maintenance class was reviewed. Pertinent medical concerns were:  None noted.      Continue current regimen
Progress Note: Weekly Education Class in the [facility]   Is there anything that you or the patient needs to let the [facility] Physician know about? no  Over the past week, have you experienced any side-effects? no    Arpita Church is a 65 y.o. female who is enrolled in [facility]    Visit Vitals    /83 (BP 1 Location: Left arm, BP Patient Position: Sitting)    Pulse (!) 59    Ht 5' 5\" (1.651 m)    Wt 198 lb (89.8 kg)    BMI 32.95 kg/m2     Weight Metrics 4/25/2018 4/18/2018 4/18/2018 4/12/2018 4/11/2018 4/4/2018 3/28/2018   Weight 198 lb - 197 lb 4.8 oz - 200 lb 202 lb 12.8 oz 204 lb 3.2 oz   Waist Measure Inches 38 39 - 39 - 40 41   Exercise Mins/week - 0 - - - - -   Body Fat % - 40.3 - - - - -   BMI 32.95 kg/m2 - 32.83 kg/m2 - 33.28 kg/m2 33.75 kg/m2 33.98 kg/m2         Have you received any other medical care this week? no  If yes, where and for what? Have you had any change in your medications since your last visit? no  If yes what? Did you have any problems adhering to the program last week? no  If yes, please explain:       Eating Habits Over Last Week:  Did you take in 64 oz of non-caloric fluids? yes     Did you consume your 4 meal replacements each day?  yes       Physical Activity Over the Past Week:    Aerobic exercise: 0 min  Resistance exercise: 0 workouts / week
Statement Selected

## 2021-02-08 RX ORDER — METOPROLOL TARTRATE 50 MG/1
TABLET ORAL
Qty: 180 TAB | Refills: 3 | Status: SHIPPED | OUTPATIENT
Start: 2021-02-08 | End: 2022-02-02

## 2021-03-09 ENCOUNTER — TELEPHONE (OUTPATIENT)
Dept: CARDIOLOGY CLINIC | Age: 66
End: 2021-03-09

## 2021-03-09 NOTE — TELEPHONE ENCOUNTER
Patient called with concerns that she was having a sharp shooting pain in the center of chest going through to her back area. Patient state she thinks she is in afib. Asked her if it was similar symptoms she had in the past with her afib and she stated she never knows when she is in afib. Patient states the symptom does not change with taking a deep breath but she does notice when she moves around. It is better when she is still.      I reviewed with Dr Michael Clark order and read back per Padma Weiss MD   Sounds like muscle skeletal but if it does not go away she may want to go to ED for further evaluation      Spoke to patient and aware of instructions

## 2021-03-24 ENCOUNTER — TELEPHONE (OUTPATIENT)
Dept: INTERNAL MEDICINE CLINIC | Age: 66
End: 2021-03-24

## 2021-03-24 NOTE — TELEPHONE ENCOUNTER
Maria A More with [facility] requesting most recent lab results be faxed to her at [phone]. Pt has a virtual appt today.   Printed and faxed lab results of 1/28/20

## 2021-05-29 NOTE — PATIENT INSTRUCTIONS
Body Mass Index: Care Instructions  Your Care Instructions    Body mass index (BMI) can help you see if your weight is raising your risk for health problems. It uses a formula to compare how much you weigh with how tall you are. · A BMI lower than 18.5 is considered underweight. · A BMI between 18.5 and 24.9 is considered healthy. · A BMI between 25 and 29.9 is considered overweight. A BMI of 30 or higher is considered obese. If your BMI is in the normal range, it means that you have a lower risk for weight-related health problems. If your BMI is in the overweight or obese range, you may be at increased risk for weight-related health problems, such as high blood pressure, heart disease, stroke, arthritis or joint pain, and diabetes. If your BMI is in the underweight range, you may be at increased risk for health problems such as fatigue, lower protection (immunity) against illness, muscle loss, bone loss, hair loss, and hormone problems. BMI is just one measure of your risk for weight-related health problems. You may be at higher risk for health problems if you are not active, you eat an unhealthy diet, or you drink too much alcohol or use tobacco products. Follow-up care is a key part of your treatment and safety. Be sure to make and go to all appointments, and call your doctor if you are having problems. It's also a good idea to know your test results and keep a list of the medicines you take. How can you care for yourself at home? · Practice healthy eating habits. This includes eating plenty of fruits, vegetables, whole grains, lean protein, and low-fat dairy. · If your doctor recommends it, get more exercise. Walking is a good choice. Bit by bit, increase the amount you walk every day. Try for at least 30 minutes on most days of the week. · Do not smoke. Smoking can increase your risk for health problems. If you need help quitting, talk to your doctor about stop-smoking programs and medicines. These can increase your chances of quitting for good. · Limit alcohol to 2 drinks a day for men and 1 drink a day for women. Too much alcohol can cause health problems. If you have a BMI higher than 25  · Your doctor may do other tests to check your risk for weight-related health problems. This may include measuring the distance around your waist. A waist measurement of more than 40 inches in men or 35 inches in women can increase the risk of weight-related health problems. · Talk with your doctor about steps you can take to stay healthy or improve your health. You may need to make lifestyle changes to lose weight and stay healthy, such as changing your diet and getting regular exercise. If you have a BMI lower than 18.5  · Your doctor may do other tests to check your risk for health problems. · Talk with your doctor about steps you can take to stay healthy or improve your health. You may need to make lifestyle changes to gain or maintain weight and stay healthy, such as getting more healthy foods in your diet and doing exercises to build muscle. Where can you learn more? Go to http://stanton-gonsalo.info/. Enter S176 in the search box to learn more about \"Body Mass Index: Care Instructions. \"  Current as of: June 26, 2018  Content Version: 11.8  © 1630-5140 Healthwise, Incorporated. Care instructions adapted under license by [employer] (which disclaims liability or warranty for this information). If you have questions about a medical condition or this instruction, always ask your healthcare professional. [place] [address] any warranty or liability for your use of this information.       Health Maintenance Due   Topic Date Due    Shingrix Vaccine Age 49> (1 of 2) 05/05/2006   

## 2021-09-10 RX ORDER — APIXABAN 5 MG/1
TABLET, FILM COATED ORAL
Qty: 180 TABLET | Refills: 3 | Status: SHIPPED | OUTPATIENT
Start: 2021-09-10 | End: 2022-09-01

## 2021-09-28 ENCOUNTER — TRANSCRIBE ORDER (OUTPATIENT)
Dept: SCHEDULING | Age: 66
End: 2021-09-28

## 2021-09-28 DIAGNOSIS — Z12.31 VISIT FOR SCREENING MAMMOGRAM: Primary | ICD-10-CM

## 2021-10-27 NOTE — PROGRESS NOTES
Tierney Waller presents today for   Chief Complaint   Patient presents with    Follow-up            1. \"Have you been to the ER, urgent care clinic since your last visit? Hospitalized since your last visit? \" {no    2. \"Have you seen or consulted any other health care providers outside of the [address] [place] [address] since your last visit? \" yes     3. For patients aged 39-70: Has the patient had a colonoscopy? yes    If the patient is female:    4. For patients aged 41-77: Has the patient had a mammogram within the past 2 years? yes    5.  For patients aged 21-65: Has the patient had a pap smear? n/a

## 2021-10-28 ENCOUNTER — TELEPHONE (OUTPATIENT)
Dept: CARDIOLOGY CLINIC | Age: 66
End: 2021-10-28

## 2021-10-28 ENCOUNTER — HOSPITAL ENCOUNTER (OUTPATIENT)
Dept: MAMMOGRAPHY | Age: 66
Discharge: HOME | End: 2021-10-28
Attending: INTERNAL MEDICINE
Payer: COMMERCIAL

## 2021-10-28 ENCOUNTER — HOSPITAL ENCOUNTER (OUTPATIENT)
Dept: LAB | Age: 66
Discharge: HOME | End: 2021-10-28
Payer: COMMERCIAL

## 2021-10-28 ENCOUNTER — OFFICE VISIT (OUTPATIENT)
Dept: INTERNAL MEDICINE CLINIC | Age: 66
End: 2021-10-28
Payer: COMMERCIAL

## 2021-10-28 VITALS
RESPIRATION RATE: 16 BRPM | HEIGHT: 66 IN | TEMPERATURE: 97.8 F | OXYGEN SATURATION: 96 % | HEART RATE: 62 BPM | WEIGHT: 253 LBS | BODY MASS INDEX: 40.66 KG/M2 | DIASTOLIC BLOOD PRESSURE: 84 MMHG | SYSTOLIC BLOOD PRESSURE: 126 MMHG

## 2021-10-28 DIAGNOSIS — I48.0 PAROXYSMAL ATRIAL FIBRILLATION (HCC): ICD-10-CM

## 2021-10-28 DIAGNOSIS — R53.83 FATIGUE, UNSPECIFIED TYPE: ICD-10-CM

## 2021-10-28 DIAGNOSIS — I10 ESSENTIAL HYPERTENSION: ICD-10-CM

## 2021-10-28 DIAGNOSIS — R73.9 HYPERGLYCEMIA: ICD-10-CM

## 2021-10-28 DIAGNOSIS — R07.9 CHEST PAIN, UNSPECIFIED TYPE: ICD-10-CM

## 2021-10-28 DIAGNOSIS — R53.83 FATIGUE, UNSPECIFIED TYPE: Primary | ICD-10-CM

## 2021-10-28 DIAGNOSIS — Z12.31 VISIT FOR SCREENING MAMMOGRAM: ICD-10-CM

## 2021-10-28 DIAGNOSIS — E66.01 OBESITY, CLASS III, BMI 40-49.9 (MORBID OBESITY) (HCC): ICD-10-CM

## 2021-10-28 LAB
ALBUMIN SERPL-MCNC: 4 G/DL (ref 3.4–5)
ALBUMIN/GLOB SERPL: 1.2 {RATIO} (ref 0.8–1.7)
ALP SERPL-CCNC: 74 U/L (ref 45–117)
ALT SERPL-CCNC: 88 U/L (ref 13–56)
ANION GAP SERPL CALC-SCNC: 2 MMOL/L (ref 3–18)
AST SERPL-CCNC: 39 U/L (ref 10–38)
BASOPHILS # BLD: 0 K/UL (ref 0–0.1)
BASOPHILS NFR BLD: 1 % (ref 0–2)
BILIRUB SERPL-MCNC: 0.4 MG/DL (ref 0.2–1)
BUN SERPL-MCNC: 18 MG/DL (ref 7–18)
BUN/CREAT SERPL: 27 (ref 12–20)
CALCIUM SERPL-MCNC: 10.1 MG/DL (ref 8.5–10.1)
CALCULATED R AXIS, ECG10: 4 DEGREES
CALCULATED T AXIS, ECG11: 64 DEGREES
CHLORIDE SERPL-SCNC: 111 MMOL/L (ref 100–111)
CHOLEST SERPL-MCNC: 288 MG/DL
CO2 SERPL-SCNC: 27 MMOL/L (ref 21–32)
CREAT SERPL-MCNC: 0.67 MG/DL (ref 0.6–1.3)
CREAT UR-MCNC: 179 MG/DL (ref 30–125)
DIAGNOSIS, 93000: NORMAL
DIFFERENTIAL METHOD BLD: ABNORMAL
EOSINOPHIL # BLD: 0.1 K/UL (ref 0–0.4)
EOSINOPHIL NFR BLD: 1 % (ref 0–5)
ERYTHROCYTE [DISTWIDTH] IN BLOOD BY AUTOMATED COUNT: 12.1 % (ref 11.6–14.5)
EST. AVERAGE GLUCOSE BLD GHB EST-MCNC: 105 MG/DL
GLOBULIN SER CALC-MCNC: 3.4 G/DL (ref 2–4)
GLUCOSE SERPL-MCNC: 102 MG/DL (ref 74–99)
HBA1C MFR BLD: 5.3 % (ref 4.2–5.6)
HCT VFR BLD AUTO: 51 % (ref 35–45)
HDLC SERPL-MCNC: 57 MG/DL (ref 40–60)
HDLC SERPL: 5.1 {RATIO} (ref 0–5)
HGB BLD-MCNC: 16 G/DL (ref 12–16)
LDLC SERPL CALC-MCNC: 181.4 MG/DL (ref 0–100)
LIPID PROFILE,FLP: ABNORMAL
LYMPHOCYTES # BLD: 2.4 K/UL (ref 0.9–3.6)
LYMPHOCYTES NFR BLD: 36 % (ref 21–52)
MCH RBC QN AUTO: 28.1 PG (ref 24–34)
MCHC RBC AUTO-ENTMCNC: 31.4 G/DL (ref 31–37)
MCV RBC AUTO: 89.5 FL (ref 78–100)
MICROALBUMIN UR-MCNC: 1.51 MG/DL (ref 0–3)
MICROALBUMIN/CREAT UR-RTO: 8 MG/G (ref 0–30)
MONOCYTES # BLD: 0.4 K/UL (ref 0.05–1.2)
MONOCYTES NFR BLD: 6 % (ref 3–10)
NEUTS SEG # BLD: 3.8 K/UL (ref 1.8–8)
NEUTS SEG NFR BLD: 57 % (ref 40–73)
PLATELET # BLD AUTO: 309 K/UL (ref 135–420)
PMV BLD AUTO: 10.3 FL (ref 9.2–11.8)
POTASSIUM SERPL-SCNC: 4.6 MMOL/L (ref 3.5–5.5)
PROT SERPL-MCNC: 7.4 G/DL (ref 6.4–8.2)
Q-T INTERVAL, ECG07: 284 MS
QRS DURATION, ECG06: 68 MS
QTC CALCULATION (BEZET), ECG08: 394 MS
RBC # BLD AUTO: 5.7 M/UL (ref 4.2–5.3)
SODIUM SERPL-SCNC: 140 MMOL/L (ref 136–145)
T4 FREE SERPL-MCNC: 1 NG/DL (ref 0.7–1.5)
TRIGL SERPL-MCNC: 248 MG/DL (ref ?–150)
TSH SERPL DL<=0.05 MIU/L-ACNC: 1.82 UIU/ML (ref 0.36–3.74)
VENTRICULAR RATE, ECG03: 116 BPM
VLDLC SERPL CALC-MCNC: 49.6 MG/DL
WBC # BLD AUTO: 6.8 K/UL (ref 4.6–13.2)

## 2021-10-28 PROCEDURE — 84439 ASSAY OF FREE THYROXINE: CPT

## 2021-10-28 PROCEDURE — 93005 ELECTROCARDIOGRAM TRACING: CPT

## 2021-10-28 PROCEDURE — 80061 LIPID PANEL: CPT

## 2021-10-28 PROCEDURE — 80053 COMPREHEN METABOLIC PANEL: CPT

## 2021-10-28 PROCEDURE — 99214 OFFICE O/P EST MOD 30 MIN: CPT | Performed by: INTERNAL MEDICINE

## 2021-10-28 PROCEDURE — 85025 COMPLETE CBC W/AUTO DIFF WBC: CPT

## 2021-10-28 PROCEDURE — 83036 HEMOGLOBIN GLYCOSYLATED A1C: CPT

## 2021-10-28 PROCEDURE — 82043 UR ALBUMIN QUANTITATIVE: CPT

## 2021-10-28 PROCEDURE — 36415 COLL VENOUS BLD VENIPUNCTURE: CPT

## 2021-10-28 PROCEDURE — 77067 SCR MAMMO BI INCL CAD: CPT

## 2021-10-28 NOTE — PROGRESS NOTES
INTERNISTS OF [facility]:  10/28/2021, MRN: [id number]      Devi Jain is a 73 y.o. female and presents to clinic for Follow-up      Subjective:   Pt is a 70yo female with h/o HLD, HTN, HERZOG, AF, and DJD. 1. Fatigue and Chest Discomfort: For he past 2 wks, she has had worsening fatigue. She reports similar sx in the past when she was diagnosed with AF. Sx were so bad that she hasn't been to work since Monday. She reports a burning CP. She is having sx \"just a little bit. \" No triggers are known for her CP. No palpitations, bleeding hx, and no SOB. No sick contacts. She is getting 6-7 hours of sleep per night. +Snoring. +Taking eliquis and metoprolol. Wt Readings from Last 3 Encounters:   10/28/21 253 lb (114.8 kg)   03/30/20 222 lb (100.7 kg)   02/12/20 215 lb (97.5 kg)     2. Abdominal Pain Hx: She feels like something in her abdomen is \"twisting\" when she bends down. The twisting sensation occurs along her epigastric area. Stretching helps to relieve this sx. \"When it happens, it makes me scream\" 2/2 the pain. It occurs every 2-3 months. It will last  No more than 5 minutes. 3.  Weight Gain: She has a history of elevated blood sugars and on previous BMPs per review of the EHR. Over the past year, patient has gained over 30 pounds. Her weight today is 243 pounds. She states that her diet has not changed. She eats a low-carb diet.     Patient Active Problem List    Diagnosis Date Noted    Primary osteoarthritis of left knee 05/14/2019    S/P TKR (total knee replacement) 05/14/2019    Severe obesity ([address] 03/11/2019    Primary osteoarthritis of right knee 05/22/2018    Atrial fibrillation ([address] 01/29/2018    HERZOG (nonalcoholic steatohepatitis) suggested by ultrasound report, 2016 08/11/2017    Obesity (BMI 30-39.9) 02/12/2017    Hyperlipidemia 02/07/2017    Essential hypertension 02/07/2017       Current Outpatient Medications   Medication Sig Dispense Refill    Eliquis 5 mg tablet TAKE 1 TABLET BY MOUTH TWICE DAILY 180 Tablet 3    metoprolol tartrate (LOPRESSOR) 50 mg tablet TAKE 1 TABLET BY MOUTH TWICE DAILY 180 Tab 3    calcium-cholecalciferol, D3, (CALCIUM 600 + D) tablet Take 2 Tabs by mouth daily.  omega-3 fatty acids-fish oil (FISH OIL) 360-1,200 mg cap Take 2 Caps by mouth daily.  cholecalciferol (VITAMIN D3) (2,000 UNITS /50 MCG) cap capsule Take 2,000 Units by mouth daily.  polyethylene glycol (MIRALAX) 17 gram packet Take 1 Packet by mouth daily. (Patient taking differently: Take 1 Packet by mouth daily. Indications: constipation) 30 Packet 1    potassium 99 mg tablet Take 99 mg by mouth daily as needed (supplement).  cefadroxil (DURICEF) 500 mg capsule TAKE 3 CAPSULES BY MOUTH THE NIGHT BEFORE THE APPOINTMENT AND 2 CAPSULES 1 HOUR BEFORE THE APPOINTMENT (Patient not taking: Reported on 10/28/2021) 5 Cap 1       No Known Allergies    Past Medical History:   Diagnosis Date    Arthritis     Bilateral Knee, and Bilateral Hand/Thumb    Atrial fibrillation ([address] 1/29/2018    Carpal tunnel syndrome     H/O echocardiogram 01/30/2018    EF 50%, normal left atrial size, no valvular heart disease    Headache     History of ankle fusion 1997    left    Hx of migraine headaches     Hypercholesterolemia     Hypertension     Morbid obesity ([address]     HERZOG (nonalcoholic steatohepatitis) suggested by ultrasound report, 2016 8/11/2017    Nausea & vomiting     Vertigo        Past Surgical History:   Procedure Laterality Date    COLONOSCOPY N/A 6/5/2018    COLONOSCOPY performed by Charlene Browning MD at [address] [place] [address]      age 25 years    HX COLONOSCOPY      Due in 2018:  May 2015    HX HYSTERECTOMY      age 30years old    HX KNEE REPLACEMENT Right 05/22/2018    HX KNEE REPLACEMENT Left 05/14/2019    HX ORTHOPAEDIC Left     ankle fusion    HX OTHER SURGICAL  2014    removal of mole left breast    HX TONSILLECTOMY      at age 29years old       Family History   Problem Relation Age of Onset    Heart Disease Mother     Hypertension Mother     Heart Surgery Mother     Cancer Father         colon    Hypertension Brother     No Known Problems Maternal Grandmother     Heart Disease Maternal Grandfather     MS Paternal Grandmother     Stroke Paternal Grandfather     Heart Disease Paternal Grandfather     No Known Problems Son        Social History     Tobacco Use    Smoking status: Never Smoker    Smokeless tobacco: Never Used   Substance Use Topics    Alcohol use: No     Alcohol/week: 0.0 standard drinks       ROS   Review of Systems   Constitutional: Positive for malaise/fatigue. Negative for chills and fever. HENT: Negative for ear pain and sore throat. Eyes: Negative for blurred vision and pain. Respiratory: Negative for cough and shortness of breath. Cardiovascular: Positive for chest pain (see HPI). Gastrointestinal: Positive for heartburn. Negative for abdominal pain, blood in stool and melena. Genitourinary: Negative for dysuria and hematuria. Musculoskeletal: Negative for joint pain and myalgias. Skin: Negative for rash. Neurological: Negative for headaches. Endo/Heme/Allergies: Does not bruise/bleed easily. Psychiatric/Behavioral: Negative for substance abuse. Objective     Vitals:    10/28/21 0751   BP: 126/84   Pulse: 62   Resp: 16   Temp: 97.8 °F (36.6 °C)   TempSrc: Temporal   SpO2: 96%   Weight: 253 lb (114.8 kg)   Height: 5' 6\" (1.676 m)   PainSc:   0 - No pain       Physical Exam  Vitals and nursing note reviewed. HENT:      Head: Normocephalic and atraumatic. Right Ear: External ear normal.      Left Ear: External ear normal.   Eyes:      General: No scleral icterus. Right eye: No discharge. Left eye: No discharge. Conjunctiva/sclera: Conjunctivae normal.   Cardiovascular:      Rate and Rhythm: Normal rate. Rhythm irregular. Heart sounds: Normal heart sounds. No murmur heard. No friction rub. No gallop. Pulmonary:      Effort: Pulmonary effort is normal. No respiratory distress. Breath sounds: Normal breath sounds. No wheezing or rales. Abdominal:      General: Bowel sounds are normal. There is no distension. Palpations: Abdomen is soft. There is no mass. Tenderness: There is no abdominal tenderness. There is no guarding or rebound. Musculoskeletal:         General: No swelling (BUE) or tenderness (BUE). Cervical back: Neck supple. Lymphadenopathy:      Cervical: No cervical adenopathy. Skin:     General: Skin is warm and dry. Findings: No erythema or rash. Neurological:      Mental Status: She is alert. Motor: No abnormal muscle tone.       Gait: Gait normal.   Psychiatric:         Mood and Affect: Mood normal.         LABS   Data Review:   Lab Results   Component Value Date/Time    WBC 6.6 01/28/2020 04:22 PM    HGB 15.1 01/28/2020 04:22 PM    HCT 45.8 (H) 01/28/2020 04:22 PM    PLATELET 510 96/68/9511 04:22 PM    MCV 89.6 01/28/2020 04:22 PM       Lab Results   Component Value Date/Time    Sodium 139 01/24/2020 11:26 AM    Potassium 4.2 01/24/2020 11:26 AM    Chloride 106 01/24/2020 11:26 AM    CO2 29 01/24/2020 11:26 AM    Anion gap 4 01/24/2020 11:26 AM    Glucose 88 01/24/2020 11:26 AM    BUN 17 01/24/2020 11:26 AM    Creatinine 0.52 (L) 01/24/2020 11:26 AM    BUN/Creatinine ratio 33 (H) 01/24/2020 11:26 AM    GFR est AA >60 01/24/2020 11:26 AM    GFR est non-AA >60 01/24/2020 11:26 AM    Calcium 9.2 01/24/2020 11:26 AM       Lab Results   Component Value Date/Time    Cholesterol, total 258 (H) 08/09/2019 07:06 AM    HDL Cholesterol 60 08/09/2019 07:06 AM    LDL, calculated 151.6 (H) 08/09/2019 07:06 AM    VLDL, calculated 46.4 08/09/2019 07:06 AM    Triglyceride 232 (H) 08/09/2019 07:06 AM    CHOL/HDL Ratio 4.3 08/09/2019 07:06 AM       Lab Results   Component Value Date/Time    Hemoglobin A1c 5.4 05/01/2019 06:19 AM Assessment/Plan:   1. Fatigue:+CP hx. +AF andEssential hypertension. Her blood pressure is 126/84.  -Okay to continue medication as prescribed. We will check labs. -I encouraged her to follow-up with her cardiologist.  -She was instructed to seek urgent medical attention if she develops persistent chest pain symptoms.  -Ordering an EKG. ORDERS:  - TSH AND FREE T4; Future  - METABOLIC PANEL, COMPREHENSIVE; Future  - LIPID PANEL; Future  - MICROALBUMIN, UR, RAND W/ MICROALB/CREAT RATIO; Future  - HEMOGLOBIN A1C WITH EAG; Future    2. Abdominal Pain Hx: Asymptomatic today.  -Checking labs. 3. Hyperglycemia Hx: Her weight is up to 53. She has had significant weight gain.  -Checking labs. ORDERS:  - METABOLIC PANEL, COMPREHENSIVE; Future  - HEMOGLOBIN A1C WITH EAG; Future  - CORTISOL, URINE FREE 24 HR; Future    4. General:  -She was to hold off on getting a bone density study.  -I encouraged her to get all recommended vaccinations. Health Maintenance Due   Topic Date Due    Bone Densitometry (Dexa) Screening  Never done    Pneumococcal 65+ years (1 of 1 - PPSV23) Never done     Lab review: labs are reviewed in the EHR and ordered as mentioned above. I have discussed the diagnosis with the patient and the intended plan as seen in the above orders. The patient has received an after-visit summary and questions were answered concerning future plans. I have discussed medication side effects and warnings with the patient as well. I have reviewed the plan of care with the patient, accepted their input and they are in agreement with the treatment goals. All questions were answered. The patient understands the plan of care. Handouts provided today with above information. Pt instructed if symptoms worsen to call the office or report to the ED for continued care. Greater than 50% of the visit time was spent in counseling and/or coordination of care.       Voice recognition was used to generate this report, which may have resulted in some phonetic based errors in grammar and contents. Even though attempts were made to correct all the mistakes, some may have been missed, and remained in the body of the document.           Jeannine Lombard, MD

## 2021-10-28 NOTE — TELEPHONE ENCOUNTER
Received call from patient stating she just saw her PCP and was stated they she was in afib. Discussed with Dr. Lonnell Kussmaul and PCP office visit reviewed. Heart rate 62. Patient states no palpitations or chest pain, just fatigue. She is on her way to get lab work ordered by PCP. Verbal order and read back per Felicia Monet MD  - continue medications as ordered  - keep scheduled appointment  - notify our office if palpitations, elevated heart rate, chest pains. This has been fully explained to the patient, who indicates understanding.

## 2021-10-28 NOTE — PATIENT INSTRUCTIONS
Eating Healthy Foods: Care Instructions  Your Care Instructions     Eating healthy foods can help lower your risk for disease. Healthy food gives you energy and keeps your heart strong, your brain active, your muscles working, and your bones strong. A healthy diet includes a variety of foods from the basic food groups: grains, vegetables, fruits, milk and milk products, and meat and beans. Some people may eat more of their favorite foods from only one food group and, as a result, miss getting the nutrients they need. So, it is important to pay attention not only to what you eat but also to what you are missing from your diet. You can eat a healthy, balanced diet by making a few small changes. Follow-up care is a key part of your treatment and safety. Be sure to make and go to all appointments, and call your doctor if you are having problems. It's also a good idea to know your test results and keep a list of the medicines you take. How can you care for yourself at home? Look at what you eat  · Keep a food diary for a week or two and record everything you eat or drink. Track the number of servings you eat from each food group. · For a balanced diet every day, eat a variety of:  ? 6 or more ounce-equivalents of grains, such as cereals, breads, crackers, rice, or pasta, every day. An ounce-equivalent is 1 slice of bread, 1 cup of ready-to-eat cereal, or ½ cup of cooked rice, cooked pasta, or cooked cereal.  ? 2½ cups of vegetables, especially:  § Dark-green vegetables such as broccoli and spinach. § Orange vegetables such as carrots and sweet potatoes. § Dry beans (such as petit and kidney beans) and peas (such as lentils). ? 2 cups of fresh, frozen, or canned fruit. A small apple or 1 banana or orange equals 1 cup. ? 3 cups of nonfat or low-fat milk, yogurt, or other milk products. ? 5½ ounces of meat and beans, such as chicken, fish, lean meat, beans, nuts, and seeds.  One egg, 1 tablespoon of peanut butter, ½ ounce nuts or seeds, or ¼ cup of cooked beans equals 1 ounce of meat. · Learn how to read food labels for serving sizes and ingredients. Fast-food and convenience-food meals often contain few or no fruits or vegetables. Make sure you eat some fruits and vegetables to make the meal more nutritious. · Look at your food diary. For each food group, add up what you have eaten and then divide the total by the number of days. This will give you an idea of how much you are eating from each food group. See if you can find some ways to change your diet to make it more healthy. Start small  · Do not try to make dramatic changes to your diet all at once. You might feel that you are missing out on your favorite foods and then be more likely to fail. · Start slowly, and gradually change your habits. Try some of the following:  ? Use whole wheat bread instead of white bread. ? Use nonfat or low-fat milk instead of whole milk. ? Eat brown rice instead of white rice, and eat whole wheat pasta instead of white-flour pasta. ? Try low-fat cheeses and low-fat yogurt. ? Add more fruits and vegetables to meals and have them for snacks. ? Add lettuce, tomato, cucumber, and onion to sandwiches. ? Add fruit to yogurt and cereal.  Enjoy food  · You can still eat your favorite foods. You just may need to eat less of them. If your favorite foods are high in fat, salt, and sugar, limit how often you eat them, but do not cut them out entirely. · Eat a wide variety of foods. Make healthy choices when eating out  · The type of restaurant you choose can help you make healthy choices. Even fast-food chains are now offering more low-fat or healthier choices on the menu. · Choose smaller portions, or take half of your meal home. · When eating out, try:  ? A veggie pizza with a whole wheat crust or grilled chicken (instead of sausage or pepperoni).   ? Pasta with roasted vegetables, grilled chicken, or marinara sauce instead of cream sauce. ? A vegetable wrap or grilled chicken wrap. ? Broiled or poached food instead of fried or breaded items. Make healthy choices easy  · Buy packaged, prewashed, ready-to-eat fresh vegetables and fruits, such as baby carrots, salad mixes, and chopped or shredded broccoli and cauliflower. · Buy packaged, presliced fruits, such as melon or pineapple. · Choose 100% fruit or vegetable juice instead of soda. Limit juice intake to 4 to 6 oz (½ to ¾ cup) a day. · Blend low-fat yogurt, fruit juice, and canned or frozen fruit to make a smoothie for breakfast or a snack. Where can you learn more? Go to http://www.weeks.com/  Enter T756 in the search box to learn more about \"Eating Healthy Foods: Care Instructions. \"  Current as of: December 17, 2020               Content Version: 13.0  © 2006-2021 [employer]. Care instructions adapted under license by [employer] (which disclaims liability or warranty for this information). If you have questions about a medical condition or this instruction, always ask your healthcare professional. [place] [address] any warranty or liability for your use of this information.

## 2021-10-29 ENCOUNTER — HOSPITAL ENCOUNTER (EMERGENCY)
Age: 66
Discharge: HOME | End: 2021-10-29
Attending: STUDENT IN AN ORGANIZED HEALTH CARE EDUCATION/TRAINING PROGRAM
Payer: COMMERCIAL

## 2021-10-29 ENCOUNTER — TELEPHONE (OUTPATIENT)
Dept: INTERNAL MEDICINE CLINIC | Age: 66
End: 2021-10-29

## 2021-10-29 ENCOUNTER — APPOINTMENT (OUTPATIENT)
Dept: GENERAL RADIOLOGY | Age: 66
End: 2021-10-29
Attending: PHYSICIAN ASSISTANT
Payer: COMMERCIAL

## 2021-10-29 VITALS
SYSTOLIC BLOOD PRESSURE: 128 MMHG | RESPIRATION RATE: 16 BRPM | OXYGEN SATURATION: 96 % | BODY MASS INDEX: 40.66 KG/M2 | WEIGHT: 253 LBS | TEMPERATURE: 98.5 F | DIASTOLIC BLOOD PRESSURE: 97 MMHG | HEIGHT: 66 IN | HEART RATE: 84 BPM

## 2021-10-29 DIAGNOSIS — R00.0 TACHYCARDIA: ICD-10-CM

## 2021-10-29 DIAGNOSIS — I48.91 ATRIAL FIBRILLATION WITH RVR (HCC): Primary | ICD-10-CM

## 2021-10-29 LAB
ANION GAP SERPL CALC-SCNC: 5 MMOL/L (ref 3–18)
BASOPHILS # BLD: 0 K/UL (ref 0–0.1)
BASOPHILS NFR BLD: 0 % (ref 0–2)
BUN SERPL-MCNC: 14 MG/DL (ref 7–18)
BUN/CREAT SERPL: 23 (ref 12–20)
CALCIUM SERPL-MCNC: 9.4 MG/DL (ref 8.5–10.1)
CHLORIDE SERPL-SCNC: 108 MMOL/L (ref 100–111)
CK MB CFR SERPL CALC: NORMAL % (ref 0–4)
CK MB SERPL-MCNC: <1 NG/ML (ref 5–25)
CK SERPL-CCNC: 52 U/L (ref 26–192)
CO2 SERPL-SCNC: 26 MMOL/L (ref 21–32)
CREAT SERPL-MCNC: 0.61 MG/DL (ref 0.6–1.3)
DIFFERENTIAL METHOD BLD: ABNORMAL
EOSINOPHIL # BLD: 0.1 K/UL (ref 0–0.4)
EOSINOPHIL NFR BLD: 1 % (ref 0–5)
ERYTHROCYTE [DISTWIDTH] IN BLOOD BY AUTOMATED COUNT: 11.9 % (ref 11.6–14.5)
GLUCOSE SERPL-MCNC: 109 MG/DL (ref 74–99)
HCT VFR BLD AUTO: 46.6 % (ref 35–45)
HGB BLD-MCNC: 15.4 G/DL (ref 12–16)
INR PPP: 0.9 (ref 0.8–1.2)
LYMPHOCYTES # BLD: 2.8 K/UL (ref 0.9–3.6)
LYMPHOCYTES NFR BLD: 39 % (ref 21–52)
MCH RBC QN AUTO: 28.9 PG (ref 24–34)
MCHC RBC AUTO-ENTMCNC: 33 G/DL (ref 31–37)
MCV RBC AUTO: 87.6 FL (ref 78–100)
MONOCYTES # BLD: 0.5 K/UL (ref 0.05–1.2)
MONOCYTES NFR BLD: 7 % (ref 3–10)
NEUTS SEG # BLD: 3.7 K/UL (ref 1.8–8)
NEUTS SEG NFR BLD: 52 % (ref 40–73)
PLATELET # BLD AUTO: 290 K/UL (ref 135–420)
PMV BLD AUTO: 10.2 FL (ref 9.2–11.8)
POTASSIUM SERPL-SCNC: 4.2 MMOL/L (ref 3.5–5.5)
PROTHROMBIN TIME: 11.6 SEC (ref 11.5–15.2)
RBC # BLD AUTO: 5.32 M/UL (ref 4.2–5.3)
SODIUM SERPL-SCNC: 139 MMOL/L (ref 136–145)
TROPONIN I SERPL-MCNC: <0.02 NG/ML (ref 0–0.04)
TSH SERPL DL<=0.05 MIU/L-ACNC: 2.86 UIU/ML (ref 0.36–3.74)
WBC # BLD AUTO: 7.2 K/UL (ref 4.6–13.2)

## 2021-10-29 PROCEDURE — 85610 PROTHROMBIN TIME: CPT

## 2021-10-29 PROCEDURE — 71046 X-RAY EXAM CHEST 2 VIEWS: CPT

## 2021-10-29 PROCEDURE — 82553 CREATINE MB FRACTION: CPT

## 2021-10-29 PROCEDURE — 99284 EMERGENCY DEPT VISIT MOD MDM: CPT

## 2021-10-29 PROCEDURE — 85025 COMPLETE CBC W/AUTO DIFF WBC: CPT

## 2021-10-29 PROCEDURE — 96360 HYDRATION IV INFUSION INIT: CPT

## 2021-10-29 PROCEDURE — 74011250636 HC RX REV CODE- 250/636: Performed by: STUDENT IN AN ORGANIZED HEALTH CARE EDUCATION/TRAINING PROGRAM

## 2021-10-29 PROCEDURE — 84443 ASSAY THYROID STIM HORMONE: CPT

## 2021-10-29 PROCEDURE — 80048 BASIC METABOLIC PNL TOTAL CA: CPT

## 2021-10-29 PROCEDURE — 93005 ELECTROCARDIOGRAM TRACING: CPT

## 2021-10-29 RX ADMIN — SODIUM CHLORIDE 1000 ML: 900 INJECTION, SOLUTION INTRAVENOUS at 17:26

## 2021-10-29 NOTE — PROGRESS NOTES
Please have her go to the emergency department. Per review of her EKG, her heart rate was elevated. She is in atrial fibrillation with rapid ventricular response. There is also some concern for new EKG findings that could indicate underlying CAD.     Dr. Giovanni Barney  [facility] of [address], [facility]  Phone: [phone]  Fax: [phone]

## 2021-10-29 NOTE — DISCHARGE INSTRUCTIONS
Please follow up with Dr. Eliza BARRAGAN. Your workup today is normal. Return to the ED for any new or worsening symptoms. Please drink plenty of fluids.

## 2021-10-29 NOTE — TELEPHONE ENCOUNTER
would like to know if DR. Manuel Chau reviewed patients EKG from yesterday?  recommends she go to ER, but per the telephone call yesterday, DR. Manuel Chau stated for her to keep her scheduled appointment. Please advise. Patient reached and she states that this morning her heart rate was 120, but now its 101. Patient denies any chest pain or palpitations. Patient informed of DR. Santos's recommendation but would like cardiology's recommendation first.

## 2021-10-29 NOTE — ED PROVIDER NOTES
EMERGENCY DEPARTMENT HISTORY AND PHYSICAL EXAM    I have evaluated the patient at 5:28 PM      Date: 10/29/2021  Patient Name: Lokesh Echeverria    History of Presenting Illness     No chief complaint on file. History Provided By: Patient  Location/Duration/Severity/Modifying factors   61-year-old female with history of hypercholesterolemia, hypertension, atrial fibrillation on anticoagulation presenting to the emergency department for evaluation of generalized weakness. Patient reports that she was advised by her primary care doctor to come to the emergency department today because her EKG yesterday demonstrated tachycardia. Patient reports that she had been feeling generally weak for the past 2 weeks. Reports that she feels like all the energy has been sucked out of her. He had blood work done yesterday which was generally unremarkable. However her EKG demonstrated tachycardia in the 120s concerning for A. fib with RVR. It was first reason that she was told to come to the emergency room today after attempting to get in touch with her cardiologist, Dr. Tracey Palomino. Currently still feels generally weak but otherwise in no acute distress. No chest pain, no shortness of breath, no abdominal pain, NVD. PCP: Aurora Gaming MD    Current Facility-Administered Medications   Medication Dose Route Frequency Provider Last Rate Last Admin    sodium chloride 0.9 % bolus infusion 1,000 mL  1,000 mL IntraVENous ONCE Walker Ours, DO         Current Outpatient Medications   Medication Sig Dispense Refill    Eliquis 5 mg tablet TAKE 1 TABLET BY MOUTH TWICE DAILY 180 Tablet 3    metoprolol tartrate (LOPRESSOR) 50 mg tablet TAKE 1 TABLET BY MOUTH TWICE DAILY 180 Tab 3    calcium-cholecalciferol, D3, (CALCIUM 600 + D) tablet Take 2 Tabs by mouth daily.  omega-3 fatty acids-fish oil (FISH OIL) 360-1,200 mg cap Take 2 Caps by mouth daily.       cholecalciferol (VITAMIN D3) (2,000 UNITS /50 MCG) cap capsule Take 2,000 Units by mouth daily.  polyethylene glycol (MIRALAX) 17 gram packet Take 1 Packet by mouth daily. (Patient taking differently: Take 1 Packet by mouth daily. Indications: constipation) 30 Packet 1    potassium 99 mg tablet Take 99 mg by mouth daily as needed (supplement). Past History     Past Medical History:  Past Medical History:   Diagnosis Date    Arthritis     Bilateral Knee, and Bilateral Hand/Thumb    Atrial fibrillation ([address] 1/29/2018    Carpal tunnel syndrome     H/O echocardiogram 01/30/2018    EF 50%, normal left atrial size, no valvular heart disease    Headache     History of ankle fusion 1997    left    Hx of migraine headaches     Hypercholesterolemia     Hypertension     Morbid obesity ([address]     HERZOG (nonalcoholic steatohepatitis) suggested by ultrasound report, 2016 8/11/2017    Nausea & vomiting     Vertigo        Past Surgical History:  Past Surgical History:   Procedure Laterality Date    COLONOSCOPY N/A 6/5/2018    COLONOSCOPY performed by Altagracia Aguilar MD at [address] [place] [address]      age 25 years    HX COLONOSCOPY      Due in 2018:  May 2015    HX HYSTERECTOMY      age 30years old    HX KNEE REPLACEMENT Right 05/22/2018    HX KNEE REPLACEMENT Left 05/14/2019    HX ORTHOPAEDIC Left     ankle fusion    HX OTHER SURGICAL  2014    removal of mole left breast    HX TONSILLECTOMY      at age 29years old       Family History:  Family History   Problem Relation Age of Onset    Heart Disease Mother     Hypertension Mother     Heart Surgery Mother     Cancer Father         colon    Hypertension Brother     No Known Problems Maternal Grandmother     Heart Disease Maternal Grandfather     MS Paternal Grandmother     Stroke Paternal Grandfather     Heart Disease Paternal Grandfather     No Known Problems Son        Social History:  Social History     Tobacco Use    Smoking status: Never Smoker    Smokeless tobacco: Never Used Substance Use Topics    Alcohol use: No     Alcohol/week: 0.0 standard drinks    Drug use: No       Allergies:  No Known Allergies      Review of Systems       Review of Systems   Constitutional: Positive for activity change and fatigue. Negative for chills, diaphoresis and fever. Respiratory: Negative for cough, chest tightness, shortness of breath, wheezing and stridor. Cardiovascular: Negative for chest pain and palpitations. Gastrointestinal: Negative for abdominal distention, abdominal pain, constipation, diarrhea, nausea and vomiting. Genitourinary: Negative for difficulty urinating, dysuria and hematuria. Musculoskeletal: Negative for back pain, joint swelling and myalgias. Skin: Negative for rash and wound. Neurological: Negative for dizziness, tremors, seizures, syncope, speech difficulty, weakness, light-headedness, numbness and headaches. Psychiatric/Behavioral: Negative for agitation. The patient is not nervous/anxious. Physical Exam     Visit Vitals  BP (!) 128/97 (BP 1 Location: Left upper arm, BP Patient Position: At rest;Sitting)   Pulse 84   Temp 98.5 °F (36.9 °C)   Resp 16   SpO2 96%         Physical Exam  Constitutional:       General: She is not in acute distress. Appearance: She is not toxic-appearing. HENT:      Head: Normocephalic and atraumatic. Mouth/Throat:      Mouth: Mucous membranes are moist.   Eyes:      Extraocular Movements: Extraocular movements intact. Pupils: Pupils are equal, round, and reactive to light. Cardiovascular:      Rate and Rhythm: Normal rate. Rhythm irregular. Heart sounds: Normal heart sounds. No murmur heard. No friction rub. No gallop. Pulmonary:      Effort: Pulmonary effort is normal.      Breath sounds: Normal breath sounds. Abdominal:      General: There is no distension. Palpations: Abdomen is soft. There is no mass. Tenderness: There is no abdominal tenderness. There is no guarding. Hernia: No hernia is present. Musculoskeletal:         General: No swelling, tenderness or deformity. Cervical back: Normal range of motion and neck supple. Skin:     General: Skin is warm and dry. Capillary Refill: Capillary refill takes less than 2 seconds. Findings: No rash. Neurological:      General: No focal deficit present. Mental Status: She is alert and oriented to person, place, and time. Cranial Nerves: No cranial nerve deficit. Sensory: No sensory deficit. Motor: No weakness. Psychiatric:         Mood and Affect: Mood normal.           Diagnostic Study Results     Labs -  Recent Results (from the past 12 hour(s))   CBC WITH AUTOMATED DIFF    Collection Time: 10/29/21  3:55 PM   Result Value Ref Range    WBC 7.2 4.6 - 13.2 K/uL    RBC 5.32 (H) 4.20 - 5.30 M/uL    HGB 15.4 12.0 - 16.0 g/dL    HCT 46.6 (H) 35.0 - 45.0 %    MCV 87.6 78.0 - 100.0 FL    MCH 28.9 24.0 - 34.0 PG    MCHC 33.0 31.0 - 37.0 g/dL    RDW 11.9 11.6 - 14.5 %    PLATELET 288 663 - 685 K/uL    MPV 10.2 9.2 - 11.8 FL    NEUTROPHILS 52 40 - 73 %    LYMPHOCYTES 39 21 - 52 %    MONOCYTES 7 3 - 10 %    EOSINOPHILS 1 0 - 5 %    BASOPHILS 0 0 - 2 %    ABS. NEUTROPHILS 3.7 1.8 - 8.0 K/UL    ABS. LYMPHOCYTES 2.8 0.9 - 3.6 K/UL    ABS. MONOCYTES 0.5 0.05 - 1.2 K/UL    ABS. EOSINOPHILS 0.1 0.0 - 0.4 K/UL    ABS.  BASOPHILS 0.0 0.0 - 0.1 K/UL    DF AUTOMATED     METABOLIC PANEL, BASIC    Collection Time: 10/29/21  3:55 PM   Result Value Ref Range    Sodium 139 136 - 145 mmol/L    Potassium 4.2 3.5 - 5.5 mmol/L    Chloride 108 100 - 111 mmol/L    CO2 26 21 - 32 mmol/L    Anion gap 5 3.0 - 18 mmol/L    Glucose 109 (H) 74 - 99 mg/dL    BUN 14 7.0 - 18 MG/DL    Creatinine 0.61 0.6 - 1.3 MG/DL    BUN/Creatinine ratio 23 (H) 12 - 20      GFR est AA >60 >60 ml/min/1.73m2    GFR est non-AA >60 >60 ml/min/1.73m2    Calcium 9.4 8.5 - 10.1 MG/DL   TSH 3RD GENERATION    Collection Time: 10/29/21  3:55 PM   Result Value Ref Range    TSH 2.86 0.36 - 3.74 uIU/mL   CARDIAC PANEL,(CK, CKMB & TROPONIN)    Collection Time: 10/29/21  3:55 PM   Result Value Ref Range    CK - MB <1.0 <3.6 ng/ml    CK-MB Index  0.0 - 4.0 %     CALCULATION NOT PERFORMED WHEN RESULT IS BELOW LINEAR LIMIT    CK 52 26 - 192 U/L    Troponin-I, QT <0.02 0.0 - 0.045 NG/ML   EKG, 12 LEAD, INITIAL    Collection Time: 10/29/21  3:58 PM   Result Value Ref Range    Ventricular Rate 107 BPM    QRS Duration 64 ms    Q-T Interval 306 ms    QTC Calculation (Bezet) 408 ms    Calculated R Axis 9 degrees    Calculated T Axis 25 degrees    Diagnosis       Atrial fibrillation with rapid ventricular response  Low voltage QRS  Cannot rule out Anterior infarct (cited on or before 28-OCT-2021)  Abnormal ECG  When compared with ECG of 28-OCT-2021 11:41,  No significant change was found     PROTHROMBIN TIME + INR    Collection Time: 10/29/21  4:27 PM   Result Value Ref Range    Prothrombin time 11.6 11.5 - 15.2 sec    INR 0.9 0.8 - 1.2         Radiologic Studies -   XR CHEST PA LAT    (Results Pending)         Medical Decision Making   I am the first provider for this patient. I reviewed the vital signs, available nursing notes, past medical history, past surgical history, family history and social history. Vital Signs-Reviewed the patient's vital signs. EKG: Afib with RVR, Rate 107. No evidence of acute ischemic morphology    Records Reviewed: Nursing Notes, Old Medical Records, Previous electrocardiograms, Previous Radiology Studies and Previous Laboratory Studies (Time of Review: 5:28 PM)    ED Course: Progress Notes, Reevaluation, and Consults:         Provider Notes (Medical Decision Making):   MDM  Number of Diagnoses or Management Options  Atrial fibrillation with RVR ([address]  Tachycardia  Diagnosis management comments: Patient presenting to the emergency department for evaluation of generalized weakness, sent to the ED by her primary care doctor.   Patient's EKG demonstrates atrial fibrillation rate of 107. No evidence of acute ischemia seen otherwise. She appears in no acute distress saturating well on room air. Bloodwork initiated in triage which is generally unremarkable and unchanged from yesterday's blood work. Troponin is negative. Chest x-ray is unremarkable. Patient will be given IV fluids to see if this normalizes. If this normalizes, patient to be discharged to follow-up with her cardiologist as originally scheduled next week. 1840:  Patient's heart rate has normalized and is now at 84. She states that she is feeling mildly improved. Patient has been reassured and will be discharged home. She has been instructed to follow-up with her cardiologist as soon as possible. She has been given ED return precautions. All questions and concerns have been addressed. Patient stable for discharge            Diagnosis     Clinical Impression:   1. Atrial fibrillation with RVR ([address]    2. Tachycardia        Disposition: home    Follow-up Information     Follow up With Specialties Details Why [facility]    [facility] EMERGENCY DEPT Emergency Medicine  As needed, If symptoms worsen [address], [address] [place] [address], MD [facility] Medicine Call   [facility] [address]  [address] [facility]      Mague Camp MD Cardiology, Internal Medicine, Nuclear Medicine   [address]  [address] [place] [address] Se  [phone]             Patient's Medications   Start Taking    No medications on file   Continue Taking    CALCIUM-CHOLECALCIFEROL, D3, (CALCIUM 600 + D) TABLET    Take 2 Tabs by mouth daily. CHOLECALCIFEROL (VITAMIN D3) (2,000 UNITS /50 MCG) CAP CAPSULE    Take 2,000 Units by mouth daily.     ELIQUIS 5 MG TABLET    TAKE 1 TABLET BY MOUTH TWICE DAILY    METOPROLOL TARTRATE (LOPRESSOR) 50 MG TABLET    TAKE 1 TABLET BY MOUTH TWICE DAILY    OMEGA-3 FATTY ACIDS-FISH OIL (FISH OIL) 360-1,200 MG CAP Take 2 Caps by mouth daily. POLYETHYLENE GLYCOL (MIRALAX) 17 GRAM PACKET    Take 1 Packet by mouth daily. POTASSIUM 99 MG TABLET    Take 99 mg by mouth daily as needed (supplement). These Medications have changed    No medications on file   Stop Taking    No medications on file     Disclaimer: Sections of this note are dictated using utilizing voice recognition software. Minor typographical errors may be present. If questions arise, please do not hesitate to contact me or call our department.

## 2021-10-29 NOTE — TELEPHONE ENCOUNTER
----- Message from Lori Kelley MD sent at 10/29/2021  1:45 PM EDT -----  Please have her go to the emergency department. Per review of her EKG, her heart rate was elevated. She is in atrial fibrillation with rapid ventricular response. There is also some concern for new EKG findings that could indicate underlying CAD.     Dr. Galen Post  [facility], [address] [place] [address], [facility] [address]  Phone: [phone]  Fax: [phone]

## 2021-10-31 LAB
CALCULATED R AXIS, ECG10: 9 DEGREES
CALCULATED T AXIS, ECG11: 25 DEGREES
DIAGNOSIS, 93000: NORMAL
Q-T INTERVAL, ECG07: 306 MS
QRS DURATION, ECG06: 64 MS
QTC CALCULATION (BEZET), ECG08: 408 MS
VENTRICULAR RATE, ECG03: 107 BPM

## 2021-11-01 ENCOUNTER — TRANSCRIBE ORDER (OUTPATIENT)
Dept: REGISTRATION | Age: 66
End: 2021-11-01

## 2021-11-01 ENCOUNTER — PATIENT MESSAGE (OUTPATIENT)
Dept: INTERNAL MEDICINE CLINIC | Age: 66
End: 2021-11-01

## 2021-11-01 ENCOUNTER — HOSPITAL ENCOUNTER (OUTPATIENT)
Dept: LAB | Age: 66
Discharge: HOME | End: 2021-11-01
Payer: COMMERCIAL

## 2021-11-01 DIAGNOSIS — R73.9 BLOOD GLUCOSE ELEVATED: Primary | ICD-10-CM

## 2021-11-01 DIAGNOSIS — R73.9 BLOOD GLUCOSE ELEVATED: ICD-10-CM

## 2021-11-01 PROCEDURE — 82530 CORTISOL FREE: CPT

## 2021-11-06 LAB
COLLECT DURATION TIME UR: NORMAL HR
CORTIS F 24H UR-MRATE: 19 UG/24 HR (ref 6–42)
CORTIS F UR-MCNC: 15 UG/L
SPECIMEN VOL ?TM UR: 1250 ML

## 2021-11-07 NOTE — PROGRESS NOTES
Let her know that her cortisol urine testing is normal.  Please have her email me a log of what foods she is eating each day and the portions. So far, I cannot see any hormonal abnormality that would explain her weight gain. I would like to review her diet more in depth with 1-2 wks of dietary information.     Dr. Victor Manuel Hendrickson  [facility], [address] [place] [address], [address] [place] [address] Str.  Phone: [phone]  Fax: [phone]

## 2021-11-08 NOTE — PROGRESS NOTES
Patient contacted, patient identified with two identifiers (Name & ). Patient aware of results per DR. Santos and verbalizes understanding. Patient will [employer].

## 2021-11-11 ENCOUNTER — OFFICE VISIT (OUTPATIENT)
Dept: CARDIOLOGY CLINIC | Age: 66
End: 2021-11-11
Payer: COMMERCIAL

## 2021-11-11 VITALS
OXYGEN SATURATION: 97 % | DIASTOLIC BLOOD PRESSURE: 86 MMHG | SYSTOLIC BLOOD PRESSURE: 124 MMHG | HEART RATE: 71 BPM | HEIGHT: 66 IN | WEIGHT: 253 LBS | BODY MASS INDEX: 40.66 KG/M2

## 2021-11-11 DIAGNOSIS — E78.2 MIXED HYPERLIPIDEMIA: ICD-10-CM

## 2021-11-11 DIAGNOSIS — I10 ESSENTIAL HYPERTENSION: ICD-10-CM

## 2021-11-11 DIAGNOSIS — R07.9 CHEST PAIN, UNSPECIFIED TYPE: ICD-10-CM

## 2021-11-11 DIAGNOSIS — E66.01 SEVERE OBESITY (HCC): ICD-10-CM

## 2021-11-11 DIAGNOSIS — I48.0 PAROXYSMAL ATRIAL FIBRILLATION (HCC): Primary | ICD-10-CM

## 2021-11-11 PROCEDURE — 93000 ELECTROCARDIOGRAM COMPLETE: CPT | Performed by: INTERNAL MEDICINE

## 2021-11-11 PROCEDURE — 99214 OFFICE O/P EST MOD 30 MIN: CPT | Performed by: INTERNAL MEDICINE

## 2021-11-11 NOTE — PROGRESS NOTES
Krystal Bacon presents today for   Chief Complaint   Patient presents with    Follow-up     wants to be seen to review of medications       Jacklyn Maiden Vosler preferred language for health care discussion is english/other. Is someone accompanying this pt? no    Is the patient using any DME equipment during [address]? no    Depression Screening:  3 most recent PHQ Screens 11/11/2021   PHQ Not Done -   Little interest or pleasure in doing things Not at all   Feeling down, depressed, irritable, or hopeless Not at all   Total Score PHQ 2 0       Learning Assessment:  Learning Assessment 11/11/2021   PRIMARY LEARNER Patient   HIGHEST LEVEL OF EDUCATION - PRIMARY LEARNER  -   BARRIERS PRIMARY LEARNER -   CO-LEARNER CAREGIVER -   PRIMARY LANGUAGE ENGLISH   LEARNER PREFERENCE PRIMARY DEMONSTRATION   ANSWERED BY patient   RELATIONSHIP SELF       Abuse Screening:  Abuse Screening Questionnaire 11/11/2021   Do you ever feel afraid of your partner? N   Are you in a relationship with someone who physically or mentally threatens you? N   Is it safe for you to go home? Y       Fall Risk  Fall Risk Assessment, last 12 mths 11/11/2021   Able to walk? Yes   Fall in past 12 months? 0   Do you feel unsteady? 0   Are you worried about falling 0           Pt currently taking Anticoagulant therapy? Eliquis 5 mg twice a day    Pt currently taking Antiplatelet therapy ? no      Coordination of Care:  1. Have you been to the ER, urgent care clinic since your last visit? Hospitalized since your last visit? yes    2. Have you seen or consulted any other health care providers outside of the [address] [place] [address] since your last visit? Include any pap smears or colon screening.  no

## 2021-11-11 NOTE — PROGRESS NOTES
HISTORY OF PRESENT ILLNESS  Jamari Fraser is a 73 y.o. female. Follow-up  Associated symptoms include chest pain. Pertinent negatives include no abdominal pain, no headaches and no shortness of breath. Fatigue  Associated symptoms include chest pain. Pertinent negatives include no abdominal pain, no headaches and no shortness of breath. Patient presents for an overdue follow-up office visit. Patient was diagnosed with atrial fibrillation with a rapid ventricular response by her PCP in January 2018. She was started on metoprolol for rate control. She was then scheduled for a IGLESIA and cardioversion, however, when she showed up for her procedure, she had already converted back to sinus rhythm. Her anticoagulation was stopped at that time. She underwent an echocardiogram which showed low normal LV systolic function, EF 25% with no valvular heart disease and a normal left atrial size. She has remained on metoprolol for rate control. The patient was again found to be in atrial fibrillation at last visit in June 2018. Her heart rate was around 120 bpm.  She converted back to sinus rhythm the following day. She was started on Eliquis for long-term anticoagulation. The patient has not been seen in the office in over a year and a half. She was recently evaluated by her PCP 2 weeks ago and was found to be back in atrial fibrillation, there is also some concern of poor R wave progression on her EKG, so she was apparently sent to the emergency department where she was discovered to be in atrial fibrillation with mildly increased rates, was treated with IV fluids which improved her heart rate and she was discharged home. She states during that timeframe she was very fatigued and had to take almost 6 days off from work because of the fatigue. She does not know if she was in atrial fibrillation the entire time. She has gained significant weight nearly 30 pounds since the COVID-19 pandemic started.   She is also noted some chest tightness and burning in the center of her chest which would last for 10 to 15 minutes occurring intermittently throughout the day, more noticeable at rest and not as noticeable when she was doing activity. However she notes that her activity tolerance has been decreased over the past month. She denies any leg swelling, no orthopnea, no PND. Past Medical History:   Diagnosis Date    Arthritis     Bilateral Knee, and Bilateral Hand/Thumb    Atrial fibrillation ([address] 1/29/2018    Carpal tunnel syndrome     H/O echocardiogram 01/30/2018    EF 50%, normal left atrial size, no valvular heart disease    Headache     History of ankle fusion 1997    left    Hx of migraine headaches     Hypercholesterolemia     Hypertension     Morbid obesity ([address]     HERZOG (nonalcoholic steatohepatitis) suggested by ultrasound report, 2016 8/11/2017    Nausea & vomiting     Vertigo      Current Outpatient Medications   Medication Sig Dispense Refill    Eliquis 5 mg tablet TAKE 1 TABLET BY MOUTH TWICE DAILY 180 Tablet 3    metoprolol tartrate (LOPRESSOR) 50 mg tablet TAKE 1 TABLET BY MOUTH TWICE DAILY 180 Tab 3    calcium-cholecalciferol, D3, (CALCIUM 600 + D) tablet Take 2 Tabs by mouth daily.  omega-3 fatty acids-fish oil (FISH OIL) 360-1,200 mg cap Take 2 Caps by mouth daily.  cholecalciferol (VITAMIN D3) (2,000 UNITS /50 MCG) cap capsule Take 2,000 Units by mouth daily.  polyethylene glycol (MIRALAX) 17 gram packet Take 1 Packet by mouth daily. 30 Packet 1    potassium 99 mg tablet Take 99 mg by mouth daily as needed (supplement). No Known Allergies     Social History     Tobacco Use    Smoking status: Never Smoker    Smokeless tobacco: Never Used   Substance Use Topics    Alcohol use: No     Alcohol/week: 0.0 standard drinks    Drug use: No           Review of Systems   Constitutional: Positive for fatigue. Negative for chills, fever and weight loss.    HENT: Negative for nosebleeds. Eyes: Negative for blurred vision and double vision. Respiratory: Negative for cough, shortness of breath and wheezing. Cardiovascular: Positive for chest pain. Negative for palpitations, orthopnea, claudication, leg swelling and PND. Gastrointestinal: Negative for abdominal pain, heartburn, nausea and vomiting. Genitourinary: Negative for dysuria and hematuria. Musculoskeletal: Negative for falls, joint pain and myalgias. Skin: Negative for rash. Neurological: Negative for dizziness, focal weakness and headaches. Endo/Heme/Allergies: Does not bruise/bleed easily. Psychiatric/Behavioral: Negative for substance abuse. Visit Vitals  /86 (BP 1 Location: Left upper arm, BP Patient Position: Sitting, BP Cuff Size: Large adult)   Pulse 71   Ht 5' 6\" (1.676 m)   Wt 114.8 kg (253 lb)   SpO2 97%   BMI 40.84 kg/m²       Physical Exam  Constitutional:       Appearance: She is well-developed. HENT:      Head: Normocephalic and atraumatic. Eyes:      Conjunctiva/sclera: Conjunctivae normal.   Neck:      Vascular: No carotid bruit or JVD. Cardiovascular:      Rate and Rhythm: Regular rhythm. Bradycardia present. Occasional extrasystoles are present. Pulses: Normal pulses. Heart sounds: S1 normal and S2 normal. Heart sounds are distant. No murmur heard. No gallop. Pulmonary:      Effort: Pulmonary effort is normal.      Breath sounds: Normal breath sounds. No wheezing or rales. Abdominal:      General: Bowel sounds are normal.      Palpations: Abdomen is soft. Tenderness: There is no abdominal tenderness. Musculoskeletal:         General: No swelling, tenderness or deformity. Cervical back: Neck supple. Skin:     General: Skin is warm and dry. Neurological:      General: No focal deficit present. Mental Status: She is alert and oriented to person, place, and time.    Psychiatric:         Behavior: Behavior normal.         Thought Content: Thought content normal.       EKG: Normal sinus rhythm, normal axis, normal QTc interval, poor R wave progression,  low voltage, nonspecific T wave abnormality. Occasional monomorphic PVCs. Compared to the previous EKG, nonspecific T wave abnormalities now present, PVCs are now present. ASSESSMENT and PLAN    Paroxysmal atrial fibrillation. Newly diagnosed in January 2018. Patient converted on her own prior to her scheduled cardioversion. Her CHADs-Vasc score is 2. The patient has been in and out of atrial fibrillation in the past according to her EKGs. In the past, she has remained fairly asymptomatic for arrhythmia, however, she now reports increased fatigue which may or may not be related to her being in atrial fibrillation. So I have recommended that she obtain one of the home rhythm monitoring devices such as the [employer] cathy to track her rhythm especially when she is feeling poorly. For now I would continue her current regimen which includes metoprolol 50 mg twice daily and Eliquis 5 mg twice daily. Chest pain. Somewhat atypical for angina, however, she does report decreased activity tolerance and does have new nonspecific T wave changes on her EKG today. I have recommended a pharmacologic nuclear stress test for further ischemic work-up. Essential hypertension. This now appears well-controlled on her current medical regimen. Dyslipidemia. Patient was previously taking simvastatin, however that was stopped last month because of elevated transaminases. A recent lipid panel was quite elevated in October 2021: Total cholesterol 288, triglycerides 248, HDL 57, . She has gained 30 pounds in weight over the past year or so. I have recommended that she try and lose all this weight and start with lifestyle modification. If her lipids remain elevated, I would consider adding a nonstatin based therapy such as ezetimibe. Obesity.   Patient has gained almost 30 pounds in weight over the past year or so. She was encouraged to try and lose much weight as possible lifestyle modification. Follow-up in 4 months, sooner as needed.

## 2021-11-12 ENCOUNTER — TELEPHONE (OUTPATIENT)
Dept: CARDIOLOGY CLINIC | Age: 66
End: 2021-11-12

## 2021-11-18 ENCOUNTER — TELEPHONE (OUTPATIENT)
Dept: CARDIOLOGY CLINIC | Age: 66
End: 2021-11-18

## 2021-11-18 NOTE — TELEPHONE ENCOUNTER
Patient made aware of stress test results and Dr. Svetlana Grijalva remarks. No questions or concerns at present.

## 2021-11-18 NOTE — TELEPHONE ENCOUNTER
----- Message from James Martínez MD sent at 11/17/2021  4:14 PM EST -----  Please let the patient know that her nuclear stress test was normal and a low risk study. ----- Message -----  From: Etha Osgood, LPN  Sent: [id number]   3:52 PM EST  To: James Martínez MD    Per your note - Chest pain. Somewhat atypical for angina, however, she does report decreased activity tolerance and does have new nonspecific T wave changes on her EKG today. I have recommended a pharmacologic nuclear stress test for further ischemic work-up.

## 2021-12-07 NOTE — PROGRESS NOTES
Her BMP is normal. I will notify the pt at her f/u apt.     Dr. Heladio Mcgraw  [facility], [address] [place] [address] Str.  Phone: [phone]  Fax: [phone] English

## 2022-02-02 RX ORDER — METOPROLOL TARTRATE 50 MG/1
TABLET ORAL
Qty: 180 TABLET | Refills: 3 | Status: SHIPPED | OUTPATIENT
Start: 2022-02-02 | End: 2022-04-18

## 2022-02-22 NOTE — PROGRESS NOTES
Alexander Hobbs presents today for Chief Complaint Patient presents with  Follow-up 3 month follow up Alexander Hobbs preferred language for health care discussion is english/other. Is someone accompanying this pt? No 
 
Is the patient using any DME equipment during OV? No 
 
Depression Screening: PHQ over the last two weeks 9/10/2018 PHQ Not Done - Little interest or pleasure in doing things Not at all Feeling down, depressed, irritable, or hopeless Not at all Total Score PHQ 2 0 Learning Assessment: 
Learning Assessment 9/10/2018 PRIMARY LEARNER Patient HIGHEST LEVEL OF EDUCATION - PRIMARY LEARNER  -  
BARRIERS PRIMARY LEARNER -  
CO-LEARNER CAREGIVER -  
PRIMARY LANGUAGE ENGLISH  
LEARNER PREFERENCE PRIMARY DEMONSTRATION  
ANSWERED BY patient RELATIONSHIP SELF Abuse Screening: 
Abuse Screening Questionnaire 5/16/2016 Do you ever feel afraid of your partner? Juanita Gallo Are you in a relationship with someone who physically or mentally threatens you? Juanita Gallo Is it safe for you to go home? Amy Dalton Pt currently taking Antiplatelet therapy? No 
 
Coordination of Care: 1. Have you been to the ER, urgent care clinic since your last visit? Hospitalized since your last visit? No 
 
2. Have you seen or consulted any other health care providers outside of the [address] [place] [address] since your last visit? Include any pap smears or colon screening.  No 
 
 
 
 
 
 
 
 
 W Plasty Text: The lesion was extirpated to the level of the fat with a #15 scalpel blade.  Given the location of the defect, shape of the defect and the proximity to free margins a W-plasty was deemed most appropriate for repair.  Using a sterile surgical marker, the appropriate transposition arms of the W-plasty were drawn incorporating the defect and placing the expected incisions within the relaxed skin tension lines where possible.    The area thus outlined was incised deep to adipose tissue with a #15 scalpel blade.  The skin margins were undermined to an appropriate distance in all directions utilizing iris scissors.  The opposing transposition arms were then transposed into place in opposite direction and anchored with interrupted buried subcutaneous sutures.

## 2022-03-18 PROBLEM — I48.91 ATRIAL FIBRILLATION (HCC): Status: ACTIVE | Noted: 2018-01-29

## 2022-03-18 PROBLEM — E66.01 SEVERE OBESITY (HCC): Status: ACTIVE | Noted: 2019-03-11

## 2022-03-18 PROBLEM — E78.5 HYPERLIPIDEMIA: Status: ACTIVE | Noted: 2017-02-07

## 2022-03-18 PROBLEM — E66.9 OBESITY (BMI 30-39.9): Status: ACTIVE | Noted: 2017-02-12

## 2022-03-19 PROBLEM — K75.81 NASH (NONALCOHOLIC STEATOHEPATITIS): Status: ACTIVE | Noted: 2017-08-11

## 2022-03-19 PROBLEM — Z96.659 S/P TKR (TOTAL KNEE REPLACEMENT): Status: ACTIVE | Noted: 2019-05-14

## 2022-03-19 PROBLEM — I10 ESSENTIAL HYPERTENSION: Status: ACTIVE | Noted: 2017-02-07

## 2022-03-20 PROBLEM — M17.11 PRIMARY OSTEOARTHRITIS OF RIGHT KNEE: Status: ACTIVE | Noted: 2018-05-22

## 2022-03-20 PROBLEM — M17.12 PRIMARY OSTEOARTHRITIS OF LEFT KNEE: Status: ACTIVE | Noted: 2019-05-14

## 2022-03-24 ENCOUNTER — HOSPITAL ENCOUNTER (OUTPATIENT)
Dept: LAB | Age: 67
Discharge: HOME | End: 2022-03-24
Payer: COMMERCIAL

## 2022-03-24 ENCOUNTER — OFFICE VISIT (OUTPATIENT)
Dept: CARDIOLOGY CLINIC | Age: 67
End: 2022-03-24
Payer: COMMERCIAL

## 2022-03-24 VITALS
SYSTOLIC BLOOD PRESSURE: 130 MMHG | HEIGHT: 66 IN | BODY MASS INDEX: 41.46 KG/M2 | DIASTOLIC BLOOD PRESSURE: 88 MMHG | OXYGEN SATURATION: 96 % | WEIGHT: 258 LBS | HEART RATE: 57 BPM

## 2022-03-24 DIAGNOSIS — I10 ESSENTIAL HYPERTENSION: ICD-10-CM

## 2022-03-24 DIAGNOSIS — E78.2 MIXED HYPERLIPIDEMIA: ICD-10-CM

## 2022-03-24 DIAGNOSIS — R53.83 FATIGUE, UNSPECIFIED TYPE: ICD-10-CM

## 2022-03-24 DIAGNOSIS — I48.0 PAROXYSMAL ATRIAL FIBRILLATION (HCC): ICD-10-CM

## 2022-03-24 DIAGNOSIS — I48.0 PAROXYSMAL ATRIAL FIBRILLATION (HCC): Primary | ICD-10-CM

## 2022-03-24 DIAGNOSIS — E66.01 SEVERE OBESITY (HCC): ICD-10-CM

## 2022-03-24 DIAGNOSIS — R07.9 CHEST PAIN, UNSPECIFIED TYPE: ICD-10-CM

## 2022-03-24 LAB
ALBUMIN SERPL-MCNC: 4.1 G/DL (ref 3.4–5)
ALBUMIN/GLOB SERPL: 1.2 {RATIO} (ref 0.8–1.7)
ALP SERPL-CCNC: 67 U/L (ref 45–117)
ALT SERPL-CCNC: 50 U/L (ref 13–56)
ANION GAP SERPL CALC-SCNC: 6 MMOL/L (ref 3–18)
AST SERPL-CCNC: 21 U/L (ref 10–38)
BASOPHILS # BLD: 0 K/UL (ref 0–0.1)
BASOPHILS NFR BLD: 0 % (ref 0–2)
BILIRUB SERPL-MCNC: 0.7 MG/DL (ref 0.2–1)
BUN SERPL-MCNC: 13 MG/DL (ref 7–18)
BUN/CREAT SERPL: 18 (ref 12–20)
CALCIUM SERPL-MCNC: 10.1 MG/DL (ref 8.5–10.1)
CHLORIDE SERPL-SCNC: 106 MMOL/L (ref 100–111)
CO2 SERPL-SCNC: 27 MMOL/L (ref 21–32)
CREAT SERPL-MCNC: 0.73 MG/DL (ref 0.6–1.3)
DIFFERENTIAL METHOD BLD: ABNORMAL
EOSINOPHIL # BLD: 0.1 K/UL (ref 0–0.4)
EOSINOPHIL NFR BLD: 1 % (ref 0–5)
ERYTHROCYTE [DISTWIDTH] IN BLOOD BY AUTOMATED COUNT: 12.2 % (ref 11.6–14.5)
GLOBULIN SER CALC-MCNC: 3.4 G/DL (ref 2–4)
GLUCOSE SERPL-MCNC: 118 MG/DL (ref 74–99)
HCT VFR BLD AUTO: 48.6 % (ref 35–45)
HGB BLD-MCNC: 15.5 G/DL (ref 12–16)
IMM GRANULOCYTES # BLD AUTO: 0 K/UL (ref 0–0.04)
IMM GRANULOCYTES NFR BLD AUTO: 1 % (ref 0–0.5)
LYMPHOCYTES # BLD: 1.9 K/UL (ref 0.9–3.6)
LYMPHOCYTES NFR BLD: 25 % (ref 21–52)
MCH RBC QN AUTO: 27.8 PG (ref 24–34)
MCHC RBC AUTO-ENTMCNC: 31.9 G/DL (ref 31–37)
MCV RBC AUTO: 87.1 FL (ref 78–100)
MONOCYTES # BLD: 0.5 K/UL (ref 0.05–1.2)
MONOCYTES NFR BLD: 7 % (ref 3–10)
NEUTS SEG # BLD: 4.9 K/UL (ref 1.8–8)
NEUTS SEG NFR BLD: 67 % (ref 40–73)
NRBC # BLD: 0 K/UL (ref 0–0.01)
NRBC BLD-RTO: 0 PER 100 WBC
PLATELET # BLD AUTO: 301 K/UL (ref 135–420)
PMV BLD AUTO: 10.7 FL (ref 9.2–11.8)
POTASSIUM SERPL-SCNC: 4.3 MMOL/L (ref 3.5–5.5)
PROT SERPL-MCNC: 7.5 G/DL (ref 6.4–8.2)
RBC # BLD AUTO: 5.58 M/UL (ref 4.2–5.3)
SODIUM SERPL-SCNC: 139 MMOL/L (ref 136–145)
T4 FREE SERPL-MCNC: 1.1 NG/DL (ref 0.7–1.5)
TSH SERPL DL<=0.05 MIU/L-ACNC: 1.67 UIU/ML (ref 0.36–3.74)
WBC # BLD AUTO: 7.4 K/UL (ref 4.6–13.2)

## 2022-03-24 PROCEDURE — 36415 COLL VENOUS BLD VENIPUNCTURE: CPT

## 2022-03-24 PROCEDURE — 99214 OFFICE O/P EST MOD 30 MIN: CPT | Performed by: INTERNAL MEDICINE

## 2022-03-24 PROCEDURE — 93000 ELECTROCARDIOGRAM COMPLETE: CPT | Performed by: INTERNAL MEDICINE

## 2022-03-24 PROCEDURE — 84439 ASSAY OF FREE THYROXINE: CPT

## 2022-03-24 PROCEDURE — 80053 COMPREHEN METABOLIC PANEL: CPT

## 2022-03-24 PROCEDURE — 85025 COMPLETE CBC W/AUTO DIFF WBC: CPT

## 2022-03-24 NOTE — PATIENT INSTRUCTIONS
Follow up with Dr Hollie Esteban with EKG in 4 months  Decrease Metoprolol to 25mg twice a day  Lab work: CBC, TSH, Free T4, CMP

## 2022-03-24 NOTE — PROGRESS NOTES
Charles Mark presents today for   Chief Complaint   Patient presents with    Follow-up     4 month follow up, c/o Weakness and Fatigue    Chest Pain     pressure in the center of chest     Dizziness     frequently       Rica Aase Vosler preferred language for health care discussion is english/other. Is someone accompanying this pt? no    Is the patient using any DME equipment during [address]? no    Depression Screening:  3 most recent PHQ Screens 3/24/2022   PHQ Not Done -   Little interest or pleasure in doing things Not at all   Feeling down, depressed, irritable, or hopeless Not at all   Total Score PHQ 2 0       Learning Assessment:  Learning Assessment 3/24/2022   PRIMARY LEARNER Patient   HIGHEST LEVEL OF EDUCATION - PRIMARY LEARNER  -   BARRIERS PRIMARY LEARNER -   CO-LEARNER CAREGIVER -   PRIMARY LANGUAGE ENGLISH   LEARNER PREFERENCE PRIMARY DEMONSTRATION   ANSWERED BY patient   RELATIONSHIP SELF       Abuse Screening:  Abuse Screening Questionnaire 3/24/2022   Do you ever feel afraid of your partner? N   Are you in a relationship with someone who physically or mentally threatens you? N   Is it safe for you to go home? Y       Fall Risk  Fall Risk Assessment, last 12 mths 3/24/2022   Able to walk? Yes   Fall in past 12 months? 0   Do you feel unsteady? 0   Are you worried about falling 0           Pt currently taking Anticoagulant therapy? Eliquis 5 mg twice a day    Pt currently taking Antiplatelet therapy ? no      Coordination of Care:  1. Have you been to the ER, urgent care clinic since your last visit? Hospitalized since your last visit? no    2. Have you seen or consulted any other health care providers outside of the [address] [place] [address] since your last visit? Include any pap smears or colon screening.  no

## 2022-03-24 NOTE — PROGRESS NOTES
HISTORY OF PRESENT ILLNESS  Jaymie Macario is a 73 y.o. female. Follow-up  Pertinent negatives include no chest pain, no abdominal pain, no headaches and no shortness of breath. Fatigue  Pertinent negatives include no chest pain, no abdominal pain, no headaches and no shortness of breath. Dizziness  Pertinent negatives include no chest pain, no abdominal pain, no headaches and no shortness of breath. Patient presents for a follow-up office visit. Patient was diagnosed with atrial fibrillation with a rapid ventricular response by her PCP in January 2018. She was started on metoprolol for rate control. She was then scheduled for a IGLESIA and cardioversion, however, when she showed up for her procedure, she had already converted back to sinus rhythm. Her anticoagulation was stopped at that time. She underwent an echocardiogram which showed low normal LV systolic function, EF 69% with no valvular heart disease and a normal left atrial size. She has remained on metoprolol for rate control. The patient was again found to be in atrial fibrillation at last visit in June 2018. Her heart rate was around 120 bpm.  She converted back to sinus rhythm the following day. She was started on Eliquis for long-term anticoagulation. The patient was found to be back in atrial fibrillation in October 2021, there is also some concern of poor R wave progression on her EKG, so she was apparently sent to the emergency department where she was discovered to be in atrial fibrillation with mildly increased rates, was treated with IV fluids which improved her heart rate and she was discharged home. She states during that timeframe she was very fatigued and had to take almost 6 days off from work because of the fatigue. She was last seen in our office 4 months ago.   Since last visit, she underwent a pharmacologic nuclear stress test in November 2021 which was a normal low risk study, no evidence of ischemia or infarction, EF 68%. She reports increased fatigue over the past month and frequent dizzy spells. She does check her heart rate and rhythm and has not noted any recurrent atrial fibrillation. Her heart rate is typically in the 50s however. She denies any chest pain or shortness of breath. No leg swelling, no orthopnea, no PND. No major change in her weight. Past Medical History:   Diagnosis Date    Arthritis     Bilateral Knee, and Bilateral Hand/Thumb    Atrial fibrillation ([address] 1/29/2018    Carpal tunnel syndrome     H/O echocardiogram 01/30/2018    EF 50%, normal left atrial size, no valvular heart disease    Headache     History of ankle fusion 1997    left    Hx of migraine headaches     Hypercholesterolemia     Hypertension     Morbid obesity ([address]     HERZOG (nonalcoholic steatohepatitis) suggested by ultrasound report, 2016 8/11/2017    Nausea & vomiting     Vertigo      Current Outpatient Medications   Medication Sig Dispense Refill    metoprolol tartrate (LOPRESSOR) 50 mg tablet TAKE 1 TABLET BY MOUTH TWICE DAILY 180 Tablet 3    Eliquis 5 mg tablet TAKE 1 TABLET BY MOUTH TWICE DAILY 180 Tablet 3    calcium-cholecalciferol, D3, (CALCIUM 600 + D) tablet Take 2 Tabs by mouth daily.  omega-3 fatty acids-fish oil (FISH OIL) 360-1,200 mg cap Take 2 Caps by mouth daily.  cholecalciferol (VITAMIN D3) (2,000 UNITS /50 MCG) cap capsule Take 2,000 Units by mouth daily.  polyethylene glycol (MIRALAX) 17 gram packet Take 1 Packet by mouth daily. 30 Packet 1    potassium 99 mg tablet Take 99 mg by mouth daily as needed (supplement).        No Known Allergies     Social History     Tobacco Use    Smoking status: Never Smoker    Smokeless tobacco: Never Used   Vaping Use    Vaping Use: Never used   Substance Use Topics    Alcohol use: No     Alcohol/week: 0.0 standard drinks    Drug use: No       Family History   Problem Relation Age of Onset    Heart Disease Mother    Mayelin Hypertension Mother     Heart Surgery Mother     Cancer Father         colon    Hypertension Brother     No Known Problems Maternal Grandmother     Heart Disease Maternal Grandfather     Mult Sclerosis Paternal Grandmother     Stroke Paternal Grandfather     Heart Disease Paternal Grandfather     No Known Problems Son          Review of Systems   Constitutional: Positive for fatigue. Negative for chills, fever and weight loss. HENT: Negative for nosebleeds. Eyes: Negative for blurred vision and double vision. Respiratory: Negative for cough, shortness of breath and wheezing. Cardiovascular: Negative for chest pain, palpitations, orthopnea, claudication, leg swelling and PND. Gastrointestinal: Negative for abdominal pain, heartburn, nausea and vomiting. Genitourinary: Negative for dysuria and hematuria. Musculoskeletal: Negative for falls, joint pain and myalgias. Skin: Negative for rash. Neurological: Positive for dizziness. Negative for focal weakness and headaches. Endo/Heme/Allergies: Does not bruise/bleed easily. Psychiatric/Behavioral: Negative for substance abuse. Visit Vitals  /88 (BP 1 Location: Left upper arm, BP Patient Position: Sitting, BP Cuff Size: Large adult)   Pulse (!) 57   Ht 5' 6\" (1.676 m)   Wt 117 kg (258 lb)   SpO2 96%   BMI 41.64 kg/m²       Physical Exam  Constitutional:       Appearance: She is well-developed. HENT:      Head: Normocephalic and atraumatic. Eyes:      Conjunctiva/sclera: Conjunctivae normal.   Neck:      Vascular: No carotid bruit or JVD. Cardiovascular:      Rate and Rhythm: Regular rhythm. Bradycardia present. No extrasystoles are present. Pulses: Normal pulses. Heart sounds: S1 normal and S2 normal. Heart sounds are distant. No murmur heard. No gallop. Pulmonary:      Effort: Pulmonary effort is normal.      Breath sounds: Normal breath sounds. No wheezing or rales.    Abdominal:      General: Bowel sounds are normal.      Palpations: Abdomen is soft. Tenderness: There is no abdominal tenderness. Musculoskeletal:         General: No swelling, tenderness or deformity. Cervical back: Neck supple. Skin:     General: Skin is warm and dry. Neurological:      General: No focal deficit present. Mental Status: She is alert and oriented to person, place, and time. Psychiatric:         Behavior: Behavior normal.         Thought Content: Thought content normal.       EKG: Sinus bradycardia, leftward axis, normal QTc interval, poor R wave progression,  low voltage, nonspecific T wave abnormality. Compared to the previous EKG, the heart rate has decreased. PVCs are no longer present. ASSESSMENT and PLAN    Fatigue. Unclear etiology at this point. In the past, this was concerning for atrial fibrillation episodes, however she is fatigued today and not in atrial fibrillation. This may be due to her bradycardia, so I have recommended decreasing her metoprolol dosage down to 25 mg twice daily. Would also like to check a CBC, CMP and thyroid panel. Paroxysmal atrial fibrillation. Newly diagnosed in January 2018. Patient converted on her own prior to her scheduled cardioversion. Her CHADs-Vasc score is 2. The patient has been in and out of atrial fibrillation in the past according to her EKGs. In the past, she has remained fairly asymptomatic for arrhythmia, however, she now reports increased fatigue which may or may not be related to her being in atrial fibrillation. Patient is now monitoring her heart rhythm with the E cardio cathy. I would continue her current dosage of Eliquis and the decrease metoprolol dose as described above. Intermittent chest pain. Nonanginal nature. She underwent a low risk nuclear stress test November 2021. Dyslipidemia. Patient was previously taking simvastatin, however that was stopped last month because of elevated transaminases.   A recent lipid panel was quite elevated in October 2021: Total cholesterol 288, triglycerides 248, HDL 57, . She has gained 30+  pounds in weight over the past year or so. I have recommended that she try and lose all this weight and start with lifestyle modification. If her lipids remain elevated, I would consider adding a nonstatin based therapy such as ezetimibe. Obesity. Patient's weight is unchanged from last visit, but up greater than 30 pounds over the past 12 months. She was encouraged to lose much weight as possible lifestyle modification. Follow-up in 4 months, sooner as needed.

## 2022-03-25 NOTE — PROGRESS NOTES
Per your note -   Fatigue. Unclear etiology at this point. In the past, this was concerning for atrial fibrillation episodes, however she is fatigued today and not in atrial fibrillation. This may be due to her bradycardia, so I have recommended decreasing her metoprolol dosage down to 25 mg twice daily. Would also like to check a CBC, CMP and thyroid panel.

## 2022-03-29 ENCOUNTER — TELEPHONE (OUTPATIENT)
Dept: INTERNAL MEDICINE CLINIC | Age: 67
End: 2022-03-29

## 2022-03-29 ENCOUNTER — TELEPHONE (OUTPATIENT)
Dept: CARDIOLOGY CLINIC | Age: 67
End: 2022-03-29

## 2022-03-29 ENCOUNTER — PATIENT MESSAGE (OUTPATIENT)
Dept: INTERNAL MEDICINE CLINIC | Age: 67
End: 2022-03-29

## 2022-03-29 NOTE — TELEPHONE ENCOUNTER
----- Message from Neftali Batista MD sent at 3/25/2022  4:03 PM EDT -----  Please let the patient know that all of her labs were normal.  ----- Message -----  From: Joy Cesar LPN  Sent: 9/61/2000   3:56 PM EDT  To: Neftali Batista MD    Per your note -   Fatigue. Unclear etiology at this point. In the past, this was concerning for atrial fibrillation episodes, however she is fatigued today and not in atrial fibrillation. This may be due to her bradycardia, so I have recommended decreasing her metoprolol dosage down to 25 mg twice daily. Would also like to check a CBC, CMP and thyroid panel.

## 2022-03-29 NOTE — TELEPHONE ENCOUNTER
----- Message from Katie Riley sent at 3/29/2022 12:24 PM EDT -----  Subject: Referral Request    QUESTIONS   Reason for referral request? Pt would like to do her Bloodwork before her   upcoming follow up visit   Has the physician seen you for this condition before? Yes  Select a date? 2021-10-28  Select the Provider the patient wants to be referred to, if known (PCP or   Specialist)? Outside Physician - Lab   Preferred Specialist (if applicable)? Do you already have an appointment scheduled? Yes  Select Scheduled Date? 2022-04-25  Select Scheduled Physician? Lukasz Acuna   Additional Information for Provider?   ---------------------------------------------------------------------------  --------------  Warden Wilmar MCKAY  What is the best way for the office to contact you? OK to leave message on   voicemail  Preferred Call Back Phone Number?  [phone]

## 2022-03-29 NOTE — TELEPHONE ENCOUNTER
Pt scheduled OV with [facility] for 04/25/22    Please order labs if appropriate. Pt requesting to have labs collected 1 week before her ov.      Thank you

## 2022-04-05 ENCOUNTER — HOSPITAL ENCOUNTER (OUTPATIENT)
Dept: LAB | Age: 67
Discharge: HOME | End: 2022-04-05
Payer: COMMERCIAL

## 2022-04-05 DIAGNOSIS — I10 ESSENTIAL HYPERTENSION: ICD-10-CM

## 2022-04-05 DIAGNOSIS — E78.2 MIXED HYPERLIPIDEMIA: ICD-10-CM

## 2022-04-05 DIAGNOSIS — R73.9 HYPERGLYCEMIA: ICD-10-CM

## 2022-04-05 DIAGNOSIS — R10.2 SUPRAPUBIC PAIN: ICD-10-CM

## 2022-04-05 DIAGNOSIS — R10.33 PERIUMBILICAL PAIN: ICD-10-CM

## 2022-04-05 DIAGNOSIS — R10.13 EPIGASTRIC PAIN: ICD-10-CM

## 2022-04-05 DIAGNOSIS — E78.2 MIXED HYPERLIPIDEMIA: Primary | ICD-10-CM

## 2022-04-05 LAB
APPEARANCE UR: CLEAR
BILIRUB UR QL: NEGATIVE
CHOLEST SERPL-MCNC: 285 MG/DL
COLOR UR: YELLOW
CREAT UR-MCNC: 151 MG/DL (ref 30–125)
CRP SERPL-MCNC: 0.6 MG/DL (ref 0–0.3)
EST. AVERAGE GLUCOSE BLD GHB EST-MCNC: 105 MG/DL
GLUCOSE UR STRIP.AUTO-MCNC: NEGATIVE MG/DL
HBA1C MFR BLD: 5.3 % (ref 4.2–5.6)
HDLC SERPL-MCNC: 54 MG/DL (ref 40–60)
HDLC SERPL: 5.3 {RATIO} (ref 0–5)
HGB UR QL STRIP: NEGATIVE
KETONES UR QL STRIP.AUTO: NEGATIVE MG/DL
LDLC SERPL CALC-MCNC: 179.2 MG/DL (ref 0–100)
LEUKOCYTE ESTERASE UR QL STRIP.AUTO: NEGATIVE
LIPASE SERPL-CCNC: 224 U/L (ref 73–393)
LIPID PROFILE,FLP: ABNORMAL
MICROALBUMIN UR-MCNC: 0.92 MG/DL (ref 0–3)
MICROALBUMIN/CREAT UR-RTO: 6 MG/G (ref 0–30)
NITRITE UR QL STRIP.AUTO: NEGATIVE
PH UR STRIP: 5 [PH] (ref 5–8)
PROT UR STRIP-MCNC: NEGATIVE MG/DL
SP GR UR REFRACTOMETRY: 1.03 (ref 1–1.03)
TRIGL SERPL-MCNC: 259 MG/DL (ref ?–150)
UROBILINOGEN UR QL STRIP.AUTO: 0.2 EU/DL (ref 0.2–1)
VLDLC SERPL CALC-MCNC: 51.8 MG/DL

## 2022-04-05 PROCEDURE — 36415 COLL VENOUS BLD VENIPUNCTURE: CPT

## 2022-04-05 PROCEDURE — 86140 C-REACTIVE PROTEIN: CPT

## 2022-04-05 PROCEDURE — 82784 ASSAY IGA/IGD/IGG/IGM EACH: CPT

## 2022-04-05 PROCEDURE — 80061 LIPID PANEL: CPT

## 2022-04-05 PROCEDURE — 83036 HEMOGLOBIN GLYCOSYLATED A1C: CPT

## 2022-04-05 PROCEDURE — 83690 ASSAY OF LIPASE: CPT

## 2022-04-05 PROCEDURE — 82043 UR ALBUMIN QUANTITATIVE: CPT

## 2022-04-05 PROCEDURE — 81003 URINALYSIS AUTO W/O SCOPE: CPT

## 2022-04-06 ENCOUNTER — HOSPITAL ENCOUNTER (OUTPATIENT)
Dept: LAB | Age: 67
Discharge: HOME | End: 2022-04-06

## 2022-04-06 LAB
GLIADIN PEPTIDE IGA SER-ACNC: 3 UNITS (ref 0–19)
GLIADIN PEPTIDE IGG SER-ACNC: 2 UNITS (ref 0–19)
IGA SERPL-MCNC: 183 MG/DL (ref 87–352)
TTG IGA SER-ACNC: <2 U/ML (ref 0–3)
TTG IGG SER-ACNC: <2 U/ML (ref 0–5)
XX-LABCORP SPECIMEN COL,LCBCF: NORMAL

## 2022-04-06 PROCEDURE — 99001 SPECIMEN HANDLING PT-LAB: CPT

## 2022-04-09 LAB
FA STL QL: NORMAL
IMP & REVIEW OF LAB RESULTS: NORMAL
NEUTRAL FAT STL QL: NORMAL
O+P SPEC MICRO: NORMAL

## 2022-04-10 NOTE — PROGRESS NOTES
Her urinalysis, lipase, A1c, celiac profile, and fecal fat stool studies are unremarkable. Meanwhile, her total cholesterol was 285. Triglycerides are 259. HDL is 54. LDL is 179. Overall, her cholesterol is about the same as it was in October 2021. We will need to discuss the addition of medication for severe hyperlipidemia. Her CRP is mildly elevated at 0.6. No microalbuminuria. Her CBC is also unremarkable.     Dr. Arelia Litten  [facility] [address]  Phone: [phone]  Fax: [phone]

## 2022-04-12 LAB — HEMOCCULT STL QL IA: NEGATIVE

## 2022-04-18 ENCOUNTER — HOSPITAL ENCOUNTER (OUTPATIENT)
Dept: LAB | Age: 67
Discharge: HOME | End: 2022-04-18
Payer: COMMERCIAL

## 2022-04-18 ENCOUNTER — OFFICE VISIT (OUTPATIENT)
Dept: INTERNAL MEDICINE CLINIC | Age: 67
End: 2022-04-18
Payer: COMMERCIAL

## 2022-04-18 VITALS
OXYGEN SATURATION: 97 % | RESPIRATION RATE: 18 BRPM | HEIGHT: 66 IN | WEIGHT: 261 LBS | BODY MASS INDEX: 41.95 KG/M2 | HEART RATE: 69 BPM | SYSTOLIC BLOOD PRESSURE: 135 MMHG | TEMPERATURE: 97.5 F | DIASTOLIC BLOOD PRESSURE: 82 MMHG

## 2022-04-18 DIAGNOSIS — E78.5 HYPERLIPIDEMIA, UNSPECIFIED HYPERLIPIDEMIA TYPE: ICD-10-CM

## 2022-04-18 DIAGNOSIS — R79.82 ELEVATED C-REACTIVE PROTEIN (CRP): ICD-10-CM

## 2022-04-18 DIAGNOSIS — I48.91 ATRIAL FIBRILLATION, UNSPECIFIED TYPE (HCC): ICD-10-CM

## 2022-04-18 DIAGNOSIS — R10.33 PERIUMBILICAL PAIN: ICD-10-CM

## 2022-04-18 DIAGNOSIS — R10.33 PERIUMBILICAL PAIN: Primary | ICD-10-CM

## 2022-04-18 PROCEDURE — 86225 DNA ANTIBODY NATIVE: CPT

## 2022-04-18 PROCEDURE — 36415 COLL VENOUS BLD VENIPUNCTURE: CPT

## 2022-04-18 PROCEDURE — 99214 OFFICE O/P EST MOD 30 MIN: CPT | Performed by: INTERNAL MEDICINE

## 2022-04-18 PROCEDURE — 83520 IMMUNOASSAY QUANT NOS NONAB: CPT

## 2022-04-18 RX ORDER — METOPROLOL TARTRATE 50 MG/1
50 TABLET ORAL 2 TIMES DAILY
Qty: 180 TABLET | Refills: 3 | Status: SHIPPED | OUTPATIENT
Start: 2022-04-18

## 2022-04-18 RX ORDER — EZETIMIBE 10 MG/1
10 TABLET ORAL DAILY
Qty: 30 TABLET | Refills: 11 | Status: SHIPPED | OUTPATIENT
Start: 2022-04-18

## 2022-04-18 NOTE — PROGRESS NOTES
INTERNISTS OF [facility]:  4/18/2022, MRN: [id number]      Josh Ayoub is a 73 y.o. female and presents to clinic for Follow-up and Labs (4-6-22)      Subjective:   Pt is a 70yo female with h/o HLD, HTN, HERZOG, AF, and DJD. 1. AF: Her HR is 120s-140s when manually assessed - during my examination. She is asymptomatic. She denies CP and SOB. No palpitations. S/p visit with Rima Trevizo in March. At that time, her metoprolol was decreased from 50mg bid to 25mg bid due to c/o fatigue thought to be 2/2 her rx. +Fatigue. +Episodes of lightheadedness. She has ankle and knee swelling. \"tt's uncomfortable: but not painful. No pain in her hands. +Taking eliquis. 2. Abdominal Pain: Reported at her last apt. Pain is a sharp and \"twisting\" pain, brought on with certain changes in position. The abdominal pain begins laterally and moves inwardly to the midline of her abdomen. This \"squeezing\" pain is increasing in frequency. No triggers are known. No change/relief with defecation, po intake, or urination. Labs show an unremarkable CMP and CBC. Her CRP is mildly elevated at 0.6  Her lipase, UA, celiac profile, and fecal fat stool studies were checked this year and unremarkable. She has yet to schedule with GI. No bleeding. +Nausea. No vomiting. Sx have been present this time x 1.5 months. 3. HLD: Her most recent labs show:  Her total cholesterol was 285. Triglycerides are 259. HDL is 54. LDL is 179. Overall, her cholesterol is about the same as it was in October 2021. She admits to not eating well.  She stopped her statin yrs ago.          Patient Active Problem List    Diagnosis Date Noted    Primary osteoarthritis of left knee 05/14/2019    S/P TKR (total knee replacement) 05/14/2019    Severe obesity ([address] 03/11/2019    Primary osteoarthritis of right knee 05/22/2018    Atrial fibrillation ([address] 01/29/2018    HERZOG (nonalcoholic steatohepatitis) suggested by ultrasound report, 2016 08/11/2017    Obesity (BMI 30-39.9) 02/12/2017    Hyperlipidemia 02/07/2017    Essential hypertension 02/07/2017       Current Outpatient Medications   Medication Sig Dispense Refill    metoprolol tartrate (LOPRESSOR) 50 mg tablet TAKE 1 TABLET BY MOUTH TWICE DAILY (Patient taking differently: Take 25 mg by mouth two (2) times a day. Yandy Kid ) 180 Tablet 3    Eliquis 5 mg tablet TAKE 1 TABLET BY MOUTH TWICE DAILY 180 Tablet 3    calcium-cholecalciferol, D3, (CALCIUM 600 + D) tablet Take 2 Tabs by mouth daily.  omega-3 fatty acids-fish oil (FISH OIL) 360-1,200 mg cap Take 2 Caps by mouth daily.  cholecalciferol (VITAMIN D3) (2,000 UNITS /50 MCG) cap capsule Take 2,000 Units by mouth daily.  polyethylene glycol (MIRALAX) 17 gram packet Take 1 Packet by mouth daily. 30 Packet 1    potassium 99 mg tablet Take 99 mg by mouth daily as needed (supplement). No Known Allergies    Past Medical History:   Diagnosis Date    Arthritis     Bilateral Knee, and Bilateral Hand/Thumb    Atrial fibrillation ([address] 1/29/2018    Carpal tunnel syndrome     H/O echocardiogram 01/30/2018    EF 50%, normal left atrial size, no valvular heart disease    Headache     History of ankle fusion 1997    left    Hx of migraine headaches     Hypercholesterolemia     Hypertension     Morbid obesity ([address]     HERZOG (nonalcoholic steatohepatitis) suggested by ultrasound report, 2016 8/11/2017    Nausea & vomiting     Vertigo        Past Surgical History:   Procedure Laterality Date    COLONOSCOPY N/A 6/5/2018    COLONOSCOPY performed by Janki John MD at [address] [place] [address]      age 25 years    HX COLONOSCOPY      Due in 2018:  May 2015    HX HYSTERECTOMY      age 30years old    HX KNEE REPLACEMENT Right 05/22/2018    HX KNEE REPLACEMENT Left 05/14/2019    HX ORTHOPAEDIC Left     ankle fusion    HX OTHER SURGICAL  2014    removal of mole left breast    HX TONSILLECTOMY      at age 29years old       Family History Problem Relation Age of Onset    Heart Disease Mother     Hypertension Mother     Heart Surgery Mother     Cancer Father         colon    Hypertension Brother     No Known Problems Maternal Grandmother     Heart Disease Maternal Grandfather     Mult Sclerosis Paternal Grandmother     Stroke Paternal Grandfather     Heart Disease Paternal Grandfather     No Known Problems Son        Social History     Tobacco Use    Smoking status: Never Smoker    Smokeless tobacco: Never Used   Substance Use Topics    Alcohol use: No     Alcohol/week: 0.0 standard drinks       ROS   Review of Systems   Constitutional: Positive for malaise/fatigue. Negative for chills and fever. HENT: Negative for ear pain and sore throat. Eyes: Negative for blurred vision and pain. Respiratory: Negative for cough and shortness of breath. Cardiovascular: Negative for chest pain. Gastrointestinal: Positive for abdominal pain and nausea. Negative for blood in stool, melena and vomiting. Genitourinary: Negative for dysuria and hematuria. Musculoskeletal: Positive for joint pain (off/on). Negative for myalgias. Skin: Negative for rash. Neurological: Negative for headaches. Endo/Heme/Allergies: Does not bruise/bleed easily. Psychiatric/Behavioral: Negative for substance abuse. Objective     Vitals:    04/18/22 1409   BP: 135/82   Pulse: 69   Resp: 18   Temp: 97.5 °F (36.4 °C)   TempSrc: Temporal   SpO2: 97%   Weight: 261 lb (118.4 kg)   Height: 5' 6\" (1.676 m)   PainSc:   0 - No pain       Physical Exam  Vitals and nursing note reviewed. HENT:      Head: Normocephalic and atraumatic. Right Ear: External ear normal.      Left Ear: External ear normal.   Eyes:      General: No scleral icterus. Right eye: No discharge. Left eye: No discharge. Conjunctiva/sclera: Conjunctivae normal.   Cardiovascular:      Rate and Rhythm: Rhythm irregular. Heart sounds: No murmur heard.   No friction rub. No gallop. Pulmonary:      Effort: Pulmonary effort is normal. No respiratory distress. Breath sounds: Normal breath sounds. No wheezing or rales. Chest:      Chest wall: No tenderness. Abdominal:      General: Bowel sounds are normal. There is no distension. Palpations: Abdomen is soft. There is no mass. Tenderness: There is no abdominal tenderness. There is no guarding or rebound. Musculoskeletal:         General: No swelling (BUE) or tenderness (BUE). Cervical back: Neck supple. Lymphadenopathy:      Cervical: No cervical adenopathy. Skin:     General: Skin is warm and dry. Findings: No erythema or rash. Neurological:      Mental Status: She is alert. Motor: No abnormal muscle tone.       Gait: Gait normal.   Psychiatric:         Mood and Affect: Mood normal.         LABS   Data Review:   Lab Results   Component Value Date/Time    WBC 7.4 03/24/2022 12:34 PM    HGB 15.5 03/24/2022 12:34 PM    HCT 48.6 (H) 03/24/2022 12:34 PM    PLATELET 427 94/35/8448 12:34 PM    MCV 87.1 03/24/2022 12:34 PM       Lab Results   Component Value Date/Time    Sodium 139 03/24/2022 12:34 PM    Potassium 4.3 03/24/2022 12:34 PM    Chloride 106 03/24/2022 12:34 PM    CO2 27 03/24/2022 12:34 PM    Anion gap 6 03/24/2022 12:34 PM    Glucose 118 (H) 03/24/2022 12:34 PM    BUN 13 03/24/2022 12:34 PM    Creatinine 0.73 03/24/2022 12:34 PM    BUN/Creatinine ratio 18 03/24/2022 12:34 PM    GFR est AA >60 03/24/2022 12:34 PM    GFR est non-AA >60 03/24/2022 12:34 PM    Calcium 10.1 03/24/2022 12:34 PM       Lab Results   Component Value Date/Time    Cholesterol, total 285 (H) 04/05/2022 07:29 AM    HDL Cholesterol 54 04/05/2022 07:29 AM    LDL, calculated 179.2 (H) 04/05/2022 07:29 AM    VLDL, calculated 51.8 04/05/2022 07:29 AM    Triglyceride 259 (H) 04/05/2022 07:29 AM    CHOL/HDL Ratio 5.3 (H) 04/05/2022 07:29 AM       Lab Results   Component Value Date/Time    Hemoglobin A1c 5.3 04/05/2022 07:29 AM       Assessment/Plan:   1. Abdominal Pain: Etiology is unknown. Unremarkable stool studies, UA, CMP, CBC, and lipase. Negative celiac panel. +Elevated C-reactive protein (CRP) but only mildly. - Checking labs. - Ordering a CT scan  - I encouraged her to schedule an apt with her GI team.    ORDERS:  - GUERLINE COMPREHENSIVE PANEL; Future  - RHEUMASSURE; Future  - CT ABD PELV W WO CONT; Future    2. Hyperlipidemia: LDL is in the 170s range. - Starting zetia. - Checking a f/u lipid panel    ORDERS:  - ezetimibe (ZETIA) 10 mg tablet; Take 1 Tablet by mouth daily. Dispense: 30 Tablet; Refill: 11    3. Atrial fibrillation: HR is elevated during her apt off/on. At the beginning of her apt, her HR was <100, but by the end of her apt, it was >100  - EKG ordered. - I encouraged her to f/u with her cardiologist sooner than her scheduled apt  - Pt instructed to go to the ED if her HR is persistently greater than 120. - I instructed her to go back on her higher metoprolol dose of 50mg bid. ORDERS:  - EKG, 12 LEAD, INITIAL; Future  - metoprolol tartrate (LOPRESSOR) 50 mg tablet; Take 1 Tablet by mouth two (2) times a day. Dispense: 180 Tablet; Refill: 3      Health Maintenance Due   Topic Date Due    Bone Densitometry (Dexa) Screening  Never done    Pneumococcal 65+ years (1 - PCV) Never done    COVID-19 Vaccine (3 - Booster for Desirae Bora series) 07/17/2021     Lab review: labs are reviewed in the EHR and ordered as mentioned above    I have discussed the diagnosis with the patient and the intended plan as seen in the above orders. The patient has received an after-visit summary and questions were answered concerning future plans. I have discussed medication side effects and warnings with the patient as well. I have reviewed the plan of care with the patient, accepted their input and they are in agreement with the treatment goals. All questions were answered.  The patient understands the plan of care. Dana Herrera provided today with above information. Pt instructed if symptoms worsen to call the office or report to the ED for continued care. Greater than 50% of the visit time was spent in counseling and/or coordination of care. Voice recognition was used to generate this report, which may have resulted in some phonetic based errors in grammar and contents. Even though attempts were made to correct all the mistakes, some may have been missed, and remained in the body of the document.           Antwan Worley MD

## 2022-04-18 NOTE — PROGRESS NOTES
HEIKE Ayoub presents today for   Chief Complaint   Patient presents with    Follow-up    Labs     4-6-22         1. \"Have you been to the ER, urgent care clinic since your last visit? Hospitalized since your last visit? \" NO    2. \"Have you seen or consulted any other health care providers outside of the [address] [place] [address] since your last visit? \" YES     3. For patients aged 39-70: Has the patient had a colonoscopy / FIT/ Cologuard? Yes - no Care Gap present      If the patient is female:    4. For patients aged 41-77: Has the patient had a mammogram within the past 2 years? Yes - no Care Gap present  See top three    5. For patients aged 21-65: Has the patient had a pap smear?  Yes - no Care Gap present/Hysterectomy

## 2022-04-18 NOTE — PATIENT INSTRUCTIONS
Body Mass Index: Care Instructions  Your Care Instructions     Body mass index (BMI) can help you see if your weight is raising your risk for health problems. It uses a formula to compare how much you weigh with how tall you are. · A BMI lower than 18.5 is considered underweight. · A BMI between 18.5 and 24.9 is considered healthy. · A BMI between 25 and 29.9 is considered overweight. A BMI of 30 or higher is considered obese. If your BMI is in the normal range, it means that you have a lower risk for weight-related health problems. If your BMI is in the overweight or obese range, you may be at increased risk for weight-related health problems, such as high blood pressure, heart disease, stroke, arthritis or joint pain, and diabetes. If your BMI is in the underweight range, you may be at increased risk for health problems such as fatigue, lower protection (immunity) against illness, muscle loss, bone loss, hair loss, and hormone problems. BMI is just one measure of your risk for weight-related health problems. You may be at higher risk for health problems if you are not active, you eat an unhealthy diet, or you drink too much alcohol or use tobacco products. Follow-up care is a key part of your treatment and safety. Be sure to make and go to all appointments, and call your doctor if you are having problems. It's also a good idea to know your test results and keep a list of the medicines you take. How can you care for yourself at home? · Practice healthy eating habits. This includes eating plenty of fruits, vegetables, whole grains, lean protein, and low-fat dairy. · If your doctor recommends it, get more exercise. Walking is a good choice. Bit by bit, increase the amount you walk every day. Try for at least 30 minutes on most days of the week. · Do not smoke. Smoking can increase your risk for health problems. If you need help quitting, talk to your doctor about stop-smoking programs and medicines. These can increase your chances of quitting for good. · Limit alcohol to 2 drinks a day for men and 1 drink a day for women. Too much alcohol can cause health problems. If you have a BMI higher than 25  · Your doctor may do other tests to check your risk for weight-related health problems. This may include measuring the distance around your waist. A waist measurement of more than 40 inches in men or 35 inches in women can increase the risk of weight-related health problems. · Talk with your doctor about steps you can take to stay healthy or improve your health. You may need to make lifestyle changes to lose weight and stay healthy, such as changing your diet and getting regular exercise. If you have a BMI lower than 18.5  · Your doctor may do other tests to check your risk for health problems. · Talk with your doctor about steps you can take to stay healthy or improve your health. You may need to make lifestyle changes to gain or maintain weight and stay healthy, such as getting more healthy foods in your diet and doing exercises to build muscle. Where can you learn more? Go to http://www.weeks.com/  Enter S176 in the search box to learn more about \"Body Mass Index: Care Instructions. \"  Current as of: December 27, 2021               Content Version: 13.2  © 2006-2022 [employer]. Care instructions adapted under license by [employer] (which disclaims liability or warranty for this information). If you have questions about a medical condition or this instruction, always ask your healthcare professional. [place] [address] any warranty or liability for your use of this information.

## 2022-04-19 LAB
CENTROMERE B AB SER-ACNC: <0.2 AI (ref 0–0.9)
CHROMATIN AB SERPL-ACNC: <0.2 AI (ref 0–0.9)
DSDNA AB SER-ACNC: 1 IU/ML (ref 0–9)
ENA JO1 AB SER-ACNC: <0.2 AI (ref 0–0.9)
ENA RNP AB SER-ACNC: <0.2 AI (ref 0–0.9)
ENA SCL70 AB SER-ACNC: <0.2 AI (ref 0–0.9)
ENA SM AB SER-ACNC: <0.2 AI (ref 0–0.9)
ENA SS-A AB SER-ACNC: <0.2 AI (ref 0–0.9)
ENA SS-B AB SER-ACNC: <0.2 AI (ref 0–0.9)
SEE BELOW, 164869: NORMAL

## 2022-04-21 ENCOUNTER — TELEPHONE (OUTPATIENT)
Dept: INTERNAL MEDICINE CLINIC | Age: 67
End: 2022-04-21

## 2022-04-21 NOTE — TELEPHONE ENCOUNTER
Outcome  Approved on April 20 2022  PA Case: [id number], Status: Approved, Coverage Starts on: 4/20/2022 12:00:00 AM, Coverage Ends on: 4/20/2023 12:00:00 AM.  Drug  Ezetimibe 10MG tablets  Form  [facility] Electronic PA Form (6929 NCPDP)      Patient made aware.

## 2022-04-21 NOTE — TELEPHONE ENCOUNTER
----- Message from Aurora Lowe sent at 4/21/2022  1:30 PM EDT -----  Subject: Message to Provider    QUESTIONS  Information for Provider? pt is calling in regarding medication that was   called in for her. [facility] pharmacy on [address] at   [facility] [address] told pt that one of the medications the insurance   wanted a reason for pt to be on the medication and they said faxed a paper   to the office about it. pt has not taken any of the medications prescribed   due to this. pt would like a call back. ---------------------------------------------------------------------------  --------------  Tyrese MCKAY  What is the best way for the office to contact you? OK to leave message on   voicemail  Preferred Call Back Phone Number? [phone]  ---------------------------------------------------------------------------  --------------  SCRIPT ANSWERS  Relationship to Patient?  Self No

## 2022-04-29 ENCOUNTER — HOSPITAL ENCOUNTER (OUTPATIENT)
Dept: CT IMAGING | Age: 67
Discharge: HOME | End: 2022-04-29
Attending: INTERNAL MEDICINE
Payer: COMMERCIAL

## 2022-04-29 DIAGNOSIS — R79.82 ELEVATED C-REACTIVE PROTEIN (CRP): ICD-10-CM

## 2022-04-29 DIAGNOSIS — R10.33 PERIUMBILICAL PAIN: ICD-10-CM

## 2022-04-29 PROCEDURE — 82565 ASSAY OF CREATININE: CPT

## 2022-04-29 PROCEDURE — 74011000636 HC RX REV CODE- 636: Performed by: INTERNAL MEDICINE

## 2022-04-29 PROCEDURE — 74177 CT ABD & PELVIS W/CONTRAST: CPT

## 2022-04-29 RX ADMIN — IOPAMIDOL 100 ML: 612 INJECTION, SOLUTION INTRAVENOUS at 17:47

## 2022-04-30 LAB — CREAT UR-MCNC: 0.5 MG/DL (ref 0.6–1.3)

## 2022-05-01 LAB
14.3.3 ETA, RHEUM. ARTHRITIS: 0.88 NG/ML
CCP IGA+IGG SERPL IA-ACNC: <20 UNITS
RHEUMATOID FACT SERPL-ACNC: <14 UNITS/ML

## 2022-05-03 ENCOUNTER — TELEPHONE (OUTPATIENT)
Dept: INTERNAL MEDICINE CLINIC | Age: 67
End: 2022-05-03

## 2022-05-03 DIAGNOSIS — R76.8 ELEVATED RHEUMATOID FACTOR: ICD-10-CM

## 2022-05-03 DIAGNOSIS — R10.33 PERIUMBILICAL PAIN: Primary | ICD-10-CM

## 2022-05-03 NOTE — TELEPHONE ENCOUNTER
----- Message from Feliciano Figueroa MD sent at 5/3/2022  5:26 AM EDT -----  One of her rheumatoid arthritis labs is positive. Please have her get labs 1 week before her follow-up visit. If her inflammatory marker at that time is elevated, she will need to be evaluated by a rheumatologist to see if she truly has rheumatoid arthritis.     Dr. Georgina Whitehead  [facility]ists of [address], [facility]  Phone: [phone]  Fax: [phone]

## 2022-05-03 NOTE — TELEPHONE ENCOUNTER
----- Message from Yeimi May MD sent at 5/3/2022  5:25 AM EDT -----  Please have her scheduled to see a GI doctor for evaluation. Her CT scan does not show any abnormalities that would explain her symptoms. She may need a colonoscopy to further investigate her symptoms.     Dr. Arelia Litten  [facility] of [facility], [address] [place] [address], [address] [place] [address] Str.  Phone: [phone]  Fax: [phone]

## 2022-05-03 NOTE — PROGRESS NOTES
Please have her scheduled to see a GI doctor for evaluation. Her CT scan does not show any abnormalities that would explain her symptoms. She may need a colonoscopy to further investigate her symptoms.     Dr. Krystina Alcantara  [facility]ists of [facility], [address] [place] [address], [address] [place] [address] Str.  Phone: [phone]  Fax: [phone]

## 2022-05-03 NOTE — PROGRESS NOTES
One of her rheumatoid arthritis labs is positive. Please have her get labs 1 week before her follow-up visit. If her inflammatory marker at that time is elevated, she will need to be evaluated by a rheumatologist to see if she truly has rheumatoid arthritis.     Dr. Otilia Ormond  [facility] [address] [facility] [address]  Phone: [phone]  Fax: [phone]

## 2022-05-03 NOTE — TELEPHONE ENCOUNTER
Patient contacted, patient identified with two identifiers (Name & ). Patient aware of results per DR. Santos and verbalizes understanding. Referral to GI and Rheumatology generated. Patient aware of both.

## 2022-05-19 ENCOUNTER — TRANSCRIBE ORDER (OUTPATIENT)
Dept: SCHEDULING | Age: 67
End: 2022-05-19

## 2022-05-19 DIAGNOSIS — R10.9 ABDOMINAL DISCOMFORT: Primary | ICD-10-CM

## 2022-05-26 ENCOUNTER — HOSPITAL ENCOUNTER (OUTPATIENT)
Dept: VASCULAR SURGERY | Age: 67
Discharge: HOME | End: 2022-05-26
Attending: PHYSICIAN ASSISTANT
Payer: COMMERCIAL

## 2022-05-26 DIAGNOSIS — R10.9 ABDOMINAL DISCOMFORT: ICD-10-CM

## 2022-05-26 PROCEDURE — 93975 VASCULAR STUDY: CPT

## 2022-05-27 LAB
ABDOMINAL DIST AORTA VEL: 82 CM/S
ABDOMINAL MID AORTA VEL: 83.4 CM/S
ABDOMINAL PROX AORTA VEL: 101.1 CM/S
CELIAC EDV: 28.5 CM/S
CELIAC PSV: 156.1 CM/S
COMMON HEPATIC EDV: 5.3 CM/S
COMMON HEPATIC PSV: 63.7 CM/S
DIST SMA EDV: 13.1 CM/S
DIST SMA PSV: 116.3 CM/S
MID AORTIC AP: 1.97 CM
MID AORTIC TRANS: 1.87 CM
MID SMA EDV: 9.4 CM/S
MID SMA PSV: 134.7 CM/S
ORIGIN SMA EDV: 19.3 CM/S
ORIGIN SMA PSV: 172.9 CM/S
PROX SMA EDV: 10.7 CM/S
PROX SMA PSV: 188.1 CM/S
SPLENIC EDV: 12.7 CM/S
SPLENIC PSV: 144.9 CM/S

## 2022-06-22 ENCOUNTER — OFFICE VISIT (OUTPATIENT)
Dept: SURGERY | Age: 67
End: 2022-06-22
Payer: COMMERCIAL

## 2022-06-22 VITALS
WEIGHT: 264 LBS | HEART RATE: 84 BPM | RESPIRATION RATE: 16 BRPM | SYSTOLIC BLOOD PRESSURE: 127 MMHG | DIASTOLIC BLOOD PRESSURE: 77 MMHG | TEMPERATURE: 97.4 F | HEIGHT: 66 IN | OXYGEN SATURATION: 97 % | BODY MASS INDEX: 42.43 KG/M2

## 2022-06-22 DIAGNOSIS — R10.11 COLICKY RUQ ABDOMINAL PAIN: Primary | ICD-10-CM

## 2022-06-22 DIAGNOSIS — K43.2 INCISIONAL HERNIA OF ANTERIOR ABDOMINAL WALL WITHOUT OBSTRUCTION OR GANGRENE: ICD-10-CM

## 2022-06-22 PROCEDURE — 99214 OFFICE O/P EST MOD 30 MIN: CPT | Performed by: SURGERY

## 2022-06-22 PROCEDURE — 1123F ACP DISCUSS/DSCN MKR DOCD: CPT | Performed by: SURGERY

## 2022-06-22 NOTE — PROGRESS NOTES
Hannah Nolasco is a 78 y.o. female  Chief Complaint   Patient presents with    New Patient     Umbillical hernia

## 2022-06-22 NOTE — PATIENT INSTRUCTIONS
High-Fiber Diet: Care Instructions  Overview     A high-fiber diet may help you relieve constipation and feel less bloated. Your doctor and dietitian will help you make a high-fiber eating plan based on your personal needs. The plan will include the things you like to eat. It will also make sure that you get 25 to 35 grams of fiber a day. Before you make changes to the way you eat, be sure to talk with your doctor or dietitian. Follow-up care is a key part of your treatment and safety. Be sure to make and go to all appointments, and call your doctor if you are having problems. It's also a good idea to know your test results and keep a list of the medicines you take. How can you care for yourself at home? · You can increase how much fiber you get if you eat more of certain foods. These foods include:  ? Whole-grain breads and cereals. ? Fruits, such as pears, apples, and peaches. Eat the skins and peels if you can.  ? Vegetables, such as broccoli, cabbage, spinach, carrots, asparagus, and squash. ? Starchy vegetables. These include potatoes with skins, kidney beans, and lima beans. · Take a fiber supplement every day if your doctor recommends it. Examples are Benefiber, Citrucel, FiberCon, and Metamucil. Ask your doctor how much to take. · Drink plenty of fluids. If you have kidney, heart, or liver disease and have to limit fluids, talk with your doctor before you increase the amount of fluids you drink. Where can you learn more? Go to http://www.gray.com/  Enter G257 in the search box to learn more about \"High-Fiber Diet: Care Instructions. \"  Current as of: September 8, 2021               Content Version: 13.2  © 2006-2022 [employer]. Care instructions adapted under license by [employer] (which disclaims liability or warranty for this information).  If you have questions about a medical condition or this instruction, always ask your healthcare professional. [address] any warranty or liability for your use of this information. Low-Fat Diet for Gallbladder Disease: After Your Visit  Your Care Instructions  When you eat, the gallbladder releases bile, which helps you digest the fat in food. If you have an inflamed gallbladder, this may cause pain. A low-fat diet may give your gallbladder a rest so you can start to heal. Your doctor and dietitian can help you make an eating plan that does not irritate your digestive system. Always talk with your doctor or dietitian before you make changes in your diet. Follow-up care is a key part of your treatment and safety. Be sure to make and go to all appointments, and call your doctor if you are having problems. Its also a good idea to know your test results and keep a list of the medicines you take. How can you care for yourself at home? · Eat many small meals and snacks each day instead of three large meals. · Choose lean meats. ¨ Eat no more than 5 to 6½ ounces of meat a day. ¨ Cut off all fat you can see. ¨ Eat chicken and turkey without the skin. ¨ Many types of fish, such as salmon, lake trout, tuna, and herring, provide healthy omega-3 fat. But, avoid fish canned in oil, such as sardines in olive oil. ¨ Bake, broil, or grill meats, fowl, or fish instead of frying them in butter or fat. · Drink or eat nonfat or low-fat milk, yogurt, cheese, or other milk products each day. ¨ Read the labels on cheeses, and choose those with less than 5 grams of fat an ounce. ¨ Try fat-free sour cream, cream cheese, or yogurt. ¨ Avoid cream soups and cream sauces on pasta. ¨ Eat low-fat ice cream, frozen yogurt, or sorbet. Avoid regular ice cream.  · Eat whole-grain cereals, breads, crackers, rice, or pasta. Avoid high-fat foods such as croissants, scones, biscuits, waffles, doughnuts, muffins, granola, and high-fat breads.   · Flavor your foods with herbs and spices (such as basil, tarragon, or mint), fat-free sauces, or lemon juice instead of butter. You can also use butter substitutes, fat-free mayonnaise, or fat-free dressing. · Try applesauce, prune puree, or mashed bananas to replace some or all of the fat when you bake. · Limit fats and oils, such as butter, margarine, mayonnaise, and salad dressing, to no more than 1 tablespoon a meal.  · Avoid high-fat foods, such as:  ¨ Chocolate, whole milk, ice cream, processed cheese, and egg yolks. ¨ Fried or buttered foods. ¨ Ham, salami, and singleton. ¨ Cinnamon rolls, cakes, pies, cookies, and other pastries. ¨ Prepared snack foods, such as potato chips, nut and granola bars, and mixed nuts. ¨ Coconut and avocado. · Learn how to read food labels for serving sizes and ingredients. Fast-food and convenience-food meals often have lots of fat. Where can you learn more? Go to [employer]ianDian[employer]be  Enter M147 in the search box to learn more about \"Low-Fat Diet for Gallbladder Disease: After Your Visit. \"   © 2571-3017 Healthwise, Incorporated. Care instructions adapted under license by [facility] (which disclaims liability or warranty for this information). This care instruction is for use with your licensed healthcare professional. If you have questions about a medical condition or this instruction, always ask your healthcare professional. [place] [address] any warranty or liability for your use of this information. Content Version: 2.8.574044; Last Revised: August 31, 2011           Gallbladder Removal: Before Your Surgery  What is gallbladder removal surgery? Gallbladder surgery removes a diseased gallbladder. It is also known as cholecystectomy (hg-axg-taq-EZEKIEL-tuh-nancy). This surgery is usually done as a laparoscopic surgery. The doctor puts a lighted tube (scope) and other surgical tools through small cuts (incisions) in your belly. The belly is inflated with air.  The air pushes the belly away from the organs so that the surgeon can see them clearly. The incisions leave scars that fade with time. Most people go home the same day. You probably will feel better each day. Most people have only a small amount of pain after 1 week. If you have a desk job, you can probably go back to work in 1 to 2 weeks. If you lift heavy objects or have a very active job, it may take up to 4 weeks. In some cases, open surgery is the best choice. Your doctor may choose open surgery in advance. Or the doctor may choose it in the middle of laparoscopic surgery. In open surgery, the doctor makes a larger incision in your upper belly. If you have open surgery, you will probably stay in the hospital for 2 to 4 days. And it may take 4 to 6 weeks to get back to your normal routine. How do you prepare for surgery? Surgery can be stressful. This information will help you understand what you can expect. And it will help you safely prepare for surgery. Preparing for surgery    · Be sure you have someone to take you home. Anesthesia and pain medicine will make it unsafe for you to drive or get home on your own.     · Understand exactly what surgery is planned, along with the risks, benefits, and other options.     · If you take aspirin or some other blood thinner, ask your doctor if you should stop taking it before your surgery. Make sure that you understand exactly what your doctor wants you to do. These medicines increase the risk of bleeding.     · Tell your doctor ALL the medicines, vitamins, supplements, and herbal remedies you take. Some may increase the risk of problems during your surgery. Your doctor will tell you if you should stop taking any of them before the surgery and how soon to do it.     · Make sure your doctor and the hospital have a copy of your advance directive. If you don't have one, you may want to prepare one. It lets others know your health care wishes.  It's a good thing to have before any type of surgery or procedure. What happens on the day of surgery? · Follow the instructions exactly about when to stop eating and drinking. If you don't, your surgery may be canceled. If your doctor told you to take your medicines on the day of surgery, take them with only a sip of water.     · Take a bath or shower before you come in for your surgery. Do not apply lotions, perfumes, deodorants, or nail polish.     · Do not shave the surgical site yourself.     · Take off all jewelry and piercings. And take out contact lenses, if you wear them. At the hospital or surgery center   · Bring a picture ID.     · The area for surgery is often marked to make sure there are no errors.     · You will be kept comfortable and safe by your anesthesia provider. You will be asleep during the surgery.     · The surgery usually takes 1 to 2 hours. When should you call your doctor? · You have questions or concerns.     · You don't understand how to prepare for your surgery.     · You become ill before the surgery (such as fever, flu, or a cold).     · You need to reschedule or have changed your mind about having the surgery. Where can you learn more? Go to http://www.gray.com/  Enter G508 in the search box to learn more about \"Gallbladder Removal: Before Your Surgery. \"  Current as of: January 20, 2022               Content Version: 13.2  © 1165-9189 Healthwise, Incorporated. Care instructions adapted under license by [employer] (which disclaims liability or warranty for this information). If you have questions about a medical condition or this instruction, always ask your healthcare professional. [place] [address] any warranty or liability for your use of this information. Abdominal Hernia Repair: Before Your Surgery  What is abdominal hernia repair surgery? An abdominal hernia repair is a type of surgery. It fixes a problem called a hernia.  A hernia is a bulge under the skin in your belly. It happens when you have a weak spot in your belly muscles and a piece of your intestines or tissues pokes through your muscles. This can cause pain. You may notice the pain most when you lift something heavy. You can have a hernia near your belly button. Or it may be in a scar from an earlier surgery. To fix it, the doctor will do one of two kinds of surgery. In open surgery, the doctor makes one cut near the hernia. This cut is called an incision. In laparoscopic surgery, the doctor makes several very small incisions and uses a thin, lighted scope and small tools. In either type of surgery, the doctor pushes the bulge back in place, if needed. Then the doctor sews the healthy tissue back together. Often the doctor patches the weak spot with a piece of material.  Laparoscopic surgery leaves several small scars. Open surgery leaves one long scar. The scars fade with time. You will probably need to take 1 to 2 weeks off from work. But if your job requires heavy lifting or other physical work, you may need to take 4 to 6 weeks off. Follow-up care is a key part of your treatment and safety. Be sure to make and go to all appointments, and call your doctor if you are having problems. It's also a good idea to know your test results and keep a list of the medicines you take. How do you prepare for the surgery? Surgery can be stressful. This information will help you understand what you can expect. And it will help you safely prepare for surgery. Preparing for surgery    · Be sure you have someone to take you home. Anesthesia and pain medicine will make it unsafe for you to drive or get home on your own.     · Understand exactly what surgery is planned, along with the risks, benefits, and other options.     · Tell your doctor ALL the medicines, vitamins, supplements, and herbal remedies you take. Some may increase the risk of problems during your surgery.  Your doctor will tell you if you should stop taking any of them before the surgery and how soon to do it.     · If you take aspirin or some other blood thinner, ask your doctor if you should stop taking it before your surgery. Make sure that you understand exactly what your doctor wants you to do. These medicines increase the risk of bleeding.     · Make sure your doctor and the hospital have a copy of your advance directive. If you don't have one, you may want to prepare one. It lets others know your health care wishes. It's a good thing to have before any type of surgery or procedure. .   What happens on the day of surgery? · Follow the instructions exactly about when to stop eating and drinking. If you don't, your surgery may be canceled. If your doctor told you to take your medicines on the day of surgery, take them with only a sip of water.     · Take a bath or shower before you come in for your surgery. Do not apply lotions, perfumes, deodorants, or nail polish.     · Do not shave the surgical site yourself.     · Take off all jewelry and piercings. And take out contact lenses, if you wear them. At the hospital or surgery center   · Bring a picture ID.     · The area for surgery is often marked to make sure there are no errors.     · You will be kept comfortable and safe by your anesthesia provider. You will be asleep during the surgery.     · The surgery will take 30 minutes to 2 hours. It depends on how large the hernia is and where it is. When should you call your doctor? · You have questions or concerns.     · You don't understand how to prepare for your surgery.     · You become ill before the surgery (such as fever, flu, or a cold).     · You need to reschedule or have changed your mind about having the surgery. Where can you learn more? Go to http://www.gray.com/  Enter U288 in the search box to learn more about \"Abdominal Hernia Repair: Before Your Surgery. \"  Current as of: September 8, 2021               Content Version: 13.2  © 2470-9067 Healthwise, Incorporated. Care instructions adapted under license by [employer] (which disclaims liability or warranty for this information). If you have questions about a medical condition or this instruction, always ask your healthcare professional. [address] any warranty or liability for your use of this information.

## 2022-06-22 NOTE — PROGRESS NOTES
[facility]  General Surgery    Subjective:     CC: Umbilical hernia     HPI: Patient is a very pleasant morbidly obese-BMI 4361kg/m² 60-year-old female with a past medical history remarkable for hypertension, hyperlipidemia, none alcohol steatohepatitis, primary osteoarthritis of the right knee, status post right total knee replacement, history of atrial fibrillation and history of nausea and vomiting. Patient states that she has been suffering with lack of energy since March. She could not get out of the bed. She works as a  and does not do any heavy lifting at work or at home. She was originally seen by gastrointestinal liver specialist and was diagnosed with a hiatal hernia as well as umbilical hernia. She describes the pain at the umbilicus as constant and dull 4 out of 10. She states that her diet consists mostly of baked chicken and fish. She only eats about 2 servings of vegetables per day. She denies eating much dairy except for some occasional yogurt. We discussed her present weight of 286 pounds and a height of 5 foot 6 inches and her of the body mass index of 42.61 kg/m². She will need to be below 245 pounds in order to have elective repair of the umbilical hernia. This will require a 20 pound weight loss. Patient understands this. She will attempt to increase her fruits and vegetables in order to move to a less calorie dense foods and thereby lose weight. We discussed the fact that her umbilical hernia is not likely causing her lack of energy instead with her expecting that she has pain in the right upper quadrant although there does not seem to be a relationship temporally to what she eats and the pain it is more likely that she may have a gallbladder or biliary issue causing her pain.     Patient Active Problem List    Diagnosis Date Noted    Primary osteoarthritis of left knee 05/14/2019    S/P TKR (total knee replacement) 05/14/2019    Severe obesity ([address] 03/11/2019    Primary osteoarthritis of right knee 05/22/2018    Atrial fibrillation ([address] 01/29/2018    HERZOG (nonalcoholic steatohepatitis) suggested by ultrasound report, 2016 08/11/2017    Obesity (BMI 30-39.9) 02/12/2017    Hyperlipidemia 02/07/2017    Essential hypertension 02/07/2017     Past Medical History:   Diagnosis Date    Arthritis     Bilateral Knee, and Bilateral Hand/Thumb    Atrial fibrillation ([address] 1/29/2018    Carpal tunnel syndrome     H/O echocardiogram 01/30/2018    EF 50%, normal left atrial size, no valvular heart disease    Headache     History of ankle fusion 1997    left    Hx of migraine headaches     Hypercholesterolemia     Hypertension     Morbid obesity ([address]     HERZOG (nonalcoholic steatohepatitis) suggested by ultrasound report, 2016 8/11/2017    Nausea & vomiting     Vertigo       Past Surgical History:   Procedure Laterality Date    COLONOSCOPY N/A 6/5/2018    COLONOSCOPY performed by Robel Mueller MD at [address] HX APPENDECTOMY      age 25 years    HX COLONOSCOPY      Due in 2018:  May 2015    HX HYSTERECTOMY      age 30years old   Gerardo Layer HX KNEE REPLACEMENT Right 05/22/2018    HX KNEE REPLACEMENT Left 05/14/2019    HX ORTHOPAEDIC Left     ankle fusion    HX OTHER SURGICAL  2014    removal of mole left breast    HX TONSILLECTOMY      at age 29years old      Family History   Problem Relation Age of Onset    Heart Disease Mother     Hypertension Mother     Heart Surgery Mother     Cancer Father         colon    Hypertension Brother     No Known Problems Maternal Grandmother     Heart Disease Maternal Grandfather     Mult Sclerosis Paternal Grandmother     Stroke Paternal Grandfather     Heart Disease Paternal Grandfather     No Known Problems Son       Social History     Tobacco Use    Smoking status: Never Smoker    Smokeless tobacco: Never Used   Substance Use Topics    Alcohol use: No     Alcohol/week: 0.0 standard drinks Allergies   Allergen Reactions    Statins-Hmg-Coa Reductase Inhibitors Other (comments)       Prior to Admission medications    Medication Sig Start Date End Date Taking? Authorizing Provider   ezetimibe (ZETIA) 10 mg tablet Take 1 Tablet by mouth daily. 4/18/22  Yes Deb Ortiz MD   metoprolol tartrate (LOPRESSOR) 50 mg tablet Take 1 Tablet by mouth two (2) times a day. 4/18/22  Yes Deb Ortiz MD   Eliquis 5 mg tablet TAKE 1 TABLET BY MOUTH TWICE DAILY 9/10/21  Yes Romayne Shropshire, MD   calcium-cholecalciferol, D3, (CALCIUM 600 + D) tablet Take 2 Tabs by mouth daily. Yes Provider, Historical   omega-3 fatty acids-fish oil (FISH OIL) 360-1,200 mg cap Take 2 Caps by mouth daily. Yes Provider, Historical   cholecalciferol (VITAMIN D3) (2,000 UNITS /50 MCG) cap capsule Take 2,000 Units by mouth daily. Yes Provider, Historical   polyethylene glycol (MIRALAX) 17 gram packet Take 1 Packet by mouth daily. 5/23/18  Yes Grace Davidson PA-C   potassium 99 mg tablet Take 99 mg by mouth daily as needed (supplement). Yes Provider, Historical       Review of Systems:    14 systems were reviewed. The results are as above in the HPI and otherwise negative. Objective:     Vitals:    06/22/22 0907   BP: 127/77   Pulse: 84   Resp: 16   Temp: 97.4 °F (36.3 °C)   TempSrc: Temporal   SpO2: 97%   Weight: 119.7 kg (264 lb)   Height: 5' 6\" (1.676 m)       Physical Exam:  GENERAL: alert, cooperative, no distress, appears stated age,   EYE: conjunctivae/corneas clear. PERRL, EOM's intact. THROAT & NECK: normal and no erythema or exudates noted. ,    LYMPHATIC: Cervical, supraclavicular, and axillary nodes normal. ,   LUNG: clear to auscultation bilaterally,   HEART: regular rate and rhythm, S1, S2 normal, no murmur, click, rub or gallop,   ABDOMEN: soft, morbidly obese, right upper quadrant tenderness to palpation with positive Carrera sign. Bowel sounds normal. No masses,  no organomegaly.   Umbilical hernia present with prior umbilical incision which she states is from a previous laparoscopy prior to her hysterectomy. EXTREMITIES:  extremities normal, atraumatic, no cyanosis or edema,   SKIN: Normal.,   NEUROLOGIC: AOx3. Cranial nerves 2-12 and sensation grossly intact. ,     Data Review:  to be done    Ms. Sarahy Singh has a reminder for a \"due or due soon\" health maintenance. I have asked that she contact her primary care provider for follow-up on this health maintenance. Impression:     · Patient with umbilical hernia which is incisional hernia secondary to her previous laparoscopy. She also has symptoms consistent with chronic cholecystitis. Plan:     · Nuclear medicine hepatobiliary scan with intervention to assess for cholecystitis. I reviewed the CT abdomen pelvis which was previously performed in May which did not reveal any evidence of gallstones in the gallbladder but the hernia was present at the umbilicus. No bowel was within the hernia. · Will call patient to discuss CAT scan results. · Patient will continue to increase the fruits and vegetables in her diet.   · Follow-up in 3 months    Signed By: Jas Mac MD     June 22, 2022

## 2022-06-30 ENCOUNTER — HOSPITAL ENCOUNTER (OUTPATIENT)
Dept: NUCLEAR MEDICINE | Age: 67
Discharge: HOME | End: 2022-06-30
Attending: SURGERY
Payer: COMMERCIAL

## 2022-06-30 DIAGNOSIS — R10.11 COLICKY RUQ ABDOMINAL PAIN: ICD-10-CM

## 2022-06-30 PROCEDURE — 78227 HEPATOBIL SYST IMAGE W/DRUG: CPT

## 2022-06-30 RX ORDER — KIT FOR THE PREPARATION OF TECHNETIUM TC 99M MEBROFENIN 45 MG/10ML
6.2 INJECTION, POWDER, LYOPHILIZED, FOR SOLUTION INTRAVENOUS
Status: COMPLETED | OUTPATIENT
Start: 2022-06-30 | End: 2022-06-30

## 2022-06-30 RX ADMIN — KIT FOR THE PREPARATION OF TECHNETIUM TC 99M MEBROFENIN 6.2 MILLICURIE: 45 INJECTION, POWDER, LYOPHILIZED, FOR SOLUTION INTRAVENOUS at 09:08

## 2022-08-09 ENCOUNTER — HOSPITAL ENCOUNTER (EMERGENCY)
Age: 67
Discharge: HOME | End: 2022-08-10
Attending: STUDENT IN AN ORGANIZED HEALTH CARE EDUCATION/TRAINING PROGRAM
Payer: COMMERCIAL

## 2022-08-09 ENCOUNTER — APPOINTMENT (OUTPATIENT)
Dept: GENERAL RADIOLOGY | Age: 67
End: 2022-08-09
Attending: STUDENT IN AN ORGANIZED HEALTH CARE EDUCATION/TRAINING PROGRAM
Payer: COMMERCIAL

## 2022-08-09 VITALS
WEIGHT: 245 LBS | SYSTOLIC BLOOD PRESSURE: 94 MMHG | HEART RATE: 99 BPM | BODY MASS INDEX: 39.37 KG/M2 | DIASTOLIC BLOOD PRESSURE: 68 MMHG | HEIGHT: 66 IN | OXYGEN SATURATION: 93 % | RESPIRATION RATE: 19 BRPM | TEMPERATURE: 99.1 F

## 2022-08-09 DIAGNOSIS — I48.91 ATRIAL FIBRILLATION WITH RVR (HCC): ICD-10-CM

## 2022-08-09 DIAGNOSIS — U07.1 COVID-19: Primary | ICD-10-CM

## 2022-08-09 LAB
ALBUMIN SERPL-MCNC: 3.7 G/DL (ref 3.4–5)
ALBUMIN/GLOB SERPL: 0.9 {RATIO} (ref 0.8–1.7)
ALP SERPL-CCNC: 93 U/L (ref 45–117)
ALT SERPL-CCNC: 127 U/L (ref 13–56)
AMORPH CRY URNS QL MICRO: ABNORMAL
ANION GAP SERPL CALC-SCNC: 7 MMOL/L (ref 3–18)
APPEARANCE UR: CLEAR
AST SERPL-CCNC: 75 U/L (ref 10–38)
BASOPHILS # BLD: 0 K/UL (ref 0–0.1)
BASOPHILS NFR BLD: 0 % (ref 0–2)
BILIRUB SERPL-MCNC: 0.5 MG/DL (ref 0.2–1)
BILIRUB UR QL: NEGATIVE
BUN SERPL-MCNC: 8 MG/DL (ref 7–18)
BUN/CREAT SERPL: 15 (ref 12–20)
CALCIUM SERPL-MCNC: 8.9 MG/DL (ref 8.5–10.1)
CHLORIDE SERPL-SCNC: 105 MMOL/L (ref 100–111)
CO2 SERPL-SCNC: 24 MMOL/L (ref 21–32)
COLOR UR: YELLOW
CREAT SERPL-MCNC: 0.55 MG/DL (ref 0.6–1.3)
DEPRECATED S PYO AG THROAT QL EIA: NEGATIVE
DIFFERENTIAL METHOD BLD: ABNORMAL
EOSINOPHIL # BLD: 0 K/UL (ref 0–0.4)
EOSINOPHIL NFR BLD: 0 % (ref 0–5)
EPITH CASTS URNS QL MICRO: ABNORMAL /LPF (ref 0–5)
ERYTHROCYTE [DISTWIDTH] IN BLOOD BY AUTOMATED COUNT: 11.8 % (ref 11.6–14.5)
FLUAV RNA SPEC QL NAA+PROBE: NOT DETECTED
FLUBV RNA SPEC QL NAA+PROBE: NOT DETECTED
GLOBULIN SER CALC-MCNC: 4.1 G/DL (ref 2–4)
GLUCOSE SERPL-MCNC: 156 MG/DL (ref 74–99)
GLUCOSE UR STRIP.AUTO-MCNC: 250 MG/DL
HCT VFR BLD AUTO: 45.4 % (ref 35–45)
HGB BLD-MCNC: 15.2 G/DL (ref 12–16)
HGB UR QL STRIP: ABNORMAL
IMM GRANULOCYTES # BLD AUTO: 0 K/UL (ref 0–0.04)
IMM GRANULOCYTES NFR BLD AUTO: 1 % (ref 0–0.5)
KETONES UR QL STRIP.AUTO: ABNORMAL MG/DL
LEUKOCYTE ESTERASE UR QL STRIP.AUTO: NEGATIVE
LIPASE SERPL-CCNC: 171 U/L (ref 73–393)
LYMPHOCYTES # BLD: 1.2 K/UL (ref 0.9–3.6)
LYMPHOCYTES NFR BLD: 14 % (ref 21–52)
MAGNESIUM SERPL-MCNC: 1.9 MG/DL (ref 1.6–2.6)
MCH RBC QN AUTO: 28.7 PG (ref 24–34)
MCHC RBC AUTO-ENTMCNC: 33.5 G/DL (ref 31–37)
MCV RBC AUTO: 85.7 FL (ref 78–100)
MONOCYTES # BLD: 0.4 K/UL (ref 0.05–1.2)
MONOCYTES NFR BLD: 5 % (ref 3–10)
MUCOUS THREADS URNS QL MICRO: ABNORMAL /LPF
NEUTS SEG # BLD: 6.7 K/UL (ref 1.8–8)
NEUTS SEG NFR BLD: 80 % (ref 40–73)
NITRITE UR QL STRIP.AUTO: NEGATIVE
NRBC # BLD: 0 K/UL (ref 0–0.01)
NRBC BLD-RTO: 0 PER 100 WBC
PH UR STRIP: 7.5 [PH] (ref 5–8)
PLATELET # BLD AUTO: 260 K/UL (ref 135–420)
PMV BLD AUTO: 11.3 FL (ref 9.2–11.8)
POTASSIUM SERPL-SCNC: 4.1 MMOL/L (ref 3.5–5.5)
PROT SERPL-MCNC: 7.8 G/DL (ref 6.4–8.2)
PROT UR STRIP-MCNC: NEGATIVE MG/DL
RBC # BLD AUTO: 5.3 M/UL (ref 4.2–5.3)
RBC #/AREA URNS HPF: ABNORMAL /HPF (ref 0–5)
SARS-COV-2, COV2: DETECTED
SODIUM SERPL-SCNC: 136 MMOL/L (ref 136–145)
SP GR UR REFRACTOMETRY: 1.01 (ref 1–1.03)
TROPONIN-HIGH SENSITIVITY: 3 NG/L (ref 0–54)
UROBILINOGEN UR QL STRIP.AUTO: 0.2 EU/DL (ref 0.2–1)
WBC # BLD AUTO: 8.4 K/UL (ref 4.6–13.2)
WBC URNS QL MICRO: ABNORMAL /HPF (ref 0–4)

## 2022-08-09 PROCEDURE — 83690 ASSAY OF LIPASE: CPT

## 2022-08-09 PROCEDURE — 71045 X-RAY EXAM CHEST 1 VIEW: CPT

## 2022-08-09 PROCEDURE — 74011250637 HC RX REV CODE- 250/637: Performed by: EMERGENCY MEDICINE

## 2022-08-09 PROCEDURE — 87636 SARSCOV2 & INF A&B AMP PRB: CPT

## 2022-08-09 PROCEDURE — 74011250636 HC RX REV CODE- 250/636: Performed by: STUDENT IN AN ORGANIZED HEALTH CARE EDUCATION/TRAINING PROGRAM

## 2022-08-09 PROCEDURE — 74011250637 HC RX REV CODE- 250/637: Performed by: STUDENT IN AN ORGANIZED HEALTH CARE EDUCATION/TRAINING PROGRAM

## 2022-08-09 PROCEDURE — 96361 HYDRATE IV INFUSION ADD-ON: CPT

## 2022-08-09 PROCEDURE — 80053 COMPREHEN METABOLIC PANEL: CPT

## 2022-08-09 PROCEDURE — 99285 EMERGENCY DEPT VISIT HI MDM: CPT

## 2022-08-09 PROCEDURE — 87880 STREP A ASSAY W/OPTIC: CPT

## 2022-08-09 PROCEDURE — 74011250636 HC RX REV CODE- 250/636: Performed by: EMERGENCY MEDICINE

## 2022-08-09 PROCEDURE — 81001 URINALYSIS AUTO W/SCOPE: CPT

## 2022-08-09 PROCEDURE — 96375 TX/PRO/DX INJ NEW DRUG ADDON: CPT

## 2022-08-09 PROCEDURE — 96374 THER/PROPH/DIAG INJ IV PUSH: CPT

## 2022-08-09 PROCEDURE — 83735 ASSAY OF MAGNESIUM: CPT

## 2022-08-09 PROCEDURE — 85025 COMPLETE CBC W/AUTO DIFF WBC: CPT

## 2022-08-09 PROCEDURE — 96376 TX/PRO/DX INJ SAME DRUG ADON: CPT

## 2022-08-09 PROCEDURE — 87070 CULTURE OTHR SPECIMN AEROBIC: CPT

## 2022-08-09 PROCEDURE — 93005 ELECTROCARDIOGRAM TRACING: CPT

## 2022-08-09 PROCEDURE — 84484 ASSAY OF TROPONIN QUANT: CPT

## 2022-08-09 PROCEDURE — 74011000250 HC RX REV CODE- 250: Performed by: STUDENT IN AN ORGANIZED HEALTH CARE EDUCATION/TRAINING PROGRAM

## 2022-08-09 RX ORDER — ONDANSETRON 2 MG/ML
4 INJECTION INTRAMUSCULAR; INTRAVENOUS
Status: COMPLETED | OUTPATIENT
Start: 2022-08-09 | End: 2022-08-09

## 2022-08-09 RX ORDER — DILTIAZEM HYDROCHLORIDE 5 MG/ML
20 INJECTION INTRAVENOUS
Status: COMPLETED | OUTPATIENT
Start: 2022-08-09 | End: 2022-08-09

## 2022-08-09 RX ORDER — ACETAMINOPHEN 325 MG/1
650 TABLET ORAL
Status: COMPLETED | OUTPATIENT
Start: 2022-08-09 | End: 2022-08-09

## 2022-08-09 RX ORDER — ACETAMINOPHEN 500 MG
1000 TABLET ORAL
Status: COMPLETED | OUTPATIENT
Start: 2022-08-09 | End: 2022-08-09

## 2022-08-09 RX ORDER — DILTIAZEM HYDROCHLORIDE 5 MG/ML
30 INJECTION INTRAVENOUS
Status: COMPLETED | OUTPATIENT
Start: 2022-08-09 | End: 2022-08-09

## 2022-08-09 RX ADMIN — SODIUM CHLORIDE 1000 ML: 9 INJECTION, SOLUTION INTRAVENOUS at 17:57

## 2022-08-09 RX ADMIN — SODIUM CHLORIDE 1000 ML: 900 INJECTION, SOLUTION INTRAVENOUS at 21:22

## 2022-08-09 RX ADMIN — DILTIAZEM HYDROCHLORIDE 30 MG: 5 INJECTION, SOLUTION INTRAVENOUS at 22:09

## 2022-08-09 RX ADMIN — ACETAMINOPHEN 1000 MG: 500 TABLET ORAL at 22:50

## 2022-08-09 RX ADMIN — ONDANSETRON 4 MG: 2 INJECTION INTRAMUSCULAR; INTRAVENOUS at 17:57

## 2022-08-09 RX ADMIN — SODIUM CHLORIDE 1000 ML: 9 INJECTION, SOLUTION INTRAVENOUS at 19:24

## 2022-08-09 RX ADMIN — ACETAMINOPHEN 650 MG: 325 TABLET, FILM COATED ORAL at 17:58

## 2022-08-09 RX ADMIN — DILTIAZEM HYDROCHLORIDE 20 MG: 5 INJECTION, SOLUTION INTRAVENOUS at 19:29

## 2022-08-09 NOTE — ED PROVIDER NOTES
EMERGENCY DEPARTMENT HISTORY AND PHYSICAL EXAM    I have evaluated the patient at 6:11 PM      Date: 8/9/2022  Patient Name: Lokesh Echeverria    History of Presenting Illness     Chief Complaint   Patient presents with    Abdominal Pain    Vomiting    Dizziness         History Provided By: Patient  Location/Duration/Severity/Modifying factors     79-year-old female with history of arthritis, A. fib, hypertension, hypercholesterolemia, Ml [place] presenting to the emergency department for evaluation of upper respiratory viral symptoms for the past 2 days. Patient reports having sore throat myalgias nausea, and subjective fevers at home. She reports having lightheadedness and had some emesis today. PCP: Aurora Gaming MD    Current Outpatient Medications   Medication Sig Dispense Refill    ezetimibe (ZETIA) 10 mg tablet Take 1 Tablet by mouth daily. 30 Tablet 11    metoprolol tartrate (LOPRESSOR) 50 mg tablet Take 1 Tablet by mouth two (2) times a day. 180 Tablet 3    Eliquis 5 mg tablet TAKE 1 TABLET BY MOUTH TWICE DAILY 180 Tablet 3    calcium-cholecalciferol, D3, (CALCIUM 600 + D) tablet Take 2 Tabs by mouth daily. omega-3 fatty acids-fish oil (FISH OIL) 360-1,200 mg cap Take 2 Caps by mouth daily. cholecalciferol (VITAMIN D3) (2,000 UNITS /50 MCG) cap capsule Take 2,000 Units by mouth daily. polyethylene glycol (MIRALAX) 17 gram packet Take 1 Packet by mouth daily. 30 Packet 1    potassium 99 mg tablet Take 99 mg by mouth daily as needed (supplement).          Past History     Past Medical History:  Past Medical History:   Diagnosis Date    Arthritis     Bilateral Knee, and Bilateral Hand/Thumb    Atrial fibrillation ([address] 1/29/2018    Carpal tunnel syndrome     H/O echocardiogram 01/30/2018    EF 50%, normal left atrial size, no valvular heart disease    Headache     History of ankle fusion 1997    left    Hx of migraine headaches     Hypercholesterolemia     Hypertension     Morbid obesity ([address] HERZOG (nonalcoholic steatohepatitis) suggested by ultrasound report, 2016 8/11/2017    Nausea & vomiting     Vertigo        Past Surgical History:  Past Surgical History:   Procedure Laterality Date    COLONOSCOPY N/A 6/5/2018    COLONOSCOPY performed by oYsef Leon MD at [facility]    HX APPENDECTOMY      age 25 years    HX COLONOSCOPY      Due in 2018: May 2015    HX HYSTERECTOMY      age 30years old    HX KNEE REPLACEMENT Right 05/22/2018    HX KNEE REPLACEMENT Left 05/14/2019    HX ORTHOPAEDIC Left     ankle fusion    HX OTHER SURGICAL  2014    removal of mole left breast    HX TONSILLECTOMY      at age 29years old       Family History:  Family History   Problem Relation Age of Onset    Heart Disease Mother     Hypertension Mother     Heart Surgery Mother     Cancer Father         colon    Hypertension Brother     No Known Problems Maternal Grandmother     Heart Disease Maternal Grandfather     Mult Sclerosis Paternal Grandmother     Stroke Paternal Grandfather     Heart Disease Paternal Grandfather     No Known Problems Son        Social History:  Social History     Tobacco Use    Smoking status: Never    Smokeless tobacco: Never   Vaping Use    Vaping Use: Never used   Substance Use Topics    Alcohol use: No     Alcohol/week: 0.0 standard drinks    Drug use: No       Allergies: Allergies   Allergen Reactions    Statins-Hmg-Coa Reductase Inhibitors Other (comments)         Review of Systems       Review of Systems   Constitutional:  Positive for activity change, chills, fatigue and fever. Negative for diaphoresis. HENT:  Positive for congestion and sore throat. Negative for trouble swallowing and voice change. Eyes:  Negative for photophobia, pain and visual disturbance. Respiratory:  Positive for cough. Negative for chest tightness, shortness of breath, wheezing and stridor. Cardiovascular:  Positive for palpitations. Negative for chest pain.    Gastrointestinal:  Negative for abdominal distention, abdominal pain, constipation, diarrhea, nausea and vomiting. Genitourinary:  Negative for difficulty urinating, dysuria and hematuria. Musculoskeletal:  Positive for myalgias. Negative for back pain and joint swelling. Skin:  Negative for rash and wound. Neurological:  Positive for light-headedness. Negative for dizziness, tremors, seizures, syncope, speech difficulty, weakness, numbness and headaches. Psychiatric/Behavioral:  Negative for agitation. The patient is not nervous/anxious. Physical Exam   Visit Vitals  BP 94/68   Pulse 99   Temp 99.1 °F (37.3 °C)   Resp 19   Ht 5' 6\" (1.676 m)   Wt 111.1 kg (245 lb)   SpO2 93%   BMI 39.54 kg/m²         Physical Exam  Constitutional:       General: She is not in acute distress. Appearance: She is not toxic-appearing. HENT:      Head: Normocephalic and atraumatic. Mouth/Throat:      Mouth: Mucous membranes are moist.   Eyes:      Extraocular Movements: Extraocular movements intact. Pupils: Pupils are equal, round, and reactive to light. Cardiovascular:      Rate and Rhythm: Tachycardia present. Rhythm irregular. Heart sounds: Normal heart sounds. No murmur heard. No friction rub. No gallop. Pulmonary:      Effort: Pulmonary effort is normal.      Breath sounds: Normal breath sounds. Abdominal:      General: There is no distension. Palpations: Abdomen is soft. There is no mass. Tenderness: There is no abdominal tenderness. There is no guarding. Hernia: No hernia is present. Musculoskeletal:         General: No swelling, tenderness or deformity. Cervical back: Normal range of motion and neck supple. Skin:     General: Skin is warm and dry. Findings: No rash. Neurological:      General: No focal deficit present. Mental Status: She is alert and oriented to person, place, and time.    Psychiatric:         Mood and Affect: Mood normal.         Diagnostic Study Results     Labs -  Recent Results (from the past 12 hour(s))   CBC WITH AUTOMATED DIFF    Collection Time: 08/09/22  5:28 PM   Result Value Ref Range    WBC 8.4 4.6 - 13.2 K/uL    RBC 5.30 4.20 - 5.30 M/uL    HGB 15.2 12.0 - 16.0 g/dL    HCT 45.4 (H) 35.0 - 45.0 %    MCV 85.7 78.0 - 100.0 FL    MCH 28.7 24.0 - 34.0 PG    MCHC 33.5 31.0 - 37.0 g/dL    RDW 11.8 11.6 - 14.5 %    PLATELET 606 092 - 950 K/uL    MPV 11.3 9.2 - 11.8 FL    NRBC 0.0 0  WBC    ABSOLUTE NRBC 0.00 0.00 - 0.01 K/uL    NEUTROPHILS 80 (H) 40 - 73 %    LYMPHOCYTES 14 (L) 21 - 52 %    MONOCYTES 5 3 - 10 %    EOSINOPHILS 0 0 - 5 %    BASOPHILS 0 0 - 2 %    IMMATURE GRANULOCYTES 1 (H) 0.0 - 0.5 %    ABS. NEUTROPHILS 6.7 1.8 - 8.0 K/UL    ABS. LYMPHOCYTES 1.2 0.9 - 3.6 K/UL    ABS. MONOCYTES 0.4 0.05 - 1.2 K/UL    ABS. EOSINOPHILS 0.0 0.0 - 0.4 K/UL    ABS. BASOPHILS 0.0 0.0 - 0.1 K/UL    ABS. IMM. GRANS. 0.0 0.00 - 0.04 K/UL    DF AUTOMATED     METABOLIC PANEL, COMPREHENSIVE    Collection Time: 08/09/22  5:28 PM   Result Value Ref Range    Sodium 136 136 - 145 mmol/L    Potassium 4.1 3.5 - 5.5 mmol/L    Chloride 105 100 - 111 mmol/L    CO2 24 21 - 32 mmol/L    Anion gap 7 3.0 - 18 mmol/L    Glucose 156 (H) 74 - 99 mg/dL    BUN 8 7.0 - 18 MG/DL    Creatinine 0.55 (L) 0.6 - 1.3 MG/DL    BUN/Creatinine ratio 15 12 - 20      GFR est AA >60 >60 ml/min/1.73m2    GFR est non-AA >60 >60 ml/min/1.73m2    Calcium 8.9 8.5 - 10.1 MG/DL    Bilirubin, total 0.5 0.2 - 1.0 MG/DL    ALT (SGPT) 127 (H) 13 - 56 U/L    AST (SGOT) 75 (H) 10 - 38 U/L    Alk.  phosphatase 93 45 - 117 U/L    Protein, total 7.8 6.4 - 8.2 g/dL    Albumin 3.7 3.4 - 5.0 g/dL    Globulin 4.1 (H) 2.0 - 4.0 g/dL    A-G Ratio 0.9 0.8 - 1.7     LIPASE    Collection Time: 08/09/22  5:28 PM   Result Value Ref Range    Lipase 171 73 - 393 U/L   COVID-19 WITH INFLUENZA A/B    Collection Time: 08/09/22  5:28 PM   Result Value Ref Range    SARS-CoV-2 by PCR Detected (AA) NOTD      Influenza A by PCR Not detected NOTD      Influenza B by PCR Not detected NOTD     TROPONIN-HIGH SENSITIVITY    Collection Time: 08/09/22  5:28 PM   Result Value Ref Range    Troponin-High Sensitivity 3 0 - 54 ng/L   MAGNESIUM    Collection Time: 08/09/22  5:28 PM   Result Value Ref Range    Magnesium 1.9 1.6 - 2.6 mg/dL   STREP AG SCREEN, GROUP A    Collection Time: 08/09/22  5:35 PM    Specimen: Throat   Result Value Ref Range    Group A Strep Ag ID Negative     URINALYSIS W/ RFLX MICROSCOPIC    Collection Time: 08/09/22  7:27 PM   Result Value Ref Range    Color YELLOW      Appearance CLEAR      Specific gravity 1.011 1.005 - 1.030      pH (UA) 7.5 5.0 - 8.0      Protein Negative NEG mg/dL    Glucose 250 (A) NEG mg/dL    Ketone TRACE (A) NEG mg/dL    Bilirubin Negative NEG      Blood TRACE (A) NEG      Urobilinogen 0.2 0.2 - 1.0 EU/dL    Nitrites Negative NEG      Leukocyte Esterase Negative NEG     URINE MICROSCOPIC ONLY    Collection Time: 08/09/22  7:27 PM   Result Value Ref Range    WBC 0 to 3 0 - 4 /hpf    RBC 0 to 3 0 - 5 /hpf    Epithelial cells FEW 0 - 5 /lpf    Mucus 1+ (A) NEG /lpf    Amorphous Crystals 1+ (A) NEG       Radiologic Studies -   XR CHEST PORT    (Results Pending)         Medical Decision Making   I am the first provider for this patient. I reviewed the vital signs, available nursing notes, past medical history, past surgical history, family history and social history. Vital Signs-Reviewed the patient's vital signs.       EKG: atrial fibrillation with RVR    Records Reviewed: Nursing Notes, Old Medical Records, Previous electrocardiograms, Previous Radiology Studies, and Previous Laboratory Studies (Time of Review: 6:11 PM)    ED Course: Progress Notes, Reevaluation, and Consults:         Provider Notes (Medical Decision Making):   MDM  Number of Diagnoses or Management Options  Atrial fibrillation with RVR ([facility] [address]  COVID-19  Diagnosis management comments: 52-year-old female presenting to the emergency department for evaluation of upper respiratory viral symptoms now for the past 2 days. Presenting in A. fib with RVR with rates in the 130s to 150s otherwise hemodynamically stable. Auscultation. Screening lab work and cardiac enzymes grossly unremarkable. Patient was swabbed for COVID and flu. The swab is positive for COVID-19. Patient required 2 boluses of IV diltiazem (20 mg followed by 30 mg). Patient did respond well after second dose of diltiazem. Heart rate now in the 90s to 110s. She is otherwise saturating well. Discussed results with her. Patient will be discharged at this time. Return precautions advised. Patient verbalizes understanding agreement plan. Stable for discharge. Procedures        Diagnosis     Clinical Impression:   1. COVID-19    2. Atrial fibrillation with RVR ([address]        Disposition: discharged    Follow-up Information       Follow up With Specialties Details Why [address] [facility] EMERGENCY DEPT Emergency Medicine  As needed, If symptoms worsen [id number] [facility]    Aurora Gaming MD Family Medicine Call in 1 day  [facility] [address]  [address] [facility]  [phone]               Patient's Medications   Start Taking    No medications on file   Continue Taking    CALCIUM-CHOLECALCIFEROL, D3, (CALCIUM 600 + D) TABLET    Take 2 Tabs by mouth daily. CHOLECALCIFEROL (VITAMIN D3) (2,000 UNITS /50 MCG) CAP CAPSULE    Take 2,000 Units by mouth daily. ELIQUIS 5 MG TABLET    TAKE 1 TABLET BY MOUTH TWICE DAILY    EZETIMIBE (ZETIA) 10 MG TABLET    Take 1 Tablet by mouth daily. METOPROLOL TARTRATE (LOPRESSOR) 50 MG TABLET    Take 1 Tablet by mouth two (2) times a day. OMEGA-3 FATTY ACIDS-FISH OIL (FISH OIL) 360-1,200 MG CAP    Take 2 Caps by mouth daily. POLYETHYLENE GLYCOL (MIRALAX) 17 GRAM PACKET    Take 1 Packet by mouth daily. POTASSIUM 99 MG TABLET    Take 99 mg by mouth daily as needed (supplement).    These Medications have changed    No medications on file   Stop Taking    No medications on file     Disclaimer: Sections of this note are dictated using utilizing voice recognition software. Minor typographical errors may be present. If questions arise, please do not hesitate to contact me or call our department.

## 2022-08-09 NOTE — ED NOTES
Assisted pt to use the bedside commode. Urine sample sent. IV cardizem administered with improvement in heart rate, 2nd bolus started. Pt update to POC,  remains at bedside.

## 2022-08-09 NOTE — ED TRIAGE NOTES
Pt reports sore throat, body aches, nausea and fever since times 2 days. Pt also states she started with dizziness and vomiting today.

## 2022-08-10 ENCOUNTER — VIRTUAL VISIT (OUTPATIENT)
Dept: INTERNAL MEDICINE CLINIC | Age: 67
End: 2022-08-10
Payer: COMMERCIAL

## 2022-08-10 ENCOUNTER — TELEPHONE (OUTPATIENT)
Dept: INTERNAL MEDICINE CLINIC | Age: 67
End: 2022-08-10

## 2022-08-10 DIAGNOSIS — I48.91 ATRIAL FIBRILLATION, UNSPECIFIED TYPE (HCC): ICD-10-CM

## 2022-08-10 DIAGNOSIS — U07.1 COVID-19: Primary | ICD-10-CM

## 2022-08-10 LAB
CALCULATED R AXIS, ECG10: -23 DEGREES
CALCULATED T AXIS, ECG11: -152 DEGREES
DIAGNOSIS, 93000: NORMAL
Q-T INTERVAL, ECG07: 290 MS
QRS DURATION, ECG06: 70 MS
QTC CALCULATION (BEZET), ECG08: 467 MS
VENTRICULAR RATE, ECG03: 156 BPM

## 2022-08-10 PROCEDURE — 99441 PR PHYS/QHP TELEPHONE EVALUATION 5-10 MIN: CPT | Performed by: INTERNAL MEDICINE

## 2022-08-10 NOTE — PROGRESS NOTES
Gary Zheng is a 78 y.o. female who was seen by synchronous (real-time) audio-phone only technology on 8/10/2022. Assessment & Plan:   1. COVID-19: +In the setting of AF with RVR earlier this wk  - Paxlovid ordered. - She was instructed to reduce her eliquis dose to 5mg daily vs bid while on rx mentioned above  - She was encouraged to check her Pox at home and to go to the ED if her sx are worsening or if her Pox<92%  - Quarantine. Rest. Hydration. ORDERS:  - nirmatrelvir-ritonavir (PAXLOVID) 300 mg (150 mg x 2)-100 mg tablet; Take per package instructions. She should take 3 tabs bid x 5 days. There should be 30 tabs per package. Dispense: 1 Box; Refill: 0    2. AF: Improved since her ED visit. - I instructed her to go to the ED if her sx worsen  - C/w rx as prescribed except as mentioned above in regards to her eliquis. Lab review: labs are reviewed in the EHR     I have discussed the diagnosis with the patient and the intended plan as seen in the above orders. I have discussed medication side effects and warnings with the patient as well. I have reviewed the plan of care with the patient, accepted their input and they are in agreement with the treatment goals. All questions were answered. The patient understands the plan of care. Pt instructed if symptoms worsen to call the office or report to the ED for continued care. Greater than 50% of the visit time was spent in counseling and/or coordination of care. I spent  9 min with the pt for this phone only encounter. Voice recognition was used to generate this report, which may have resulted in some phonetic based errors in grammar and contents. Even though attempts were made to correct all the mistakes, some may have been missed, and remained in the body of the document.       Subjective:   Gary Zheng was seen for   Chief Complaint   Patient presents with    Positive For Covid-19     Pt is a 77 yo female with h/o HLD, HTN, HERZOG, AF, and JAMES.    1. Covid-19 :  She developed sx 3 days ago. Today she reports: some hoarseness, sniffling, coughing, sneezing, and sore throat. Sx are \"a little bit better. \" +Fatigue. No SOB. No sputum. Her temperature was normal at home. Her pulse oximeter is not working. 8/9/22 CXR: Suboptimal exam due to hypoinflation. No definite acute cardiopulmonary abnormality. 2. AF: She was seen earlier this week with atrial fibrillation with RVR in the setting of Covid-19 in the ED. She received diltiazem IV and her heart rate normalized. Today she reports: she still has some palpitations but her HR is in the \"low 100s. \"  She is taking metoprolol and Eliquis 5 mg twice daily. 115/64 was her BP today. Prior to Admission medications    Medication Sig Start Date End Date Taking? Authorizing Provider   ezetimibe (ZETIA) 10 mg tablet Take 1 Tablet by mouth daily. 4/18/22   Radha Sr MD   metoprolol tartrate (LOPRESSOR) 50 mg tablet Take 1 Tablet by mouth two (2) times a day. 4/18/22   Radha Sr MD   Eliquis 5 mg tablet TAKE 1 TABLET BY MOUTH TWICE DAILY 9/10/21   Belle Esquivel MD   calcium-cholecalciferol, D3, (CALCIUM 600 + D) tablet Take 2 Tabs by mouth daily. Provider, Historical   omega-3 fatty acids-fish oil (FISH OIL) 360-1,200 mg cap Take 2 Caps by mouth daily. Provider, Historical   cholecalciferol (VITAMIN D3) (2,000 UNITS /50 MCG) cap capsule Take 2,000 Units by mouth daily. Provider, Historical   polyethylene glycol (MIRALAX) 17 gram packet Take 1 Packet by mouth daily. 5/23/18   Adelfo Davidson PA-C   potassium 99 mg tablet Take 99 mg by mouth daily as needed (supplement).     Provider, Historical     Allergies   Allergen Reactions    Statins-Hmg-Coa Reductase Inhibitors Other (comments)     Past Medical History:   Diagnosis Date    Arthritis     Bilateral Knee, and Bilateral Hand/Thumb    Atrial fibrillation ([address] 1/29/2018    Carpal tunnel syndrome     H/O echocardiogram 01/30/2018    EF 50%, normal left atrial size, no valvular heart disease    Headache     History of ankle fusion 1997    left    Hx of migraine headaches     Hypercholesterolemia     Hypertension     Morbid obesity (HCC)     HERZOG (nonalcoholic steatohepatitis) suggested by ultrasound report, 2016 8/11/2017    Nausea & vomiting     Vertigo      Past Surgical History:   Procedure Laterality Date    COLONOSCOPY N/A 6/5/2018    COLONOSCOPY performed by Nicole Terrazas MD at [facility]    HX APPENDECTOMY      age 25 years    HX COLONOSCOPY      Due in 2018:  May 2015    HX HYSTERECTOMY      age 30years old    HX KNEE REPLACEMENT Right 05/22/2018    HX KNEE REPLACEMENT Left 05/14/2019    HX ORTHOPAEDIC Left     ankle fusion    HX OTHER SURGICAL  2014    removal of mole left breast    HX TONSILLECTOMY      at age 29years old     Family History   Problem Relation Age of Onset    Heart Disease Mother     Hypertension Mother     Heart Surgery Mother     Cancer Father         colon    Hypertension Brother     No Known Problems Maternal Grandmother     Heart Disease Maternal Grandfather     Mult Sclerosis Paternal Grandmother     Stroke Paternal Grandfather     Heart Disease Paternal Grandfather     No Known Problems Son      Social History     Socioeconomic History    Marital status:    Tobacco Use    Smoking status: Never    Smokeless tobacco: Never   Vaping Use    Vaping Use: Never used   Substance and Sexual Activity    Alcohol use: No     Alcohol/week: 0.0 standard drinks    Drug use: No    Sexual activity: Yes   Social History Narrative    Works at [employer] in the [employer] department       ROS:  Gen: See HPI  HEENT:See HPI  CV: No CP. +Palpitations  Resp:See HPI  GI: No abdominal pain  : No hematuria/dysuria  Derm: No rash  Neuro: No new paresthesias/weakness  Musc: No new myalgias/jt pain  Psych: No depression sx      LABS:  Lab Results   Component Value Date/Time    Sodium 136 08/09/2022 05:28 PM    Potassium 4.1 08/09/2022 05:28 PM    Chloride 105 08/09/2022 05:28 PM    CO2 24 08/09/2022 05:28 PM    Anion gap 7 08/09/2022 05:28 PM    Glucose 156 (H) 08/09/2022 05:28 PM    BUN 8 08/09/2022 05:28 PM    Creatinine 0.55 (L) 08/09/2022 05:28 PM    BUN/Creatinine ratio 15 08/09/2022 05:28 PM    GFR est AA >60 08/09/2022 05:28 PM    GFR est non-AA >60 08/09/2022 05:28 PM    Calcium 8.9 08/09/2022 05:28 PM       Lab Results   Component Value Date/Time    Cholesterol, total 285 (H) 04/05/2022 07:29 AM    HDL Cholesterol 54 04/05/2022 07:29 AM    LDL, calculated 179.2 (H) 04/05/2022 07:29 AM    VLDL, calculated 51.8 04/05/2022 07:29 AM    Triglyceride 259 (H) 04/05/2022 07:29 AM    CHOL/HDL Ratio 5.3 (H) 04/05/2022 07:29 AM       Lab Results   Component Value Date/Time    WBC 8.4 08/09/2022 05:28 PM    HGB 15.2 08/09/2022 05:28 PM    HCT 45.4 (H) 08/09/2022 05:28 PM    PLATELET 993 15/50/1875 05:28 PM    MCV 85.7 08/09/2022 05:28 PM       Lab Results   Component Value Date/Time    Hemoglobin A1c 5.3 04/05/2022 07:29 AM       Lab Results   Component Value Date/Time    TSH 1.67 03/24/2022 12:34 PM           Due to this being a TeleHealth phone only evaluation, many elements of the physical examination are unable to be assessed. The pt was seen by synchronous (real-time) audio-phone only technology, and/or her healthcare decision maker, is aware that this patient-initiated, Telehealth encounter is a billable service, with coverage as determined by her insurance carrier. She is aware that she may receive a bill and has provided verbal consent to proceed: Yes. The patient (or guardian if applicable) is aware that this is a billable service, which includes applicable copays. This virtual visit was conducted with the patient's (and/or legal guardian's) consent. The pt is located in a state where the provider was licensed to provide care. The pt is being evaluated by a phone only visit encounter for concerns as above.  A caregiver was present when appropriate. Due to this being a TeleHealth encounter (During INBIG-45 public health emergency), evaluation of the following organ systems was limited: Vitals/Constitutional/EENT/Resp/CV/GI//MS/Neuro/Skin/Heme-Lymph-Imm. Pursuant to the emergency declaration under the [address] [place] [address] and the [employer] and [employer] Act, this Virtual phone only Visit was conducted, with patient's (and/or legal guardian's) consent, to reduce the patient's risk of exposure to COVID-19 and provide necessary medical care. Services were provided through a phone only synchronous discussion virtually to substitute for in-person clinic visit. Patient and provider were located at their individual home/office respectively. We discussed the expected course, resolution and complications of the diagnosis(es) in detail. Medication risks, benefits, costs, interactions, and alternatives were discussed as indicated. I advised her to contact the office if her condition worsens, changes or fails to improve as anticipated. She expressed understanding with the diagnosis(es) and plan. MD Leslie Vogt, who was evaluated through a synchronous (real-time) audio only encounter, and/or her healthcare decision maker, is aware that it is a billable service, which includes applicable co-pays, with coverage as determined by her insurance carrier. She provided verbal consent to proceed and patient identification was verified. This visit was conducted pursuant to the emergency declaration under the [address] [place] [address] and the "[employer] Act. A caregiver was present when appropriate. Ability to conduct physical exam was limited.  The patient was located at: Home: [address] [place] [id number]  The provider was located at: Facility ([facility] Department): [facility] [address] [address]    --Carlo Marrero MD on 8/10/2022 at 10:34 AM        Gary Zheng, was evaluated through a synchronous (real-time) audio-video encounter. The patient (or guardian if applicable) is aware that this is a billable service, which includes applicable co-pays. This Virtual Visit was conducted with patient's (and/or legal guardian's) consent. The visit was conducted pursuant to the emergency declaration under the [address] [place] [address], [address] [place] [address] [facility] authority and the [employer] and [employer] Act. Patient identification was verified, and a caregiver was present when appropriate. The patient was located at: Home: [address] [place] [phone]  The provider was located at:  Facility ([facility] Department): [address] [phone]

## 2022-08-10 NOTE — ED NOTES
3rd bolus ordered verbal by MD Goins at this time for decreasing BP after completion of 2IVF boluses and hypotension contraindicating ordered cardizem. IVF begun at this time. Continuing to monitor pts HR and BP for changes indicative of improvements requiring DC cardizem or VS parameters met for administration.

## 2022-08-10 NOTE — ED NOTES
Pt completed all IVF at this time. BP tending down now 95/57. Holding second dose cardizem until consulting MD. Pt denies nausea. Endorses dizziness, headache and abdominal pain and cramping. Has used bedside commode multiple times.

## 2022-08-11 ENCOUNTER — PATIENT OUTREACH (OUTPATIENT)
Dept: CASE MANAGEMENT | Age: 67
End: 2022-08-11

## 2022-08-11 LAB
BACTERIA SPEC CULT: NORMAL
SERVICE CMNT-IMP: NORMAL

## 2022-08-11 NOTE — Clinical Note
FYI- Discussed with patient how to take Paxlovid. Advised to take 3 tabs bid for 5 days per your directions.

## 2022-08-11 NOTE — PROGRESS NOTES
Ambulatory Care Coordination ED COVID Follow up Call    Challenges to be reviewed by the provider   Additional needs identified to be addressed with provider   Discussed how to take Paxlovid. Encounter was not routed to provider for escalation. Method of communication with provider : chart routed    Discussed COVID-19 related testing which was available at this time. Test results were positive. Patient informed of results, if available? yes. Current Symptoms: no new symptoms and no worsening symptoms    Reviewed New or Changed Meds:  Discuss Paxlovid prescribed by pcp. Do you have what you need at home? Durable Medical Equipment ordered at discharge: None  Home Health/Outpatient orders at discharge: none   Pulse oximeter? no Discussed and confirmed pulse oximeter discharge instructions and when to notify provider or seek emergency care. Patient education provided: Reviewed appropriate site of care based on symptoms and resources available to patient including: PCP and when to seek medical attention . Follow up appointment recommended: no. If no appointment scheduled, scheduling offered: no.  Future Appointments   Date Time Provider Katharine Cerna   9/15/2022  8:20 AM Mague Camp MD [facility] BS AMB   9/23/2022  3:00 PM Yao Ogden MD [facility] BS AMB       Interventions:  LPN CC available if assistance is needed. Reviewed discharge instructions, medical action plan and red flags with patient who verbalized understanding. Provided contact information for future needs. Plan for follow-up call in 5-7 days based on severity of symptoms and risk factors.   Plan for next call:  follow up      Jabari Johnson LPN

## 2022-08-13 NOTE — PROGRESS NOTES
Nurse note from patient's weekly VLCD / LCD / Maintenance class was reviewed. Pertinent medical concerns were:  None noted.      Continue current regimen
Progress Note: Weekly Education Class in the [facility]   Is there anything that you or the patient needs to let the [facility] Physician know about? no  Over the past week, have you experienced any side-effects? Yes, constipation I make sure I use 1 or 2 packets of Fiber per day. I even take stool softener and that doesn't help    Moriah Barrett is a 65 y.o. female who is enrolled in [facility]    Visit Vitals    /85 (BP 1 Location: Right arm, BP Patient Position: Sitting)    Pulse 76    Ht 5' 6.5\" (1.689 m)    Wt 252 lb 12.8 oz (114.7 kg)    BMI 40.19 kg/m2     Weight Metrics 11/8/2017 11/8/2017 11/1/2017 11/1/2017 10/26/2017 10/25/2017 10/18/2017   Weight - 252 lb 12.8 oz - 253 lb 11.2 oz - 258 lb 9.6 oz 261 lb 9.6 oz   Waist Measure Inches 46 - 47 - 47 - 47   Exercise Mins/week - - 0 - - - -   Body Fat % - - 42.7 - - - -   BMI - 40.19 kg/m2 - 40.33 kg/m2 - 41.74 kg/m2 42.22 kg/m2         Have you received any other medical care this week? no  If yes, where and for what? Have you had any change in your medications since your last visit? no  If yes what? Did you have any problems adhering to the program last week? no  If yes, please explain:       Eating Habits Over Last Week:  Did you take in 64 oz of non-caloric fluids? yes     Did you consume your 4 meal replacements each day?  yes       Physical Activity Over the Past Week:    Aerobic exercise: 5 min  Resistance exercise: 0 workouts / week
98.1

## 2022-08-18 ENCOUNTER — PATIENT OUTREACH (OUTPATIENT)
Dept: CASE MANAGEMENT | Age: 67
End: 2022-08-18

## 2022-08-18 NOTE — PROGRESS NOTES
Patient resolved from [address] Transitions episode on 8/18/2022. Patient currently reports that the following symptoms have improved:  fatigue and cough. No further outreach scheduled with this LPN CC. Episode of Care resolved. Patient has this LPN CC contact information if future needs arise.

## 2022-09-01 RX ORDER — APIXABAN 5 MG/1
TABLET, FILM COATED ORAL
Qty: 180 TABLET | Refills: 3 | Status: SHIPPED | OUTPATIENT
Start: 2022-09-01

## 2022-10-07 ENCOUNTER — TRANSCRIBE ORDER (OUTPATIENT)
Dept: SCHEDULING | Age: 67
End: 2022-10-07

## 2022-10-07 DIAGNOSIS — Z12.31 VISIT FOR SCREENING MAMMOGRAM: Primary | ICD-10-CM

## 2022-10-31 ENCOUNTER — HOSPITAL ENCOUNTER (OUTPATIENT)
Dept: MAMMOGRAPHY | Age: 67
Discharge: HOME | End: 2022-10-31
Attending: INTERNAL MEDICINE

## 2022-10-31 DIAGNOSIS — Z12.31 VISIT FOR SCREENING MAMMOGRAM: ICD-10-CM

## 2023-01-31 DIAGNOSIS — Z12.31 VISIT FOR SCREENING MAMMOGRAM: Primary | ICD-10-CM

## 2023-02-04 DIAGNOSIS — Z12.31 VISIT FOR SCREENING MAMMOGRAM: Primary | ICD-10-CM

## 2023-04-03 RX ORDER — EZETIMIBE 10 MG/1
TABLET ORAL
Qty: 30 TABLET | Refills: 0 | Status: SHIPPED | OUTPATIENT
Start: 2023-04-03 | End: 2023-05-02

## 2023-04-14 ENCOUNTER — HOSPITAL ENCOUNTER (OUTPATIENT)
Facility: HOSPITAL | Age: 68
Discharge: HOME | End: 2023-04-17
Payer: COMMERCIAL

## 2023-04-14 PROBLEM — E66.01 SEVERE OBESITY (BMI 35.0-39.9) WITH COMORBIDITY (HCC): Status: ACTIVE | Noted: 2023-04-14

## 2023-04-14 PROBLEM — I10 ESSENTIAL (PRIMARY) HYPERTENSION: Status: ACTIVE | Noted: 2017-02-07

## 2023-04-14 LAB
ANION GAP SERPL CALC-SCNC: 8 MMOL/L (ref 3–18)
BUN SERPL-MCNC: 15 MG/DL (ref 7–18)
BUN/CREAT SERPL: 26 (ref 12–20)
CALCIUM SERPL-MCNC: 9.8 MG/DL (ref 8.5–10.1)
CHLORIDE SERPL-SCNC: 110 MMOL/L (ref 100–111)
CO2 SERPL-SCNC: 24 MMOL/L (ref 21–32)
CREAT SERPL-MCNC: 0.58 MG/DL (ref 0.6–1.3)
ERYTHROCYTE [DISTWIDTH] IN BLOOD BY AUTOMATED COUNT: 11.9 % (ref 11.6–14.5)
GLUCOSE SERPL-MCNC: 120 MG/DL (ref 74–99)
HCT VFR BLD AUTO: 49.7 % (ref 35–45)
HGB BLD-MCNC: 16.2 G/DL (ref 12–16)
MAGNESIUM SERPL-MCNC: 2.3 MG/DL (ref 1.6–2.6)
MCH RBC QN AUTO: 28.6 PG (ref 24–34)
MCHC RBC AUTO-ENTMCNC: 32.6 G/DL (ref 31–37)
MCV RBC AUTO: 87.7 FL (ref 78–100)
NRBC # BLD: 0 K/UL (ref 0–0.01)
NRBC BLD-RTO: 0 PER 100 WBC
PLATELET # BLD AUTO: 280 K/UL (ref 135–420)
PMV BLD AUTO: 11.8 FL (ref 9.2–11.8)
POTASSIUM SERPL-SCNC: 4.6 MMOL/L (ref 3.5–5.5)
RBC # BLD AUTO: 5.67 M/UL (ref 4.2–5.3)
SODIUM SERPL-SCNC: 142 MMOL/L (ref 136–145)
TSH SERPL DL<=0.05 MIU/L-ACNC: 1.55 UIU/ML (ref 0.36–3.74)
WBC # BLD AUTO: 6.1 K/UL (ref 4.6–13.2)

## 2023-04-14 PROCEDURE — 36415 COLL VENOUS BLD VENIPUNCTURE: CPT

## 2023-04-14 PROCEDURE — 80048 BASIC METABOLIC PNL TOTAL CA: CPT

## 2023-04-14 PROCEDURE — 84443 ASSAY THYROID STIM HORMONE: CPT

## 2023-04-14 PROCEDURE — 83735 ASSAY OF MAGNESIUM: CPT

## 2023-04-14 PROCEDURE — 85027 COMPLETE CBC AUTOMATED: CPT

## 2023-04-17 RX ORDER — AMIODARONE HYDROCHLORIDE 400 MG/1
400 TABLET ORAL DAILY
Qty: 30 TABLET | Refills: 0 | Status: SHIPPED | OUTPATIENT
Start: 2023-04-17

## 2023-04-28 RX ORDER — METOPROLOL TARTRATE 50 MG/1
TABLET, FILM COATED ORAL
Qty: 180 TABLET | Refills: 1 | Status: SHIPPED | OUTPATIENT
Start: 2023-04-28

## 2023-05-02 RX ORDER — EZETIMIBE 10 MG/1
TABLET ORAL
Qty: 30 TABLET | Refills: 1 | Status: SHIPPED | OUTPATIENT
Start: 2023-05-02 | End: 2023-06-26

## 2023-05-08 RX ORDER — AMIODARONE HYDROCHLORIDE 400 MG/1
400 TABLET ORAL DAILY
Qty: 90 TABLET | Refills: 3 | Status: SHIPPED | OUTPATIENT
Start: 2023-05-08

## 2023-05-15 SDOH — HEALTH STABILITY: PHYSICAL HEALTH: ON AVERAGE, HOW MANY MINUTES DO YOU ENGAGE IN EXERCISE AT THIS LEVEL?: 10 MIN

## 2023-05-15 SDOH — ECONOMIC STABILITY: HOUSING INSECURITY
IN THE LAST 12 MONTHS, WAS THERE A TIME WHEN YOU DID NOT HAVE A STEADY PLACE TO SLEEP OR SLEPT IN A SHELTER (INCLUDING NOW)?: NO

## 2023-05-15 SDOH — ECONOMIC STABILITY: FOOD INSECURITY: WITHIN THE PAST 12 MONTHS, THE FOOD YOU BOUGHT JUST DIDN'T LAST AND YOU DIDN'T HAVE MONEY TO GET MORE.: NEVER TRUE

## 2023-05-15 SDOH — ECONOMIC STABILITY: INCOME INSECURITY: HOW HARD IS IT FOR YOU TO PAY FOR THE VERY BASICS LIKE FOOD, HOUSING, MEDICAL CARE, AND HEATING?: NOT HARD AT ALL

## 2023-05-15 SDOH — ECONOMIC STABILITY: FOOD INSECURITY: WITHIN THE PAST 12 MONTHS, YOU WORRIED THAT YOUR FOOD WOULD RUN OUT BEFORE YOU GOT MONEY TO BUY MORE.: NEVER TRUE

## 2023-05-15 SDOH — HEALTH STABILITY: PHYSICAL HEALTH: ON AVERAGE, HOW MANY DAYS PER WEEK DO YOU ENGAGE IN MODERATE TO STRENUOUS EXERCISE (LIKE A BRISK WALK)?: 3 DAYS

## 2023-05-15 SDOH — ECONOMIC STABILITY: TRANSPORTATION INSECURITY
IN THE PAST 12 MONTHS, HAS LACK OF TRANSPORTATION KEPT YOU FROM MEETINGS, WORK, OR FROM GETTING THINGS NEEDED FOR DAILY LIVING?: NO

## 2023-05-15 ASSESSMENT — PATIENT HEALTH QUESTIONNAIRE - PHQ9
1. LITTLE INTEREST OR PLEASURE IN DOING THINGS: 0
SUM OF ALL RESPONSES TO PHQ QUESTIONS 1-9: 0
SUM OF ALL RESPONSES TO PHQ QUESTIONS 1-9: 0
SUM OF ALL RESPONSES TO PHQ9 QUESTIONS 1 & 2: 0
2. FEELING DOWN, DEPRESSED OR HOPELESS: 0
SUM OF ALL RESPONSES TO PHQ QUESTIONS 1-9: 0
SUM OF ALL RESPONSES TO PHQ QUESTIONS 1-9: 0

## 2023-05-15 ASSESSMENT — LIFESTYLE VARIABLES
HOW OFTEN DO YOU HAVE SIX OR MORE DRINKS ON ONE OCCASION: 1
HOW MANY STANDARD DRINKS CONTAINING ALCOHOL DO YOU HAVE ON A TYPICAL DAY: PATIENT DOES NOT DRINK
HOW MANY STANDARD DRINKS CONTAINING ALCOHOL DO YOU HAVE ON A TYPICAL DAY: 0
HOW OFTEN DO YOU HAVE A DRINK CONTAINING ALCOHOL: NEVER
HOW OFTEN DO YOU HAVE A DRINK CONTAINING ALCOHOL: 1

## 2023-05-16 ENCOUNTER — OFFICE VISIT (OUTPATIENT)
Age: 68
End: 2023-05-16
Payer: COMMERCIAL

## 2023-05-16 VITALS
HEART RATE: 65 BPM | OXYGEN SATURATION: 96 % | WEIGHT: 230 LBS | BODY MASS INDEX: 36.96 KG/M2 | SYSTOLIC BLOOD PRESSURE: 134 MMHG | TEMPERATURE: 98.3 F | RESPIRATION RATE: 14 BRPM | DIASTOLIC BLOOD PRESSURE: 79 MMHG | HEIGHT: 66 IN

## 2023-05-16 DIAGNOSIS — R79.89 ABNORMAL CBC: ICD-10-CM

## 2023-05-16 DIAGNOSIS — R73.9 HYPERGLYCEMIA: ICD-10-CM

## 2023-05-16 DIAGNOSIS — Z71.89 ADVANCE CARE PLANNING: ICD-10-CM

## 2023-05-16 DIAGNOSIS — Z00.00 MEDICARE ANNUAL WELLNESS VISIT, SUBSEQUENT: ICD-10-CM

## 2023-05-16 DIAGNOSIS — I48.91 ATRIAL FIBRILLATION, UNSPECIFIED TYPE (HCC): Primary | ICD-10-CM

## 2023-05-16 DIAGNOSIS — I10 ESSENTIAL (PRIMARY) HYPERTENSION: ICD-10-CM

## 2023-05-16 DIAGNOSIS — K75.81 NASH (NONALCOHOLIC STEATOHEPATITIS): ICD-10-CM

## 2023-05-16 DIAGNOSIS — Z78.0 POSTMENOPAUSAL: ICD-10-CM

## 2023-05-16 PROCEDURE — 3078F DIAST BP <80 MM HG: CPT | Performed by: INTERNAL MEDICINE

## 2023-05-16 PROCEDURE — 3074F SYST BP LT 130 MM HG: CPT | Performed by: INTERNAL MEDICINE

## 2023-05-16 PROCEDURE — 99213 OFFICE O/P EST LOW 20 MIN: CPT | Performed by: INTERNAL MEDICINE

## 2023-05-16 PROCEDURE — 1123F ACP DISCUSS/DSCN MKR DOCD: CPT | Performed by: INTERNAL MEDICINE

## 2023-05-16 PROCEDURE — G0439 PPPS, SUBSEQ VISIT: HCPCS | Performed by: INTERNAL MEDICINE

## 2023-05-17 ENCOUNTER — HOSPITAL ENCOUNTER (OUTPATIENT)
Facility: HOSPITAL | Age: 68
Discharge: HOME | End: 2023-05-20
Payer: COMMERCIAL

## 2023-05-17 LAB
ALBUMIN SERPL-MCNC: 3.8 G/DL (ref 3.4–5)
ALBUMIN/GLOB SERPL: 1.3 (ref 0.8–1.7)
ALP SERPL-CCNC: 55 U/L (ref 45–117)
ALT SERPL-CCNC: 31 U/L (ref 13–56)
ANION GAP SERPL CALC-SCNC: 3 MMOL/L (ref 3–18)
AST SERPL-CCNC: 17 U/L (ref 10–38)
BASOPHILS # BLD: 0 K/UL (ref 0–0.1)
BASOPHILS NFR BLD: 1 % (ref 0–2)
BILIRUB SERPL-MCNC: 0.5 MG/DL (ref 0.2–1)
BUN SERPL-MCNC: 17 MG/DL (ref 7–18)
BUN/CREAT SERPL: 26 (ref 12–20)
CALCIUM SERPL-MCNC: 9.5 MG/DL (ref 8.5–10.1)
CHLORIDE SERPL-SCNC: 108 MMOL/L (ref 100–111)
CHOLEST SERPL-MCNC: 198 MG/DL
CO2 SERPL-SCNC: 30 MMOL/L (ref 21–32)
CREAT SERPL-MCNC: 0.66 MG/DL (ref 0.6–1.3)
CREAT UR-MCNC: 156 MG/DL (ref 30–125)
DIFFERENTIAL METHOD BLD: ABNORMAL
EOSINOPHIL # BLD: 0.1 K/UL (ref 0–0.4)
EOSINOPHIL NFR BLD: 2 % (ref 0–5)
ERYTHROCYTE [DISTWIDTH] IN BLOOD BY AUTOMATED COUNT: 12 % (ref 11.6–14.5)
EST. AVERAGE GLUCOSE BLD GHB EST-MCNC: 103 MG/DL
GLOBULIN SER CALC-MCNC: 3 G/DL (ref 2–4)
GLUCOSE SERPL-MCNC: 98 MG/DL (ref 74–99)
HBA1C MFR BLD: 5.2 % (ref 4.2–5.6)
HCT VFR BLD AUTO: 48.9 % (ref 35–45)
HDLC SERPL-MCNC: 58 MG/DL (ref 40–60)
HDLC SERPL: 3.4 (ref 0–5)
HGB BLD-MCNC: 15.3 G/DL (ref 12–16)
IMM GRANULOCYTES # BLD AUTO: 0 K/UL (ref 0–0.04)
IMM GRANULOCYTES NFR BLD AUTO: 0 % (ref 0–0.5)
LDLC SERPL CALC-MCNC: 118.8 MG/DL (ref 0–100)
LIPID PANEL: ABNORMAL
LYMPHOCYTES # BLD: 1.7 K/UL (ref 0.9–3.6)
LYMPHOCYTES NFR BLD: 34 % (ref 21–52)
MCH RBC QN AUTO: 28.5 PG (ref 24–34)
MCHC RBC AUTO-ENTMCNC: 31.3 G/DL (ref 31–37)
MCV RBC AUTO: 91.2 FL (ref 78–100)
MICROALBUMIN UR-MCNC: 0.81 MG/DL (ref 0–3)
MICROALBUMIN/CREAT UR-RTO: 5 MG/G (ref 0–30)
MONOCYTES # BLD: 0.4 K/UL (ref 0.05–1.2)
MONOCYTES NFR BLD: 8 % (ref 3–10)
NEUTS SEG # BLD: 2.7 K/UL (ref 1.8–8)
NEUTS SEG NFR BLD: 54 % (ref 40–73)
NRBC # BLD: 0 K/UL (ref 0–0.01)
NRBC BLD-RTO: 0 PER 100 WBC
PLATELET # BLD AUTO: 230 K/UL (ref 135–420)
PMV BLD AUTO: 11.8 FL (ref 9.2–11.8)
POTASSIUM SERPL-SCNC: 4.4 MMOL/L (ref 3.5–5.5)
PROT SERPL-MCNC: 6.8 G/DL (ref 6.4–8.2)
RBC # BLD AUTO: 5.36 M/UL (ref 4.2–5.3)
SODIUM SERPL-SCNC: 141 MMOL/L (ref 136–145)
TRIGL SERPL-MCNC: 106 MG/DL
VLDLC SERPL CALC-MCNC: 21.2 MG/DL
WBC # BLD AUTO: 5.1 K/UL (ref 4.6–13.2)

## 2023-05-17 PROCEDURE — 85025 COMPLETE CBC W/AUTO DIFF WBC: CPT

## 2023-05-17 PROCEDURE — 82570 ASSAY OF URINE CREATININE: CPT

## 2023-05-17 PROCEDURE — 36415 COLL VENOUS BLD VENIPUNCTURE: CPT

## 2023-05-17 PROCEDURE — 82043 UR ALBUMIN QUANTITATIVE: CPT

## 2023-05-17 PROCEDURE — 80053 COMPREHEN METABOLIC PANEL: CPT

## 2023-05-17 PROCEDURE — 83036 HEMOGLOBIN GLYCOSYLATED A1C: CPT

## 2023-05-17 PROCEDURE — 80061 LIPID PANEL: CPT

## 2023-05-22 ENCOUNTER — OFFICE VISIT (OUTPATIENT)
Age: 68
End: 2023-05-22
Payer: COMMERCIAL

## 2023-05-22 VITALS
HEIGHT: 66 IN | OXYGEN SATURATION: 97 % | SYSTOLIC BLOOD PRESSURE: 122 MMHG | DIASTOLIC BLOOD PRESSURE: 80 MMHG | HEART RATE: 53 BPM | WEIGHT: 228 LBS | BODY MASS INDEX: 36.64 KG/M2

## 2023-05-22 DIAGNOSIS — I10 ESSENTIAL (PRIMARY) HYPERTENSION: ICD-10-CM

## 2023-05-22 DIAGNOSIS — E66.01 SEVERE OBESITY (BMI 35.0-39.9) WITH COMORBIDITY (HCC): ICD-10-CM

## 2023-05-22 DIAGNOSIS — I48.0 PAROXYSMAL ATRIAL FIBRILLATION (HCC): Primary | ICD-10-CM

## 2023-05-22 DIAGNOSIS — E78.2 MIXED HYPERLIPIDEMIA: ICD-10-CM

## 2023-05-22 PROBLEM — R71.8 ELEVATED HEMATOCRIT: Status: ACTIVE | Noted: 2023-05-22

## 2023-05-22 PROBLEM — M89.9 DISORDER OF BONE: Status: ACTIVE | Noted: 2023-05-22

## 2023-05-22 PROBLEM — R53.83 OTHER FATIGUE: Status: ACTIVE | Noted: 2023-05-22

## 2023-05-22 PROBLEM — M15.9 GENERALIZED OSTEOARTHRITIS OF MULTIPLE SITES: Status: ACTIVE | Noted: 2023-05-22

## 2023-05-22 PROCEDURE — 1123F ACP DISCUSS/DSCN MKR DOCD: CPT | Performed by: INTERNAL MEDICINE

## 2023-05-22 PROCEDURE — 93000 ELECTROCARDIOGRAM COMPLETE: CPT | Performed by: INTERNAL MEDICINE

## 2023-05-22 PROCEDURE — 99214 OFFICE O/P EST MOD 30 MIN: CPT | Performed by: INTERNAL MEDICINE

## 2023-05-22 PROCEDURE — 3074F SYST BP LT 130 MM HG: CPT | Performed by: INTERNAL MEDICINE

## 2023-05-22 PROCEDURE — 3079F DIAST BP 80-89 MM HG: CPT | Performed by: INTERNAL MEDICINE

## 2023-05-22 ASSESSMENT — PATIENT HEALTH QUESTIONNAIRE - PHQ9
SUM OF ALL RESPONSES TO PHQ QUESTIONS 1-9: 0
SUM OF ALL RESPONSES TO PHQ9 QUESTIONS 1 & 2: 0
SUM OF ALL RESPONSES TO PHQ QUESTIONS 1-9: 0
2. FEELING DOWN, DEPRESSED OR HOPELESS: 0
1. LITTLE INTEREST OR PLEASURE IN DOING THINGS: 0
SUM OF ALL RESPONSES TO PHQ QUESTIONS 1-9: 0
SUM OF ALL RESPONSES TO PHQ QUESTIONS 1-9: 0

## 2023-05-22 ASSESSMENT — ENCOUNTER SYMPTOMS
SORE THROAT: 0
ABDOMINAL PAIN: 0
COUGH: 0
ABDOMINAL DISTENTION: 0
VOMITING: 0
SHORTNESS OF BREATH: 0
NAUSEA: 0

## 2023-05-22 NOTE — PROGRESS NOTES
Surinder Reich presents today for   Chief Complaint   Patient presents with    Follow-up     5 week follow up       Surinder Reich preferred language for health care discussion is english/other. Is someone accompanying this pt? no    Is the patient using any DME equipment during OV? no    Depression Screening:  Depression: Not at risk    PHQ-2 Score: 0        Learning Assessment:  Who is the primary learner? Patient    What is the preferred language for health care of the primary learner? ENGLISH    How does the primary learner prefer to learn new concepts? DEMONSTRATION    Answered By patient    Relationship to Learner SELF           Pt currently taking Anticoagulant therapy? Eliquis 5 mg twice a day    Pt currently taking Antiplatelet therapy ? no      Coordination of Care:  1. Have you been to the ER, urgent care clinic since your last visit? Hospitalized since your last visit? no    2. Have you seen or consulted any other health care providers outside of the [address] [place] [address] since your last visit? Include any pap smears or colon screening.  no

## 2023-05-22 NOTE — PROGRESS NOTES
05/22/23     Ronnie Licea  is a 80 y.o. female     Chief Complaint   Patient presents with    Follow-up     5 week follow up       HPI    Patient presents for a follow-up office visit. Patient was diagnosed with atrial fibrillation with a rapid ventricular response by her PCP in January 2018. She was started on metoprolol for rate control. She was then scheduled for a KIESHA and cardioversion, however, when she showed up for her procedure, she had already converted back to sinus rhythm. Her anticoagulation was stopped at that time. She underwent an echocardiogram which showed low normal LV systolic function, EF 56% with no valvular heart disease and a normal left atrial size. She has remained on metoprolol for rate control. The patient was again found to be in atrial fibrillation at last visit in June 2018. Her heart rate was around 120 bpm.  She converted back to sinus rhythm the following day. She was started on Eliquis for long-term anticoagulation. She underwent a pharmacologic nuclear stress test in November 2021 which was a normal low risk study, no evidence of ischemia or infarction, EF 68%. Patient was last seen in the office a little over a month ago. At that visit he was having frequent episodes of atrial fibrillation and was found to be in atrial fibrillation with rapid ventricular rates with a heart rate of 140 bpm.  She states he has been feeling more fatigued and usual since the atrial fibrillation began. He also notices a burning sensation in her chest.  She denies any dizziness, syncope or near syncope. Patient was started on amiodarone initially at 400 mg twice daily for a week and then decrease the dose down to 400 mg daily which she has been taking for the past 4 weeks. She feels like she went back into normal rhythm after about 10 days of being on the medication. She has not had any recurrent symptoms of atrial fibrillation since converting.   She underwent a magnesium

## 2023-05-23 ENCOUNTER — TELEPHONE (OUTPATIENT)
Age: 68
End: 2023-05-23

## 2023-05-23 ENCOUNTER — HOSPITAL ENCOUNTER (OUTPATIENT)
Facility: HOSPITAL | Age: 68
Discharge: HOME | End: 2023-05-26
Payer: COMMERCIAL

## 2023-05-23 DIAGNOSIS — Z78.0 POSTMENOPAUSAL: ICD-10-CM

## 2023-05-23 PROCEDURE — 77080 DXA BONE DENSITY AXIAL: CPT

## 2023-05-23 ASSESSMENT — ENCOUNTER SYMPTOMS
ANAL BLEEDING: 0
SORE THROAT: 0
ABDOMINAL PAIN: 0
BLOOD IN STOOL: 0
EYE PAIN: 0
COUGH: 0
SHORTNESS OF BREATH: 0

## 2023-05-24 NOTE — ACP (ADVANCE CARE PLANNING)
Advance Care Planning     General Advance Care Planning (ACP) Conversation    Date of Conversation: 5/16/23    Conducted with: Patient with Decision Making Capacity    Healthcare Decision Maker: Today we documented Decision Maker(s) consistent with ACP documents on file. Content/Action Overview: Has ACP document(s) on file - reflects the patient's care preferences    She has no changes to make to her pre-existing advanced directive.     Length of Voluntary ACP Conversation in minutes:  <16 minutes (Non-Billable)    Christine Charlton MD

## 2023-05-24 NOTE — PATIENT INSTRUCTIONS
For more information on your local Area Agency on Aging or [facility] on Aging please visit the appropriate web site below:    [place]: MobileCycles.pl    [facility]: https://aging. [place].gov/    [facility]: [place]://aging.[place].gov/    [place]: InsuranceSquad.es           [facility]  Dental care for older adults: Overview  Dental care for older people is much the same as for younger adults. But older adults do have concerns that younger adults do not. Older adults may have problems with gum disease and decay on the roots of their teeth. They may need missing teeth replaced or broken fillings fixed. Or they may have dentures that need to be cared for. Some older adults may have trouble holding a toothbrush. You can help remind the person you are caring for to brush and floss their teeth or to clean their dentures. In some cases, you may need to do the brushing and other dental care tasks. People who have trouble using their hands or who have dementia may need this extra help. How can you help with dental care? Normal dental care  To keep the teeth and gums healthy:  Brush the teeth with fluoride toothpaste twice a day--in the morning and at night--and floss at least once a day. Plaque can quickly build up on the teeth of older adults. Watch for the signs of gum disease. These signs include gums that bleed after brushing or after eating hard foods, such as apples. See a dentist regularly. Many experts recommend checkups every 6 months. Keep the dentist up to date on any new medications the person is taking. Encourage a balanced diet that includes whole grains, vegetables, and fruits, and that is low in saturated fat and sodium. Encourage the person you're caring for not to use tobacco products. They can affect dental and general health. Many older adults have a fixed income and feel that they can't afford dental care.  But most [facility] and

## 2023-06-02 ENCOUNTER — TELEPHONE (OUTPATIENT)
Age: 68
End: 2023-06-02

## 2023-06-02 NOTE — TELEPHONE ENCOUNTER
Patient is requesting documentation for airport security stating that she has had knee replacements. She has a flight on 6/16 and doesn't want to have any issues with the metal detectors. Please advise patient when ready, [phone].

## 2023-06-05 RX ORDER — AMIODARONE HYDROCHLORIDE 400 MG/1
200 TABLET ORAL DAILY
Qty: 90 TABLET | Refills: 3 | OUTPATIENT
Start: 2023-06-05

## 2023-06-05 RX ORDER — AMIODARONE HYDROCHLORIDE 200 MG/1
200 TABLET ORAL DAILY
Qty: 90 TABLET | Refills: 3 | Status: SHIPPED | OUTPATIENT
Start: 2023-06-05

## 2023-06-26 RX ORDER — EZETIMIBE 10 MG/1
TABLET ORAL
Qty: 90 TABLET | Refills: 1 | Status: SHIPPED | OUTPATIENT
Start: 2023-06-26

## 2023-08-24 ENCOUNTER — OFFICE VISIT (OUTPATIENT)
Age: 68
End: 2023-08-24
Payer: COMMERCIAL

## 2023-08-24 VITALS
SYSTOLIC BLOOD PRESSURE: 126 MMHG | HEART RATE: 46 BPM | WEIGHT: 224 LBS | HEIGHT: 66 IN | BODY MASS INDEX: 36 KG/M2 | DIASTOLIC BLOOD PRESSURE: 84 MMHG | OXYGEN SATURATION: 98 %

## 2023-08-24 DIAGNOSIS — E66.01 SEVERE OBESITY (BMI 35.0-39.9) WITH COMORBIDITY (HCC): ICD-10-CM

## 2023-08-24 DIAGNOSIS — E78.2 MIXED HYPERLIPIDEMIA: ICD-10-CM

## 2023-08-24 DIAGNOSIS — I48.0 PAROXYSMAL ATRIAL FIBRILLATION (HCC): Primary | ICD-10-CM

## 2023-08-24 DIAGNOSIS — I10 ESSENTIAL (PRIMARY) HYPERTENSION: ICD-10-CM

## 2023-08-24 PROCEDURE — 1123F ACP DISCUSS/DSCN MKR DOCD: CPT | Performed by: INTERNAL MEDICINE

## 2023-08-24 PROCEDURE — 3074F SYST BP LT 130 MM HG: CPT | Performed by: INTERNAL MEDICINE

## 2023-08-24 PROCEDURE — 93000 ELECTROCARDIOGRAM COMPLETE: CPT | Performed by: INTERNAL MEDICINE

## 2023-08-24 PROCEDURE — 3079F DIAST BP 80-89 MM HG: CPT | Performed by: INTERNAL MEDICINE

## 2023-08-24 PROCEDURE — 99214 OFFICE O/P EST MOD 30 MIN: CPT | Performed by: INTERNAL MEDICINE

## 2023-08-24 ASSESSMENT — PATIENT HEALTH QUESTIONNAIRE - PHQ9
SUM OF ALL RESPONSES TO PHQ QUESTIONS 1-9: 0
SUM OF ALL RESPONSES TO PHQ9 QUESTIONS 1 & 2: 0
2. FEELING DOWN, DEPRESSED OR HOPELESS: 0
SUM OF ALL RESPONSES TO PHQ QUESTIONS 1-9: 0
1. LITTLE INTEREST OR PLEASURE IN DOING THINGS: 0
SUM OF ALL RESPONSES TO PHQ QUESTIONS 1-9: 0
SUM OF ALL RESPONSES TO PHQ QUESTIONS 1-9: 0

## 2023-08-24 ASSESSMENT — ENCOUNTER SYMPTOMS
SORE THROAT: 0
ABDOMINAL DISTENTION: 0
COUGH: 0
VOMITING: 0
SHORTNESS OF BREATH: 0
NAUSEA: 0
ABDOMINAL PAIN: 0

## 2023-08-24 NOTE — PROGRESS NOTES
Behavior normal.       EKG: Sinus bradycardia, first-degree AV block, leftward axis, borderline poor R wave progression, nonspecific T wave abnormality. Compared to the previous EKG, no significant change. Assessment / Plan:   Paroxysmal atrial fibrillation. Patient converted to normal sinus rhythm after being started on oral amiodarone in April 2023. She is now taking amiodarone 200 mg daily. She appears to be tolerating this regimen. No recurrent arrhythmias since starting amiodarone. She should continue her current metoprolol tartrate dosing and her Eliquis for oral anticoagulation. She will need yearly thyroid panels and intermittent PFTs while staying on this medication. Hypertension. Historically has been well controlled with metoprolol as monotherapy. She remains normotensive in the office today. Dyslipidemia. Patient has been managed with Zetia 10 mg daily. A recent lipid profile from May 2023 was reasonable: Total cholesterol 198, HDL 58, . Obesity. Patient states that she is intentionally lost now almost 40 pounds in weight with significant dietary changes. She was congratulated and encouraged to try losing more weight.     Follow-up in 6 months, sooner if needed      Kain Ly MD

## 2023-08-24 NOTE — PROGRESS NOTES
Alexey Root presents today for   Chief Complaint   Patient presents with    Follow-up     3 month follow up       Alexey Root preferred language for health care discussion is english/other. Is someone accompanying this pt? no    Is the patient using any DME equipment during OV? no    Depression Screening:  Depression: Not at risk    PHQ-2 Score: 0        Learning Assessment:  Who is the primary learner? Patient    What is the preferred language for health care of the primary learner? ENGLISH    How does the primary learner prefer to learn new concepts? DEMONSTRATION    Answered By patient    Relationship to Learner SELF           Pt currently taking Anticoagulant therapy? Eliquis 5 mg twice a day    Pt currently taking Antiplatelet therapy ? no      Coordination of Care:  1. Have you been to the ER, urgent care clinic since your last visit? Hospitalized since your last visit? no    2. Have you seen or consulted any other health care providers outside of the [address] [place] [address] since your last visit? Include any pap smears or colon screening.  no

## 2023-08-31 RX ORDER — APIXABAN 5 MG/1
TABLET, FILM COATED ORAL
Qty: 180 TABLET | Refills: 3 | Status: SHIPPED | OUTPATIENT
Start: 2023-08-31

## 2023-11-10 NOTE — PROGRESS NOTES
1. Have you been to the ER, urgent care clinic since your last visit? Hospitalized since your last visit? Yes Reason for visit: hospital admission    2. Have you seen or consulted any other health care providers outside of the [address] [place] [address] since your last visit? Include any pap smears or colon screening.  Yes Reason for visit: cardiology Minoxidil Counseling: Minoxidil is a topical medication which can increase blood flow where it is applied. It is uncertain how this medication increases hair growth. Side effects are uncommon and include stinging and allergic reactions.

## 2023-11-14 NOTE — PROGRESS NOTES
Rounded on patient post total knee replacement. Activity: Reinforced importance of getting OOB for all meals, going to bathroom to help prevent blood clots. VTE prophylaxis: Instructed patient to use their SCD's when not up and walking. To use while in bed and in the chair. Educated re: ankle pumps to assist with circulation when in hospital and at home. Medications: Reviewed pain medications patient is taking and the importance of keeping pain under control to help with getting OOB and therapy. Reminded the patient to always eat a snack with their pain medication to help to prevent nausea. Encouraged patient to monitor for constipation and to take a stool softner/laxative while recovering and on pain medication. Incentive Spirometry: Reinforced use of incentive spirometer with return demonstration by patient. Wound Care: Dressing to surgical site covered by dry ace wrap and ice machine. Patient instructed not to take dressing off at home. Patient given CHG wash to use in hospital and at home. Stressed importance of using a clean towel and washcloth daily. Reminded to put on clean clothes and night clothes daily. Ice Protocol: Ice man machine in place per protocol. Patient Safety: Call light in reach. Patient  reminded to call for help toget OOB or when leaving bathroom for safety. Phone and other items also within reach per patient's request.     Diet: Educated patient on the importance of eating three well balanced meals a day with protein to promote bone/muscle healing. Reminded patient to drink lots of fluids to protect kidneys from all the medications being taken currently with recovery. Patient given educational material to remind them to continue doing everything at home to prevent complications and have a successful recovery. Patient  verbalized understand of all information and education discussed. Patient  given the opportunity for asking questions.       Christi Hoover RN to take out berlin and remove constavac this AM.   Mobility Intervention:       [] Pt dangled at edge of bed    [] Pt assisted OOB to bedside commode    [] Pt assisted OOB to chair    [] Pt ambulated to bathroom    [] Patient was ambulated in room/hallway    Assistive Device Utilized:       [] Rolling walker   [] Crutches   [] Straight Cane   [] Knee immobilizer   [] IV pole    After Mobilization: patient found up in chair dressed since breakfast per patient report.       [x] Patient left in no apparent distress sitting up in chair  [] Patient left in no apparent distress in bed  [x] Call bell left within reach  [x] SCDs on & machine turned on  [x] Ice applied  [] RN notified  [] Caregiver present  [] Bed alarm activated    Reason patient not mobilized:      [] Patient refused   [] Nausea/vomiting   [] Low blood pressure   [] Drowsy/lethargic    Pain Rating:     [x] 0  [] 1  Assistive Device:        [x] 2  [] 3  [] 4  [] 5  [] 6  Assistive Device:        [] 7  [] 8  [] 9  [] 10    Comments: [facility]

## 2023-12-20 NOTE — PROGRESS NOTES
[facility]NISTS OF [facility]:  2/1/2018, MRN: [id number]      Yenny Parnell is a 65 y.o. female and presents to clinic for Hospital Follow Up (patient is feeling better)    Subjective:   Pt is a 64yo female with h/o HLD, HTN, AF, and DJD. PAF: The patient has been participating in medically supervised diet program since her last appointment. Recently, she had complaint of fatigue, abdominal discomfort, and dizziness. Initial lab work confirmed a mild pancreatitis. A follow-up lipase was unremarkable. A liver ultrasound was obtained and unremarkable for significant pathology. Eventually she was sent to the ED for evaluation. In the emergency department, she was found to have paroxysmal atrial fibrillation. She had rapid ventricular rate associated with her atrial fibrillation. She was given diltiazem. Her heart rate improved/stabilized. She was discharged home. She has been taking aspirin since then. She had an echocardiogram done earlier this week. Findings are listed below. She was scheduled for elective cardioversion but she spontaneously converted to normal sinus rhythm with po metoprolol. Thus this procedure was canceled. Meanwhile, the patient reports no palpitations or chest discomfort. No abdominal pain symptoms. She is taking metoprolol and reports no adverse side effects of taking this medication. Her next cardiology appointment in a month. She states that she has enough metoprolol to last her until that appointment. Note that she was previously on Lasix but this rx was stopped and swapped for metoprolol as stated above. Wt Readings from Last 3 Encounters:   02/01/18 213 lb (96.6 kg)   01/30/18 211 lb (95.7 kg)   01/29/18 211 lb (95.7 kg)     Echocardiogram 1/18: Left ventricle: Systolic function was at the lower limits of normal by visual assessment. Ejection fraction was estimated to be 50 %. No obvious wall motion abnormalities identified in the views obtained.  Wall thickness was at the upper limits of normal. COMPARISONS: Comparison was made with the previous study of 19-Jul-2016. Left atrium has  increased in size. Otherwise no significant change. Abdomen/pelvis CT 1/18: There is no definable acute processes in abdomen or in pelvis. No evidence of pancreatitis. No evidence of obstructive uropathy. Status post appendectomy and hysterectomy noted. No demonstrable abnormality in liver and gallbladder. Patient Active Problem List    Diagnosis Date Noted    Atrial fibrillation ([address] 01/29/2018    HERZOG (nonalcoholic steatohepatitis) suggested by ultrasound report, 2016 08/11/2017    Obesity (BMI 30-39.9) 02/12/2017    Hyperlipidemia 02/07/2017    Essential hypertension 02/07/2017    Osteoarthritis of knee 02/07/2017       Current Outpatient Prescriptions   Medication Sig Dispense Refill    aspirin 81 mg chewable tablet Take 1 Tab by mouth daily. 30 Tab 0    cholecalciferol, vitamin D3, (VITAMIN D3) 2,000 unit tab Take 2,000 Units by mouth daily.  potassium 99 mg tablet Take 99 mg by mouth daily as needed.  acetaminophen (TYLENOL ARTHRITIS PAIN) 650 mg CR tablet Take 650 mg by mouth every six (6) hours as needed for Pain.  meloxicam (MOBIC) 15 mg tablet TAKE 1 TABLET BY MOUTH DAILY 90 Tab 3    simvastatin (ZOCOR) 20 mg tablet Take 1 Tab by mouth nightly. 90 Tab 3    omega-3 fatty acids-vitamin e 1,000 mg cap Take 1 Cap by mouth two (2) times a day.  calcium-cholecalciferol, d3, 600-125 mg-unit tab Take 1,200 mg by mouth daily.  metoprolol tartrate (LOPRESSOR) 100 mg IR tablet Take 0.5 Tabs by mouth two (2) times a day.  30 Tab 0       No Known Allergies    Past Medical History:   Diagnosis Date    Arthritis     Bilateral Knee, and Bilateral Hand/Thumb    Atrial fibrillation ([address] 1/29/2018    Carpal tunnel syndrome     Headache     History of ankle fusion 1997    left    Hx of migraine headaches     Hypercholesterolemia     Hypertension     Morbid obesity ([address]     HERZOG (nonalcoholic steatohepatitis) suggested by ultrasound report, 2016 8/11/2017    Vertigo        Past Surgical History:   Procedure Laterality Date    HX APPENDECTOMY      age 25 years    HX COLONOSCOPY      Due in 2018: May 2015    HX HYSTERECTOMY      age 30years old   [facility] HX TONSILLECTOMY      at age 29years old       Family History   Problem Relation Age of Onset    Heart Disease Mother     Hypertension Mother     Heart Surgery Mother     Cancer Father      colon    Hypertension Brother     No Known Problems Maternal Grandmother     Heart Disease Maternal Grandfather     MS Paternal Grandmother     Stroke Paternal Grandfather     Heart Disease Paternal Grandfather     No Known Problems Son        Social History   Substance Use Topics    Smoking status: Never Smoker    Smokeless tobacco: Never Used    Alcohol use 0.0 oz/week     0 Standard drinks or equivalent per week      Comment: rare once a year       ROS   Review of Systems   Constitutional: Negative for chills and fever. HENT: Negative for ear pain and sore throat. Eyes: Negative for blurred vision and pain. Respiratory: Negative for cough and shortness of breath. Cardiovascular: Negative for chest pain. Gastrointestinal: Negative for abdominal pain, blood in stool and melena. Genitourinary: Negative for dysuria and hematuria. Musculoskeletal: Positive for joint pain. Negative for myalgias. Skin: Negative for rash. Neurological: Negative for tingling, focal weakness and headaches. Endo/Heme/Allergies: Does not bruise/bleed easily. Psychiatric/Behavioral: Negative for substance abuse.        Objective     Vitals:    02/01/18 1201   BP: 129/79   Pulse: 66   Resp: 18   Temp: 98.4 °F (36.9 °C)   SpO2: 98%   Weight: 213 lb (96.6 kg)   Height: 5' 5\" (1.651 m)   PainSc:   0 - No pain       Physical Exam   Constitutional: She is oriented to person, place, and time and well-developed, well-nourished, and in no distress. HENT:   Head: Normocephalic and atraumatic. Right Ear: External ear normal.   Left Ear: External ear normal.   Nose: Nose normal.   Mouth/Throat: Oropharynx is clear and moist. No oropharyngeal exudate. Eyes: Conjunctivae and EOM are normal. Pupils are equal, round, and reactive to light. Right eye exhibits no discharge. Left eye exhibits no discharge. No scleral icterus. Neck: Neck supple. Cardiovascular: Normal rate, regular rhythm, normal heart sounds and intact distal pulses. Exam reveals no gallop and no friction rub. No murmur heard. Pulmonary/Chest: Effort normal and breath sounds normal. No respiratory distress. She has no wheezes. She has no rales. Abdominal: Soft. Bowel sounds are normal. She exhibits no distension. There is no tenderness. There is no rebound and no guarding. Musculoskeletal: She exhibits no edema or tenderness (BUE). Lymphadenopathy:     She has no cervical adenopathy. Neurological: She is alert and oriented to person, place, and time. She exhibits normal muscle tone. Gait normal.   Skin: Skin is warm and dry. No erythema. Psychiatric: Affect normal.   Nursing note and vitals reviewed.       LABS   Data Review:   Lab Results   Component Value Date/Time    WBC 10.8 01/26/2018 12:13 PM    HGB 16.8 01/26/2018 12:13 PM    HCT 50.8 01/26/2018 12:13 PM    PLATELET 845 00/67/7572 12:13 PM    MCV 86.5 01/26/2018 12:13 PM       Lab Results   Component Value Date/Time    Sodium 141 01/29/2018 11:36 AM    Potassium 4.4 01/29/2018 11:36 AM    Chloride 108 01/29/2018 11:36 AM    CO2 23 01/29/2018 11:36 AM    Anion gap 10 01/29/2018 11:36 AM    Glucose 109 01/29/2018 11:36 AM    BUN 16 01/29/2018 11:36 AM    Creatinine 0.53 01/29/2018 11:36 AM    BUN/Creatinine ratio 30 01/29/2018 11:36 AM    GFR est AA >60 01/29/2018 11:36 AM    GFR est non-AA >60 01/29/2018 11:36 AM    Calcium 9.3 01/29/2018 11:36 AM       Lab Results   Component Value Date/Time Cholesterol, total 155 01/26/2018 07:43 AM    HDL Cholesterol 50 01/26/2018 07:43 AM    LDL, calculated 76.2 01/26/2018 07:43 AM    VLDL, calculated 28.8 01/26/2018 07:43 AM    Triglyceride 144 01/26/2018 07:43 AM    CHOL/HDL Ratio 3.1 01/26/2018 07:43 AM       Lab Results   Component Value Date/Time    Hemoglobin A1c 5.6 01/26/2018 07:43 AM     Lab Results   Component Value Date/Time    TSH 2.01 07/20/2017 10:23 AM    TSH 3.34 10/10/2016 07:49 AM    TSH 1.47 05/16/2016 02:20 PM       Assessment/Plan:   1. Atrial fibrillation and HTN: Converted to sinus rhythm. HR is 66. BP stable. Continue with metoprolol and aspirin. Continue to follow-up with the cardiology team.  Crystal Rowley discussed what atrial fibrillation is, the potential risk of poorly controlled atrial fibrillation, and the importance of monitoring her heart rate. I encouraged her to buy a blood pressure and heart rate machine so that she can easily monitor these measurements at home. 2. Abdominal pain: Resolved. CT and liver ultrasound findings are reassuring. Observation. 3. Obesity (BMI 30-39. 9): Weight is down to 213lbs. We discussed how she should postpone participation in the medically supervised diet program for now. Once she has been more stable stable for much longer, we can revisit restarting this program.  Until then, I instructed her to maintain a heart healthy low fat and low-carb diet. Health Maintenance Due   Topic Date Due    ZOSTER VACCINE AGE 60>  03/05/2016    COLONOSCOPY  05/28/2018     Lab review: labs are reviewed in the EHR    I have discussed the diagnosis with the patient and the intended plan as seen in the above orders. The patient has received an after-visit summary and questions were answered concerning future plans. I have discussed medication side effects and warnings with the patient as well.  I have reviewed the plan of care with the patient, accepted their input and they are in agreement with the treatment goals. All questions were answered. The patient understands the plan of care. Handouts provided today with above information. Pt instructed if symptoms worsen to call the office or report to the ED for continued care. Greater than 50% of the visit time was spent in counseling and/or coordination of care. I spent 25 minutes with the patient for this encounter, 15 of which were spent counseling her as described above. Follow-up Disposition:  Return in about 6 months (around 8/1/2018) for BP check, weight.     Elan Marshall MD No

## 2024-01-23 RX ORDER — METOPROLOL TARTRATE 50 MG/1
TABLET, FILM COATED ORAL
Qty: 180 TABLET | Refills: 1 | Status: SHIPPED | OUTPATIENT
Start: 2024-01-23

## 2024-05-31 RX ORDER — AMIODARONE HYDROCHLORIDE 200 MG/1
200 TABLET ORAL DAILY
Qty: 90 TABLET | Refills: 3 | Status: SHIPPED | OUTPATIENT
Start: 2024-05-31

## 2024-06-13 ENCOUNTER — OFFICE VISIT (OUTPATIENT)
Age: 69
End: 2024-06-13
Payer: MEDICARE

## 2024-06-13 VITALS
HEART RATE: 49 BPM | DIASTOLIC BLOOD PRESSURE: 82 MMHG | HEIGHT: 66 IN | OXYGEN SATURATION: 96 % | SYSTOLIC BLOOD PRESSURE: 136 MMHG | WEIGHT: 260 LBS | BODY MASS INDEX: 41.78 KG/M2

## 2024-06-13 DIAGNOSIS — E78.2 MIXED HYPERLIPIDEMIA: ICD-10-CM

## 2024-06-13 DIAGNOSIS — E66.01 SEVERE OBESITY (BMI 35.0-39.9) WITH COMORBIDITY (HCC): ICD-10-CM

## 2024-06-13 DIAGNOSIS — I48.0 PAROXYSMAL ATRIAL FIBRILLATION (HCC): Primary | ICD-10-CM

## 2024-06-13 DIAGNOSIS — I10 ESSENTIAL (PRIMARY) HYPERTENSION: ICD-10-CM

## 2024-06-13 PROCEDURE — 93000 ELECTROCARDIOGRAM COMPLETE: CPT | Performed by: INTERNAL MEDICINE

## 2024-06-13 PROCEDURE — 1123F ACP DISCUSS/DSCN MKR DOCD: CPT | Performed by: INTERNAL MEDICINE

## 2024-06-13 PROCEDURE — 3075F SYST BP GE 130 - 139MM HG: CPT | Performed by: INTERNAL MEDICINE

## 2024-06-13 PROCEDURE — 99214 OFFICE O/P EST MOD 30 MIN: CPT | Performed by: INTERNAL MEDICINE

## 2024-06-13 PROCEDURE — 3079F DIAST BP 80-89 MM HG: CPT | Performed by: INTERNAL MEDICINE

## 2024-06-13 RX ORDER — EZETIMIBE 10 MG/1
10 TABLET ORAL DAILY
Qty: 90 TABLET | Refills: 0 | Status: SHIPPED | OUTPATIENT
Start: 2024-06-13 | End: 2024-06-14

## 2024-06-13 RX ORDER — APIXABAN 5 MG/1
5 TABLET, FILM COATED ORAL 2 TIMES DAILY
Qty: 180 TABLET | Refills: 0 | Status: SHIPPED | OUTPATIENT
Start: 2024-06-13

## 2024-06-13 RX ORDER — METOPROLOL TARTRATE 50 MG/1
50 TABLET, FILM COATED ORAL 2 TIMES DAILY
Qty: 180 TABLET | Refills: 0 | Status: SHIPPED | OUTPATIENT
Start: 2024-06-13

## 2024-06-13 RX ORDER — AMIODARONE HYDROCHLORIDE 200 MG/1
200 TABLET ORAL DAILY
Qty: 90 TABLET | Refills: 0 | Status: SHIPPED | OUTPATIENT
Start: 2024-06-13

## 2024-06-13 RX ORDER — FUROSEMIDE 20 MG/1
20 TABLET ORAL DAILY
Qty: 90 TABLET | Refills: 0 | Status: SHIPPED | OUTPATIENT
Start: 2024-06-13

## 2024-06-13 ASSESSMENT — ENCOUNTER SYMPTOMS
COUGH: 0
NAUSEA: 0
ABDOMINAL DISTENTION: 0
SHORTNESS OF BREATH: 0
SORE THROAT: 0
VOMITING: 0
ABDOMINAL PAIN: 0

## 2024-06-13 ASSESSMENT — PATIENT HEALTH QUESTIONNAIRE - PHQ9
SUM OF ALL RESPONSES TO PHQ QUESTIONS 1-9: 0
2. FEELING DOWN, DEPRESSED OR HOPELESS: NOT AT ALL
SUM OF ALL RESPONSES TO PHQ9 QUESTIONS 1 & 2: 0
SUM OF ALL RESPONSES TO PHQ QUESTIONS 1-9: 0
1. LITTLE INTEREST OR PLEASURE IN DOING THINGS: NOT AT ALL

## 2024-06-13 ASSESSMENT — ANXIETY QUESTIONNAIRES
4. TROUBLE RELAXING: NOT AT ALL
5. BEING SO RESTLESS THAT IT IS HARD TO SIT STILL: NOT AT ALL
2. NOT BEING ABLE TO STOP OR CONTROL WORRYING: NOT AT ALL
6. BECOMING EASILY ANNOYED OR IRRITABLE: NOT AT ALL
3. WORRYING TOO MUCH ABOUT DIFFERENT THINGS: NOT AT ALL
7. FEELING AFRAID AS IF SOMETHING AWFUL MIGHT HAPPEN: NOT AT ALL
GAD7 TOTAL SCORE: 0
1. FEELING NERVOUS, ANXIOUS, OR ON EDGE: NOT AT ALL

## 2024-06-13 NOTE — PROGRESS NOTES
Susan L Vosler presents today for   Chief Complaint   Patient presents with    Follow-up     6 month       Susan L Vosler preferred language for health care discussion is english/other.    Is someone accompanying this pt? no    Is the patient using any DME equipment during OV? no    Depression Screening:  Depression: Not at risk (6/13/2024)    PHQ-2     PHQ-2 Score: 0        Learning Assessment:  Who is the primary learner? Patient    What is the preferred language for health care of the primary learner? ENGLISH    How does the primary learner prefer to learn new concepts? DEMONSTRATION    Answered By patient    Relationship to Learner SELF           Pt currently taking Anticoagulant therapy? Eliquis 5 mg bid    Pt currently taking Antiplatelet therapy ? no      Coordination of Care:  1. Have you been to the ER, urgent care clinic since your last visit? Hospitalized since your last visit? no    2. Have you seen or consulted any other health care providers outside of the [facility] since your last visit? Include any pap smears or colon screening. no

## 2024-06-13 NOTE — PROGRESS NOTES
06/13/24     Susan L Vosler  is a 69 y.o. female     Chief Complaint   Patient presents with    Follow-up     6 month       HPI    Patient presents for a follow-up office visit.  Patient was diagnosed with atrial fibrillation with a rapid ventricular response by her PCP in January 2018.  She was started on metoprolol for rate control.   She was then scheduled for a KIESHA and cardioversion, however, when she showed up for her procedure, she had already converted back to sinus rhythm.  Her anticoagulation was stopped at that time.  She underwent an echocardiogram which showed low normal LV systolic function, EF 50% with no valvular heart disease and a normal left atrial size.  She has remained on metoprolol for rate control.    The patient was again found to be in atrial fibrillation at last visit in June 2018.  Her heart rate was around 120 bpm.  She converted back to sinus rhythm the following day.  She was started on Eliquis for long-term anticoagulation.     She underwent a pharmacologic nuclear stress test in November 2021 which was a normal low risk study, no evidence of ischemia or infarction, EF 68%.    Patient has been maintained on amiodarone 200 mg daily since last year and has remained in sinus rhythm.  She was last seen in the office 9 to 10 months ago.  Since last visit, she has gained over 30 pounds of weight.  As result she reports increased leg swelling especially at the end of the day or when she stands or sits for long period of time.  In the past, she would take furosemide and states is significantly improved her swelling.  She denies any shortness of breath, no orthopnea, no PND.  She states she is moving to [place] at the end of the month.    Past Medical History:   Diagnosis Date    Arthritis     Bilateral Knee, and Bilateral Hand/Thumb    Atrial fibrillation (HCC) 1/29/2018    Carpal tunnel syndrome     COVID 08/2022    H/O echocardiogram 01/30/2018    EF 50%, normal left atrial size, no

## 2024-06-14 RX ORDER — EZETIMIBE 10 MG/1
10 TABLET ORAL DAILY
Qty: 90 TABLET | Refills: 1 | Status: SHIPPED | OUTPATIENT
Start: 2024-06-14

## 2024-07-15 RX ORDER — FUROSEMIDE 20 MG
20 TABLET ORAL DAILY
Qty: 90 TABLET | Refills: 0 | OUTPATIENT
Start: 2024-07-15

## 2024-07-16 RX ORDER — FUROSEMIDE 20 MG/1
20 TABLET ORAL DAILY
Qty: 30 TABLET | Refills: 0 | Status: SHIPPED | OUTPATIENT
Start: 2024-07-16

## 2025-04-08 NOTE — TELEPHONE ENCOUNTER
----- Message from Hui Little MD sent at 5/23/2023  3:56 PM EDT -----  Please let her know that her DEXA scan is WNL.     Dr. Ames Client  [facility] [address]  Phone: [phone]  Fax: [phone]
Left message for patient to call the office. Also sent my chart message.
Mychart Last read by Julio Lara at  5:04 PM on 5/23/2023.
oral

## (undated) DEVICE — SYR 10ML LUER LOK 1/5ML GRAD --

## (undated) DEVICE — ENDOSCOPY PUMP TUBING/ CAP SET: Brand: ERBE

## (undated) DEVICE — PACK SURG BSHR TOT KNEE LF

## (undated) DEVICE — WATERPROOF, BACTERIA PROOF DRESSING WITH ABSORBENT SEE THROUGH PAD: Brand: OPSITE POST-OP VISIBLE 30X10CM CTN 20

## (undated) DEVICE — NEEDLE HYPO 18GA L1.5IN PNK S STL HUB POLYPR SHLD REG BVL

## (undated) DEVICE — KIT EVAC 400CC DIA1/4IN H PAT 12.5IN 3 SPR RND SHP PVC DRN

## (undated) DEVICE — KENDALL SCD EXPRESS SLEEVES, KNEE LENGTH, MEDIUM: Brand: KENDALL SCD

## (undated) DEVICE — T4 HOOD

## (undated) DEVICE — SUTURE VCRL SZ 2 L27IN ABSRB VLT L65MM TP-1 1/2 CIR J649G

## (undated) DEVICE — 4-PORT MANIFOLD: Brand: NEPTUNE 2

## (undated) DEVICE — SOLUTION IRRIG 3000ML 0.9% SOD CHL FLX CONT 0797208] ICU MEDICAL INC]

## (undated) DEVICE — GOWN ISOL IMPERV UNIV, DISP, OPEN BACK, BLUE --

## (undated) DEVICE — Z DISCONTINUED BY MEDLINE USE 2711682 TRAY SKIN PREP DRY W/ PREM GLV

## (undated) DEVICE — TRAY CATH OD16FR SIL URIN M STATLOK STBL DEV SURSTP

## (undated) DEVICE — SYR LR LCK 1ML GRAD NSAF 30ML --

## (undated) DEVICE — TRAP SPEC COLL POLYP POLYSTYR --

## (undated) DEVICE — (D)SYR 10ML 1/5ML GRAD NSAF -- PKGING CHANGE USE ITEM 338027

## (undated) DEVICE — FORCEPS BX L240CM JAW DIA2.8MM L CAP W/ NDL MIC MESH TOOTH

## (undated) DEVICE — SHEET,DRAPE,70X100,STERILE: Brand: MEDLINE

## (undated) DEVICE — (D)PREP SKN CHLRAPRP APPL 26ML -- CONVERT TO ITEM 371833

## (undated) DEVICE — INTENDED FOR TISSUE SEPARATION, AND OTHER PROCEDURES THAT REQUIRE A SHARP SURGICAL BLADE TO PUNCTURE OR CUT.: Brand: BARD-PARKER SAFETY BLADES SIZE 10, STERILE

## (undated) DEVICE — CATHETER SUCT TR FL TIP 14FR W/ O CTRL

## (undated) DEVICE — (D)GLOVE EXAM LG NITRL NS -- DISC BY MFR NO SUB

## (undated) DEVICE — STERILE POLYISOPRENE POWDER-FREE SURGICAL GLOVES: Brand: PROTEXIS

## (undated) DEVICE — PADDING CAST W6INXL4YD ST COT COHESIVE HND TEARABLE SPEC

## (undated) DEVICE — 2108 SERIES SAGITTAL BLADE (24.8 X 1.24 X 80.1MM)

## (undated) DEVICE — SUTURE VCRL SZ 2-0 L36IN ABSRB UD L36MM CT-1 1/2 CIR J945H

## (undated) DEVICE — SLIM BODY SKIN STAPLER: Brand: APPOSE ULC

## (undated) DEVICE — KIT CLN UP BON SECOURS MARYV

## (undated) DEVICE — SYRINGE MED 20ML STD CLR PLAS LUERLOCK TIP N CTRL DISP

## (undated) DEVICE — AIRLIFE™ NASAL OXYGEN CANNULA CURVED, NONFLARED TIP WITH 14 FOOT (4.3 M) CRUSH-RESISTANT TUBING, OVER-THE-EAR STYLE: Brand: AIRLIFE™

## (undated) DEVICE — Device

## (undated) DEVICE — DRAPE TWL SURG 16X26IN BLU ORB04] ALLCARE INC]

## (undated) DEVICE — BLADE TNGE REG 6IN WOOD STRL -- CONVERT TO ITEM 153408

## (undated) DEVICE — COVER LT HNDL BLU STRL -- MEDICHOICE

## (undated) DEVICE — SNARE ENDO 2.4MMX230CM -- COLD EXACTO

## (undated) DEVICE — SUTURE ABSORBABLE BRAIDED 2-0 CT-1 27 IN UD VICRYL J259H

## (undated) DEVICE — HANDPIECE SET WITH HIGH FLOW TIP AND SUCTION TUBE: Brand: INTERPULSE

## (undated) DEVICE — SOFT SILICONE HYDROCELLULAR SACRUM DRESSING WITH LOCK AWAY LAYER: Brand: ALLEVYN LIFE SACRUM (LARGE) PACK OF 10

## (undated) DEVICE — REM POLYHESIVE ADULT PATIENT RETURN ELECTRODE: Brand: VALLEYLAB

## (undated) DEVICE — NEEDLE NRV BLK 21GA L4IN 30DEG INSUL BVL EXTN SET STIMUPLEX

## (undated) DEVICE — BNDG CMPR ELAS KNT VEL 6INX5YD -- MEDICHOICE

## (undated) DEVICE — SNARE POLYP M W27MMXL240CM OVL STIFF DISP CAPTIVATOR

## (undated) DEVICE — FLUFF AND POLYMER UNDERPAD,EXTRA HEAVY: Brand: WINGS

## (undated) DEVICE — WATERPROOF, BACTERIA PROOF DRESSING WITH ABSORBENT SEE THROUGH PAD: Brand: OPSITE POST-OP VISIBLE 35X10CM CTN 20

## (undated) DEVICE — SPIROMETER INCENT 2500ML W ONE W VLV

## (undated) DEVICE — 3M™ BAIR PAWS FLEX™ WARMING GOWN, STANDARD, 20 PER CASE 81003: Brand: BAIR PAWS™

## (undated) DEVICE — ZIMMER® STERILE DISPOSABLE TOURNIQUET CUFF WITH PLC, DUAL PORT, SINGLE BLADDER, 34 IN. (86 CM)

## (undated) DEVICE — SYRINGE MED 25GA 3ML L5/8IN SUBQ PLAS W/ DETACH NDL SFTY

## (undated) DEVICE — MEDI-VAC SUCTION HIGH CAPACITY: Brand: CARDINAL HEALTH

## (undated) DEVICE — (D)PACK ICE DISP -- DISC BY MFR

## (undated) DEVICE — SYR 50ML SLIP TIP NSAF LF STRL --

## (undated) DEVICE — SOLUTION IV 1000ML 0.9% SOD CHL

## (undated) DEVICE — SOLUTION IRRIG 1000ML H2O STRL BLT

## (undated) DEVICE — CANNULA ORIG TL CLR W FOAM CUSHIONS AND 14FT SUPL TB 3 CHN

## (undated) DEVICE — 3M™ TEGADERM™ TRANSPARENT FILM DRESSING FRAME STYLE, 1626W, 4 IN X 4-3/4 IN (10 CM X 12 CM), 50/CT 4CT/CASE: Brand: 3M™ TEGADERM™

## (undated) DEVICE — DRSG PATCH ANTIMIC 1INX7.0MM -- CONVERT TO ITEM 356054

## (undated) DEVICE — NEEDLE SPNL 22GA L3.5IN BLK HUB S STL REG WALL FIT STYL W/

## (undated) DEVICE — GAUZE SPONGES,16 PLY: Brand: CURITY

## (undated) DEVICE — BLADE SAW W098XL295IN THK005IN CUT THK0058IN REPL SAG W

## (undated) DEVICE — FLEX ADVANTAGE 3000CC: Brand: FLEX ADVANTAGE

## (undated) DEVICE — BIT DRL QR TOT KNEE 1/8IN SS -- DISP STRL 2/PK

## (undated) DEVICE — MEDI-VAC NON-CONDUCTIVE SUCTION TUBING: Brand: CARDINAL HEALTH

## (undated) DEVICE — CLIP HEMO ENDOSCP 235CM BX/10 -- RESOLUTION 360